# Patient Record
Sex: FEMALE | Race: WHITE | Employment: PART TIME | URBAN - METROPOLITAN AREA
[De-identification: names, ages, dates, MRNs, and addresses within clinical notes are randomized per-mention and may not be internally consistent; named-entity substitution may affect disease eponyms.]

---

## 2019-01-22 PROBLEM — F34.1 DYSTHYMIC DISORDER: Status: ACTIVE | Noted: 2019-01-22

## 2019-06-24 ENCOUNTER — HOSPITAL ENCOUNTER (OUTPATIENT)
Dept: GENERAL RADIOLOGY | Age: 54
Discharge: HOME | End: 2019-06-24
Attending: FAMILY MEDICINE
Payer: COMMERCIAL

## 2019-06-24 DIAGNOSIS — M54.10 RADICULAR LOW BACK PAIN: ICD-10-CM

## 2019-06-24 PROCEDURE — 72100 X-RAY EXAM L-S SPINE 2/3 VWS: CPT

## 2019-06-26 PROBLEM — N95.1 MENOPAUSAL SYNDROME: Chronic | Status: ACTIVE | Noted: 2019-06-26

## 2019-06-26 PROBLEM — E78.5 HYPERLIPIDEMIA ASSOCIATED WITH TYPE 2 DIABETES MELLITUS (HCC): Chronic | Status: ACTIVE | Noted: 2019-06-26

## 2019-06-26 PROBLEM — M47.817 OSTEOARTHRITIS OF LUMBOSACRAL SPINE: Status: ACTIVE | Noted: 2019-06-26

## 2019-06-26 PROBLEM — E11.69 HYPERLIPIDEMIA ASSOCIATED WITH TYPE 2 DIABETES MELLITUS (HCC): Chronic | Status: ACTIVE | Noted: 2019-06-26

## 2019-06-27 PROBLEM — R03.0 ELEVATED BP WITHOUT DIAGNOSIS OF HYPERTENSION: Status: ACTIVE | Noted: 2019-06-27

## 2019-08-12 LAB — HIV AG/AB: NORMAL

## 2019-11-14 ENCOUNTER — HOSPITAL ENCOUNTER (OUTPATIENT)
Dept: MAMMOGRAPHY | Age: 54
Discharge: HOME | End: 2019-11-14
Attending: FAMILY MEDICINE
Payer: COMMERCIAL

## 2019-11-14 DIAGNOSIS — Z12.39 BREAST CANCER SCREENING: ICD-10-CM

## 2019-11-14 PROCEDURE — 77067 SCR MAMMO BI INCL CAD: CPT

## 2022-03-18 PROBLEM — E11.69 HYPERLIPIDEMIA ASSOCIATED WITH TYPE 2 DIABETES MELLITUS (HCC): Status: ACTIVE | Noted: 2019-06-26

## 2022-03-18 PROBLEM — R03.0 ELEVATED BP WITHOUT DIAGNOSIS OF HYPERTENSION: Status: ACTIVE | Noted: 2019-06-27

## 2022-03-18 PROBLEM — E78.5 HYPERLIPIDEMIA ASSOCIATED WITH TYPE 2 DIABETES MELLITUS (HCC): Status: ACTIVE | Noted: 2019-06-26

## 2022-03-18 PROBLEM — M47.817 OSTEOARTHRITIS OF LUMBOSACRAL SPINE: Status: ACTIVE | Noted: 2019-06-26

## 2022-03-19 PROBLEM — F34.1 DYSTHYMIC DISORDER: Status: ACTIVE | Noted: 2019-01-22

## 2022-03-20 PROBLEM — N95.1 MENOPAUSAL SYNDROME: Status: ACTIVE | Noted: 2019-06-26

## 2022-04-29 PROBLEM — E66.3 OVERWEIGHT (BMI 25.0-29.9): Status: ACTIVE | Noted: 2022-04-29

## 2022-04-29 PROBLEM — R07.9 CHEST PAIN: Status: ACTIVE | Noted: 2022-04-29

## 2022-05-06 PROBLEM — I10 HYPERTENSION: Status: ACTIVE | Noted: 2022-05-06

## 2022-05-09 ENCOUNTER — HOSPITAL ENCOUNTER (OUTPATIENT)
Dept: LAB | Age: 57
Discharge: HOME | End: 2022-05-09

## 2022-05-09 DIAGNOSIS — R94.39 ABNORMAL CARDIOVASCULAR STRESS TEST: ICD-10-CM

## 2022-05-09 LAB
ANION GAP SERPL CALC-SCNC: 3 MMOL/L (ref 7–16)
BASOPHILS # BLD: 0.1 K/UL (ref 0–0.2)
BASOPHILS NFR BLD: 1 % (ref 0–2)
BUN SERPL-MCNC: 12 MG/DL (ref 6–23)
CALCIUM SERPL-MCNC: 8.7 MG/DL (ref 8.3–10.4)
CHLORIDE SERPL-SCNC: 105 MMOL/L (ref 98–107)
CO2 SERPL-SCNC: 28 MMOL/L (ref 21–32)
CREAT SERPL-MCNC: 0.7 MG/DL (ref 0.6–1)
DIFFERENTIAL METHOD BLD: NORMAL
EOSINOPHIL # BLD: 0.1 K/UL (ref 0–0.8)
EOSINOPHIL NFR BLD: 2 % (ref 0.5–7.8)
ERYTHROCYTE [DISTWIDTH] IN BLOOD BY AUTOMATED COUNT: 12.3 % (ref 11.9–14.6)
GLUCOSE SERPL-MCNC: 230 MG/DL (ref 65–100)
HCT VFR BLD AUTO: 39 % (ref 35.8–46.3)
HGB BLD-MCNC: 13.3 G/DL (ref 11.7–15.4)
IMM GRANULOCYTES # BLD AUTO: 0 K/UL (ref 0–0.5)
IMM GRANULOCYTES NFR BLD AUTO: 0 % (ref 0–5)
INR PPP: 1.1
LYMPHOCYTES # BLD: 2.4 K/UL (ref 0.5–4.6)
LYMPHOCYTES NFR BLD: 35 % (ref 13–44)
MCH RBC QN AUTO: 28.1 PG (ref 26.1–32.9)
MCHC RBC AUTO-ENTMCNC: 34.1 G/DL (ref 31.4–35)
MCV RBC AUTO: 82.3 FL (ref 79.6–97.8)
MONOCYTES # BLD: 0.5 K/UL (ref 0.1–1.3)
MONOCYTES NFR BLD: 8 % (ref 4–12)
NEUTS SEG # BLD: 3.8 K/UL (ref 1.7–8.2)
NEUTS SEG NFR BLD: 54 % (ref 43–78)
NRBC # BLD: 0 K/UL (ref 0–0.2)
PLATELET # BLD AUTO: 213 K/UL (ref 150–450)
PMV BLD AUTO: 10.5 FL (ref 9.4–12.3)
POTASSIUM SERPL-SCNC: 4.3 MMOL/L (ref 3.5–5.1)
PROTHROMBIN TIME: 14.3 SEC (ref 12.6–14.5)
RBC # BLD AUTO: 4.74 M/UL (ref 4.05–5.2)
SODIUM SERPL-SCNC: 136 MMOL/L (ref 136–145)
WBC # BLD AUTO: 7 K/UL (ref 4.3–11.1)

## 2022-05-09 PROCEDURE — 85610 PROTHROMBIN TIME: CPT

## 2022-05-09 PROCEDURE — 80048 BASIC METABOLIC PNL TOTAL CA: CPT

## 2022-05-09 PROCEDURE — 85025 COMPLETE CBC W/AUTO DIFF WBC: CPT

## 2022-05-09 PROCEDURE — 36415 COLL VENOUS BLD VENIPUNCTURE: CPT

## 2022-05-10 ENCOUNTER — HOSPITAL ENCOUNTER (OUTPATIENT)
Dept: MAMMOGRAPHY | Age: 57
Discharge: HOME | End: 2022-05-10
Attending: INTERNAL MEDICINE

## 2022-05-10 DIAGNOSIS — Z12.31 SCREENING MAMMOGRAM, ENCOUNTER FOR: ICD-10-CM

## 2022-05-10 PROCEDURE — 77067 SCR MAMMO BI INCL CAD: CPT

## 2022-05-11 NOTE — PROGRESS NOTES
Called to pre-assess for Mario Knight , Scheduled LHC. No answer & message left for pt to arrive @ 1200. NPO after MN except for 81mg ASA x4, bp meds.   Instructed to take half dose of insulin tonight and none in AM

## 2022-05-12 ENCOUNTER — HOSPITAL ENCOUNTER (OUTPATIENT)
Age: 57
Setting detail: OUTPATIENT SURGERY
Discharge: HOME | End: 2022-05-12
Attending: INTERNAL MEDICINE | Admitting: INTERNAL MEDICINE

## 2022-05-12 ENCOUNTER — APPOINTMENT (OUTPATIENT)
Dept: NON INVASIVE DIAGNOSTICS | Age: 57
End: 2022-05-12
Attending: INTERNAL MEDICINE

## 2022-05-12 VITALS
OXYGEN SATURATION: 94 % | DIASTOLIC BLOOD PRESSURE: 63 MMHG | HEART RATE: 80 BPM | SYSTOLIC BLOOD PRESSURE: 97 MMHG | TEMPERATURE: 98 F

## 2022-05-12 DIAGNOSIS — R94.39 ABNORMAL CARDIOVASCULAR STRESS TEST: ICD-10-CM

## 2022-05-12 LAB
ATRIAL RATE: 80 BPM
CALCULATED P AXIS, ECG09: 57 DEGREES
CALCULATED R AXIS, ECG10: 18 DEGREES
CALCULATED T AXIS, ECG11: 61 DEGREES
DIAGNOSIS, 93000: NORMAL
ECHO AO ASC DIAM: 2.8 CM
ECHO AO ROOT DIAM: 3.1 CM
ECHO AV AREA PEAK VELOCITY: 2.9 CM2
ECHO AV AREA VTI: 3.4 CM2
ECHO AV MEAN GRADIENT: 2 MMHG
ECHO AV MEAN VELOCITY: 0.6 M/S
ECHO AV PEAK GRADIENT: 3 MMHG
ECHO AV PEAK VELOCITY: 0.9 M/S
ECHO AV VELOCITY RATIO: 0.89
ECHO AV VTI: 16.1 CM
ECHO IVC PROX: 1.7 CM
ECHO LA AREA 2C: 14.2 CM2
ECHO LA AREA 4C: 13.9 CM2
ECHO LA DIAMETER: 3.6 CM
ECHO LA MAJOR AXIS: 4.9 CM
ECHO LA MINOR AXIS: 4.7 CM
ECHO LA TO AORTIC ROOT RATIO: 1.16
ECHO LA VOL BP: 34 ML (ref 22–52)
ECHO LV E' LATERAL VELOCITY: 9 CM/S
ECHO LV E' SEPTAL VELOCITY: 7 CM/S
ECHO LV EDV A2C: 88 ML
ECHO LV EDV A4C: 94 ML
ECHO LV EJECTION FRACTION A2C: 55 %
ECHO LV EJECTION FRACTION A4C: 54 %
ECHO LV EJECTION FRACTION BIPLANE: 55 % (ref 55–100)
ECHO LV ESV A2C: 40 ML
ECHO LV ESV A4C: 43 ML
ECHO LV FRACTIONAL SHORTENING: 26 % (ref 28–44)
ECHO LV INTERNAL DIMENSION DIASTOLIC: 4.3 CM (ref 3.9–5.3)
ECHO LV INTERNAL DIMENSION SYSTOLIC: 3.2 CM
ECHO LV IVSD: 1 CM (ref 0.6–0.9)
ECHO LV MASS 2D: 123.3 G (ref 67–162)
ECHO LV POSTERIOR WALL DIASTOLIC: 0.8 CM (ref 0.6–0.9)
ECHO LV RELATIVE WALL THICKNESS RATIO: 0.37
ECHO LVOT AREA: 3.1 CM2
ECHO LVOT AV VTI INDEX: 1.09
ECHO LVOT DIAM: 2 CM
ECHO LVOT MEAN GRADIENT: 1 MMHG
ECHO LVOT PEAK GRADIENT: 3 MMHG
ECHO LVOT PEAK VELOCITY: 0.8 M/S
ECHO LVOT SV: 55.3 ML
ECHO LVOT VTI: 17.6 CM
ECHO MV A VELOCITY: 0.77 M/S
ECHO MV E DECELERATION TIME (DT): 169 MS
ECHO MV E VELOCITY: 0.84 M/S
ECHO MV E/A RATIO: 1.09
ECHO MV E/E' LATERAL: 9.33
ECHO MV E/E' RATIO (AVERAGED): 10.67
ECHO MV E/E' SEPTAL: 12
ECHO RV BASAL DIMENSION: 3.4 CM
ECHO RV INTERNAL DIMENSION: 3.4 CM
ECHO RV TAPSE: 2 CM (ref 1.7–?)
GLUCOSE BLD STRIP.AUTO-MCNC: 140 MG/DL (ref 65–100)
P-R INTERVAL, ECG05: 144 MS
Q-T INTERVAL, ECG07: 400 MS
QRS DURATION, ECG06: 70 MS
QTC CALCULATION (BEZET), ECG08: 461 MS
SERVICE CMNT-IMP: ABNORMAL
VENTRICULAR RATE, ECG03: 80 BPM

## 2022-05-12 PROCEDURE — 77030040934 HC CATH DIAG DXTERITY MEDT -A: Performed by: INTERNAL MEDICINE

## 2022-05-12 PROCEDURE — 74011000250 HC RX REV CODE- 250: Performed by: INTERNAL MEDICINE

## 2022-05-12 PROCEDURE — 99152 MOD SED SAME PHYS/QHP 5/>YRS: CPT | Performed by: INTERNAL MEDICINE

## 2022-05-12 PROCEDURE — 93458 L HRT ARTERY/VENTRICLE ANGIO: CPT | Performed by: INTERNAL MEDICINE

## 2022-05-12 PROCEDURE — C1887 CATHETER, GUIDING: HCPCS | Performed by: INTERNAL MEDICINE

## 2022-05-12 PROCEDURE — 74011250636 HC RX REV CODE- 250/636: Performed by: INTERNAL MEDICINE

## 2022-05-12 PROCEDURE — 93005 ELECTROCARDIOGRAM TRACING: CPT | Performed by: INTERNAL MEDICINE

## 2022-05-12 PROCEDURE — 82962 GLUCOSE BLOOD TEST: CPT

## 2022-05-12 PROCEDURE — 77030029997 HC DEV COM RDL R BND TELE -B: Performed by: INTERNAL MEDICINE

## 2022-05-12 PROCEDURE — 99245 OFF/OP CONSLTJ NEW/EST HI 55: CPT | Performed by: THORACIC SURGERY (CARDIOTHORACIC VASCULAR SURGERY)

## 2022-05-12 PROCEDURE — 74011000636 HC RX REV CODE- 636: Performed by: INTERNAL MEDICINE

## 2022-05-12 PROCEDURE — C1894 INTRO/SHEATH, NON-LASER: HCPCS | Performed by: INTERNAL MEDICINE

## 2022-05-12 PROCEDURE — 77030016699 HC CATH ANGI DX INFN1 CARD -A: Performed by: INTERNAL MEDICINE

## 2022-05-12 PROCEDURE — C1769 GUIDE WIRE: HCPCS | Performed by: INTERNAL MEDICINE

## 2022-05-12 PROCEDURE — 93306 TTE W/DOPPLER COMPLETE: CPT

## 2022-05-12 RX ORDER — NALOXONE HYDROCHLORIDE 0.4 MG/ML
0.4 INJECTION, SOLUTION INTRAMUSCULAR; INTRAVENOUS; SUBCUTANEOUS AS NEEDED
Status: CANCELLED | OUTPATIENT
Start: 2022-05-12

## 2022-05-12 RX ORDER — SODIUM CHLORIDE 9 MG/ML
75 INJECTION, SOLUTION INTRAVENOUS CONTINUOUS
Status: DISCONTINUED | OUTPATIENT
Start: 2022-05-12 | End: 2022-05-12 | Stop reason: HOSPADM

## 2022-05-12 RX ORDER — MIDAZOLAM HYDROCHLORIDE 1 MG/ML
INJECTION, SOLUTION INTRAMUSCULAR; INTRAVENOUS AS NEEDED
Status: DISCONTINUED | OUTPATIENT
Start: 2022-05-12 | End: 2022-05-12 | Stop reason: HOSPADM

## 2022-05-12 RX ORDER — SODIUM CHLORIDE 9 MG/ML
75 INJECTION, SOLUTION INTRAVENOUS CONTINUOUS
Status: CANCELLED | OUTPATIENT
Start: 2022-05-12

## 2022-05-12 RX ORDER — SODIUM CHLORIDE 0.9 % (FLUSH) 0.9 %
5-40 SYRINGE (ML) INJECTION AS NEEDED
Status: CANCELLED | OUTPATIENT
Start: 2022-05-12

## 2022-05-12 RX ORDER — ACETAMINOPHEN 325 MG/1
650 TABLET ORAL
Status: CANCELLED | OUTPATIENT
Start: 2022-05-12

## 2022-05-12 RX ORDER — GUAIFENESIN 100 MG/5ML
324 LIQUID (ML) ORAL ONCE
Status: DISCONTINUED | OUTPATIENT
Start: 2022-05-12 | End: 2022-05-12 | Stop reason: HOSPADM

## 2022-05-12 RX ORDER — FENTANYL CITRATE 50 UG/ML
INJECTION, SOLUTION INTRAMUSCULAR; INTRAVENOUS AS NEEDED
Status: DISCONTINUED | OUTPATIENT
Start: 2022-05-12 | End: 2022-05-12 | Stop reason: HOSPADM

## 2022-05-12 RX ORDER — HYDROCODONE BITARTRATE AND ACETAMINOPHEN 5; 325 MG/1; MG/1
1 TABLET ORAL
Status: CANCELLED | OUTPATIENT
Start: 2022-05-12

## 2022-05-12 RX ORDER — LIDOCAINE HYDROCHLORIDE 10 MG/ML
INJECTION INFILTRATION; PERINEURAL AS NEEDED
Status: DISCONTINUED | OUTPATIENT
Start: 2022-05-12 | End: 2022-05-12 | Stop reason: HOSPADM

## 2022-05-12 RX ORDER — ONDANSETRON 2 MG/ML
4 INJECTION INTRAMUSCULAR; INTRAVENOUS
Status: CANCELLED | OUTPATIENT
Start: 2022-05-12 | End: 2022-05-13

## 2022-05-12 RX ORDER — HEPARIN SODIUM 200 [USP'U]/100ML
INJECTION, SOLUTION INTRAVENOUS
Status: COMPLETED | OUTPATIENT
Start: 2022-05-12 | End: 2022-05-12

## 2022-05-12 RX ORDER — MORPHINE SULFATE 4 MG/ML
1 INJECTION INTRAVENOUS
Status: CANCELLED | OUTPATIENT
Start: 2022-05-12

## 2022-05-12 RX ORDER — SODIUM CHLORIDE 0.9 % (FLUSH) 0.9 %
5-40 SYRINGE (ML) INJECTION EVERY 8 HOURS
Status: CANCELLED | OUTPATIENT
Start: 2022-05-12

## 2022-05-12 RX ADMIN — SODIUM CHLORIDE 75 ML/HR: 900 INJECTION, SOLUTION INTRAVENOUS at 13:25

## 2022-05-12 NOTE — PROGRESS NOTES
Complete amount of air removed from TR band with no bleeding or swelling noted. TR band removed and gauze dressing applied over site. Reinforced to patient  importance of no pushing, pulling or heavy lifting with affected arm. IV sites removed and pt assisted OOB. Tolerated well. Assisted OOB & ambulated to BR & on unit. Tolerated activity without difficulty. Right wrist site without bleeding or hematoma. Discharge instructions reviewed with patient including post cath care and medications side effects. Discussed thoroughly follow up appts with CVsurgery and cardiologist. Time allowed for questions and answers. Pt discharged via wheelchair with RN.

## 2022-05-12 NOTE — PROGRESS NOTES
Report received from [address] [place] [address]. Procedural findings communicated. Intra procedural  medication administration reviewed. Progression of care discussed.      Patient received into CPRU [place] [facility] post sheath removal.     [facility] with Dr Hortensia Fitzpatrick  No interventions  CV Surgery consult  R Radial  TR band at 12mL  Versed 2mg  Fentanyl 50mcg  Heparin 5000 units    Access site without bleeding or swelling yes    Dressing dry and intact yes    Patient instructed to limit movement to right upper extremity    Routine post procedural vital signs and site assessment initiated yes

## 2022-05-12 NOTE — PROGRESS NOTES
Patient received to [address] [place] [address] room # 16  Ambulatory from [facility]. Patient scheduled for [facility] today with Dr Mary Sheikh. Procedure reviewed & questions answered, voiced good understanding consent obtained & placed on chart. All medications and medical history reviewed. Will prep patient per orders. Patient & family updated on plan of care. The patient has a fraility score of 3-MANAGING WELL, based on ambulation.     324mg of Aspirin taken at 0500

## 2022-05-12 NOTE — PROGRESS NOTES
Nemesio Hobbs. MD Susy García. Adele Michel MD            Consult Note    MINISTRY [facility] Gregory Castellano         5/6/2022 1965    REFERRING PHYSICIAN:  Dr. Jackie Gonzalez:  The patient is a 64 y.o. female who was seen in the office by Dr Fay Barros. She had noted some right sided chest discomfort and JOAQUIN. JOAQUIN was more noticeable with climbing stairs. She underwent a nuclear stress test that showed basal inferolateral, mid inferior, mid inferolateral and apical ischemia. LHC was planned. She underwent cardiac catheterization today that showed severe multivessel disease with an occluded LAD and subtotally occluded LCx. Echocardiogram showed a normal LV EF with no significant valvular disease. Risk factors include DM, HTN, dyslipidemia    ** No history of stroke, TIA, prior cardiac surgery, prior vascular surgery, anesthetic complication, lung disease, DVT or PE, GI bleeding       Past Medical History:   Diagnosis Date    Anxiety 11/28/2012    DM (diabetes mellitus) ([facility] [address] 11/28/2012    Hypertension        History reviewed. No pertinent surgical history.     Family History   Problem Relation Age of Onset    Diabetes Father        Social History     Socioeconomic History    Marital status:      Spouse name: Not on file    Number of children: Not on file    Years of education: Not on file    Highest education level: Not on file   Occupational History    Not on file   Tobacco Use    Smoking status: Never Smoker    Smokeless tobacco: Never Used   Substance and Sexual Activity    Alcohol use: Yes     Comment: occ    Drug use: No    Sexual activity: Not on file   Other Topics Concern    Not on file   Social History Narrative    Not on file     Social Determinants of Health     Financial Resource Strain:     Difficulty of Paying Living Expenses: Not on file   Food Insecurity:     Worried About Running Out of Food in the Last Year: Not on file    [address] in the Last Year: Not on file   Transportation Needs:     Lack of Transportation (Medical): Not on file    Lack of Transportation (Non-Medical): Not on file   Physical Activity:     Days of Exercise per Week: Not on file    Minutes of Exercise per Session: Not on file   Stress:     Feeling of Stress : Not on file   Social Connections:     Frequency of Communication with Friends and Family: Not on file    Frequency of Social Gatherings with Friends and Family: Not on file    Attends Oriental orthodox Services: Not on file    Active Member of [address] [place] [address] [email or web address] or Organizations: Not on file    Attends Club or Organization Meetings: Not on file    Marital Status: Not on file   Intimate Partner Violence:     Fear of Current or Ex-Partner: Not on file    Emotionally Abused: Not on file    Physically Abused: Not on file    Sexually Abused: Not on file   Housing Stability:     Unable to Pay for Housing in the Last Year: Not on file    Number of [place] in the Last Year: Not on file    Unstable Housing in the Last Year: Not on file       No Known Allergies    No current facility-administered medications on file prior to encounter. Current Outpatient Medications on File Prior to Encounter   Medication Sig Dispense Refill    lisinopriL (PRINIVIL, ZESTRIL) 20 mg tablet Take 1 Tablet by mouth daily. 90 Tablet 1    aspirin delayed-release 81 mg tablet Take 1 Tablet by mouth daily. 1 Tablet 0    citalopram (CELEXA) 20 mg tablet Take 1 Tablet by mouth daily for 30 days. 30 Tablet 5    atorvastatin (LIPITOR) 20 mg tablet Take 1 Tablet by mouth daily. 90 Tablet 1    insulin NPH (HumuLIN N NPH Insulin KwikPen) 100 unit/mL (3 mL) inpn 15 units qam, 7 units pm before meals 3 Pen 3    OZEMPIC 0.25 mg or 0.5 mg(2 mg/1.5 mL) sub-q pen 0.5 mg by SubCUTAneous route every seven (7) days.  1 Box 3    Blood-Glucose Meter (ONETOUCH VERIO IQ METER) monitoring kit Use twice daily as directed for management of T2DM uncontrolled (E11.65) 1 Kit 0    glucose blood VI test strips (ONETOUCH VERIO) strip Use twice daily as directed for management of T2DM uncontrolled (E11.65) 200 Strip 3    lancets (ONETOUCH ULTRASOFT LANCETS) misc Use twice daily as directed for management of T2DM uncontrolled (E11.65) 200 Each 3    raloxifene (EVISTA) 60 mg tablet Take 1 Tab by mouth daily. (Patient not taking: Reported on 4/27/2022) 30 Tab 11       REVIEW OF SYSTEMS:  Review of Systems   Constitutional: Negative for chills, fever, malaise/fatigue, weight gain and weight loss. HENT: Negative for ear pain, hearing loss, nosebleeds, sore throat and tinnitus. Eyes: Negative for blurred vision, vision loss in left eye and vision loss in right eye. Cardiovascular: Positive for chest pain and dyspnea on exertion. Negative for leg swelling, near-syncope, orthopnea, palpitations, paroxysmal nocturnal dyspnea and syncope. Respiratory: Negative for cough, hemoptysis, shortness of breath, sputum production and wheezing. Endocrine: Negative for cold intolerance, heat intolerance and polydipsia. Hematologic/Lymphatic: Does not bruise/bleed easily. Skin: Negative for color change and rash. Musculoskeletal: Negative for back pain, joint pain, joint swelling and myalgias. Gastrointestinal: Negative for abdominal pain, constipation, diarrhea, dysphagia, heartburn, hematemesis, melena, nausea and vomiting. Genitourinary: Negative for dysuria, frequency, hematuria and urgency. Neurological: Negative for difficulty with concentration, dizziness, headaches, light-headedness, numbness, paresthesias, seizures, vertigo and weakness. Psychiatric/Behavioral: Negative for altered mental status and depression.        Physical Exam  Vitals:    05/12/22 1544 05/12/22 1559 05/12/22 1614 05/12/22 1629   BP: 120/78 114/68 123/85 126/75   Pulse: 94 84 82 87   Temp:       SpO2: 94% 95% 95% 96%       Physical Exam:  General: Well Developed, Well Nourished, No Acute Distress  HEENT: Normocephalic, pupils equal and round, no scleral icterus  Neck: supple, no JVD  Chest wall: No deformity  Heart: S1S2 with RRR without murmurs or gallops  Lungs: Clear throughout auscultation bilaterally without adventitious sounds  Abd: soft, nontender, nondistended, with good bowel sounds, no pulsatile masses  Ext: warm, no edema, calves supple/nontender, pulses 2+ bilaterally  Skin: warm and dry, no rashes, cyanosis, jaundice, ecchymoses or evidence of skin breakdown  Psychiatric: Normal mood and affect  Neurologic: Alert and oriented X 3, no focal deficit noted    Labs:     Lab Results   Component Value Date/Time    Cholesterol, total 217 (H) 04/27/2022 03:48 PM    HDL Cholesterol 40 04/27/2022 03:48 PM    LDL, calculated 131 (H) 04/27/2022 03:48 PM    LDL, calculated 164 (H) 06/19/2019 11:00 AM    VLDL, calculated 46 (H) 04/27/2022 03:48 PM    VLDL, calculated 34 06/19/2019 11:00 AM    Triglyceride 259 (H) 04/27/2022 03:48 PM       Assessment:     Principal Problem:    Chest pain (4/29/2022)    CAD    DM    Hypertension    Dyslipidemia     Plan:     Yue Kang is to see preoperative teaching film that thoroughly discusses procedure, risks, and possible complications. Risks, benefits, and alternatives were discussed to include, but not limited to:     1. Bleeding  2. Arrhythmia   3. Infection including mediastinitis  4. Myocardial infarction  5. Need for reoperation  6. Renal failure  7. Respiratory failure  8. Stroke  9. Potential death      Cardiac catheterization films, echocardiogram, and labs reviewed. The patient has NYHA Class III symptoms. The mortality risk for CABG surgery is 0.418%. She agrees to proceed with CABG surgery.        Amelia Sorensen MD

## 2022-05-12 NOTE — DISCHARGE INSTRUCTIONS
Cardiac Catheterization/Angiography Discharge Instructions    *Check the puncture site frequently for swelling or bleeding. If you see any bleeding, lie down and apply pressure over the area with a clean town or washcloth. Notify your doctor for any redness, swelling, drainage or oozing from the puncture site. Notify your doctor for any fever or chills. *If the leg or arm with the puncture becomes cold, numb or painful, call Dr Tyler Payne at  [phone]    *Activity should be limited for the next 48 hours. Climb stairs as little as possible and avoid any stooping, bending or strenuous activity for 48 hours. No heavy lifting (anything over 10 pounds) for three days. *Do not drive for 48 hours. *You may resume your usual diet. Drink more fluids than usual.    *Have a responsible person drive you home and stay with you for at least 24 hours after your heart catheterization/angiography. *You may remove the bandage from your Right and Arm in 24 hours. You may shower in 24 hours. No tub baths, hot tubs or swimming for one week. Do not place any lotions, creams, powders, ointments over the puncture site for one week. You may place a clean band-aid over the puncture site each day for 5 days. Change this daily.

## 2022-05-12 NOTE — PROGRESS NOTES
TRANSFER - OUT REPORT:    Verbal report given to Ammy Berg RN(name) on [facility]  being transferred to CPRU(unit) for routine progression of care       Report consisted of patients Situation, Background, Assessment and   Recommendations(SBAR). Information from the following report(s) SBAR was reviewed with the receiving nurse.     [facility] with Dr Christin Schwab  No interventions  CV Surgery consult  R Radial  TR band at 12mL  Versed 2mg  Fentanyl 50mcg  Heparin 5000 units

## 2022-05-12 NOTE — PROGRESS NOTES
Abner Williamson. MD Alanna Agustin. Sascha Miller MD           MANAGEMENT PLAN    Chiara Mackay         5/6/2022 1965    REFERRING PHYSICIAN:  Dr. Katherine Snyder:  The patient is a 64 y.o. female who was seen in the office by Dr Osmel Smiley. She had noted some right sided chest discomfort and JOAQUIN. JOAQUIN was more noticeable with climbing stairs. She underwent a nuclear stress test that showed basal inferolateral, mid inferior, mid inferolateral and apical ischemia. LHC was planned. She underwent cardiac catheterization today that showed severe multivessel disease with an occluded LAD and subtotally occluded LCx. Echocardiogram showed a normal LV EF with no significant valvular disease. Risk factors include DM, HTN, dyslipidemia    ** No history of stroke, TIA, prior cardiac surgery, prior vascular surgery, anesthetic complication, lung disease, DVT or PE, GI bleeding       Past Medical History:   Diagnosis Date    Anxiety 11/28/2012    DM (diabetes mellitus) ([facility] [address] 11/28/2012    Hypertension        History reviewed. No pertinent surgical history.     Family History   Problem Relation Age of Onset    Diabetes Father        Social History     Socioeconomic History    Marital status:      Spouse name: Not on file    Number of children: Not on file    Years of education: Not on file    Highest education level: Not on file   Occupational History    Not on file   Tobacco Use    Smoking status: Never Smoker    Smokeless tobacco: Never Used   Substance and Sexual Activity    Alcohol use: Yes     Comment: occ    Drug use: No    Sexual activity: Not on file   Other Topics Concern    Not on file   Social History Narrative    Not on file     Social Determinants of Health     Financial Resource Strain:     Difficulty of Paying Living Expenses: Not on file   Food Insecurity:     Worried About Running Out of Food in the Last Year: Not on file    [address] in the Last Year: Not on file   Transportation Needs:     Lack of Transportation (Medical): Not on file    Lack of Transportation (Non-Medical): Not on file   Physical Activity:     Days of Exercise per Week: Not on file    Minutes of Exercise per Session: Not on file   Stress:     Feeling of Stress : Not on file   Social Connections:     Frequency of Communication with Friends and Family: Not on file    Frequency of Social Gatherings with Friends and Family: Not on file    Attends [employer] Services: Not on file    Active Member of [address] [place] [address] [employer] or Organizations: Not on file    Attends Club or Organization Meetings: Not on file    Marital Status: Not on file   Intimate Partner Violence:     Fear of Current or Ex-Partner: Not on file    Emotionally Abused: Not on file    Physically Abused: Not on file    Sexually Abused: Not on file   Housing Stability:     Unable to Pay for Housing in the Last Year: Not on file    Number of [place] in the Last Year: Not on file    Unstable Housing in the Last Year: Not on file       No Known Allergies    No current facility-administered medications on file prior to encounter. Current Outpatient Medications on File Prior to Encounter   Medication Sig Dispense Refill    lisinopriL (PRINIVIL, ZESTRIL) 20 mg tablet Take 1 Tablet by mouth daily. 90 Tablet 1    aspirin delayed-release 81 mg tablet Take 1 Tablet by mouth daily. 1 Tablet 0    citalopram (CELEXA) 20 mg tablet Take 1 Tablet by mouth daily for 30 days. 30 Tablet 5    atorvastatin (LIPITOR) 20 mg tablet Take 1 Tablet by mouth daily. 90 Tablet 1    insulin NPH (HumuLIN N NPH Insulin KwikPen) 100 unit/mL (3 mL) inpn 15 units qam, 7 units pm before meals 3 Pen 3    OZEMPIC 0.25 mg or 0.5 mg(2 mg/1.5 mL) sub-q pen 0.5 mg by SubCUTAneous route every seven (7) days.  1 Box 3    Blood-Glucose Meter (ONETOUCH VERIO IQ METER) monitoring kit Use twice daily as directed for management of T2DM uncontrolled (E11.65) 1 Kit 0    glucose blood VI test strips (ONETOUCH VERIO) strip Use twice daily as directed for management of T2DM uncontrolled (E11.65) 200 Strip 3    lancets (ONETOUCH ULTRASOFT LANCETS) misc Use twice daily as directed for management of T2DM uncontrolled (E11.65) 200 Each 3    raloxifene (EVISTA) 60 mg tablet Take 1 Tab by mouth daily. (Patient not taking: Reported on 4/27/2022) 30 Tab 11       REVIEW OF SYSTEMS:  Review of Systems   Constitutional: Negative for chills, fever, malaise/fatigue, weight gain and weight loss. HENT: Negative for ear pain, hearing loss, nosebleeds, sore throat and tinnitus. Eyes: Negative for blurred vision, vision loss in left eye and vision loss in right eye. Cardiovascular: Positive for chest pain and dyspnea on exertion. Negative for leg swelling, near-syncope, orthopnea, palpitations, paroxysmal nocturnal dyspnea and syncope. Respiratory: Negative for cough, hemoptysis, shortness of breath, sputum production and wheezing. Endocrine: Negative for cold intolerance, heat intolerance and polydipsia. Hematologic/Lymphatic: Does not bruise/bleed easily. Skin: Negative for color change and rash. Musculoskeletal: Negative for back pain, joint pain, joint swelling and myalgias. Gastrointestinal: Negative for abdominal pain, constipation, diarrhea, dysphagia, heartburn, hematemesis, melena, nausea and vomiting. Genitourinary: Negative for dysuria, frequency, hematuria and urgency. Neurological: Negative for difficulty with concentration, dizziness, headaches, light-headedness, numbness, paresthesias, seizures, vertigo and weakness. Psychiatric/Behavioral: Negative for altered mental status and depression.        Physical Exam  Vitals:    05/12/22 1322   BP: (!) 144/84   Pulse: 81   Temp: 98 °F (36.7 °C)   SpO2: 98%       Physical Exam:  General: Well Developed, Well Nourished, No Acute Distress  HEENT: Normocephalic, pupils equal and round, no scleral icterus  Neck: supple, no JVD  Chest wall: No deformity  Heart: S1S2 with RRR without murmurs or gallops  Lungs: Clear throughout auscultation bilaterally without adventitious sounds  Abd: soft, nontender, nondistended, with good bowel sounds, no pulsatile masses  Ext: warm, no edema, calves supple/nontender, pulses 2+ bilaterally  Skin: warm and dry, no rashes, cyanosis, jaundice, ecchymoses or evidence of skin breakdown  Psychiatric: Normal mood and affect  Neurologic: Alert and oriented X 3, no focal deficit noted    Labs:     Lab Results   Component Value Date/Time    Cholesterol, total 217 (H) 04/27/2022 03:48 PM    HDL Cholesterol 40 04/27/2022 03:48 PM    LDL, calculated 131 (H) 04/27/2022 03:48 PM    LDL, calculated 164 (H) 06/19/2019 11:00 AM    VLDL, calculated 46 (H) 04/27/2022 03:48 PM    VLDL, calculated 34 06/19/2019 11:00 AM    Triglyceride 259 (H) 04/27/2022 03:48 PM       Assessment:     Principal Problem:    Chest pain (4/29/2022)    CAD    DM    Hypertension    Dyslipidemia     Plan:     Dora Philip is to see preoperative teaching film that thoroughly discusses procedure, risks, and possible complications. Risks, benefits, and alternatives were discussed to include, but not limited to:     1. Bleeding  2. Arrhythmia   3. Infection including mediastinitis  4. Myocardial infarction  5. Need for reoperation  6. Renal failure  7. Respiratory failure  8. Stroke  9. Potential death      Cardiac catheterization films, echocardiogram, and labs reviewed. The patient has NYHA Class III symptoms. The mortality risk for CABG surgery is 0.418%. She agrees to proceed with CABG surgery.        Lilian Walker PA-C

## 2022-05-12 NOTE — Clinical Note
TRANSFER - IN REPORT:     Verbal report received from: cpru RN. Report consisted of patient's Situation, Background, Assessment and   Recommendations(SBAR). Opportunity for questions and clarification was provided. Assessment completed upon patient's arrival to unit and care assumed.

## 2022-05-20 ENCOUNTER — ANESTHESIA EVENT (OUTPATIENT)
Dept: SURGERY | Age: 57
DRG: 236 | End: 2022-05-20

## 2022-05-23 ENCOUNTER — HOSPITAL ENCOUNTER (OUTPATIENT)
Dept: ULTRASOUND IMAGING | Age: 57
Discharge: HOME | End: 2022-05-26

## 2022-05-23 ENCOUNTER — HOSPITAL ENCOUNTER (OUTPATIENT)
Dept: GENERAL RADIOLOGY | Age: 57
Discharge: HOME | End: 2022-05-26

## 2022-05-23 ENCOUNTER — HOSPITAL ENCOUNTER (OUTPATIENT)
Dept: PREADMISSION TESTING | Age: 57
Discharge: HOME | DRG: 236 | End: 2022-05-26

## 2022-05-23 VITALS
DIASTOLIC BLOOD PRESSURE: 86 MMHG | BODY MASS INDEX: 29.2 KG/M2 | RESPIRATION RATE: 16 BRPM | WEIGHT: 164.8 LBS | TEMPERATURE: 97.3 F | SYSTOLIC BLOOD PRESSURE: 150 MMHG | OXYGEN SATURATION: 98 % | HEART RATE: 81 BPM | HEIGHT: 63 IN

## 2022-05-23 DIAGNOSIS — I25.709 CORONARY ARTERY DISEASE INVOLVING CORONARY BYPASS GRAFT OF NATIVE HEART WITH ANGINA PECTORIS (HCC): Primary | ICD-10-CM

## 2022-05-23 DIAGNOSIS — Z01.810 PREOP CARDIOVASCULAR EXAM: ICD-10-CM

## 2022-05-23 LAB
ALBUMIN SERPL-MCNC: 3.4 G/DL (ref 3.5–5)
ALBUMIN/GLOB SERPL: 0.8 {RATIO} (ref 1.2–3.5)
ALP SERPL-CCNC: 139 U/L (ref 50–136)
ALT SERPL-CCNC: 23 U/L (ref 12–65)
ANION GAP SERPL CALC-SCNC: 7 MMOL/L (ref 7–16)
APPEARANCE UR: CLEAR
APTT PPP: 27.3 SEC (ref 24.1–35.1)
AST SERPL-CCNC: 14 U/L (ref 15–37)
BASOPHILS # BLD: 0.1 K/UL (ref 0–0.2)
BASOPHILS NFR BLD: 1 % (ref 0–2)
BILIRUB SERPL-MCNC: 0.3 MG/DL (ref 0.2–1.1)
BILIRUB UR QL: NEGATIVE
BUN SERPL-MCNC: 18 MG/DL (ref 6–23)
CALCIUM SERPL-MCNC: 9.3 MG/DL (ref 8.3–10.4)
CHLORIDE SERPL-SCNC: 102 MMOL/L (ref 98–107)
CO2 SERPL-SCNC: 28 MMOL/L (ref 21–32)
COLOR UR: YELLOW
CREAT SERPL-MCNC: 0.74 MG/DL (ref 0.6–1)
DIFFERENTIAL METHOD BLD: NORMAL
EOSINOPHIL # BLD: 0.1 K/UL (ref 0–0.8)
EOSINOPHIL NFR BLD: 2 % (ref 0.5–7.8)
ERYTHROCYTE [DISTWIDTH] IN BLOOD BY AUTOMATED COUNT: 12.4 % (ref 11.9–14.6)
EST. AVERAGE GLUCOSE BLD GHB EST-MCNC: 283 MG/DL
GLOBULIN SER CALC-MCNC: 4.2 G/DL (ref 2.3–3.5)
GLUCOSE BLD STRIP.AUTO-MCNC: 309 MG/DL (ref 65–100)
GLUCOSE SERPL-MCNC: 297 MG/DL (ref 65–100)
GLUCOSE UR STRIP.AUTO-MCNC: >1000 MG/DL
HBA1C MFR BLD: 11.5 % (ref 4.2–6.3)
HCT VFR BLD AUTO: 40.2 % (ref 35.8–46.3)
HGB BLD-MCNC: 13.7 G/DL (ref 11.7–15.4)
HGB UR QL STRIP: NEGATIVE
HISTORY CHECK: NORMAL
IMM GRANULOCYTES # BLD AUTO: 0 K/UL (ref 0–0.5)
IMM GRANULOCYTES NFR BLD AUTO: 0 % (ref 0–5)
INR PPP: 1
KETONES UR QL STRIP.AUTO: NEGATIVE MG/DL
LEUKOCYTE ESTERASE UR QL STRIP.AUTO: NEGATIVE
LYMPHOCYTES # BLD: 2.7 K/UL (ref 0.5–4.6)
LYMPHOCYTES NFR BLD: 39 % (ref 13–44)
MAGNESIUM SERPL-MCNC: 2 MG/DL (ref 1.8–2.4)
MCH RBC QN AUTO: 28.6 PG (ref 26.1–32.9)
MCHC RBC AUTO-ENTMCNC: 34.1 G/DL (ref 31.4–35)
MCV RBC AUTO: 83.9 FL (ref 79.6–97.8)
MONOCYTES # BLD: 0.5 K/UL (ref 0.1–1.3)
MONOCYTES NFR BLD: 7 % (ref 4–12)
NEUTS SEG # BLD: 3.6 K/UL (ref 1.7–8.2)
NEUTS SEG NFR BLD: 51 % (ref 43–78)
NITRITE UR QL STRIP.AUTO: NEGATIVE
NRBC # BLD: 0 K/UL (ref 0–0.2)
PH UR STRIP: 5.5 [PH] (ref 5–9)
PLATELET # BLD AUTO: 219 K/UL (ref 150–450)
PMV BLD AUTO: 10.8 FL (ref 9.4–12.3)
POTASSIUM SERPL-SCNC: 4.3 MMOL/L (ref 3.5–5.1)
PROT SERPL-MCNC: 7.6 G/DL (ref 6.3–8.2)
PROT UR STRIP-MCNC: NEGATIVE MG/DL
PROTHROMBIN TIME: 13.3 SEC (ref 12.6–14.5)
RBC # BLD AUTO: 4.79 M/UL (ref 4.05–5.2)
SERVICE CMNT-IMP: ABNORMAL
SODIUM SERPL-SCNC: 137 MMOL/L (ref 136–145)
SP GR UR REFRACTOMETRY: 1.03 (ref 1–1.02)
UROBILINOGEN UR QL STRIP.AUTO: 1 EU/DL (ref 0.2–1)
WBC # BLD AUTO: 7.1 K/UL (ref 4.3–11.1)

## 2022-05-23 PROCEDURE — 80053 COMPREHEN METABOLIC PANEL: CPT

## 2022-05-23 PROCEDURE — 86901 BLOOD TYPING SEROLOGIC RH(D): CPT

## 2022-05-23 PROCEDURE — 93880 EXTRACRANIAL BILAT STUDY: CPT

## 2022-05-23 PROCEDURE — 83036 HEMOGLOBIN GLYCOSYLATED A1C: CPT

## 2022-05-23 PROCEDURE — 82962 GLUCOSE BLOOD TEST: CPT

## 2022-05-23 PROCEDURE — 83735 ASSAY OF MAGNESIUM: CPT

## 2022-05-23 PROCEDURE — 87086 URINE CULTURE/COLONY COUNT: CPT

## 2022-05-23 PROCEDURE — 86920 COMPATIBILITY TEST SPIN: CPT

## 2022-05-23 PROCEDURE — 85610 PROTHROMBIN TIME: CPT

## 2022-05-23 PROCEDURE — 85730 THROMBOPLASTIN TIME PARTIAL: CPT

## 2022-05-23 PROCEDURE — 71046 X-RAY EXAM CHEST 2 VIEWS: CPT

## 2022-05-23 PROCEDURE — 85025 COMPLETE CBC W/AUTO DIFF WBC: CPT

## 2022-05-23 PROCEDURE — 81003 URINALYSIS AUTO W/O SCOPE: CPT

## 2022-05-23 RX ORDER — SODIUM CHLORIDE 9 MG/ML
INJECTION, SOLUTION INTRAVENOUS PRN
Status: DISCONTINUED | OUTPATIENT
Start: 2022-05-23 | End: 2022-05-27 | Stop reason: HOSPADM

## 2022-05-23 RX ORDER — ASPIRIN 81 MG/1
81 TABLET ORAL NIGHTLY
COMMUNITY

## 2022-05-23 RX ORDER — ATORVASTATIN CALCIUM 20 MG/1
20 TABLET, FILM COATED ORAL NIGHTLY
Status: ON HOLD | COMMUNITY
End: 2022-06-01 | Stop reason: SDUPTHER

## 2022-05-23 NOTE — PERIOP NOTE
Results for Luis Enrique Acevedo (MRN [id number]) as of 5/23/2022 16:14   Ref.  Range 5/23/2022 09:27   Sodium Latest Ref Range: 136 - 145 mmol/L 137   Potassium Latest Ref Range: 3.5 - 5.1 mmol/L 4.3   Chloride Latest Ref Range: 98 - 107 mmol/L 102   CO2 Latest Ref Range: 21 - 32 mmol/L 28   BUN,BUNPL Latest Ref Range: 6 - 23 MG/DL 18   Creatinine Latest Ref Range: 0.6 - 1.0 MG/DL 0.74   Anion Gap Latest Ref Range: 7 - 16 mmol/L 7   GFR Non- Latest Ref Range: >60 ml/min/1.73m2 >60   GFR African American Latest Ref Range: >60 ml/min/1.73m2 >60   Magnesium Latest Ref Range: 1.8 - 2.4 mg/dL 2.0   GLUCOSE, FASTING,GF Latest Ref Range: 65 - 100 mg/dL 297 (H)   CALCIUM, SERUM, 135492 Latest Ref Range: 8.3 - 10.4 MG/DL 9.3   ALBUMIN/GLOBULIN RATIO Latest Ref Range: 1.2 - 3.5   0.8 (L)   Total Protein Latest Ref Range: 6.3 - 8.2 g/dL 7.6   Albumin Latest Ref Range: 3.5 - 5.0 g/dL 3.4 (L)   Globulin Latest Ref Range: 2.3 - 3.5 g/dL 4.2 (H)   Alk Phosphatase Latest Ref Range: 50 - 136 U/L 139 (H)   ALT Latest Ref Range: 12 - 65 U/L 23   AST Latest Ref Range: 15 - 37 U/L 14 (L)   Bilirubin Latest Ref Range: 0.2 - 1.1 MG/DL 0.3   Hemoglobin A1C Latest Ref Range: 4.20 - 6.30 % 11.5 (H)   eAG (mg/dL) Latest Units: mg/dL 283   WBC Latest Ref Range: 4.3 - 11.1 K/uL 7.1   RBC Latest Ref Range: 4.05 - 5.2 M/uL 4.79   Hemoglobin Quant Latest Ref Range: 11.7 - 15.4 g/dL 13.7   Hematocrit Latest Ref Range: 35.8 - 46.3 % 40.2   MCV Latest Ref Range: 79.6 - 97.8 FL 83.9   MCH Latest Ref Range: 26.1 - 32.9 PG 28.6   MCHC Latest Ref Range: 31.4 - 35.0 g/dL 34.1   MPV Latest Ref Range: 9.4 - 12.3 FL 10.8   RDW Latest Ref Range: 11.9 - 14.6 % 12.4   Platelet Count Latest Ref Range: 150 - 450 K/uL 219   Absolute Mono # Latest Ref Range: 0.1 - 1.3 K/UL 0.5   Eosinophils % Latest Ref Range: 0.5 - 7.8 % 2   Basophils Absolute Latest Ref Range: 0.0 - 0.2 K/UL 0.1   Differential Type Latest Units:   AUTOMATED   Seg Neutrophils Latest Ref Range: 43 - 78 % 51   Segs Absolute Latest Ref Range: 1.7 - 8.2 K/UL 3.6   Lymphocytes Latest Ref Range: 13 - 44 % 39   Absolute Lymph # Latest Ref Range: 0.5 - 4.6 K/UL 2.7   Monocytes Latest Ref Range: 4.0 - 12.0 % 7   Absolute Eos # Latest Ref Range: 0.0 - 0.8 K/UL 0.1   Basophils Latest Ref Range: 0.0 - 2.0 % 1   Immature Granulocytes Latest Ref Range: 0.0 - 5.0 % 0   Nucleated Red Blood Cells Latest Ref Range: 0.0 - 0.2 K/uL 0.00   Prothrombin Time Latest Ref Range: 12.6 - 14.5 sec 13.3   INR Latest Units:   1.0   PTT Latest Ref Range: 24.1 - 35.1 SEC 27.3     Results for Martha Becker (MRN [id number]) as of 5/23/2022 16:14   Ref. Range 5/23/2022 09:28   Color, UA Latest Units:   YELLOW   Glucose, UA Latest Units: mg/dL >1000   Bilirubin, Urine Latest Ref Range: NEG   Negative   Ketones, Urine Latest Ref Range: NEG mg/dL Negative   Specific Gravity, UA Latest Ref Range: 1.001 - 1.023   1.028 (H)   Blood, Urine Latest Ref Range: NEG   Negative   Protein, UA Latest Ref Range: NEG mg/dL Negative   Urobilinogen, Urine Latest Ref Range: 0.2 - 1.0 EU/dL 1.0   Nitrite, Urine Latest Ref Range: NEG   Negative   Leukocyte Esterase, Urine Latest Ref Range: NEG   Negative   Appearance Latest Units:   CLEAR   pH, Urine Latest Ref Range: 5.0 - 9.0   5.5     Carotid US  History: Preop for CABG     Sonographic evaluation of the carotid arteries was performed bilaterally. PSV  and EDV criteria utilized for carotid artery stenosis measurements     Grayscale imaging on the right demonstrates mild plaque disease at the carotid  bulb. The velocities and ratios are unremarkable. The right vertebral artery  demonstrates antegrade flow without evidence of steal.     Grayscale imaging on the left demonstrates mild plaque disease at the carotid  bulb. The velocities and ratios are unremarkable. The left vertebral artery  demonstrates antegrade flow without evidence of steal.     IMPRESSION:  1.  Less than 50% stenosis of the right internal carotid artery. 2. Less than 50% stenosis of the left internal carotid artery. CHEST X-RAY, 2 views.       INDICATION:  Preoperative evaluation for coronary artery bypass surgery.       TECHNIQUE: PA and lateral views.       COMPARISON: None.       FINDINGS:    Lungs: are clear. Costophrenic angles: are sharp. Heart size: is normal.    Pulmonary vasculature: is unremarkable. Aorta: Unremarkable. Included portion of the upper abdomen: is unremarkable.     Bones: No gross bony lesions.       Other: None.

## 2022-05-23 NOTE — PROGRESS NOTES
Patient verified name and . Patient provided medical/health information and PTA medications to the best of their ability. TYPE  CASE: 3  Order for consent found in EHR and matches case posting. Labs per surgeon: Carotid US, CXR, CBC, APTT, T/C, UA, PT/INR, UCX, MAG, A1C, CMP; results pending  Labs per anesthesia protocol: POC Glucose; results 309-Notified Dr. Sina Lanier of BS reading and last A1C 15.4 on 2022, orders given to inform patient that surgery can be cancelled if BS is 300 or > the day of surgery, orders given for POC glucose for the day of surgery, orders given to instruct patient to take insulin as instructed by prescribing physician. Patient given these instructions and provided teaching about eating a healthy diet prior to surgery. Per Dr. Sina Lanier, she did not need to see pt today, anesthesia to assess pt the day of surgery. No other orders received. Dominic Grijalva for Dr. June Aviles also made aware. [id number]    Patient provided with and instructed on educational handouts including Heart Guide to Surgery, blood transfusions, pain management, central line infection prevention, being on a ventilator and hand hygiene for the family and community, and [place] [facility] [address] brochure. Instructed that family must be present in building at all times. Incentive spirometry completed. Patient viewed pre-operative DVD. Instructed on chest-xray procedure and carotid ultrasound. Instructed on type and cross match procedure and not to remove green blood bank bracelet. Instructed on medications- Amiodarone, Lopressor with Rx called into [facility] [address] on [address] in [place], [place]. Spoke with Raúl Fajardo. Surgical skin cleanser provided and instructions given per hospital policy. Original medication prescription bottles NOT visualized during patient appointment. Patient teach back successful and patient demonstrates knowledge of instruction.       How to Use Your Incentive Spirometer       About Your Incentive Spirometer  An incentive spirometer is a device that will expand your lungs by helping you to breathe more deeply and fully. The parts of your incentive spirometer are labeled in Figure 1. Using your incentive spirometer  When you're using your incentive spirometer, make sure to breathe through your mouth. If you breathe through your nose, the incentive spirometer won't work properly. You can hold your nose if you have trouble. DO NOT BLOW INTO THE DEVICE. If you feel dizzy at any time, stop and rest. Try again at a later time.  Sit upright in a chair or in bed. Hold the incentive spirometer at eye level.  Put the mouthpiece in your mouth and close your lips tightly around it. Slowly breathe out (exhale) completely.  Breathe in (inhale) slowly through your mouth as deeply as you can. As you take the breath, you will see the piston rise inside the large column. While the piston rises, the indicator on the right should move upwards. It should stay in between the 2 arrows (see Figure 1).  Try to get the piston as high as you can, while keeping the indicator between the arrows. If the indicator doesn't stay between the arrows, you're breathing either too fast or too slow.  When you get it as high as you can, hold your breath for 10 seconds, or as long as possible. While you're holding your breath, the piston will slowly fall to the base of the spirometer.  Once the piston reaches the bottom of the spirometer, breathe out slowly through your mouth. Rest for a few seconds.  Repeat 10 times. Try to get the piston to the same level with each breath.  After each set of 10 breaths, try to cough as coughing will help loosen or clear any mucus in your lungs.  Put the marker at the level the piston reached on your incentive spirometer. This will be your goal next time. Repeat these steps every hour that you're awake.   Cover the mouthpiece of the incentive spirometer when you aren't using it    PLEASE CONTINUE TAKING ALL PRESCRIPTION MEDICATIONS UP TO THE DAY OF SURGERY UNLESS OTHERWISE DIRECTED BELOW. DISCONTINUE all vitamins and supplements 7 days prior to surgery. DISCONTINUE Non-Steriodal Anti-Inflammatory (NSAIDS) such as Advil and Aleve 5 days prior to surgery. Home Medications to take  the day of surgery      Take Humulin 8 units the day of surgery if glucose is >120, hold if glucose is <120. Home Medications   to Hold           Comments    Hibiclens shower the night before surgery and the morning of surgery. On the day before surgery please take Acetaminophen 1000mg in the morning and then again before bed. You may substitute for Tylenol 650 mg. Metoprolol 12.5 mg and Amiodarone 600 mg will be called into your preferred pharmacy; medications will need to be taken after 4 pm the night before your surgery. Please do not remove blood bank bracelet. Day before surgery (05/24) Take Humulin- 12 units in the morning and 6 units at night     Bring incentive spirometer the day of surgery. Please do not bring home medications with you on the day of surgery unless otherwise directed by your nurse. If you are instructed to bring home medications, please give them to your nurse as they will be administered by the nursing staff. If you have any questions, please call [facility] [phone] or [facility] [phone]. A copy of this note was provided to the patient for reference.

## 2022-05-23 NOTE — PERIOP NOTE
All lab results, CXR , Carotid US reviewed and routed to surgeon's office. Abdirashid Riggs notified of A1C, and POC Glucose. UCX pending, chart flagged for follow up.

## 2022-05-23 NOTE — PROGRESS NOTES
Mupirocin 2% ointment    Prevention of Infection after surgery is a goal of your care at [facility]. Infection prevention is a team effort between you, your doctor, and the staff at [facility]. We can help prevent infection after surgery by good hygiene, hand washing and use of mupirocin ointment. You will need to use Mupirocin ointment in your nose twice daily for five days, please carefully read the instructions below and follow ALL of them exactly. How to use Mupirocin ointment:   This medicine has been approved for use in the nose. You do not need to use it anywhere else.  Do not get any of it in your eyes or on your skin. If it does get on these areas, rinse it off right away.  Before using the medicine, gently blow your nose to clear the nostrils. Apply inside each nostril with a cotton tipped applicator.  This medicine is not for long term use.  Make sure your doctor knows if you are pregnant or breast feeding.  This medicine should not be used in children under 15years old, unless prescribed by your doctor. Mupirocin 2% ointment: Apply to each nostril, twice daily: once in the morning and once in the evening, for five days. If you have any questions, please call the Preassessment Department where you had your appointment. Please call between the hours of 8:30 am and 4:30 pm, Monday through Friday, excluding Holidays. Jeancarlos Woods [phone], [address] [place] [address] [phone]. Please check off on the chart below each and every time that you use the ointment. All doses of the medication need to be to completed the night before your surgery date. Start date: ______05/23_______     Day #     Date Morning dose Afternoon dose    One 05/23     Two 05/24     Three      Four      Five           It is important to bring this completed sheet to the hospital the day of surgery. Give it to the nurse who gets you ready for surgery.    If you forget to bring the sheet, the 10 doses will be started over   DO NOT bring the tube of Mupirocin with you. If you have not completed all 10 doses, it will be   given to you by your nurses in the hospital    Thank you! FREQUENTLY ASKED QUESTIONS:    What is Staph and MRSA? Staphylococcus aureus or Staph germs are very common. About 1 out of every 3 people (about 30%) carries Staph on their skin or in their nose. Methicillin-resistant Staphylococcus aureus or MRSA germs are a type of Staph germ that are very resistant to antibiotics. About 2 out of every 100 people (about 2%) carry MRSA on their skin or in their nose. Does this mean I have a Staph or MRSA infection in my nose? Being colonized or a carrier means you carry the germ but have no active signs or symptoms of infection. You are not sick with a MRSA or Staph infection. How did I get Staph or MRSA? Anyone can get Staph or MRSA on their body by either having contact with an infected wound or sharing personal items that have touched infected skin. People at higher risk for contacting MRSA or Staph are those in close contact with shared equipment or supplies. For example, athletes,  and school students, nursing home residents and those receiving inpatient medical care are at higher risk. Can my family or friends get Staph or MRSA from me? The chance of family or friends getting Staph or MRSA from you is very low. Bathing frequently, frequent hand washing and not sharing personal items like towels or razors will help prevent spreading Staph or MRSA to others. Your family or friends should wash hands with soap and water or use antibacterial gel before and after visiting with you. As long as family and friends, including children, are healthyit is okay to visit with your loved ones. Will this cancel my surgery? No, being colonized with Staph or MRSA will not cancel your surgery.  However, an infection can start if Staph or MRSA germs enter a break in the skin such as a surgical site. By using the Mupirocin ointment if your swab is positive, frequent hand washing and practicing good hygiene like bathing before your surgery, we can work together to help prevent infection after your surgery.

## 2022-05-24 LAB
BACTERIA SPEC CULT: NORMAL
BACTERIA SPEC CULT: NORMAL
SERVICE CMNT-IMP: NORMAL

## 2022-05-24 NOTE — PERIOP NOTE
Culture, Urine  Order: [id number]   Status: Final result     Visible to patient: Yes (not seen)     Next appt: 06/01/2022 at 08:00 AM in Cardiology Katherin Hicks MD)    Specimen Information: Urine         0 Result Notes     Ref Range & Units 5/23/22 0928 Resulting Agency   Special Requests   NO SPECIAL REQUESTS  [facility]   Culture   >100,000 [address] [place] [address]   Culture   THREE OR MORE TYPES OF ORGANISMS ARE PRESENT.  THIS IS INDICATIVE OF CONTAMINATION DUE TO IMPROPER COLLECTION TECHNIQUE.  PLEASE REPEAT COLLECTION UNLESS PATIENT HAS STARTED ANTIBIOTIC TREATMENT.  [address] [facility]              Specimen Collected: 05/23/22 09:28 Last Resulted: 05/24/22 09:42

## 2022-05-25 ENCOUNTER — ANESTHESIA (OUTPATIENT)
Dept: SURGERY | Age: 57
DRG: 236 | End: 2022-05-25

## 2022-05-25 ENCOUNTER — APPOINTMENT (OUTPATIENT)
Dept: GENERAL RADIOLOGY | Age: 57
DRG: 236 | End: 2022-05-25
Attending: THORACIC SURGERY (CARDIOTHORACIC VASCULAR SURGERY)

## 2022-05-25 ENCOUNTER — HOSPITAL ENCOUNTER (INPATIENT)
Age: 57
LOS: 7 days | Discharge: HOME | DRG: 236 | End: 2022-06-01
Attending: THORACIC SURGERY (CARDIOTHORACIC VASCULAR SURGERY) | Admitting: THORACIC SURGERY (CARDIOTHORACIC VASCULAR SURGERY)
Payer: COMMERCIAL

## 2022-05-25 DIAGNOSIS — I25.118 CORONARY ARTERY DISEASE OF NATIVE ARTERY OF NATIVE HEART WITH STABLE ANGINA PECTORIS (HCC): ICD-10-CM

## 2022-05-25 DIAGNOSIS — Z01.818 PRE-OP TESTING: Primary | ICD-10-CM

## 2022-05-25 DIAGNOSIS — Z95.1 S/P CABG X 2: ICD-10-CM

## 2022-05-25 PROBLEM — R07.9 CHEST PAIN: Status: ACTIVE | Noted: 2022-04-29

## 2022-05-25 PROBLEM — Z99.11 ENCOUNTER FOR WEANING FROM VENTILATOR (HCC): Status: ACTIVE | Noted: 2022-05-25

## 2022-05-25 PROBLEM — R09.02 HYPOXIA: Status: ACTIVE | Noted: 2022-05-25

## 2022-05-25 LAB
ACT AVERAGE - AAVG: 131 SEC
ACT AVERAGE - AAVG: 533 SEC
ACT AVERAGE - AAVG: 538 SEC
ACT AVERAGE - AAVG: 558 SEC
ANION GAP SERPL CALC-SCNC: 5 MMOL/L (ref 7–16)
APTT PPP: 32.5 SEC (ref 24.1–35.1)
ARTERIAL PATENCY WRIST A: ABNORMAL
ARTERIAL PATENCY WRIST A: ABNORMAL
BASE DEFICIT BLD-SCNC: 0.1 MMOL/L
BASE DEFICIT BLD-SCNC: 0.5 MMOL/L
BASE DEFICIT BLD-SCNC: 0.5 MMOL/L
BASE DEFICIT BLD-SCNC: 1.4 MMOL/L
BASE DEFICIT BLD-SCNC: 2.3 MMOL/L
BASE DEFICIT BLD-SCNC: 2.4 MMOL/L
BASE DEFICIT BLD-SCNC: 2.9 MMOL/L
BASELINE ACT: 135 SEC
BDY SITE: ABNORMAL
BDY SITE: ABNORMAL
BUN SERPL-MCNC: 15 MG/DL (ref 6–23)
CA-I BLD-MCNC: 1.08 MMOL/L (ref 1.12–1.32)
CA-I BLD-MCNC: 1.12 MMOL/L (ref 1.12–1.32)
CA-I BLD-MCNC: 1.13 MMOL/L (ref 1.12–1.32)
CA-I BLD-MCNC: 1.14 MMOL/L (ref 1.12–1.32)
CA-I BLD-MCNC: 1.4 MMOL/L (ref 1.12–1.32)
CALCIUM SERPL-MCNC: 8.6 MG/DL (ref 8.3–10.4)
CHLORIDE SERPL-SCNC: 114 MMOL/L (ref 98–107)
CO2 BLD-SCNC: 23 MMOL/L (ref 13–23)
CO2 BLD-SCNC: 23 MMOL/L (ref 13–23)
CO2 BLD-SCNC: 24 MMOL/L (ref 13–23)
CO2 BLD-SCNC: 25 MMOL/L (ref 13–23)
CO2 BLD-SCNC: 25 MMOL/L (ref 13–23)
CO2 SERPL-SCNC: 24 MMOL/L (ref 21–32)
CREAT SERPL-MCNC: 0.7 MG/DL (ref 0.6–1)
EKG ATRIAL RATE: 80 BPM
EKG DIAGNOSIS: NORMAL
EKG P AXIS: 61 DEGREES
EKG P-R INTERVAL: 172 MS
EKG Q-T INTERVAL: 428 MS
EKG QRS DURATION: 70 MS
EKG QTC CALCULATION (BAZETT): 493 MS
EKG R AXIS: 60 DEGREES
EKG T AXIS: 50 DEGREES
EKG VENTRICULAR RATE: 80 BPM
ERYTHROCYTE [DISTWIDTH] IN BLOOD BY AUTOMATED COUNT: 12.3 % (ref 11.9–14.6)
FIBRINOGEN PPP-MCNC: 207 MG/DL (ref 190–501)
GAS FLOW.O2 O2 DELIVERY SYS: ABNORMAL L/MIN
GAS FLOW.O2 O2 DELIVERY SYS: ABNORMAL L/MIN
GLUCOSE BLD STRIP.AUTO-MCNC: 115 MG/DL (ref 65–100)
GLUCOSE BLD STRIP.AUTO-MCNC: 119 MG/DL (ref 65–100)
GLUCOSE BLD STRIP.AUTO-MCNC: 122 MG/DL (ref 65–100)
GLUCOSE BLD STRIP.AUTO-MCNC: 122 MG/DL (ref 65–100)
GLUCOSE BLD STRIP.AUTO-MCNC: 124 MG/DL (ref 65–100)
GLUCOSE BLD STRIP.AUTO-MCNC: 126 MG/DL (ref 65–100)
GLUCOSE BLD STRIP.AUTO-MCNC: 131 MG/DL (ref 65–100)
GLUCOSE BLD STRIP.AUTO-MCNC: 138 MG/DL (ref 65–100)
GLUCOSE BLD STRIP.AUTO-MCNC: 139 MG/DL (ref 65–100)
GLUCOSE BLD STRIP.AUTO-MCNC: 142 MG/DL (ref 65–100)
GLUCOSE BLD STRIP.AUTO-MCNC: 177 MG/DL (ref 65–100)
GLUCOSE BLD STRIP.AUTO-MCNC: 183 MG/DL (ref 65–100)
GLUCOSE BLD STRIP.AUTO-MCNC: 199 MG/DL (ref 65–100)
GLUCOSE BLD STRIP.AUTO-MCNC: 202 MG/DL (ref 65–100)
GLUCOSE BLD STRIP.AUTO-MCNC: 241 MG/DL (ref 65–100)
GLUCOSE SERPL-MCNC: 122 MG/DL (ref 65–100)
HCO3 BLD-SCNC: 23 MMOL/L (ref 22–26)
HCO3 BLD-SCNC: 23.1 MMOL/L (ref 22–26)
HCO3 BLD-SCNC: 23.3 MMOL/L (ref 22–26)
HCO3 BLD-SCNC: 23.6 MMOL/L (ref 22–26)
HCO3 BLD-SCNC: 23.8 MMOL/L (ref 22–26)
HCO3 BLD-SCNC: 24.9 MMOL/L (ref 22–26)
HCO3 BLD-SCNC: 24.9 MMOL/L (ref 22–26)
HCT VFR BLD AUTO: 30.8 % (ref 35.8–46.3)
HCT VFR BLD AUTO: 34 % (ref 35.8–46.3)
HEPARIN BOLUS-PATIENT: NORMAL UNITS
HEPARIN BOLUS-PUMP: 0 UNITS
HEPARIN BOLUS-TOTAL: NORMAL UNITS
HEPARIN REQUIRED-PATIENT: 0
HEPARIN REQUIRED-PATIENT: 0
HEPARIN REQUIRED-PATIENT: 978
HEPARIN REQUIRED-TOTAL: 0 UNITS
HEPARIN REQUIRED-TOTAL: 0 UNITS
HEPARIN REQUIRED-TOTAL: 1210 UNITS
HEPARIN TEST CONCENTRATE: 2.5 MG/KG
HEPARIN TEST CONCENTRATE: 3 MG/KG
HEPARIN TEST CONCENTRATE: 3 MG/KG
HGB BLD-MCNC: 10.7 G/DL (ref 11.7–15.4)
HGB BLD-MCNC: 11.7 G/DL (ref 11.7–15.4)
INR PPP: 1.4
MAGNESIUM SERPL-MCNC: 2.4 MG/DL (ref 1.8–2.4)
MAGNESIUM SERPL-MCNC: 2.9 MG/DL (ref 1.8–2.4)
MCH RBC QN AUTO: 28.6 PG (ref 26.1–32.9)
MCHC RBC AUTO-ENTMCNC: 34.7 G/DL (ref 31.4–35)
MCV RBC AUTO: 82.4 FL (ref 79.6–97.8)
NRBC # BLD: 0 K/UL (ref 0–0.2)
O2/TOTAL GAS SETTING VFR VENT: 30 %
O2/TOTAL GAS SETTING VFR VENT: 60 %
PCO2 BLD: 36.8 MMHG (ref 35–45)
PCO2 BLD: 37.2 MMHG (ref 35–45)
PCO2 BLD: 40.9 MMHG (ref 35–45)
PCO2 BLD: 41.4 MMHG (ref 35–45)
PCO2 BLD: 42.4 MMHG (ref 35–45)
PCO2 BLD: 43.4 MMHG (ref 35–45)
PCO2 BLD: 47.6 MMHG (ref 35–45)
PEEP RESPIRATORY: 8 CMH2O
PEEP RESPIRATORY: 8 CMH2O
PH BLD: 7.3 [PH] (ref 7.35–7.45)
PH BLD: 7.35 [PH] (ref 7.35–7.45)
PH BLD: 7.35 [PH] (ref 7.35–7.45)
PH BLD: 7.37 [PH] (ref 7.35–7.45)
PH BLD: 7.39 [PH] (ref 7.35–7.45)
PH BLD: 7.4 [PH] (ref 7.35–7.45)
PH BLD: 7.42 [PH] (ref 7.35–7.45)
PLATELET # BLD AUTO: 143 K/UL (ref 150–450)
PMV BLD AUTO: 10.2 FL (ref 9.4–12.3)
PO2 BLD: 104 MMHG (ref 75–100)
PO2 BLD: 108 MMHG (ref 75–100)
PO2 BLD: 274 MMHG (ref 75–100)
PO2 BLD: 319 MMHG (ref 75–100)
PO2 BLD: 321 MMHG (ref 75–100)
PO2 BLD: 334 MMHG (ref 75–100)
PO2 BLD: 446 MMHG (ref 75–100)
POTASSIUM BLD-SCNC: 3.5 MMOL/L (ref 3.5–5.1)
POTASSIUM BLD-SCNC: 4.1 MMOL/L (ref 3.5–5.1)
POTASSIUM BLD-SCNC: 4.8 MMOL/L (ref 3.5–5.1)
POTASSIUM SERPL-SCNC: 4 MMOL/L (ref 3.5–5.1)
POTASSIUM SERPL-SCNC: 4.3 MMOL/L (ref 3.5–5.1)
PRESSURE SUPPORT SETTING VENT: 5 CMH2O
PROJECTED HEPARIN CONCENTATION: 2.7 MG/KG
PROTAMINE DOSE-PUMP: 34 MG
PROTAMINE DOSE-PUMP: 41 MG
PROTAMINE DOSE-PUMP: 41 MG
PROTAMINE DOSE-TOTAL: 178 MG
PROTAMINE DOSE-TOTAL: 213 MG
PROTAMINE DOSE-TOTAL: 213 MG
PROTHROMBIN TIME: 17.3 SEC (ref 12.6–14.5)
RBC # BLD AUTO: 3.74 M/UL (ref 4.05–5.2)
RESPIRATORY RATE, POC: 18 (ref 5–40)
SAO2 % BLD: 100 %
SAO2 % BLD: 97.3 % (ref 95–98)
SAO2 % BLD: 97.9 % (ref 95–98)
SERVICE CMNT-IMP: ABNORMAL
SLOPE: 95
SODIUM BLD-SCNC: 139 MMOL/L (ref 136–145)
SODIUM BLD-SCNC: 140 MMOL/L (ref 136–145)
SODIUM BLD-SCNC: 141 MMOL/L (ref 136–145)
SODIUM SERPL-SCNC: 143 MMOL/L (ref 136–145)
SPECIMEN SITE: ABNORMAL
SPECIMEN TYPE: ABNORMAL
SPECIMEN TYPE: ABNORMAL
VENTILATION MODE VENT: ABNORMAL
VENTILATION MODE VENT: ABNORMAL
VT SETTING VENT: 400 ML
WBC # BLD AUTO: 15.1 K/UL (ref 4.3–11.1)

## 2022-05-25 PROCEDURE — 2720000010 HC SURG SUPPLY STERILE: Performed by: THORACIC SURGERY (CARDIOTHORACIC VASCULAR SURGERY)

## 2022-05-25 PROCEDURE — 85018 HEMOGLOBIN: CPT

## 2022-05-25 PROCEDURE — 93005 ELECTROCARDIOGRAM TRACING: CPT | Performed by: THORACIC SURGERY (CARDIOTHORACIC VASCULAR SURGERY)

## 2022-05-25 PROCEDURE — 6370000000 HC RX 637 (ALT 250 FOR IP): Performed by: PHYSICIAN ASSISTANT

## 2022-05-25 PROCEDURE — 06BQ4ZZ EXCISION OF LEFT SAPHENOUS VEIN, PERCUTANEOUS ENDOSCOPIC APPROACH: ICD-10-PCS | Performed by: THORACIC SURGERY (CARDIOTHORACIC VASCULAR SURGERY)

## 2022-05-25 PROCEDURE — 2500000003 HC RX 250 WO HCPCS

## 2022-05-25 PROCEDURE — 80048 BASIC METABOLIC PNL TOTAL CA: CPT

## 2022-05-25 PROCEDURE — 71045 X-RAY EXAM CHEST 1 VIEW: CPT

## 2022-05-25 PROCEDURE — 3600000018 HC SURGERY OHS ADDTL 15MIN: Performed by: THORACIC SURGERY (CARDIOTHORACIC VASCULAR SURGERY)

## 2022-05-25 PROCEDURE — 99223 1ST HOSP IP/OBS HIGH 75: CPT | Performed by: INTERNAL MEDICINE

## 2022-05-25 PROCEDURE — 2580000003 HC RX 258: Performed by: THORACIC SURGERY (CARDIOTHORACIC VASCULAR SURGERY)

## 2022-05-25 PROCEDURE — 85530 HEPARIN-PROTAMINE TOLERANCE: CPT

## 2022-05-25 PROCEDURE — 85520 HEPARIN ASSAY: CPT

## 2022-05-25 PROCEDURE — 03HY32Z INSERTION OF MONITORING DEVICE INTO UPPER ARTERY, PERCUTANEOUS APPROACH: ICD-10-PCS | Performed by: ANESTHESIOLOGY

## 2022-05-25 PROCEDURE — 5A1221Z PERFORMANCE OF CARDIAC OUTPUT, CONTINUOUS: ICD-10-PCS | Performed by: THORACIC SURGERY (CARDIOTHORACIC VASCULAR SURGERY)

## 2022-05-25 PROCEDURE — C1729 CATH, DRAINAGE: HCPCS | Performed by: THORACIC SURGERY (CARDIOTHORACIC VASCULAR SURGERY)

## 2022-05-25 PROCEDURE — 84132 ASSAY OF SERUM POTASSIUM: CPT

## 2022-05-25 PROCEDURE — 2500000003 HC RX 250 WO HCPCS: Performed by: NURSE ANESTHETIST, CERTIFIED REGISTERED

## 2022-05-25 PROCEDURE — P9047 ALBUMIN (HUMAN), 25%, 50ML: HCPCS

## 2022-05-25 PROCEDURE — 2580000003 HC RX 258: Performed by: NURSE ANESTHETIST, CERTIFIED REGISTERED

## 2022-05-25 PROCEDURE — 82803 BLOOD GASES ANY COMBINATION: CPT

## 2022-05-25 PROCEDURE — 02100Z9 BYPASS CORONARY ARTERY, ONE ARTERY FROM LEFT INTERNAL MAMMARY, OPEN APPROACH: ICD-10-PCS | Performed by: THORACIC SURGERY (CARDIOTHORACIC VASCULAR SURGERY)

## 2022-05-25 PROCEDURE — 2709999900 HC NON-CHARGEABLE SUPPLY: Performed by: THORACIC SURGERY (CARDIOTHORACIC VASCULAR SURGERY)

## 2022-05-25 PROCEDURE — 3700000001 HC ADD 15 MINUTES (ANESTHESIA): Performed by: THORACIC SURGERY (CARDIOTHORACIC VASCULAR SURGERY)

## 2022-05-25 PROCEDURE — 83735 ASSAY OF MAGNESIUM: CPT

## 2022-05-25 PROCEDURE — 3600000008 HC SURGERY OHS BASE: Performed by: THORACIC SURGERY (CARDIOTHORACIC VASCULAR SURGERY)

## 2022-05-25 PROCEDURE — 2100000000 HC CCU R&B

## 2022-05-25 PROCEDURE — 6370000000 HC RX 637 (ALT 250 FOR IP): Performed by: ANESTHESIOLOGY

## 2022-05-25 PROCEDURE — 6360000002 HC RX W HCPCS: Performed by: THORACIC SURGERY (CARDIOTHORACIC VASCULAR SURGERY)

## 2022-05-25 PROCEDURE — 82330 ASSAY OF CALCIUM: CPT

## 2022-05-25 PROCEDURE — 2580000003 HC RX 258

## 2022-05-25 PROCEDURE — 021009W BYPASS CORONARY ARTERY, ONE ARTERY FROM AORTA WITH AUTOLOGOUS VENOUS TISSUE, OPEN APPROACH: ICD-10-PCS | Performed by: THORACIC SURGERY (CARDIOTHORACIC VASCULAR SURGERY)

## 2022-05-25 PROCEDURE — 94002 VENT MGMT INPAT INIT DAY: CPT

## 2022-05-25 PROCEDURE — 6360000002 HC RX W HCPCS: Performed by: NURSE ANESTHETIST, CERTIFIED REGISTERED

## 2022-05-25 PROCEDURE — 85384 FIBRINOGEN ACTIVITY: CPT

## 2022-05-25 PROCEDURE — 85347 COAGULATION TIME ACTIVATED: CPT

## 2022-05-25 PROCEDURE — 6360000002 HC RX W HCPCS

## 2022-05-25 PROCEDURE — 85027 COMPLETE CBC AUTOMATED: CPT

## 2022-05-25 PROCEDURE — 02HV33Z INSERTION OF INFUSION DEVICE INTO SUPERIOR VENA CAVA, PERCUTANEOUS APPROACH: ICD-10-PCS | Performed by: ANESTHESIOLOGY

## 2022-05-25 PROCEDURE — 85730 THROMBOPLASTIN TIME PARTIAL: CPT

## 2022-05-25 PROCEDURE — 3700000000 HC ANESTHESIA ATTENDED CARE: Performed by: THORACIC SURGERY (CARDIOTHORACIC VASCULAR SURGERY)

## 2022-05-25 PROCEDURE — 6370000000 HC RX 637 (ALT 250 FOR IP): Performed by: THORACIC SURGERY (CARDIOTHORACIC VASCULAR SURGERY)

## 2022-05-25 PROCEDURE — B246ZZ4 ULTRASONOGRAPHY OF RIGHT AND LEFT HEART, TRANSESOPHAGEAL: ICD-10-PCS | Performed by: ANESTHESIOLOGY

## 2022-05-25 PROCEDURE — 2580000003 HC RX 258: Performed by: ANESTHESIOLOGY

## 2022-05-25 PROCEDURE — A4217 STERILE WATER/SALINE, 500 ML: HCPCS | Performed by: THORACIC SURGERY (CARDIOTHORACIC VASCULAR SURGERY)

## 2022-05-25 PROCEDURE — 82947 ASSAY GLUCOSE BLOOD QUANT: CPT

## 2022-05-25 PROCEDURE — 2500000003 HC RX 250 WO HCPCS: Performed by: THORACIC SURGERY (CARDIOTHORACIC VASCULAR SURGERY)

## 2022-05-25 PROCEDURE — 85610 PROTHROMBIN TIME: CPT

## 2022-05-25 RX ORDER — SODIUM CHLORIDE 0.9 % (FLUSH) 0.9 %
5-40 SYRINGE (ML) INJECTION PRN
Status: DISCONTINUED | OUTPATIENT
Start: 2022-05-25 | End: 2022-06-01 | Stop reason: HOSPADM

## 2022-05-25 RX ORDER — ROCURONIUM BROMIDE 10 MG/ML
INJECTION, SOLUTION INTRAVENOUS PRN
Status: DISCONTINUED | OUTPATIENT
Start: 2022-05-25 | End: 2022-05-25 | Stop reason: SDUPTHER

## 2022-05-25 RX ORDER — MIDAZOLAM HYDROCHLORIDE 2 MG/2ML
2 INJECTION, SOLUTION INTRAMUSCULAR; INTRAVENOUS
Status: DISCONTINUED | OUTPATIENT
Start: 2022-05-25 | End: 2022-05-25 | Stop reason: HOSPADM

## 2022-05-25 RX ORDER — VECURONIUM BROMIDE 1 MG/ML
INJECTION, POWDER, LYOPHILIZED, FOR SOLUTION INTRAVENOUS PRN
Status: DISCONTINUED | OUTPATIENT
Start: 2022-05-25 | End: 2022-05-25 | Stop reason: SDUPTHER

## 2022-05-25 RX ORDER — MAGNESIUM SULFATE IN WATER 40 MG/ML
2000 INJECTION, SOLUTION INTRAVENOUS PRN
Status: DISCONTINUED | OUTPATIENT
Start: 2022-05-25 | End: 2022-05-27

## 2022-05-25 RX ORDER — PROTAMINE SULFATE 10 MG/ML
INJECTION, SOLUTION INTRAVENOUS PRN
Status: DISCONTINUED | OUTPATIENT
Start: 2022-05-25 | End: 2022-05-25 | Stop reason: SDUPTHER

## 2022-05-25 RX ORDER — MIDAZOLAM HYDROCHLORIDE 1 MG/ML
INJECTION INTRAMUSCULAR; INTRAVENOUS PRN
Status: DISCONTINUED | OUTPATIENT
Start: 2022-05-25 | End: 2022-05-25 | Stop reason: SDUPTHER

## 2022-05-25 RX ORDER — PHENYLEPHRINE HCL IN 0.9% NACL 50MG/250ML
PLASTIC BAG, INJECTION (ML) INTRAVENOUS CONTINUOUS PRN
Status: DISCONTINUED | OUTPATIENT
Start: 2022-05-25 | End: 2022-05-25 | Stop reason: SDUPTHER

## 2022-05-25 RX ORDER — AMIODARONE HYDROCHLORIDE 200 MG/1
600 TABLET ORAL ONCE
Status: COMPLETED | OUTPATIENT
Start: 2022-05-25 | End: 2022-05-25

## 2022-05-25 RX ORDER — DEXTROSE, SODIUM CHLORIDE, AND POTASSIUM CHLORIDE 5; .45; .15 G/100ML; G/100ML; G/100ML
INJECTION INTRAVENOUS CONTINUOUS
Status: DISCONTINUED | OUTPATIENT
Start: 2022-05-25 | End: 2022-05-27

## 2022-05-25 RX ORDER — SODIUM CHLORIDE, SODIUM LACTATE, POTASSIUM CHLORIDE, CALCIUM CHLORIDE 600; 310; 30; 20 MG/100ML; MG/100ML; MG/100ML; MG/100ML
INJECTION, SOLUTION INTRAVENOUS CONTINUOUS
Status: DISCONTINUED | OUTPATIENT
Start: 2022-05-25 | End: 2022-05-25 | Stop reason: HOSPADM

## 2022-05-25 RX ORDER — EPHEDRINE SULFATE/0.9% NACL/PF 50 MG/5 ML
SYRINGE (ML) INTRAVENOUS PRN
Status: DISCONTINUED | OUTPATIENT
Start: 2022-05-25 | End: 2022-05-25 | Stop reason: SDUPTHER

## 2022-05-25 RX ORDER — POTASSIUM CHLORIDE 29.8 MG/ML
20 INJECTION INTRAVENOUS PRN
Status: DISCONTINUED | OUTPATIENT
Start: 2022-05-25 | End: 2022-05-27

## 2022-05-25 RX ORDER — NOREPINEPHRINE BIT/0.9 % NACL 16MG/250ML
.01-.1 INFUSION BOTTLE (ML) INTRAVENOUS CONTINUOUS PRN
Status: DISCONTINUED | OUTPATIENT
Start: 2022-05-25 | End: 2022-05-27

## 2022-05-25 RX ORDER — PROPOFOL 10 MG/ML
INJECTION, EMULSION INTRAVENOUS PRN
Status: DISCONTINUED | OUTPATIENT
Start: 2022-05-25 | End: 2022-05-25 | Stop reason: SDUPTHER

## 2022-05-25 RX ORDER — SODIUM CHLORIDE 9 MG/ML
INJECTION, SOLUTION INTRAVENOUS PRN
Status: DISCONTINUED | OUTPATIENT
Start: 2022-05-25 | End: 2022-05-27

## 2022-05-25 RX ORDER — DEXMEDETOMIDINE HYDROCHLORIDE 4 UG/ML
INJECTION, SOLUTION INTRAVENOUS CONTINUOUS PRN
Status: DISCONTINUED | OUTPATIENT
Start: 2022-05-25 | End: 2022-05-25 | Stop reason: SDUPTHER

## 2022-05-25 RX ORDER — NITROGLYCERIN 20 MG/100ML
INJECTION INTRAVENOUS CONTINUOUS PRN
Status: DISCONTINUED | OUTPATIENT
Start: 2022-05-25 | End: 2022-05-25 | Stop reason: SDUPTHER

## 2022-05-25 RX ORDER — SODIUM CHLORIDE 0.9 % (FLUSH) 0.9 %
5-40 SYRINGE (ML) INJECTION EVERY 12 HOURS SCHEDULED
Status: DISCONTINUED | OUTPATIENT
Start: 2022-05-25 | End: 2022-06-01 | Stop reason: HOSPADM

## 2022-05-25 RX ORDER — LIDOCAINE HYDROCHLORIDE 20 MG/ML
INJECTION, SOLUTION INTRAVENOUS PRN
Status: DISCONTINUED | OUTPATIENT
Start: 2022-05-25 | End: 2022-05-25 | Stop reason: SDUPTHER

## 2022-05-25 RX ORDER — ATORVASTATIN CALCIUM 80 MG/1
80 TABLET, FILM COATED ORAL NIGHTLY
Status: DISCONTINUED | OUTPATIENT
Start: 2022-05-25 | End: 2022-06-01 | Stop reason: HOSPADM

## 2022-05-25 RX ORDER — HEPARIN SODIUM 1000 [USP'U]/ML
INJECTION, SOLUTION INTRAVENOUS; SUBCUTANEOUS PRN
Status: DISCONTINUED | OUTPATIENT
Start: 2022-05-25 | End: 2022-05-25 | Stop reason: SDUPTHER

## 2022-05-25 RX ORDER — SODIUM CHLORIDE 9 MG/ML
INJECTION, SOLUTION INTRAVENOUS CONTINUOUS PRN
Status: DISCONTINUED | OUTPATIENT
Start: 2022-05-25 | End: 2022-05-25 | Stop reason: SDUPTHER

## 2022-05-25 RX ORDER — SODIUM CHLORIDE 0.9 % (FLUSH) 0.9 %
5-40 SYRINGE (ML) INJECTION PRN
Status: DISCONTINUED | OUTPATIENT
Start: 2022-05-25 | End: 2022-05-25 | Stop reason: HOSPADM

## 2022-05-25 RX ORDER — ONDANSETRON 2 MG/ML
4 INJECTION INTRAMUSCULAR; INTRAVENOUS EVERY 4 HOURS PRN
Status: DISCONTINUED | OUTPATIENT
Start: 2022-05-25 | End: 2022-06-01 | Stop reason: HOSPADM

## 2022-05-25 RX ORDER — FAMOTIDINE 20 MG/1
20 TABLET, FILM COATED ORAL 2 TIMES DAILY
Status: DISCONTINUED | OUTPATIENT
Start: 2022-05-26 | End: 2022-06-01 | Stop reason: HOSPADM

## 2022-05-25 RX ORDER — SODIUM CHLORIDE, SODIUM LACTATE, POTASSIUM CHLORIDE, CALCIUM CHLORIDE 600; 310; 30; 20 MG/100ML; MG/100ML; MG/100ML; MG/100ML
INJECTION, SOLUTION INTRAVENOUS CONTINUOUS PRN
Status: DISCONTINUED | OUTPATIENT
Start: 2022-05-25 | End: 2022-05-25 | Stop reason: SDUPTHER

## 2022-05-25 RX ORDER — OXYCODONE HYDROCHLORIDE AND ACETAMINOPHEN 5; 325 MG/1; MG/1
1 TABLET ORAL EVERY 4 HOURS PRN
Status: DISCONTINUED | OUTPATIENT
Start: 2022-05-25 | End: 2022-05-27

## 2022-05-25 RX ORDER — DEXMEDETOMIDINE HYDROCHLORIDE 4 UG/ML
.1-1.5 INJECTION, SOLUTION INTRAVENOUS CONTINUOUS
Status: DISCONTINUED | OUTPATIENT
Start: 2022-05-25 | End: 2022-05-26

## 2022-05-25 RX ORDER — ASPIRIN 81 MG/1
81 TABLET ORAL DAILY
Status: DISCONTINUED | OUTPATIENT
Start: 2022-05-26 | End: 2022-06-01 | Stop reason: HOSPADM

## 2022-05-25 RX ORDER — DEXTROSE MONOHYDRATE 50 MG/ML
100 INJECTION, SOLUTION INTRAVENOUS PRN
Status: DISCONTINUED | OUTPATIENT
Start: 2022-05-25 | End: 2022-05-27

## 2022-05-25 RX ORDER — PAPAVERINE HYDROCHLORIDE 30 MG/ML
INJECTION INTRAMUSCULAR; INTRAVENOUS PRN
Status: DISCONTINUED | OUTPATIENT
Start: 2022-05-25 | End: 2022-05-25 | Stop reason: ALTCHOICE

## 2022-05-25 RX ORDER — DOBUTAMINE HYDROCHLORIDE 200 MG/100ML
2-10 INJECTION INTRAVENOUS CONTINUOUS PRN
Status: DISCONTINUED | OUTPATIENT
Start: 2022-05-25 | End: 2022-05-27

## 2022-05-25 RX ORDER — PHENYLEPHRINE HCL IN 0.9% NACL 50MG/250ML
10-300 PLASTIC BAG, INJECTION (ML) INTRAVENOUS CONTINUOUS
Status: DISCONTINUED | OUTPATIENT
Start: 2022-05-25 | End: 2022-05-26

## 2022-05-25 RX ORDER — SUFENTANIL CITRATE 50 UG/ML
INJECTION EPIDURAL; INTRAVENOUS PRN
Status: DISCONTINUED | OUTPATIENT
Start: 2022-05-25 | End: 2022-05-25 | Stop reason: SDUPTHER

## 2022-05-25 RX ORDER — NITROGLYCERIN 20 MG/100ML
0-100 INJECTION INTRAVENOUS CONTINUOUS PRN
Status: DISCONTINUED | OUTPATIENT
Start: 2022-05-25 | End: 2022-05-27

## 2022-05-25 RX ORDER — AMIODARONE HYDROCHLORIDE 200 MG/1
200 TABLET ORAL 2 TIMES DAILY
Status: DISCONTINUED | OUTPATIENT
Start: 2022-05-25 | End: 2022-06-01 | Stop reason: HOSPADM

## 2022-05-25 RX ORDER — SODIUM CHLORIDE 0.9 % (FLUSH) 0.9 %
5-40 SYRINGE (ML) INJECTION EVERY 12 HOURS SCHEDULED
Status: DISCONTINUED | OUTPATIENT
Start: 2022-05-25 | End: 2022-05-25 | Stop reason: HOSPADM

## 2022-05-25 RX ORDER — GLUCAGON 1 MG/ML
1 KIT INJECTION PRN
Status: DISCONTINUED | OUTPATIENT
Start: 2022-05-25 | End: 2022-06-01 | Stop reason: HOSPADM

## 2022-05-25 RX ORDER — ACETAMINOPHEN 500 MG
1000 TABLET ORAL ONCE
Status: COMPLETED | OUTPATIENT
Start: 2022-05-25 | End: 2022-05-25

## 2022-05-25 RX ORDER — SODIUM CHLORIDE 9 MG/ML
INJECTION, SOLUTION INTRAVENOUS PRN
Status: DISCONTINUED | OUTPATIENT
Start: 2022-05-25 | End: 2022-05-25 | Stop reason: HOSPADM

## 2022-05-25 RX ORDER — ACETAMINOPHEN 325 MG/1
650 TABLET ORAL EVERY 4 HOURS PRN
Status: DISCONTINUED | OUTPATIENT
Start: 2022-05-25 | End: 2022-05-27

## 2022-05-25 RX ORDER — CEFAZOLIN SODIUM 1 G/3ML
INJECTION, POWDER, FOR SOLUTION INTRAMUSCULAR; INTRAVENOUS PRN
Status: DISCONTINUED | OUTPATIENT
Start: 2022-05-25 | End: 2022-05-25 | Stop reason: SDUPTHER

## 2022-05-25 RX ORDER — MORPHINE SULFATE 4 MG/ML
3 INJECTION INTRAVENOUS
Status: DISCONTINUED | OUTPATIENT
Start: 2022-05-25 | End: 2022-05-27

## 2022-05-25 RX ORDER — MIDAZOLAM HYDROCHLORIDE 1 MG/ML
1 INJECTION, SOLUTION INTRAMUSCULAR; INTRAVENOUS
Status: DISCONTINUED | OUTPATIENT
Start: 2022-05-25 | End: 2022-05-27

## 2022-05-25 RX ORDER — CHLORHEXIDINE GLUCONATE 0.12 MG/ML
10 RINSE ORAL 2 TIMES DAILY
Status: DISCONTINUED | OUTPATIENT
Start: 2022-05-25 | End: 2022-05-27

## 2022-05-25 RX ORDER — MORPHINE SULFATE 4 MG/ML
4 INJECTION INTRAVENOUS
Status: DISCONTINUED | OUTPATIENT
Start: 2022-05-25 | End: 2022-05-27

## 2022-05-25 RX ADMIN — MIDAZOLAM 2 MG: 1 INJECTION INTRAMUSCULAR; INTRAVENOUS at 10:40

## 2022-05-25 RX ADMIN — MIDAZOLAM 2 MG: 1 INJECTION INTRAMUSCULAR; INTRAVENOUS at 12:39

## 2022-05-25 RX ADMIN — CEFAZOLIN SODIUM 2000 MG: 1 INJECTION, POWDER, FOR SOLUTION INTRAMUSCULAR; INTRAVENOUS at 14:29

## 2022-05-25 RX ADMIN — SUFENTANIL CITRATE 10 MCG: 50 INJECTION EPIDURAL; INTRAVENOUS at 11:34

## 2022-05-25 RX ADMIN — Medication 3 AMPULE: at 09:38

## 2022-05-25 RX ADMIN — Medication 2000 MG: at 21:58

## 2022-05-25 RX ADMIN — NITROGLYCERIN 10 MCG/MIN: 20 INJECTION INTRAVENOUS at 11:19

## 2022-05-25 RX ADMIN — SUFENTANIL CITRATE 50 MCG: 50 INJECTION EPIDURAL; INTRAVENOUS at 12:39

## 2022-05-25 RX ADMIN — SUFENTANIL CITRATE 50 MCG: 50 INJECTION EPIDURAL; INTRAVENOUS at 12:34

## 2022-05-25 RX ADMIN — ONDANSETRON 4 MG: 2 INJECTION INTRAMUSCULAR; INTRAVENOUS at 21:58

## 2022-05-25 RX ADMIN — POTASSIUM CHLORIDE, DEXTROSE MONOHYDRATE AND SODIUM CHLORIDE: 150; 5; 450 INJECTION, SOLUTION INTRAVENOUS at 16:05

## 2022-05-25 RX ADMIN — SODIUM CHLORIDE, PRESERVATIVE FREE 10 ML: 5 INJECTION INTRAVENOUS at 21:58

## 2022-05-25 RX ADMIN — AMIODARONE HYDROCHLORIDE 600 MG: 200 TABLET ORAL at 09:38

## 2022-05-25 RX ADMIN — PHENYLEPHRINE HYDROCHLORIDE 10 MCG/MIN: 10 INJECTION INTRAVENOUS at 11:19

## 2022-05-25 RX ADMIN — SUFENTANIL CITRATE 20 MCG: 50 INJECTION EPIDURAL; INTRAVENOUS at 10:49

## 2022-05-25 RX ADMIN — VECURONIUM BROMIDE 3 MG: 1 INJECTION, POWDER, LYOPHILIZED, FOR SOLUTION INTRAVENOUS at 12:56

## 2022-05-25 RX ADMIN — MIDAZOLAM 1 MG: 1 INJECTION INTRAMUSCULAR; INTRAVENOUS at 10:49

## 2022-05-25 RX ADMIN — SUFENTANIL CITRATE 20 MCG: 50 INJECTION EPIDURAL; INTRAVENOUS at 11:27

## 2022-05-25 RX ADMIN — PROPOFOL 100 MG: 10 INJECTION, EMULSION INTRAVENOUS at 10:49

## 2022-05-25 RX ADMIN — ATORVASTATIN CALCIUM 80 MG: 80 TABLET, FILM COATED ORAL at 21:58

## 2022-05-25 RX ADMIN — PHENYLEPHRINE HYDROCHLORIDE 10 MCG/MIN: 10 INJECTION INTRAVENOUS at 15:35

## 2022-05-25 RX ADMIN — Medication 0.01 MCG/KG/MIN: at 14:10

## 2022-05-25 RX ADMIN — PROTAMINE SULFATE 200 MG: 10 INJECTION, SOLUTION INTRAVENOUS at 14:20

## 2022-05-25 RX ADMIN — AMIODARONE HYDROCHLORIDE 200 MG: 200 TABLET ORAL at 21:58

## 2022-05-25 RX ADMIN — SODIUM CHLORIDE, SODIUM LACTATE, POTASSIUM CHLORIDE, CALCIUM CHLORIDE: 600; 310; 30; 20 INJECTION, SOLUTION INTRAVENOUS at 11:16

## 2022-05-25 RX ADMIN — DEXMEDETOMIDINE HYDROCHLORIDE 0.5 MCG/KG/HR: 100 INJECTION, SOLUTION INTRAVENOUS at 14:29

## 2022-05-25 RX ADMIN — VECURONIUM BROMIDE 4 MG: 1 INJECTION, POWDER, LYOPHILIZED, FOR SOLUTION INTRAVENOUS at 13:47

## 2022-05-25 RX ADMIN — SODIUM CHLORIDE: 900 INJECTION, SOLUTION INTRAVENOUS at 15:35

## 2022-05-25 RX ADMIN — SUFENTANIL CITRATE 50 MCG: 50 INJECTION EPIDURAL; INTRAVENOUS at 12:41

## 2022-05-25 RX ADMIN — ROCURONIUM BROMIDE 50 MG: 50 INJECTION, SOLUTION INTRAVENOUS at 10:49

## 2022-05-25 RX ADMIN — ACETAMINOPHEN 1000 MG: 500 TABLET ORAL at 09:38

## 2022-05-25 RX ADMIN — SUFENTANIL CITRATE 50 MCG: 50 INJECTION EPIDURAL; INTRAVENOUS at 11:39

## 2022-05-25 RX ADMIN — MIDAZOLAM 3 MG: 1 INJECTION INTRAMUSCULAR; INTRAVENOUS at 13:47

## 2022-05-25 RX ADMIN — Medication 2 UNITS/HR: at 12:00

## 2022-05-25 RX ADMIN — LIDOCAINE HYDROCHLORIDE 100 MG: 20 INJECTION, SOLUTION INTRAVENOUS at 10:49

## 2022-05-25 RX ADMIN — HEPARIN SODIUM 24000 UNITS: 1000 INJECTION INTRAVENOUS; SUBCUTANEOUS at 12:24

## 2022-05-25 RX ADMIN — AMINOCAPROIC ACID 1 G/HR: 250 INJECTION, SOLUTION INTRAVENOUS at 11:50

## 2022-05-25 RX ADMIN — SODIUM CHLORIDE, SODIUM LACTATE, POTASSIUM CHLORIDE, AND CALCIUM CHLORIDE: 600; 310; 30; 20 INJECTION, SOLUTION INTRAVENOUS at 10:34

## 2022-05-25 RX ADMIN — Medication 5 MG: at 11:24

## 2022-05-25 RX ADMIN — CEFAZOLIN SODIUM 2000 MG: 1 INJECTION, POWDER, FOR SOLUTION INTRAMUSCULAR; INTRAVENOUS at 11:22

## 2022-05-25 RX ADMIN — SODIUM CHLORIDE: 900 INJECTION INTRAVENOUS at 11:16

## 2022-05-25 RX ADMIN — SODIUM CHLORIDE, POTASSIUM CHLORIDE, SODIUM LACTATE AND CALCIUM CHLORIDE: 600; 310; 30; 20 INJECTION, SOLUTION INTRAVENOUS at 09:39

## 2022-05-25 RX ADMIN — CHLORHEXIDINE GLUCONATE 10 ML: 1.2 RINSE ORAL at 22:19

## 2022-05-25 RX ADMIN — PHENYLEPHRINE HYDROCHLORIDE 100 MCG: 10 INJECTION INTRAVENOUS at 11:24

## 2022-05-25 RX ADMIN — VECURONIUM BROMIDE 3 MG: 1 INJECTION, POWDER, LYOPHILIZED, FOR SOLUTION INTRAVENOUS at 12:03

## 2022-05-25 ASSESSMENT — PULMONARY FUNCTION TESTS
PIF_VALUE: 13.1
PIF_VALUE: 20
PIF_VALUE: 13.4
PIF_VALUE: 19.1
PIF_VALUE: 19
PIF_VALUE: 20.6
PIF_VALUE: 15.8
PIF_VALUE: 21.1
PIF_VALUE: 13.4
PIF_VALUE: 18.7
PIF_VALUE: 21.9
PIF_VALUE: 20.5
PIF_VALUE: 20.4
PIF_VALUE: 16
PIF_VALUE: 20.9
PIF_VALUE: 21.5
PIF_VALUE: 20.4
PIF_VALUE: 20.6
PIF_VALUE: 20
PIF_VALUE: 13.3
PIF_VALUE: 20.5
PIF_VALUE: 20.6
PIF_VALUE: 19
PIF_VALUE: 13.6
PIF_VALUE: 19.9
PIF_VALUE: 19.5
PIF_VALUE: 16.6

## 2022-05-25 ASSESSMENT — PAIN - FUNCTIONAL ASSESSMENT: PAIN_FUNCTIONAL_ASSESSMENT: NONE - DENIES PAIN

## 2022-05-25 ASSESSMENT — PAIN SCALES - GENERAL
PAINLEVEL_OUTOF10: 0
PAINLEVEL_OUTOF10: 0

## 2022-05-25 NOTE — CONSENT
Informed Consent for Blood Component Transfusion Note    I have discussed with the patient the rationale for blood component transfusion; its benefits in treating or preventing fatigue, organ damage, or death; and its risk which includes mild transfusion reactions, rare risk of blood borne infection, or more serious but rare reactions. I have discussed the alternatives to transfusion, including the risk and consequences of not receiving transfusion. The patient had an opportunity to ask questions and had agreed to proceed with transfusion of blood components.     Electronically signed by Yenifer Villatoro MD on 5/25/22 at 9:06 AM EDT

## 2022-05-25 NOTE — PERIOP NOTE
Pt placed on 3 L O2 per NC per protocol. Allevyn dsg placed on chart per protocol. Warming blanket applied. Hair clipped chin to toe per protocol. Wiped with CHG wipes. #18g IV placed in R hand. Christi,  johanne AM [id number] . Pt watched pre-op teaching video. All necessary studies in pt's chart, reviewed. On heart monitor. Call light given. Family at bedside.

## 2022-05-25 NOTE — PERIOP NOTE
Family updated on case progress after verification of security code at 11:36 by [facility], RN.

## 2022-05-25 NOTE — PROGRESS NOTES
TRANSFER - IN REPORT:    Verbal report received from Lauryn [address] on [facility] being received from OR for routine progression of patient care      Report consisted of patients Situation, Background, Assessment and Recommendations(SBAR). Information from the following report(s) SBAR, Kardex, OR Summary, Procedure Summary, Intake/Output, Recent Results and Cardiac Rhythm NSR was reviewed with the receiving nurse. Opportunity for questions and clarification was provided. Assessment completed upon patients arrival to unit and care assumed.

## 2022-05-25 NOTE — CONSULTS
Cardiovascular ICU Consult Note: 5/25/2022  Tash Gordon                                                     Admission Date: 5/25/2022     The patient's chart is reviewed and the patient is discussed with the staff. Subjective:     Patient is seen at the request of Dr. Burnadette Severs for respiratory management status post cardiac surgery. Patient had CABG X 2.  Currently is sedated in CV-ICU and orally intubated receiving  mechanical ventilation. He received This SmartLink must be used in the context of an imaging procedure during surgery. We have been asked to see in the CV-ICU for mechanical ventilation management and weaning. Ms. Rosario Greenfield is a 64year old female, PMH DM, HTN, and HLD here s/p CABG X 2. The patient had previously been seen by [facility] for CASTRO and right sided chest discomfort. Left cardiac catheterization was done on 5/6/2022 and revealed severe multivessel disease. The patient is a never smoker and has not history of any lung disease.     Current Outpatient Medications   Medication Instructions    AMIODARONE HCL  mg, Oral, ONCE, To be taken after 4 pm the day before surgery    aspirin 81 mg, Oral, Nightly    atorvastatin (LIPITOR) 20 mg, Oral, Nightly    citalopram (CELEXA) 20 mg, Oral, Nightly    insulin NPH (HUMULIN N;NOVOLIN N) 100 UNIT/ML injection vial SubCUTAneous, 2 TIMES DAILY BEFORE MEALS, 15 units in the morning and 7 units at night     lisinopril (PRINIVIL;ZESTRIL) 20 mg, Oral, Nightly, TAKE 1 TABLET BY MOUTH EVERY DAY    METOPROLOL TARTRATE PO 12.5 mg, Oral, ONCE, To be taken after 4 pm the day before surgery      Review of Systems: unable to determine due to intubated status  Past Medical History:   Diagnosis Date    Anxiety 11/28/2012    CAD (coronary artery disease)     No MI; no stents    Depression     managed with med    DM (diabetes mellitus) ([facility] [address] 11/28/2012    insulin reliant; AVG ; pt denies s.s. of hypoglycemia; last A1C    HLD (hyperlipidemia)     managed with med    Hypertension     managed with med    Ulcerative esophagitis      Past Surgical History:   Procedure Laterality Date    CARDIAC CATHETERIZATION Left     UPPER GASTROINTESTINAL ENDOSCOPY      ulcerative espophagitis     Social History     Socioeconomic History    Marital status:      Spouse name: Not on file    Number of children: Not on file    Years of education: Not on file    Highest education level: Not on file   Occupational History    Not on file   Tobacco Use    Smoking status: Never Smoker    Smokeless tobacco: Never Used   Vaping Use    Vaping Use: Never used   Substance and Sexual Activity    Alcohol use: Yes     Comment: rarely    Drug use: No    Sexual activity: Not on file   Other Topics Concern    Not on file   Social History Narrative    Not on file     Social Determinants of Health     Financial Resource Strain:     Difficulty of Paying Living Expenses: Not on file   Food Insecurity:     Worried About [address] in the Last Year: Not on file    [address] [employer] in the Last Year: Not on file   Transportation Needs:     Lack of Transportation (Medical): Not on file    Lack of Transportation (Non-Medical):  Not on file   Physical Activity:     Days of Exercise per Week: Not on file    Minutes of Exercise per Session: Not on file   Stress:     Feeling of Stress : Not on file   Social Connections:     Frequency of Communication with Friends and Family: Not on file    Frequency of Social Gatherings with Friends and Family: Not on file    Attends Jainism Services: Not on file    Active Member of Clubs or Organizations: Not on file    Attends Club or Organization Meetings: Not on file    Marital Status: Not on file   Intimate Partner Violence:     Fear of Current or Ex-Partner: Not on file    Emotionally Abused: Not on file    Physically Abused: Not on file    Sexually Abused: Not on file Housing Stability:     Unable to Pay for Housing in the Last Year: Not on file    Number of Places Lived in the Last Year: Not on file    Unstable Housing in the Last Year: Not on file     Family History   Problem Relation Age of Onset    Diabetes Father      No Known Allergies  Objective:   Blood pressure 119/62, pulse 78, temperature 97.5 °F (36.4 °C), temperature source Bladder, resp. rate 18, height 5' 4\" (1.626 m), weight 165 lb (74.8 kg), SpO2 99 %. Intake/Output Summary (Last 24 hours) at 5/25/2022 1610  Last data filed at 5/25/2022 1525  Gross per 24 hour   Intake 1200 ml   Output 1385 ml   Net -185 ml     Physical Exam:          Constitutional:  Sedated, orally intubated and mechanically ventilated. EENMT:  Sclera clear, pupils equal, oral mucosa moist and orally intubated  Respiratory: Clear bilaterally, PRVC 60%  Cardiovascular:  RRR with no M,G,R;  Gastrointestinal:  soft; + bowel sounds present  Musculoskeletal:  warm with no cyanosis, no lower extremity edema. Morrow site left leg with ace wrap. Sedated with no movements. SKIN:  no jaundice or ecchymosis   Neurologic:  sedated but no gross neuro deficits  Psychiatric:  sedated and unable to assess at this time    CXR:  Pending      LINES:  ETT, suarez, swan farhat, arterial line, chest tubes times 2 in epigastric area without air leak. DRIPS:   Dose (mcg/kg/min) Propofol : *100 mg  Dose (mcg/kg/hr) Dexmedetomidine: 0.5 mcg/kg/hr  Dose (mcg/min) Epinephrine: 0 mcg/min  Dose (mcg/kg/min) Phenylephrine : *10 mcg/min  Dose (mcg/kg/min) Dobutamine: 0 mcg/kg/min  Dose (units/hr) Insulin : 2 Units/hr     CI:   2.9    Ventilator Settings:  Ideal body weight: 54.7 kg (120 lb 9.5 oz)  Adjusted ideal body weight: 62.8 kg (138 lb 5.7 oz)  Mode FIO2 Rate Tidal Volume Pressure   PRVC  PRVC  60 %            8     Peak airway pressure:     Minute ventilation:    No results for input(s): PH, PCO2, PO2, HCO3 in the last 72 hours.    Recent Labs 05/23/22  0927 05/25/22  1539   WBC 7.1 15.1*   HGB 13.7 10.7*   HCT 40.2 30.8*    143*   INR 1.0  --      Recent Labs     05/23/22  0927      K 4.3      CO2 28   BUN 18   CREATININE 0.74   MG 2.0     No results for input(s): PROCAL, LACTATE in the last 72 hours. No results found for this or any previous visit. Assessment and Plan :  (Medical Decision Making)   Impression: 64 y.o. y/o female s/p CV surgery for S/P CABG x 2    Encounter for weaning from Ventilator:  Post op ABG and CXR review. Continue with weaning per protocol. Hypoxemia:   Once weaned from ventilator will work on IS, mobility and weaning O2. Bronchodilators per protocol. Atelectasis:   IS, mobility after extubated. S/P Cardiovascular surgery:  Monitor chest tube output, removal per surgery. More than 50% of the time documented was spent in face-to-face contact with the patient and in the care of the patient on the floor/unit where the patient is located. Thank you for this referral.  We appreciate the opportunity to participate in this patient's care. Will follow along with you. Marianela Lawler MD     I have spoken with and examined the patient. I agree with the above assessment and plan as documented. I contributed more than 50% of the clinical time to this encounter today. Gen: sedated  Lungs:  Clear, Vent 60%, 99% sat  Heart:  RRR with no Murmur/Rubs/Gallops  Abd: soft  Ext: no edema    Appropriate appearance post op. Continue with routine vent weaning. Monitor chest tube output. Will follow.     Marianela Lawler MD

## 2022-05-25 NOTE — ANESTHESIA PROCEDURE NOTES
Airway  Date/Time: 5/25/2022 10:49 AM  Urgency: elective    Airway not difficult    General Information and Staff    Patient location during procedure: OR    Indications and Patient Condition  Indications for airway management: anesthesia  Spontaneous ventilation: present  Sedation level: deep  Preoxygenated: yes  Patient position: sniffing  MILS not maintained throughout  Mask difficulty assessment: vent by bag mask    Final Airway Details  Final airway type: endotracheal airway      Successful airway: ETT  Cuffed: yes   Successful intubation technique: direct laryngoscopy  Facilitating devices/methods: intubating stylet  Endotracheal tube insertion site: oral  Blade: Jose  Blade size: #3  ETT size (mm): 7.5  Cormack-Lehane Classification: grade I - full view of glottis  Placement verified by: chest auscultation and capnometry   Inital cuff pressure (cm H2O): 21  Measured from: lips  Number of attempts at approach: 1  Ventilation between attempts: bag mask  Number of other approaches attempted: 0
Arterial Line:    An arterial line was placed using surface landmarks, in the OR for the following indication(s): Siddharth Alcantara A 20 gauge (size), (length), Arrow (type) catheter was placed, into the left radial artery, secured by Tegaderm.   Anesthesia type: Local    Events:  patient tolerated procedure well with no complications and EBL 1AY.1/26/5121 10:46 AM5/25/2022 10:49 AM  Resident/CRNA: Getachew Bai, APRN - CRNA  Preanesthetic Checklist  Completed: patient identified, IV checked, site marked, risks and benefits discussed, surgical/procedural consents, equipment checked, pre-op evaluation, timeout performed, anesthesia consent given, oxygen available, monitors applied/VS acknowledged, fire risk safety assessment completed and verbalized and blood product R/B/A discussed and consented
Central Venous Line:    A central venous line was placed using ultrasound guidance, in the OR for the following indication(s): central venous access, CVP monitoring and vasoactive infusions. 5/25/2022 11:01 AM5/25/2022 11:11 AM    Sterility preparation included the following: hand hygiene performed prior to procedure, maximum sterile barriers used and sterile technique used to drape from head to toe. The patient was placed in Trendelenburg position. The right    The site was prepped with Chloraprep. A 8.5 Fr (size), 12 (length), introducer SLIC was placed. During the procedure, the following specific steps were taken: target vein identified, needle advanced into vein and blood aspirated and guidewire advanced into vein. Intravenous verification was obtained by ultrasound, venous blood return and manometry. Post insertion care included: all ports aspirated, all ports flushed easily, guidewire removed intact, line sutured in place and dressing applied. During the procedure the patient experienced: patient tolerated procedure well with no complications and EBL < 5mL. Outcomes: uncomplicated and patient tolerated procedure wellpermanent images obtained  Anesthesia type: general.. No    Additional notes:  Potential vascular access sites examined with ultrasound and an acceptable, patent site selected as noted above. Needle path and vascular access visualized in real time using ultrasound. As noted above, a confirmatory image is permanently stored in the chart. Easy catheter insertion. Chest x-ray to be performed in ICU. Seven step protocol followed.      Staffing  Performed: Anesthesiologist   Anesthesiologist: Anatoly Macias MD  Preanesthetic Checklist  Completed: patient identified, IV checked, risks and benefits discussed, equipment checked, pre-op evaluation, timeout performed, anesthesia consent given, oxygen available and monitors applied/VS acknowledged
Anesthesia Information  Performed Personally  Anesthesiologist:  Isiah Henderson MD    Post Intervention Follow-up Study  2.1None

## 2022-05-25 NOTE — BRIEF OP NOTE
Brief Postoperative Note      Patient: Warren Coulter  YOB: 1965  MRN: [id number]    Date of Procedure: 5/25/2022    Pre-Op Diagnosis: Atherosclerosis of native coronary artery of native heart with unstable angina pectoris (HCC) [I25.110]    Post-Op Diagnosis: Same       Procedure(s):  CORONARY ARTERY BYPASS GRAFT (CABG X 2), LIMA; ENDOSCOPIC VEIN HARVEST LEFT GREATER SAPHENOUS  TRANSESOPHAGEAL ECHOCARDIOGRAM    Surgeon(s):  Zoila Crook MD    Assistant:  Surgical Assistant: Elizabeth Horner V    Anesthesia: General    Estimated Blood Loss (mL): Minimal    Complications: None    Specimens:   * No specimens in log *    Implants:  * No implants in log *      Drains:   Chest Tube Left Pleural 1 (Active)       Chest Tube Anterior Mediastinal;Other (Comment) 2 (Active)       Urinary Catheter 2 Way; Haji-Temperature (Active)       Findings:      Electronically signed by Kia Reyes MD on 5/25/2022 at 3:15 PM

## 2022-05-25 NOTE — ANESTHESIA PRE PROCEDURE
Department of Anesthesiology  Preprocedure Note       Name:  Pasty Goldberg   Age:  64 y.o.  :  1965                                          MRN:  [id number]         Date:  2022      Surgeon: Christine Calzada):  Jose Luis Isbell MD    Procedure: Procedure(s):  CABG CORONARY ARTERY BYPASS    Medications prior to admission:   Prior to Admission medications    Medication Sig Start Date End Date Taking?  Authorizing Provider   atorvastatin (LIPITOR) 20 MG tablet Take 20 mg by mouth at bedtime    Historical Provider, MD   insulin NPH (HUMULIN N;NOVOLIN N) 100 UNIT/ML injection vial Inject into the skin 2 times daily (before meals) 15 units in the morning and 7 units at night    Historical Provider, MD   aspirin 81 MG EC tablet Take 81 mg by mouth at bedtime    Historical Provider, MD   METOPROLOL TARTRATE PO Take 12.5 mg by mouth once To be taken after 4 pm the day before surgery 22  Historical Provider, MD   AMIODARONE HCL PO Take 600 mg by mouth once To be taken after 4 pm the day before surgery 22  Historical Provider, MD   citalopram (CELEXA) 20 MG tablet Take 20 mg by mouth at bedtime  19   Ar Automatic Reconciliation   lisinopril (PRINIVIL;ZESTRIL) 20 MG tablet Take 20 mg by mouth at bedtime TAKE 1 TABLET BY MOUTH EVERY DAY 7/3/19   Ar Automatic Reconciliation       Current medications:    Current Facility-Administered Medications   Medication Dose Route Frequency Provider Last Rate Last Admin    ceFAZolin (ANCEF) 2000 mg in sterile water 20 mL IV syringe  2,000 mg IntraVENous On Call to [address] [place] [address]        amiodarone (CORDARONE) tablet 600 mg  600 mg Oral Once [employer], [place]        alcohol 62% (NOZIN) nasal  3 ampule  3 ampule Topical Once [employer], [place]        lidocaine 1 % injection 1 mL  1 mL IntraDERmal Once PRN Laura Figueroa MD        acetaminophen (TYLENOL) tablet 1,000 mg  1,000 mg Oral Once Laura Figueroa MD       Patricia See lactated ringers infusion   IntraVENous Continuous Yaz Mendiola MD        sodium chloride flush 0.9 % injection 5-40 mL  5-40 mL IntraVENous 2 times per day Yaz Mendiola MD        sodium chloride flush 0.9 % injection 5-40 mL  5-40 mL IntraVENous PRN Yaz Mendiola MD        0.9 % sodium chloride infusion   IntraVENous PRN Yaz Mendiola MD        midazolam PF (VERSED) injection 2 mg  2 mg IntraVENous Once PRN Yaz Mendiola MD         Facility-Administered Medications Ordered in Other Encounters   Medication Dose Route Frequency Provider Last Rate Last Admin    0.9 % sodium chloride infusion   IntraVENous PRN CLAUDIA Cueva           Allergies:  No Known Allergies    Problem List:    Patient Active Problem List   Diagnosis Code    Osteoarthritis of lumbosacral spine M47.817    Elevated BP without diagnosis of hypertension R03.0    DM (diabetes mellitus) ([facility] [facility] E11.9    Hyperlipidemia associated with type 2 diabetes mellitus ([facility] [facility] E11.69, E78.5    Anxiety F41.9    Dysthymic disorder F34.1    Class 1 obesity with serious comorbidity and body mass index (BMI) of 30.0 to 30.9 in adult E66.9, Z68.30    Uncontrolled type 2 diabetes mellitus with hyperglycemia (HCC) E11.65    Plantar wart of right foot B07.0    Menopausal syndrome N95.1    Overweight (BMI 25.0-29. 9) E66.3    Chest pain R07.9    Hypertension I10       Past Medical History:        Diagnosis Date    Anxiety 11/28/2012    CAD (coronary artery disease)     No MI; no stents    Depression     managed with med    DM (diabetes mellitus) ([facility] [address] 11/28/2012    insulin reliant; AVG ; pt denies s.s. of hypoglycemia; last A1C    HLD (hyperlipidemia)     managed with med    Hypertension     managed with med    Ulcerative esophagitis        Past Surgical History:        Procedure Laterality Date    CARDIAC CATHETERIZATION Left     UPPER GASTROINTESTINAL ENDOSCOPY      ulcerative espophagitis       Social History:    Social History Tobacco Use    Smoking status: Never Smoker    Smokeless tobacco: Never Used   Substance Use Topics    Alcohol use: Yes     Comment: rarely                                Counseling given: Not Answered      Vital Signs (Current):   Vitals:    05/25/22 0858   BP: 115/71   Pulse: 86   Resp: 16   Temp: 97.9 °F (36.6 °C)   TempSrc: Oral   SpO2: 97%   Weight: 165 lb (74.8 kg)   Height: 5' 4\" (1.626 m)                                              BP Readings from Last 3 Encounters:   05/25/22 115/71   05/23/22 (!) 150/86   05/06/22 138/80       NPO Status: Time of last liquid consumption: 2300                        Time of last solid consumption: 2200                        Date of last liquid consumption: 05/24/22                        Date of last solid food consumption: 05/24/22    BMI:   Wt Readings from Last 3 Encounters:   05/25/22 165 lb (74.8 kg)   05/23/22 164 lb 12.8 oz (74.8 kg)   05/06/22 167 lb (75.8 kg)     Body mass index is 28.32 kg/m².     CBC:   Lab Results   Component Value Date    WBC 7.1 05/23/2022    RBC 4.79 05/23/2022    HGB 13.7 05/23/2022    HCT 40.2 05/23/2022    MCV 83.9 05/23/2022    RDW 12.4 05/23/2022     05/23/2022       CMP:   Lab Results   Component Value Date     05/23/2022    K 4.3 05/23/2022     05/23/2022    CO2 28 05/23/2022    BUN 18 05/23/2022    CREATININE 0.74 05/23/2022    GFRAA >60 05/23/2022    AGRATIO 1.3 04/27/2022    LABGLOM >60 05/23/2022    GLUCOSE 297 05/23/2022    PROT 7.6 05/23/2022    CALCIUM 9.3 05/23/2022    BILITOT 0.3 05/23/2022    ALKPHOS 139 05/23/2022    ALKPHOS 176 04/27/2022    AST 14 05/23/2022    ALT 23 05/23/2022       POC Tests:   Recent Labs     05/23/22  0940   POCGLU 309*       Coags:   Lab Results   Component Value Date    PROTIME 13.3 05/23/2022    INR 1.0 05/23/2022    APTT 27.3 05/23/2022       HCG (If Applicable): No results found for: PREGTESTUR, PREGSERUM, HCG, HCGQUANT     ABGs: No results found for: PHART, PO2ART, MPH6ZVQ, JJI9NUR, BEART, J3FOEFCN     Type & Screen (If Applicable):  No results found for: LABABO, LABRH    Drug/Infectious Status (If Applicable):  No results found for: HIV, HEPCAB    COVID-19 Screening (If Applicable): No results found for: COVID19        Anesthesia Evaluation  Patient summary reviewed  Airway: Mallampati: II  TM distance: >3 FB   Neck ROM: full  Mouth opening: > = 3 FB   Dental: normal exam         Pulmonary:Negative Pulmonary ROS and normal exam  breath sounds clear to auscultation                             Cardiovascular:Negative CV ROS  Exercise tolerance: good (>4 METS),   (+) hypertension:, CAD: obstructive, CASTRO:,     (-) murmur      Rhythm: regular  Rate: normal                 ROS comment: Pt denies recent CP, SOB or changes in functional status    Echo 5/12/22: Normal Valves and LVEF 55-60%    Cath 5/12/22: Severe multivessel CAD with occluded LAD and subtotal occluded LCx     Neuro/Psych:   Negative Neuro/Psych ROS  (+) depression/anxiety             GI/Hepatic/Renal: Neg GI/Hepatic/Renal ROS            Endo/Other: Negative Endo/Other ROS   (+) DiabetesType II DM, poorly controlled, , : arthritis (OA):., .                 Abdominal:              PE comment: Deferred   Vascular: negative vascular ROS. Other Findings:           Anesthesia Plan      general     ASA 4       Induction: intravenous. arterial line, central line, CVP and MICHELLE    Anesthetic plan and risks discussed with patient. Use of blood products discussed with whom consented to blood products.                      Claire Carpenter MD   5/25/2022

## 2022-05-25 NOTE — PERIOP NOTE
TRANSFER - OUT REPORT:    Verbal report given to Tato Leonard RN on [employer]  being transferred to CVICU for routine progression of patient care       Report consisted of patient's Situation, Background, Assessment and   Recommendations(SBAR). Information from the following report(s) Nurse Handoff Report and Surgery Report was reviewed with the receiving nurse. Lines:   CVC Double Lumen 05/25/22 Right (Active)       Peripheral IV 05/25/22 Right Hand (Active)   Site Assessment Clean, dry & intact 05/25/22 0959   Line Status Blood return noted; Infusing 05/25/22 0959   Phlebitis Assessment No symptoms 05/25/22 0959   Infiltration Assessment 0 05/25/22 0959   Alcohol Cap Used No 05/25/22 0959   Dressing Status Clean, dry & intact 05/25/22 0959   Dressing Type Transparent 05/25/22 0959   Dressing Intervention New 05/25/22 0959       Arterial Line 05/25/22 Left Radial (Active)       Introducer 05/25/22 (Active)        Opportunity for questions and clarification was provided.       Patient transported with:  Monitor, O2 @ 15lpm and Registered Nurse, CRNA, JACOBO

## 2022-05-25 NOTE — PROGRESS NOTES
Patient was intubated with a number 7.5 ET Tube. Tube placement verified by auscultation, by CXR and ETCO2 monitor. ET Tube is secured at the 21 cm ector at the lip and on the right side. Patient was intubated by OR on the UNKNOWN attempt. Breath sounds are diminished. Patient is NEG for subcutaneous air and chest excursion is symmetric. Trachea is midline. Patient is also NEG for cyanosis and is NEG for pitting edema. Patient placed on ventilator on documented settings. All alarms are set and audible. Resuscitation bag  at the head of the bed. Ventilator Settings  Mode FIO2 Rate Tidal Volume Pressure PEEP I:E Ratio     PRVC 60 18 400      8        Peak airway pressure:     Minute ventilation:       ABG: No results for input(s): PH, PCO2, PO2, HCO3 in the last 72 hours.

## 2022-05-26 ENCOUNTER — APPOINTMENT (OUTPATIENT)
Dept: GENERAL RADIOLOGY | Age: 57
DRG: 236 | End: 2022-05-26
Attending: THORACIC SURGERY (CARDIOTHORACIC VASCULAR SURGERY)

## 2022-05-26 LAB
ANION GAP SERPL CALC-SCNC: 4 MMOL/L (ref 7–16)
BUN SERPL-MCNC: 12 MG/DL (ref 6–23)
CALCIUM SERPL-MCNC: 8.5 MG/DL (ref 8.3–10.4)
CHLORIDE SERPL-SCNC: 115 MMOL/L (ref 98–107)
CO2 SERPL-SCNC: 25 MMOL/L (ref 21–32)
CREAT SERPL-MCNC: 0.5 MG/DL (ref 0.6–1)
EKG ATRIAL RATE: 87 BPM
EKG DIAGNOSIS: NORMAL
EKG P AXIS: -19 DEGREES
EKG P-R INTERVAL: 156 MS
EKG Q-T INTERVAL: 390 MS
EKG QRS DURATION: 58 MS
EKG QTC CALCULATION (BAZETT): 469 MS
EKG R AXIS: -32 DEGREES
EKG T AXIS: 6 DEGREES
EKG VENTRICULAR RATE: 87 BPM
ERYTHROCYTE [DISTWIDTH] IN BLOOD BY AUTOMATED COUNT: 12.5 % (ref 11.9–14.6)
ERYTHROCYTE [DISTWIDTH] IN BLOOD BY AUTOMATED COUNT: 12.7 % (ref 11.9–14.6)
GLUCOSE BLD STRIP.AUTO-MCNC: 100 MG/DL (ref 65–100)
GLUCOSE BLD STRIP.AUTO-MCNC: 101 MG/DL (ref 65–100)
GLUCOSE BLD STRIP.AUTO-MCNC: 106 MG/DL (ref 65–100)
GLUCOSE BLD STRIP.AUTO-MCNC: 107 MG/DL (ref 65–100)
GLUCOSE BLD STRIP.AUTO-MCNC: 113 MG/DL (ref 65–100)
GLUCOSE BLD STRIP.AUTO-MCNC: 127 MG/DL (ref 65–100)
GLUCOSE BLD STRIP.AUTO-MCNC: 185 MG/DL (ref 65–100)
GLUCOSE BLD STRIP.AUTO-MCNC: 90 MG/DL (ref 65–100)
GLUCOSE BLD STRIP.AUTO-MCNC: 92 MG/DL (ref 65–100)
GLUCOSE BLD STRIP.AUTO-MCNC: 97 MG/DL (ref 65–100)
GLUCOSE SERPL-MCNC: 94 MG/DL (ref 65–100)
HCT VFR BLD AUTO: 28.6 % (ref 35.8–46.3)
HCT VFR BLD AUTO: 28.7 % (ref 35.8–46.3)
HCT VFR BLD AUTO: 33.2 % (ref 35.8–46.3)
HGB BLD-MCNC: 11.3 G/DL (ref 11.7–15.4)
HGB BLD-MCNC: 9.3 G/DL (ref 11.7–15.4)
HGB BLD-MCNC: 9.7 G/DL (ref 11.7–15.4)
MAGNESIUM SERPL-MCNC: 2.3 MG/DL (ref 1.8–2.4)
MAGNESIUM SERPL-MCNC: 2.3 MG/DL (ref 1.8–2.4)
MCH RBC QN AUTO: 28.1 PG (ref 26.1–32.9)
MCH RBC QN AUTO: 28.4 PG (ref 26.1–32.9)
MCHC RBC AUTO-ENTMCNC: 32.4 G/DL (ref 31.4–35)
MCHC RBC AUTO-ENTMCNC: 33.9 G/DL (ref 31.4–35)
MCV RBC AUTO: 83.9 FL (ref 79.6–97.8)
MCV RBC AUTO: 86.7 FL (ref 79.6–97.8)
NRBC # BLD: 0 K/UL (ref 0–0.2)
NRBC # BLD: 0 K/UL (ref 0–0.2)
PLATELET # BLD AUTO: 119 K/UL (ref 150–450)
PLATELET # BLD AUTO: 147 K/UL (ref 150–450)
PMV BLD AUTO: 10.4 FL (ref 9.4–12.3)
PMV BLD AUTO: 10.5 FL (ref 9.4–12.3)
POTASSIUM SERPL-SCNC: 4.3 MMOL/L (ref 3.5–5.1)
POTASSIUM SERPL-SCNC: 4.6 MMOL/L (ref 3.5–5.1)
RBC # BLD AUTO: 3.31 M/UL (ref 4.05–5.2)
RBC # BLD AUTO: 3.41 M/UL (ref 4.05–5.2)
SERVICE CMNT-IMP: ABNORMAL
SERVICE CMNT-IMP: NORMAL
SODIUM SERPL-SCNC: 144 MMOL/L (ref 136–145)
WBC # BLD AUTO: 8.4 K/UL (ref 4.3–11.1)
WBC # BLD AUTO: 9.7 K/UL (ref 4.3–11.1)

## 2022-05-26 PROCEDURE — 83735 ASSAY OF MAGNESIUM: CPT

## 2022-05-26 PROCEDURE — 6370000000 HC RX 637 (ALT 250 FOR IP): Performed by: PHYSICIAN ASSISTANT

## 2022-05-26 PROCEDURE — A4216 STERILE WATER/SALINE, 10 ML: HCPCS | Performed by: THORACIC SURGERY (CARDIOTHORACIC VASCULAR SURGERY)

## 2022-05-26 PROCEDURE — 84132 ASSAY OF SERUM POTASSIUM: CPT

## 2022-05-26 PROCEDURE — 85027 COMPLETE CBC AUTOMATED: CPT

## 2022-05-26 PROCEDURE — 6360000002 HC RX W HCPCS: Performed by: THORACIC SURGERY (CARDIOTHORACIC VASCULAR SURGERY)

## 2022-05-26 PROCEDURE — 80048 BASIC METABOLIC PNL TOTAL CA: CPT

## 2022-05-26 PROCEDURE — 36592 COLLECT BLOOD FROM PICC: CPT

## 2022-05-26 PROCEDURE — 93005 ELECTROCARDIOGRAM TRACING: CPT | Performed by: THORACIC SURGERY (CARDIOTHORACIC VASCULAR SURGERY)

## 2022-05-26 PROCEDURE — 2100000000 HC CCU R&B

## 2022-05-26 PROCEDURE — 2700000000 HC OXYGEN THERAPY PER DAY

## 2022-05-26 PROCEDURE — P9041 ALBUMIN (HUMAN),5%, 50ML: HCPCS | Performed by: THORACIC SURGERY (CARDIOTHORACIC VASCULAR SURGERY)

## 2022-05-26 PROCEDURE — 85018 HEMOGLOBIN: CPT

## 2022-05-26 PROCEDURE — 37799 UNLISTED PX VASCULAR SURGERY: CPT

## 2022-05-26 PROCEDURE — 6370000000 HC RX 637 (ALT 250 FOR IP): Performed by: THORACIC SURGERY (CARDIOTHORACIC VASCULAR SURGERY)

## 2022-05-26 PROCEDURE — 82962 GLUCOSE BLOOD TEST: CPT

## 2022-05-26 PROCEDURE — 71045 X-RAY EXAM CHEST 1 VIEW: CPT

## 2022-05-26 PROCEDURE — 2500000003 HC RX 250 WO HCPCS: Performed by: THORACIC SURGERY (CARDIOTHORACIC VASCULAR SURGERY)

## 2022-05-26 PROCEDURE — 99222 1ST HOSP IP/OBS MODERATE 55: CPT | Performed by: INTERNAL MEDICINE

## 2022-05-26 PROCEDURE — 2580000003 HC RX 258: Performed by: THORACIC SURGERY (CARDIOTHORACIC VASCULAR SURGERY)

## 2022-05-26 RX ORDER — DIMETHICONE, CAMPHOR (SYNTHETIC), MENTHOL, AND PHENOL 1.1; .5; .625; .5 G/100G; G/100G; G/100G; G/100G
OINTMENT TOPICAL PRN
Status: DISCONTINUED | OUTPATIENT
Start: 2022-05-26 | End: 2022-05-26

## 2022-05-26 RX ORDER — INSULIN LISPRO 100 [IU]/ML
0-18 INJECTION, SOLUTION INTRAVENOUS; SUBCUTANEOUS
Status: DISCONTINUED | OUTPATIENT
Start: 2022-05-26 | End: 2022-06-01 | Stop reason: HOSPADM

## 2022-05-26 RX ORDER — KETOROLAC TROMETHAMINE 30 MG/ML
30 INJECTION, SOLUTION INTRAMUSCULAR; INTRAVENOUS ONCE
Status: COMPLETED | OUTPATIENT
Start: 2022-05-26 | End: 2022-05-26

## 2022-05-26 RX ORDER — PHENYLEPHRINE HCL IN 0.9% NACL 50MG/250ML
10-300 PLASTIC BAG, INJECTION (ML) INTRAVENOUS CONTINUOUS
Status: DISCONTINUED | OUTPATIENT
Start: 2022-05-26 | End: 2022-05-27

## 2022-05-26 RX ORDER — KETOROLAC TROMETHAMINE 15 MG/ML
15 INJECTION, SOLUTION INTRAMUSCULAR; INTRAVENOUS EVERY 6 HOURS PRN
Status: DISPENSED | OUTPATIENT
Start: 2022-05-26 | End: 2022-05-28

## 2022-05-26 RX ORDER — ALBUMIN, HUMAN INJ 5% 5 %
25 SOLUTION INTRAVENOUS ONCE
Status: COMPLETED | OUTPATIENT
Start: 2022-05-26 | End: 2022-05-26

## 2022-05-26 RX ADMIN — OXYCODONE AND ACETAMINOPHEN 1 TABLET: 5; 325 TABLET ORAL at 00:13

## 2022-05-26 RX ADMIN — FAMOTIDINE 20 MG: 10 INJECTION, SOLUTION INTRAVENOUS at 17:41

## 2022-05-26 RX ADMIN — OXYCODONE AND ACETAMINOPHEN 1 TABLET: 5; 325 TABLET ORAL at 21:52

## 2022-05-26 RX ADMIN — ASPIRIN 81 MG: 81 TABLET ORAL at 08:19

## 2022-05-26 RX ADMIN — ONDANSETRON 4 MG: 2 INJECTION INTRAMUSCULAR; INTRAVENOUS at 21:51

## 2022-05-26 RX ADMIN — ATORVASTATIN CALCIUM 80 MG: 80 TABLET, FILM COATED ORAL at 21:52

## 2022-05-26 RX ADMIN — KETOROLAC TROMETHAMINE 30 MG: 30 INJECTION, SOLUTION INTRAMUSCULAR at 10:43

## 2022-05-26 RX ADMIN — INSULIN LISPRO 3 UNITS: 100 INJECTION, SOLUTION INTRAVENOUS; SUBCUTANEOUS at 21:49

## 2022-05-26 RX ADMIN — OXYCODONE AND ACETAMINOPHEN 1 TABLET: 5; 325 TABLET ORAL at 04:31

## 2022-05-26 RX ADMIN — ALBUMIN (HUMAN) 25 G: 12.5 INJECTION, SOLUTION INTRAVENOUS at 00:40

## 2022-05-26 RX ADMIN — SODIUM CHLORIDE, PRESERVATIVE FREE 5 ML: 5 INJECTION INTRAVENOUS at 22:28

## 2022-05-26 RX ADMIN — ONDANSETRON 4 MG: 2 INJECTION INTRAMUSCULAR; INTRAVENOUS at 08:25

## 2022-05-26 RX ADMIN — SODIUM CHLORIDE, PRESERVATIVE FREE 10 ML: 5 INJECTION INTRAVENOUS at 09:09

## 2022-05-26 RX ADMIN — AMIODARONE HYDROCHLORIDE 200 MG: 200 TABLET ORAL at 08:19

## 2022-05-26 RX ADMIN — ACETAMINOPHEN 650 MG: 325 TABLET ORAL at 07:28

## 2022-05-26 RX ADMIN — KETOROLAC TROMETHAMINE 15 MG: 15 INJECTION, SOLUTION INTRAMUSCULAR; INTRAVENOUS at 17:40

## 2022-05-26 RX ADMIN — AMIODARONE HYDROCHLORIDE 200 MG: 200 TABLET ORAL at 22:28

## 2022-05-26 RX ADMIN — Medication 2000 MG: at 05:21

## 2022-05-26 RX ADMIN — OXYCODONE AND ACETAMINOPHEN 1 TABLET: 5; 325 TABLET ORAL at 09:14

## 2022-05-26 RX ADMIN — ONDANSETRON 4 MG: 2 INJECTION INTRAMUSCULAR; INTRAVENOUS at 14:21

## 2022-05-26 RX ADMIN — ONDANSETRON 4 MG: 2 INJECTION INTRAMUSCULAR; INTRAVENOUS at 17:11

## 2022-05-26 ASSESSMENT — PAIN DESCRIPTION - ORIENTATION
ORIENTATION: MID
ORIENTATION: MID
ORIENTATION: MID;ANTERIOR
ORIENTATION: MID
ORIENTATION: MID
ORIENTATION: MID;ANTERIOR
ORIENTATION: MID
ORIENTATION: ANTERIOR;MID

## 2022-05-26 ASSESSMENT — PAIN SCALES - GENERAL
PAINLEVEL_OUTOF10: 3
PAINLEVEL_OUTOF10: 3
PAINLEVEL_OUTOF10: 2
PAINLEVEL_OUTOF10: 4
PAINLEVEL_OUTOF10: 3
PAINLEVEL_OUTOF10: 5
PAINLEVEL_OUTOF10: 6
PAINLEVEL_OUTOF10: 5
PAINLEVEL_OUTOF10: 2
PAINLEVEL_OUTOF10: 4
PAINLEVEL_OUTOF10: 3
PAINLEVEL_OUTOF10: 6
PAINLEVEL_OUTOF10: 6
PAINLEVEL_OUTOF10: 3

## 2022-05-26 ASSESSMENT — PAIN DESCRIPTION - ONSET
ONSET: ON-GOING

## 2022-05-26 ASSESSMENT — PAIN - FUNCTIONAL ASSESSMENT
PAIN_FUNCTIONAL_ASSESSMENT: ACTIVITIES ARE NOT PREVENTED

## 2022-05-26 ASSESSMENT — PAIN DESCRIPTION - PAIN TYPE
TYPE: SURGICAL PAIN
TYPE: ACUTE PAIN
TYPE: SURGICAL PAIN
TYPE: SURGICAL PAIN

## 2022-05-26 ASSESSMENT — PAIN DESCRIPTION - DESCRIPTORS
DESCRIPTORS: ACHING

## 2022-05-26 ASSESSMENT — PAIN DESCRIPTION - LOCATION
LOCATION: BACK
LOCATION: CHEST;INCISION
LOCATION: CHEST;BACK;SHOULDER
LOCATION: CHEST;INCISION
LOCATION: CHEST
LOCATION: CHEST
LOCATION: CHEST;INCISION
LOCATION: CHEST;STERNUM

## 2022-05-26 ASSESSMENT — PAIN DESCRIPTION - FREQUENCY
FREQUENCY: INTERMITTENT

## 2022-05-26 NOTE — PROGRESS NOTES
TIMEOUT performed prior to extubation on Annmarie Null with RT, primary RN, and charge RN present on 5/25/2022 at 2155 PM.      Per anesthesia team on admission, patient's intubation was no acute fracture or dislocation     ABG results as follows:    Results for Akira Bryson (MRN [id number]) as of 5/26/2022 01:49   Ref. Range 5/25/2022 21:30   BASE DEFICIT (POC) Latest Units: mmol/L 2.9   pCO2, Arterial, POC Latest Ref Range: 35 - 45 MMHG 47.6 (H)   Performed by: Unknown Dian   pH, Arterial, POC Latest Ref Range: 7.35 - 7.45   7.30 (L)   pO2, Arterial, POC Latest Ref Range: 75 - 100 MMHG 104 (H)   POC PEEP Latest Units: cmH2O 8   RESPIRATORY COMMENT: Unknown VE5   SO2c, Arterial, POC Latest Ref Range: 95 - 98 % 97.3   Specimen type: Latest Units:   ARTERIAL     The patient is hemodynamically stable and can demonstrate the following on a consistent basis:  follow commands , elevate head off the pillow, nods appropriately to questions.       Dr. Laura Mixon notified of weaning parameters and order to extubate obtained.      Patient extubated by Katlin Goss RT to airvo at 40L 74% without complication.

## 2022-05-26 NOTE — PROGRESS NOTES
Arterial line pulled without complication. Manual pressure applied for 5 minutes, pressure dressing of gauze and tape applied. Pt tolerated well.

## 2022-05-26 NOTE — PROGRESS NOTES
Comprehensive Nutrition Assessment    Type and Reason for Visit: Initial,Positive Nutrition Screen  Malnutrition Screening Tool: Malnutrition Screen  Have you recently lost weight without trying?: Unsure of amount of weight loss (2 points)  Have you been eating poorly because of a decreased appetite?: No (0 points)  Malnutrition Screening Tool Score: 2    Nutrition Recommendations/Plan:   Meals and Snacks:  Diet: Continue current order  Nutrition Supplement Therapy:   Medical food supplement therapy:  Initiate Glucerna Shake three times per day (this provides 220 kcal and 10 grams protein per bottle)--vanilla     Malnutrition Assessment:  Malnutrition Status: No malnutrition    Nutrition Assessment:  Nutrition History:      Pt states typically eating 2 meals a day without unintentional wt loss PTA. Nutrition Background:   Pt with PMH notable for OA, DM II, HLD, and HTN. Presents for CABG x2 (5/25). Nutrition Interval:  Pt seen sitting up in bed. She states having some jello, but vomited soon after. She denies having any nausea at time of assessment and not feeling nauseous prior to po intake. Discussed addition of Glucerna with meals while appetite improves. Encouraged pt to request antiemetic if she feels nauseous prior to meals. Nutrition Related Findings:   No antony muscle or subcutaneous fat wasting observed      Current Nutrition Therapies:  No diet orders on file    Current Intake:   Average Meal Intake: 1-25% Average Supplements Intake: None Ordered      Anthropometric Measures:  Height: 5' 4\" (162.6 cm)  Current Body Wt: 173 lb 11.6 oz (78.8 kg) (5/26), Weight source: Bed Scale  BMI: 29.8  Admission Body Weight: 165 lb (74.8 kg) (5/25; standing scale)  Ideal Body Weight (Kg) (Calculated): 55 kg (120 lbs), 144.8 %  Usual Body Wt: 167 lb (75.8 kg) (4/27; MD encounter), Percent weight change: 4       Edema: No data recorded  BMI Category Overweight (BMI 25.0-29. 9)  Estimated Daily Nutrient Needs:  Energy (kcal/day): 7349-7881 (Kcal/kg (20-25) Weight used:   Admission (75kg)  Protein (g/day): 60-75 (0.8-1gm/kg) Weight Used: (Admission (75kg))    Fluid (ml/day):   (1 ml/kcal)    Nutrition Diagnosis:   · Inadequate oral intake related to  (vomiting) as evidenced by intake 0-25% (pt states barrier to intake)    Nutrition Interventions:   Food and/or Nutrient Delivery: Continue Current Diet,Start Oral Nutrition Supplement     Coordination of Nutrition Care: Continue to monitor while inpatient       Goals: Active Goal: Meet at least 75% of estimated needs,by next RD assessment       Nutrition Monitoring and Evaluation:      Food/Nutrient Intake Outcomes: Food and Nutrient Intake,Supplement Intake  Physical Signs/Symptoms Outcomes: Biochemical Data,GI Status,Meal Time Behavior,Weight    Discharge Planning:     Too soon to determine    Jan Avitia MS, RDN, LD 5/26/2022 at 11:53 AM  Contact: [phone]

## 2022-05-26 NOTE — ANESTHESIA POSTPROCEDURE EVALUATION
Department of Anesthesiology  Postprocedure Note    Patient: Warren Coulter  MRN: [id number]  [place]: 1965  Date of evaluation: 5/25/2022  Time:  10:03 PM     Procedure Summary     Date: 05/25/22 Room / Location: [facility] MAIN OR 04 CARDIAC / [facility] MAIN OR    Anesthesia Start: 1034 Anesthesia Stop: 1539    Procedures:       CORONARY ARTERY BYPASS GRAFT (CABG X 2), LIMA; ENDOSCOPIC VEIN HARVEST LEFT GREATER SAPHENOUS (N/A Chest)      TRANSESOPHAGEAL ECHOCARDIOGRAM (N/A Esophagus) Diagnosis:       Atherosclerosis of native coronary artery of native heart with unstable angina pectoris (HCC)      Abnormal stress test      (Atherosclerosis of native coronary artery of native heart with unstable angina pectoris ([facility] [address] [I25.110])    Providers: Zoila Crook MD Responsible Provider: Dio Sanchez MD    Anesthesia Type: general ASA Status: 4          Anesthesia Type: No value filed. Jhonatan Phase I: Jhonatan Score: 10    Jhonatan Phase II:      Last vitals: Reviewed and per EMR flowsheets.        Anesthesia Post Evaluation    Patient location during evaluation: ICU  Patient participation: complete - patient cannot participate  Level of consciousness: sedated and ventilated  Airway patency: patent  Nausea & Vomiting: no nausea and no vomiting  Complications: no  Cardiovascular status: hemodynamically stable  Respiratory status: ventilator  Hydration status: euvolemic  Comments: /62   Pulse 74   Temp 97.6 °F (36.4 °C) (Bladder)   Resp 12   Ht 5' 4\" (1.626 m)   Wt 165 lb (74.8 kg)   SpO2 100%   BMI 28.32 kg/m²

## 2022-05-26 NOTE — PROGRESS NOTES
Chart screened by  for discharge planning. No needs identified at this time. Please consult  if any new issues arise.        05/26/22 3159   Condition of Participation: Discharge Planning   The Plan for Transition of Care is related to the following treatment goals: TBD pending clinical  progress

## 2022-05-26 NOTE — PROGRESS NOTES
Respiratory Mechanics completed and are as follows:    RR  14    RSBI 31  NIF -20    RASS 0     Patient extubated to Airvo with 40L flow and 40% FiO2. Patient is able to communicate and is negative for stridor. Breath sounds are clear and diminished. No complications with extubation.      celena baumann, RCP

## 2022-05-26 NOTE — PROGRESS NOTES
[facility] Critical Care Note[de-identified] 5/26/2022  Tash Gordon  Admission Date: 5/25/2022     Length of Stay: 1 days    Background: 65 y/o female had CABG X 2.            Ms. Armand Krause is a 64year old female, PMH DM, HTN, and HLD here s/p CABG X 2. The patient had previously been seen by [facility] for CASTRO and right sided chest discomfort. Left cardiac catheterization was done on 5/6/2022 and revealed severe multivessel disease. The patient is a never smoker and has not history of any lung disease    Notable PMH:  has a past medical history of Anxiety, CAD (coronary artery disease), Depression, DM (diabetes mellitus) ([address] HLD (hyperlipidemia), Hypertension, and Ulcerative esophagitis. 24 Hour events: The pt has tolerated extubation yesterday pm, still on some wilmer, currently on nc    ROS: unable to obtain/negative except as listed elsewhere. Lines: (insertion date)       Peripheral IV 05/25/22 Right Hand (Active)     Chest Tube Left Pleural 1 (Active)       Chest Tube Anterior Mediastinal;Other (Comment) 2 (Active)       Urinary Catheter 2 Way; Haji-Temperature (Active)      Arterial Line 05/25/22 Left Radial (Active)     Introducer 05/25/22 (Active)       Arterial Line 05/25/22 Left Radial (Active)       CVC Double Lumen 05/25/22 Right (Active)        Drips: current dose (range)  Dose (mcg/kg/min) Propofol : *100 mg  Dose (mcg/kg/hr) Dexmedetomidine: 0 mcg/kg/hr  Dose (mcg/min) Epinephrine: 0 mcg/min  Dose (mcg/min) Phenylephrine : 0 mcg/min  Dose (mcg/kg/min) Dobutamine: 0 mcg/kg/min  Dose (units/hr) Insulin : 1.92 Units/hr (BG 90)     Pertinent Exam:         Blood pressure (!) 103/57, pulse 84, temperature 98.8 °F (37.1 °C), temperature source Bladder, resp. rate 11, height 5' 4\" (1.626 m), weight 173 lb 11.6 oz (78.8 kg), SpO2 100 %.      Intake/Output Summary (Last 24 hours) at 5/26/2022 0906  Last data filed at 5/26/2022 0701  Gross per 24 hour   Intake 1432.42 ml Output 2965 ml   Net -1532.58 ml     Constitutional:  nad  EENMT:  Sclera clear, pupils equal, oral mucosa moist  Respiratory:  clear  Cardiovascular:  RRR  Gastrointestinal:  soft with no tenderness; positive bowel sounds present  Musculoskeletal:  warm with no cyanosis, no lower extremity edema  Skin:  no jaundice or ecchymosis  Neurologic:alert  Psychiatric: calm    CXR:       Recent Labs     05/23/22 0927 05/23/22 0927 05/25/22 1539 05/25/22 1539 05/25/22 2016 05/26/22  0006 05/26/22  0314   WBC 7.1  --  15.1*  --   --   --  9.7   HGB 13.7   < > 10.7*   < > 11.7 11.3* 9.7*   HCT 40.2   < > 30.8*   < > 34.0* 33.2* 28.6*     --  143*  --   --   --  147*   INR 1.0  --  1.4  --   --   --   --     < > = values in this interval not displayed. Recent Labs     05/23/22 0927 05/23/22 0927 05/25/22 1539 05/25/22 1539 05/25/22 2016 05/26/22  0006 05/26/22  0314     --  143  --   --   --  144   K 4.3   < > 4.0   < > 4.3 4.6 4.3     --  114*  --   --   --  115*   CO2 28  --  24  --   --   --  25   BUN 18  --  15  --   --   --  12   CREATININE 0.74  --  0.70  --   --   --  0.50*   MG 2.0   < > 2.9*   < > 2.4 2.3 2.3   BILITOT 0.3  --   --   --   --   --   --    AST 14*  --   --   --   --   --   --    ALT 23  --   --   --   --   --   --    ALKPHOS 139*  --   --   --   --   --   --     < > = values in this interval not displayed. No results for input(s): TROPHS, NTPROBNP, CRP, ESR in the last 72 hours. Recent Labs     05/23/22 0927 05/25/22 1539 05/26/22  0314   GLUCOSE 297* 122* 94      ECHO: No results found for this or any previous visit. Microbiology:   Recent Labs     05/23/22  0928   CULTURE >100,000 COLONIES/mL MIXED SKIN SUYAPA ISOLATED  THREE OR MORE TYPES OF ORGANISMS ARE PRESENT. THIS IS INDICATIVE OF CONTAMINATION DUE TO IMPROPER COLLECTION TECHNIQUE. PLEASE REPEAT COLLECTION UNLESS PATIENT HAS STARTED ANTIBIOTIC TREATMENT.      Ventilator Settings Ideal body weight: 54.7 kg (120 lb 9.5 oz)  Adjusted ideal body weight: 64.3 kg (141 lb 13.5 oz)  Mode FIO2 Rate Tidal Volume Pressure   PS/CPAP    40 %  12 bmp     400 mL   8     Peak airway pressure:     Minute ventilation: Minute Ventilation (l/min): 6 l/min  ABG:No results for input(s): PH, PCO2, PO2, HCO3 in the last 72 hours. Assessment and Plan:  (Medical Decision Making)     Impression: 64 y.o. y/o female s/p CV surgery for S/P CABG x 2     Encounter for weaning from Ventilator: Tolerating extubation yesterday     Hypoxemia:   On nc     Atelectasis:   IS, mobility after extubated.     S/P Cardiovascular surgery:  Monitor chest tube output, removal per surgery.   Still on wilmer-wean as tolerated  Full Code        Franklyn Mcfarland MD

## 2022-05-26 NOTE — PROGRESS NOTES
Patient intubated with 7.5 ET Tube. ET Tube is secured at the 21 cm ector at the lip and on the right side. . Breath sounds are clear and diminished. Patient is negative for subcutaneous air and chest excursion is symmetric. Trachea is midline. Patient is also negative for cyanosis and is negative for pitting edema. Patient on documented settings. All alarms are set and audible.  Resuscitation bag  at the head of the bed.       Ventilator Settings  Mode FIO2 Rate Tidal Volume Pressure PEEP I:E Ratio    SIMV PRVC 30 12 400 10cm H20     8         Peak airway pressure:  16   Minute ventilation:   5.4                     Heidy Mancuso RCP

## 2022-05-26 NOTE — PROGRESS NOTES
CV Progress Note    Admit Date: 5/25/2022    POD 1    Subjective:     Patient present conditions: Awake, Alert and Cooperative. Review of Systems   b0  Cardiac: Vital signs stable. Lines in  Respiratory: Chest x-ray clear. Minimal chest tube drainage. extubated  Neuro: Moves all four extremities. Incision: Dry. GI: Taking liquids. Objective:     Vitals:  Blood pressure (!) 103/57, pulse 84, temperature 98.8 °F (37.1 °C), temperature source Bladder, resp. rate 11, height 5' 4\" (1.626 m), weight 173 lb 11.6 oz (78.8 kg), SpO2 100 %. I/O:  05/26 0701 - 05/26 1900  In: -   Out: 100 [Urine:60]  05/24 1901 - 05/26 0700  In: 1432.4 [I.V.:1082.4]  Out: 9119 [Urine:2690]    Heart: No Murmur. Lung: Working with IS. Neuro: Cooperative. Incisions: Dry. arECG/Telemetry: Unchanged from Pre-Op    Labs:  Recent Results (from the past 12 hour(s))   POCT Glucose    Collection Time: 05/25/22  8:13 PM   Result Value Ref Range    POC Glucose 131 (H) 65 - 100 mg/dL    Performed by:  Hutson (Hoeung)    Hemoglobin and Hematocrit    Collection Time: 05/25/22  8:16 PM   Result Value Ref Range    Hemoglobin 11.7 11.7 - 15.4 g/dL    Hematocrit 34.0 (L) 35.8 - 46.3 %   Potassium    Collection Time: 05/25/22  8:16 PM   Result Value Ref Range    Potassium 4.3 3.5 - 5.1 mmol/L   Magnesium    Collection Time: 05/25/22  8:16 PM   Result Value Ref Range    Magnesium 2.4 1.8 - 2.4 mg/dL   POC Blood, Cord, Arterial    Collection Time: 05/25/22  9:30 PM   Result Value Ref Range    DEVICE ADULT VENT      FIO2 30 %    pH, Arterial, POC 7.30 (L) 7.35 - 7.45      pCO2, Arterial, POC 47.6 (H) 35 - 45 MMHG    pO2, Arterial,  (H) 75 - 100 MMHG    HCO3, Mixed 23.6 22 - 26 MMOL/L    SO2c, Arterial, POC 97.3 95 - 98 %    BASE DEFICIT (POC) 2.9 mmol/L    Mode Pressure Support      POC PEEP 8 cmH2O    POC Pressure Support 5 cmH2O    POC Kendell's Test NOT APPLICABLE      Site DRAWN FROM ARTERIAL LINE      Specimen type: ARTERIAL Performed by: Dian     RESPIRATORY COMMENT: [id number]    POCT Glucose    Collection Time: 05/25/22  9:43 PM   Result Value Ref Range    POC Glucose 119 (H) 65 - 100 mg/dL    Performed by: Yossi    POCT Glucose    Collection Time: 05/25/22 10:25 PM   Result Value Ref Range    POC Glucose 126 (H) 65 - 100 mg/dL    Performed by: Don (Hoeung)RNMallicjose    POCT Glucose    Collection Time: 05/25/22 11:06 PM   Result Value Ref Range    POC Glucose 122 (H) 65 - 100 mg/dL    Performed by: Hutson (Hoeung)    Potassium    Collection Time: 05/26/22 12:06 AM   Result Value Ref Range    Potassium 4.6 3.5 - 5.1 mmol/L   Magnesium    Collection Time: 05/26/22 12:06 AM   Result Value Ref Range    Magnesium 2.3 1.8 - 2.4 mg/dL   Hemoglobin and Hematocrit    Collection Time: 05/26/22 12:06 AM   Result Value Ref Range    Hemoglobin 11.3 (L) 11.7 - 15.4 g/dL    Hematocrit 33.2 (L) 35.8 - 46.3 %   POCT Glucose    Collection Time: 05/26/22 12:09 AM   Result Value Ref Range    POC Glucose 113 (H) 65 - 100 mg/dL    Performed by: Sandra)BILLMallicjose    POCT Glucose    Collection Time: 05/26/22  1:14 AM   Result Value Ref Range    POC Glucose 107 (H) 65 - 100 mg/dL    Performed by: Bar (Hoeung)Mallica    POCT Glucose    Collection Time: 05/26/22  2:06 AM   Result Value Ref Range    POC Glucose 106 (H) 65 - 100 mg/dL    Performed by: Bar (Hoeung)Mallica    POCT Glucose    Collection Time: 05/26/22  3:04 AM   Result Value Ref Range    POC Glucose 97 65 - 100 mg/dL    Performed by:  Bar (Hoeung)Mallicjose    Magnesium    Collection Time: 05/26/22  3:14 AM   Result Value Ref Range    Magnesium 2.3 1.8 - 2.4 mg/dL   Basic Metabolic Panel    Collection Time: 05/26/22  3:14 AM   Result Value Ref Range    Sodium 144 136 - 145 mmol/L    Potassium 4.3 3.5 - 5.1 mmol/L    Chloride 115 (H) 98 - 107 mmol/L    CO2 25 21 - 32 mmol/L    Anion Gap 4 (L) 7 - 16 mmol/L    Glucose 94 65 - 100 mg/dL    BUN 12 6 - 23 MG/DL CREATININE 0.50 (L) 0.6 - 1.0 MG/DL    GFR African American >60 >60 ml/min/1.73m2    GFR Non- >60 >60 ml/min/1.73m2    Calcium 8.5 8.3 - 10.4 MG/DL   CBC    Collection Time: 05/26/22  3:14 AM   Result Value Ref Range    WBC 9.7 4.3 - 11.1 K/uL    RBC 3.41 (L) 4.05 - 5.2 M/uL    Hemoglobin 9.7 (L) 11.7 - 15.4 g/dL    Hematocrit 28.6 (L) 35.8 - 46.3 %    MCV 83.9 79.6 - 97.8 FL    MCH 28.4 26.1 - 32.9 PG    MCHC 33.9 31.4 - 35.0 g/dL    RDW 12.7 11.9 - 14.6 %    Platelets 163 (L) 476 - 450 K/uL    MPV 10.4 9.4 - 12.3 FL    nRBC 0.00 0.0 - 0.2 K/uL   POCT Glucose    Collection Time: 05/26/22  4:05 AM   Result Value Ref Range    POC Glucose 101 (H) 65 - 100 mg/dL    Performed by: Bar (Hoeung)Mallicojse    POCT Glucose    Collection Time: 05/26/22  5:07 AM   Result Value Ref Range    POC Glucose 100 65 - 100 mg/dL    Performed by: Bar (Hoeung)Mallicjose    POCT Glucose    Collection Time: 05/26/22  6:21 AM   Result Value Ref Range    POC Glucose 92 65 - 100 mg/dL    Performed by: Bar (Hoeung)Mallicjose    EKG 12 lead    Collection Time: 05/26/22  7:29 AM   Result Value Ref Range    Ventricular Rate 87 BPM    Atrial Rate 87 BPM    P-R Interval 156 ms    QRS Duration 58 ms    Q-T Interval 390 ms    QTc Calculation (Bazett) 469 ms    P Axis -19 degrees    R Axis -32 degrees    T Axis 6 degrees    Diagnosis Normal sinus rhythm with sinus arrhythmia    POCT Glucose    Collection Time: 05/26/22  7:35 AM   Result Value Ref Range    POC Glucose 90 65 - 100 mg/dL    Performed by: Jaclyn        Assessment:     Stable. Plan transfer today      Plan/Recommendations/Medical Decision Making:     Continue present treatment    Discontinue: Chest tubes today. See orders    Trammell Lynette.  Dre Gaines MD

## 2022-05-26 NOTE — PROGRESS NOTES
completed initial visit with patient. Patient expressed calm affect and stated that she was glad to have her procedure behind her.  provided pastoral presence, prayer and empathetic listening.   Signed by  Jluis Swanson M.Div.

## 2022-05-27 ENCOUNTER — APPOINTMENT (OUTPATIENT)
Dept: GENERAL RADIOLOGY | Age: 57
DRG: 236 | End: 2022-05-27
Attending: THORACIC SURGERY (CARDIOTHORACIC VASCULAR SURGERY)

## 2022-05-27 LAB
ABO + RH BLD: NORMAL
ANION GAP SERPL CALC-SCNC: 4 MMOL/L (ref 7–16)
BLD PROD TYP BPU: NORMAL
BLD PROD TYP BPU: NORMAL
BLOOD BANK DISPENSE STATUS: NORMAL
BLOOD BANK DISPENSE STATUS: NORMAL
BLOOD GROUP ANTIBODIES SERPL: NORMAL
BPU ID: NORMAL
BPU ID: NORMAL
BUN SERPL-MCNC: 21 MG/DL (ref 6–23)
CALCIUM SERPL-MCNC: 8.7 MG/DL (ref 8.3–10.4)
CHLORIDE SERPL-SCNC: 111 MMOL/L (ref 98–107)
CO2 SERPL-SCNC: 25 MMOL/L (ref 21–32)
CREAT SERPL-MCNC: 0.8 MG/DL (ref 0.6–1)
CROSSMATCH RESULT: NORMAL
CROSSMATCH RESULT: NORMAL
ERYTHROCYTE [DISTWIDTH] IN BLOOD BY AUTOMATED COUNT: 12.3 % (ref 11.9–14.6)
GLUCOSE BLD STRIP.AUTO-MCNC: 219 MG/DL (ref 65–100)
GLUCOSE BLD STRIP.AUTO-MCNC: 229 MG/DL (ref 65–100)
GLUCOSE BLD STRIP.AUTO-MCNC: 235 MG/DL (ref 65–100)
GLUCOSE BLD STRIP.AUTO-MCNC: 294 MG/DL (ref 65–100)
GLUCOSE SERPL-MCNC: 179 MG/DL (ref 65–100)
HCT VFR BLD AUTO: 27.1 % (ref 35.8–46.3)
HGB BLD-MCNC: 8.8 G/DL (ref 11.7–15.4)
MAGNESIUM SERPL-MCNC: 2.1 MG/DL (ref 1.8–2.4)
MCH RBC QN AUTO: 27.8 PG (ref 26.1–32.9)
MCHC RBC AUTO-ENTMCNC: 32.5 G/DL (ref 31.4–35)
MCV RBC AUTO: 85.8 FL (ref 79.6–97.8)
NRBC # BLD: 0 K/UL (ref 0–0.2)
PLATELET # BLD AUTO: 110 K/UL (ref 150–450)
PMV BLD AUTO: 10.6 FL (ref 9.4–12.3)
POTASSIUM SERPL-SCNC: 4.5 MMOL/L (ref 3.5–5.1)
RBC # BLD AUTO: 3.16 M/UL (ref 4.05–5.2)
SERVICE CMNT-IMP: ABNORMAL
SODIUM SERPL-SCNC: 140 MMOL/L (ref 136–145)
SPECIMEN EXP DATE BLD: NORMAL
UNIT DIVISION: 0
UNIT DIVISION: 0
WBC # BLD AUTO: 8.1 K/UL (ref 4.3–11.1)

## 2022-05-27 PROCEDURE — 71045 X-RAY EXAM CHEST 1 VIEW: CPT

## 2022-05-27 PROCEDURE — 83735 ASSAY OF MAGNESIUM: CPT

## 2022-05-27 PROCEDURE — 80048 BASIC METABOLIC PNL TOTAL CA: CPT

## 2022-05-27 PROCEDURE — 6370000000 HC RX 637 (ALT 250 FOR IP): Performed by: PHYSICIAN ASSISTANT

## 2022-05-27 PROCEDURE — 2140000001 HC CVICU INTERMEDIATE R&B

## 2022-05-27 PROCEDURE — 82962 GLUCOSE BLOOD TEST: CPT

## 2022-05-27 PROCEDURE — 2580000003 HC RX 258: Performed by: THORACIC SURGERY (CARDIOTHORACIC VASCULAR SURGERY)

## 2022-05-27 PROCEDURE — 6370000000 HC RX 637 (ALT 250 FOR IP): Performed by: THORACIC SURGERY (CARDIOTHORACIC VASCULAR SURGERY)

## 2022-05-27 PROCEDURE — 6360000002 HC RX W HCPCS: Performed by: THORACIC SURGERY (CARDIOTHORACIC VASCULAR SURGERY)

## 2022-05-27 PROCEDURE — 99232 SBSQ HOSP IP/OBS MODERATE 35: CPT | Performed by: INTERNAL MEDICINE

## 2022-05-27 PROCEDURE — 85027 COMPLETE CBC AUTOMATED: CPT

## 2022-05-27 RX ORDER — SENNA AND DOCUSATE SODIUM 50; 8.6 MG/1; MG/1
1 TABLET, FILM COATED ORAL 2 TIMES DAILY
Status: DISCONTINUED | OUTPATIENT
Start: 2022-05-27 | End: 2022-06-01 | Stop reason: HOSPADM

## 2022-05-27 RX ORDER — POTASSIUM CHLORIDE 20 MEQ/1
20 TABLET, EXTENDED RELEASE ORAL 2 TIMES DAILY PRN
Status: DISCONTINUED | OUTPATIENT
Start: 2022-05-27 | End: 2022-06-01 | Stop reason: HOSPADM

## 2022-05-27 RX ORDER — POTASSIUM CHLORIDE 750 MG/1
10 TABLET, EXTENDED RELEASE ORAL DAILY
Status: DISPENSED | OUTPATIENT
Start: 2022-05-27 | End: 2022-05-30

## 2022-05-27 RX ORDER — INSULIN LISPRO 100 [IU]/ML
0-9 INJECTION, SOLUTION INTRAVENOUS; SUBCUTANEOUS NIGHTLY
Status: DISCONTINUED | OUTPATIENT
Start: 2022-05-27 | End: 2022-06-01 | Stop reason: HOSPADM

## 2022-05-27 RX ORDER — POTASSIUM CHLORIDE 20 MEQ/1
40 TABLET, EXTENDED RELEASE ORAL 2 TIMES DAILY PRN
Status: DISCONTINUED | OUTPATIENT
Start: 2022-05-27 | End: 2022-06-01 | Stop reason: HOSPADM

## 2022-05-27 RX ORDER — OXYCODONE HYDROCHLORIDE AND ACETAMINOPHEN 5; 325 MG/1; MG/1
1 TABLET ORAL EVERY 6 HOURS PRN
Status: DISCONTINUED | OUTPATIENT
Start: 2022-05-27 | End: 2022-06-01 | Stop reason: HOSPADM

## 2022-05-27 RX ORDER — POLYETHYLENE GLYCOL 3350 17 G/17G
17 POWDER, FOR SOLUTION ORAL DAILY PRN
Status: DISCONTINUED | OUTPATIENT
Start: 2022-05-27 | End: 2022-06-01 | Stop reason: HOSPADM

## 2022-05-27 RX ORDER — TRAMADOL HYDROCHLORIDE 50 MG/1
50 TABLET ORAL EVERY 6 HOURS PRN
Status: DISCONTINUED | OUTPATIENT
Start: 2022-05-27 | End: 2022-06-01 | Stop reason: HOSPADM

## 2022-05-27 RX ORDER — ACETAMINOPHEN 325 MG/1
650 TABLET ORAL EVERY 4 HOURS PRN
Status: DISCONTINUED | OUTPATIENT
Start: 2022-05-27 | End: 2022-06-01 | Stop reason: HOSPADM

## 2022-05-27 RX ORDER — FUROSEMIDE 40 MG/1
40 TABLET ORAL DAILY
Status: DISPENSED | OUTPATIENT
Start: 2022-05-27 | End: 2022-05-30

## 2022-05-27 RX ORDER — LANOLIN ALCOHOL/MO/W.PET/CERES
400 CREAM (GRAM) TOPICAL 3 TIMES DAILY PRN
Status: DISCONTINUED | OUTPATIENT
Start: 2022-05-27 | End: 2022-06-01 | Stop reason: HOSPADM

## 2022-05-27 RX ORDER — LANOLIN ALCOHOL/MO/W.PET/CERES
400 CREAM (GRAM) TOPICAL 4 TIMES DAILY PRN
Status: DISCONTINUED | OUTPATIENT
Start: 2022-05-27 | End: 2022-06-01 | Stop reason: HOSPADM

## 2022-05-27 RX ADMIN — AMIODARONE HYDROCHLORIDE 200 MG: 200 TABLET ORAL at 21:34

## 2022-05-27 RX ADMIN — SODIUM CHLORIDE, PRESERVATIVE FREE 10 ML: 5 INJECTION INTRAVENOUS at 08:47

## 2022-05-27 RX ADMIN — METOPROLOL TARTRATE 25 MG: 25 TABLET, FILM COATED ORAL at 21:34

## 2022-05-27 RX ADMIN — AMIODARONE HYDROCHLORIDE 200 MG: 200 TABLET ORAL at 08:44

## 2022-05-27 RX ADMIN — KETOROLAC TROMETHAMINE 15 MG: 15 INJECTION, SOLUTION INTRAMUSCULAR; INTRAVENOUS at 18:18

## 2022-05-27 RX ADMIN — Medication 1 AMPULE: at 21:33

## 2022-05-27 RX ADMIN — METOPROLOL TARTRATE 25 MG: 25 TABLET, FILM COATED ORAL at 08:45

## 2022-05-27 RX ADMIN — INSULIN LISPRO 9 UNITS: 100 INJECTION, SOLUTION INTRAVENOUS; SUBCUTANEOUS at 16:35

## 2022-05-27 RX ADMIN — INSULIN LISPRO 3 UNITS: 100 INJECTION, SOLUTION INTRAVENOUS; SUBCUTANEOUS at 21:34

## 2022-05-27 RX ADMIN — KETOROLAC TROMETHAMINE 15 MG: 15 INJECTION, SOLUTION INTRAMUSCULAR; INTRAVENOUS at 12:52

## 2022-05-27 RX ADMIN — INSULIN LISPRO 6 UNITS: 100 INJECTION, SOLUTION INTRAVENOUS; SUBCUTANEOUS at 08:52

## 2022-05-27 RX ADMIN — ASPIRIN 81 MG: 81 TABLET ORAL at 08:44

## 2022-05-27 RX ADMIN — FAMOTIDINE 20 MG: 20 TABLET, FILM COATED ORAL at 08:44

## 2022-05-27 RX ADMIN — SENNOSIDES AND DOCUSATE SODIUM 1 TABLET: 8.6; 5 TABLET ORAL at 21:34

## 2022-05-27 RX ADMIN — INSULIN LISPRO 6 UNITS: 100 INJECTION, SOLUTION INTRAVENOUS; SUBCUTANEOUS at 12:43

## 2022-05-27 RX ADMIN — ONDANSETRON 4 MG: 2 INJECTION INTRAMUSCULAR; INTRAVENOUS at 04:43

## 2022-05-27 RX ADMIN — ATORVASTATIN CALCIUM 80 MG: 80 TABLET, FILM COATED ORAL at 21:34

## 2022-05-27 RX ADMIN — FAMOTIDINE 20 MG: 20 TABLET, FILM COATED ORAL at 21:34

## 2022-05-27 RX ADMIN — KETOROLAC TROMETHAMINE 15 MG: 15 INJECTION, SOLUTION INTRAMUSCULAR; INTRAVENOUS at 04:40

## 2022-05-27 RX ADMIN — SODIUM CHLORIDE, PRESERVATIVE FREE 10 ML: 5 INJECTION INTRAVENOUS at 21:41

## 2022-05-27 ASSESSMENT — PAIN DESCRIPTION - DESCRIPTORS
DESCRIPTORS: ACHING;SORE
DESCRIPTORS: ACHING;SORE
DESCRIPTORS: ACHING
DESCRIPTORS: ACHING
DESCRIPTORS: ACHING;SORE

## 2022-05-27 ASSESSMENT — PAIN SCALES - GENERAL
PAINLEVEL_OUTOF10: 3
PAINLEVEL_OUTOF10: 0
PAINLEVEL_OUTOF10: 3
PAINLEVEL_OUTOF10: 1
PAINLEVEL_OUTOF10: 3
PAINLEVEL_OUTOF10: 1
PAINLEVEL_OUTOF10: 0
PAINLEVEL_OUTOF10: 5
PAINLEVEL_OUTOF10: 3
PAINLEVEL_OUTOF10: 5
PAINLEVEL_OUTOF10: 5

## 2022-05-27 ASSESSMENT — PAIN DESCRIPTION - ONSET
ONSET: ON-GOING

## 2022-05-27 ASSESSMENT — PAIN - FUNCTIONAL ASSESSMENT
PAIN_FUNCTIONAL_ASSESSMENT: ACTIVITIES ARE NOT PREVENTED

## 2022-05-27 ASSESSMENT — PAIN DESCRIPTION - ORIENTATION
ORIENTATION: ANTERIOR;MID
ORIENTATION: MID
ORIENTATION: ANTERIOR;MID
ORIENTATION: MID
ORIENTATION: ANTERIOR;MID
ORIENTATION: ANTERIOR;MID
ORIENTATION: MID
ORIENTATION: ANTERIOR;MID

## 2022-05-27 ASSESSMENT — PAIN DESCRIPTION - PAIN TYPE
TYPE: SURGICAL PAIN
TYPE: SURGICAL PAIN
TYPE: ACUTE PAIN;SURGICAL PAIN
TYPE: SURGICAL PAIN

## 2022-05-27 ASSESSMENT — PAIN DESCRIPTION - LOCATION
LOCATION: CHEST;INCISION
LOCATION: CHEST;INCISION;STERNUM
LOCATION: CHEST;INCISION
LOCATION: CHEST;INCISION
LOCATION: CHEST;STERNUM
LOCATION: CHEST;INCISION

## 2022-05-27 ASSESSMENT — PAIN DESCRIPTION - FREQUENCY
FREQUENCY: INTERMITTENT

## 2022-05-27 NOTE — PROGRESS NOTES
TRANSFER - IN REPORT:    Verbal report received from [facility] on [employer]  being received from CVICU for routine progression of patient care      Report consisted of patient's Situation, Background, Assessment and Recommendations (SBAR). Information from the following report(s) Nurse Handoff Report, MAR, Recent Results and Cardiac Rhythm NSR was reviewed with the receiving nurse. Opportunity for questions and clarification was provided. Assessment completed upon patient's arrival to unit and care assumed. Dual skin assessment completed with [facility]. Nicolle Lutz would vac on surgical chest wound c/d/i and LLE incisions open to air and c/d/i. No skin breakdown noted on sacrum.

## 2022-05-27 NOTE — PLAN OF CARE
Problem: Chronic Conditions and Co-morbidities  Goal: Patient's chronic conditions and co-morbidity symptoms are monitored and maintained or improved  Outcome: Progressing  Flowsheets (Taken 5/26/2022 2000)  Care Plan - Patient's Chronic Conditions and Co-Morbidity Symptoms are Monitored and Maintained or Improved:   Monitor and assess patient's chronic conditions and comorbid symptoms for stability, deterioration, or improvement   Collaborate with multidisciplinary team to address chronic and comorbid conditions and prevent exacerbation or deterioration   Update acute care plan with appropriate goals if chronic or comorbid symptoms are exacerbated and prevent overall improvement and discharge     Problem: Discharge Planning  Goal: Discharge to home or other facility with appropriate resources  Outcome: Progressing  Flowsheets (Taken 5/26/2022 2000)  Discharge to home or other facility with appropriate resources:   Identify barriers to discharge with patient and caregiver   Arrange for needed discharge resources and transportation as appropriate   Identify discharge learning needs (meds, wound care, etc)   Arrange for interpreters to assist at discharge as needed     Problem: Pain  Goal: Verbalizes/displays adequate comfort level or baseline comfort level  Outcome: Progressing  Flowsheets  Taken 5/26/2022 2000 by Marisol Swanson RN  Verbalizes/displays adequate comfort level or baseline comfort level:   Encourage patient to monitor pain and request assistance   Assess pain using appropriate pain scale   Administer analgesics based on type and severity of pain and evaluate response   Implement non-pharmacological measures as appropriate and evaluate response   Consider cultural and social influences on pain and pain management  Taken 5/26/2022 1000 by Topher Vargas RN  Verbalizes/displays adequate comfort level or baseline comfort level: Encourage patient to monitor pain and request assistance     Problem: Skin/Tissue Integrity  Goal: Absence of new skin breakdown  Description: 1. Monitor for areas of redness and/or skin breakdown  2. Assess vascular access sites hourly  3. Every 4-6 hours minimum:  Change oxygen saturation probe site  4. Every 4-6 hours:  If on nasal continuous positive airway pressure, respiratory therapy assess nares and determine need for appliance change or resting period.   Outcome: Progressing     Problem: Safety - Adult  Goal: Free from fall injury  Outcome: Progressing     Problem: ABCDS Injury Assessment  Goal: Absence of physical injury  Outcome: Progressing

## 2022-05-27 NOTE — PROGRESS NOTES
A follow up visit was made to the patient. Emotional support, spiritual presence and   prayer were provided for the patient and her family.       Davina George, [address] [facility], [facility]

## 2022-05-27 NOTE — PROGRESS NOTES
[facility] Critical Care Note[de-identified] 5/27/2022  Tash Gordon  Admission Date: 5/25/2022     Length of Stay: 2 days    Background: 65 y/o female had CABG X 2.            Ms. Jarrod Sumner is a 64year old female, PMH DM, HTN, and HLD here s/p CABG X 2. The patient had previously been seen by [facility] for CASTRO and right sided chest discomfort. Left cardiac catheterization was done on 5/6/2022 and revealed severe multivessel disease. The patient is a never smoker and has not history of any lung disease    Notable PMH:  has a past medical history of Anxiety, CAD (coronary artery disease), Depression, DM (diabetes mellitus) ([address] HLD (hyperlipidemia), Hypertension, and Ulcerative esophagitis. 24 Hour events: The pt is currently on nc 1 LPM with sat 99%    ROS: unable to obtain/negative except as listed elsewhere. Lines: (insertion date)       Peripheral IV 05/25/22 Right Hand (Active)     Chest Tube Left Pleural 1 (Active)       Chest Tube Anterior Mediastinal;Other (Comment) 2 (Active)       Urinary Catheter 2 Way; Haji-Temperature (Active)      Arterial Line 05/25/22 Left Radial (Active)     Introducer 05/25/22 (Active)       Arterial Line 05/25/22 Left Radial (Active)       CVC Double Lumen 05/25/22 Right (Active)        Drips: current dose (range)  Dose (mcg/kg/min) Propofol : *100 mg  Dose (mcg/kg/hr) Dexmedetomidine: 0 mcg/kg/hr  Dose (mcg/min) Epinephrine: 0 mcg/min  Dose (mcg/min) Phenylephrine : 0 mcg/min  Dose (mcg/kg/min) Dobutamine: 0 mcg/kg/min  Dose (units/hr) Insulin : 0 Units/hr     Pertinent Exam:         Blood pressure 119/60, pulse 95, temperature 98.5 °F (36.9 °C), temperature source Oral, resp. rate 16, height 5' 4\" (1.626 m), weight 164 lb 7.4 oz (74.6 kg), SpO2 97 %.      Intake/Output Summary (Last 24 hours) at 5/27/2022 0936  Last data filed at 5/27/2022 0859  Gross per 24 hour   Intake 1937.72 ml   Output 705 ml   Net 1232.72 ml       EENMT:  Sclera clear, pupils equal, oral mucosa moist  Respiratory:  clear  Cardiovascular:  RRR  Gastrointestinal:  soft with no tenderness; positive bowel sounds present  Musculoskeletal:  warm with no cyanosis, no lower extremity edema  Skin:  no jaundice or ecchymosis  Neurologic:alert  Psychiatric: calm    CXR:     5/27       1. Persistent bibasilar atelectasis.       2. Stable left chest tube. Negative for pneumothorax           Recent Labs     05/25/22  1539 05/25/22 2016 05/26/22 0314 05/26/22 2119 05/27/22  0428   WBC 15.1*  --  9.7 8.4 8.1   HGB 10.7*   < > 9.7* 9.3* 8.8*   HCT 30.8*   < > 28.6* 28.7* 27.1*   *  --  147* 119* 110*   INR 1.4  --   --   --   --     < > = values in this interval not displayed. Recent Labs     05/25/22 1539 05/25/22 2016 05/26/22  0006 05/26/22 0314 05/27/22  0428     --   --  144 140   K 4.0   < > 4.6 4.3 4.5   *  --   --  115* 111*   CO2 24  --   --  25 25   BUN 15  --   --  12 21   CREATININE 0.70  --   --  0.50* 0.80   MG 2.9*   < > 2.3 2.3 2.1    < > = values in this interval not displayed. No results for input(s): TROPHS, NTPROBNP, CRP, ESR in the last 72 hours. Recent Labs     05/25/22 1539 05/26/22 0314 05/27/22  0428   GLUCOSE 122* 94 179*      ECHO: No results found for this or any previous visit. Microbiology:   No results for input(s): CULTURE in the last 72 hours. Invalid input(s): STAIN  ABG:No results for input(s): PH, PCO2, PO2, HCO3 in the last 72 hours. Assessment and Plan:  (Medical Decision Making)     Impression: 64 y.o. y/o female s/p CV surgery for S/P CABG x 2      Hypoxemia:   On nc     Atelectasis:   IS, mobility after extubated.     S/P Cardiovascular surgery:  Monitor chest tube output, removal per surgery.   Can go to telemetry     Full Code        Mo Dallas MD

## 2022-05-27 NOTE — PROGRESS NOTES
TRANSFER - OUT REPORT:    Verbal report given to Miguelito Gallegos RN on [employer] being transferred to [facility] for routine progression of patient care       Report consisted of patients Situation, Background, Assessment and Recommendations (SBAR). Information from the following report(s) SBAR, Procedure Summary, MAR, Recent Results and Cardiac Rhythm NSR was reviewed with the receiving nurse. Opportunity for questions and clarification was provided.

## 2022-05-27 NOTE — PLAN OF CARE
Problem: Chronic Conditions and Co-morbidities  Goal: Patient's chronic conditions and co-morbidity symptoms are monitored and maintained or improved  5/27/2022 1536 by Grupo Mejía RN  Outcome: Progressing  5/27/2022 1158 by Mirta Mitchell RN  Outcome: Progressing     Problem: Discharge Planning  Goal: Discharge to home or other facility with appropriate resources  5/27/2022 1536 by Grupo Mejía RN  Outcome: Progressing  5/27/2022 1158 by Mirta Mitchell RN  Outcome: Progressing     Problem: Pain  Goal: Verbalizes/displays adequate comfort level or baseline comfort level  5/27/2022 1536 by Grupo Mejía RN  Outcome: Progressing  5/27/2022 1158 by Mirta Mitchell RN  Outcome: Progressing  Flowsheets  Taken 5/27/2022 0430 by Elisa Mahoney RN  Verbalizes/displays adequate comfort level or baseline comfort level:   Encourage patient to monitor pain and request assistance   Assess pain using appropriate pain scale   Administer analgesics based on type and severity of pain and evaluate response   Implement non-pharmacological measures as appropriate and evaluate response   Consider cultural and social influences on pain and pain management  Taken 5/27/2022 0000 by Elisa Mahoney RN  Verbalizes/displays adequate comfort level or baseline comfort level:   Encourage patient to monitor pain and request assistance   Assess pain using appropriate pain scale   Administer analgesics based on type and severity of pain and evaluate response   Implement non-pharmacological measures as appropriate and evaluate response   Consider cultural and social influences on pain and pain management     Problem: Skin/Tissue Integrity  Goal: Absence of new skin breakdown  Description: 1. Monitor for areas of redness and/or skin breakdown  2. Assess vascular access sites hourly  3. Every 4-6 hours minimum:  Change oxygen saturation probe site  4.   Every 4-6 hours:  If on nasal continuous positive airway pressure, respiratory therapy assess nares and determine need for appliance change or resting period.   5/27/2022 1536 by Curry Solomon RN  Outcome: Progressing  5/27/2022 1158 by Dahiana Walter RN  Outcome: Progressing     Problem: Safety - Adult  Goal: Free from fall injury  5/27/2022 1536 by Curry Solomon RN  Outcome: Progressing  5/27/2022 1158 by Dahiana Walter RN  Outcome: Progressing     Problem: ABCDS Injury Assessment  Goal: Absence of physical injury  5/27/2022 1536 by Curry Solomon RN  Outcome: Progressing  5/27/2022 1158 by Dahiana Walter RN  Outcome: Progressing

## 2022-05-27 NOTE — PROGRESS NOTES
This CM met with pt this day to complete assessment. She verified her address, emergency contact, and PCP. She confirms she does not currently have insurance. She reports she pays OOP for her medications. She has no home DME. She lives at home with her mother with 4 steps to enter. Prior to admission she is independent with her ADLs including bathing, dressing, cooking, and driving. We discussed discharge planning this day. Pt plans on returning home with mother when medically stable for discharge. We discussed the role and recommendation of home health at discharge - pt is agreeable to referral.  Reviewed that due to pt being self-pay the referral would be made to [facility] - she is agreeable to referral being made. Referral made this day. No additional CM needs at this time. 05/26/22 9939   Service Assessment   Patient Orientation Alert and Oriented   Cognition Alert   History Provided By Patient   Primary Caregiver Family   PCP Verified by CM Yes   Prior Functional Level Independent in ADLs/IADLs   Can patient return to prior living arrangement Yes   Ability to make needs known: Good   Family able to assist with home care needs: Yes   Social/Functional History   Lives With Parent   Type of [facility] Help From Family   ADL Assistance Independent   Discharge Planning   Type of Residence House   Living Arrangements Family Members   Type of [facility]. PT;Nursing Services   Patient expects to be discharged to: . [address] Discharge   Mode of Transport at Discharge Other (see comment)  (family)   Confirm Follow Up Transport Family   Condition of Participation: Discharge Planning   The Plan for Transition of Care is related to the following treatment goals: home with home health   The Patient and/or Patient Representative was provided with a Choice of Provider? Patient   The Patient and/Or Patient Representative agree with the Discharge Plan?  Yes   Freedom of Choice list was provided with basic dialogue that supports the patient's individualized plan of care/goals, treatment preferences, and shares the quality data associated with the providers?   Yes

## 2022-05-27 NOTE — PROGRESS NOTES
D10year-old woman is postop day #2 from a two-vessel coronary artery bypass grafting. Patient is awake and alert    Objective blood pressure is 116/62 patient is in normal sinus rhythm 90 bpm  Minimal chest tube drainage but there is a small airleak  Vital signs are stable  Abdomen soft nontender  Incisions dry and intact Geena in place chest incision    Assessment: Overall stable postop day #2 from coronary artery bypass grafting small air leak but without evidence of pneumothorax on the chest x-ray    Plan: Transfer to floor today we will leave chest tubes in since there is a small airleak.

## 2022-05-27 NOTE — PLAN OF CARE
Problem: Chronic Conditions and Co-morbidities  Goal: Patient's chronic conditions and co-morbidity symptoms are monitored and maintained or improved  5/27/2022 1158 by Emily Joshua RN  Outcome: Progressing  5/26/2022 2344 by Juan Zheng RN  Outcome: Progressing  Flowsheets (Taken 5/26/2022 2000)  Care Plan - Patient's Chronic Conditions and Co-Morbidity Symptoms are Monitored and Maintained or Improved:   Monitor and assess patient's chronic conditions and comorbid symptoms for stability, deterioration, or improvement   Collaborate with multidisciplinary team to address chronic and comorbid conditions and prevent exacerbation or deterioration   Update acute care plan with appropriate goals if chronic or comorbid symptoms are exacerbated and prevent overall improvement and discharge     Problem: Discharge Planning  Goal: Discharge to home or other facility with appropriate resources  5/27/2022 1158 by Emily Joshua RN  Outcome: Progressing  5/26/2022 2344 by Juan Zheng RN  Outcome: Progressing  Flowsheets (Taken 5/26/2022 2000)  Discharge to home or other facility with appropriate resources:   Identify barriers to discharge with patient and caregiver   Arrange for needed discharge resources and transportation as appropriate   Identify discharge learning needs (meds, wound care, etc)   Arrange for interpreters to assist at discharge as needed     Problem: Pain  Goal: Verbalizes/displays adequate comfort level or baseline comfort level  5/27/2022 1158 by Emily Joshua RN  Outcome: Progressing  Flowsheets  Taken 5/27/2022 0430 by Juan Zheng RN  Verbalizes/displays adequate comfort level or baseline comfort level:   Encourage patient to monitor pain and request assistance   Assess pain using appropriate pain scale   Administer analgesics based on type and severity of pain and evaluate response   Implement non-pharmacological measures as appropriate and evaluate response   Consider cultural and social influences on pain and pain management  Taken 5/27/2022 0000 by Juan Zheng RN  Verbalizes/displays adequate comfort level or baseline comfort level:   Encourage patient to monitor pain and request assistance   Assess pain using appropriate pain scale   Administer analgesics based on type and severity of pain and evaluate response   Implement non-pharmacological measures as appropriate and evaluate response   Consider cultural and social influences on pain and pain management  5/26/2022 2344 by Juan Zheng RN  Outcome: Progressing  Flowsheets  Taken 5/26/2022 2000 by Juna Zheng RN  Verbalizes/displays adequate comfort level or baseline comfort level:   Encourage patient to monitor pain and request assistance   Assess pain using appropriate pain scale   Administer analgesics based on type and severity of pain and evaluate response   Implement non-pharmacological measures as appropriate and evaluate response   Consider cultural and social influences on pain and pain management  Taken 5/26/2022 1000 by Snehal Castro RN  Verbalizes/displays adequate comfort level or baseline comfort level: Encourage patient to monitor pain and request assistance     Problem: Skin/Tissue Integrity  Goal: Absence of new skin breakdown  Description: 1. Monitor for areas of redness and/or skin breakdown  2. Assess vascular access sites hourly  3. Every 4-6 hours minimum:  Change oxygen saturation probe site  4. Every 4-6 hours:  If on nasal continuous positive airway pressure, respiratory therapy assess nares and determine need for appliance change or resting period.   5/27/2022 1158 by Emily Joshua RN  Outcome: Progressing  5/26/2022 2344 by Juan Zheng RN  Outcome: Progressing     Problem: Safety - Adult  Goal: Free from fall injury  5/27/2022 1158 by Emily Joshua RN  Outcome: Progressing  5/26/2022 2344 by Juan Zheng RN  Outcome: Progressing     Problem: ABCDS Injury Assessment  Goal: Absence of physical injury  5/27/2022 1158 by Faustino Rdz RN  Outcome: Progressing  5/26/2022 2344 by Aurelio Walls RN  Outcome: Progressing

## 2022-05-28 ENCOUNTER — HOME HEALTH ADMISSION (OUTPATIENT)
Dept: HOME HEALTH SERVICES | Facility: HOME HEALTH | Age: 57
End: 2022-05-28
Payer: COMMERCIAL

## 2022-05-28 ENCOUNTER — APPOINTMENT (OUTPATIENT)
Dept: GENERAL RADIOLOGY | Age: 57
DRG: 236 | End: 2022-05-28
Attending: THORACIC SURGERY (CARDIOTHORACIC VASCULAR SURGERY)

## 2022-05-28 LAB
ANION GAP SERPL CALC-SCNC: 5 MMOL/L (ref 7–16)
BUN SERPL-MCNC: 26 MG/DL (ref 6–23)
CALCIUM SERPL-MCNC: 8.8 MG/DL (ref 8.3–10.4)
CHLORIDE SERPL-SCNC: 107 MMOL/L (ref 98–107)
CO2 SERPL-SCNC: 25 MMOL/L (ref 21–32)
CREAT SERPL-MCNC: 0.8 MG/DL (ref 0.6–1)
ERYTHROCYTE [DISTWIDTH] IN BLOOD BY AUTOMATED COUNT: 12.1 % (ref 11.9–14.6)
GLUCOSE BLD STRIP.AUTO-MCNC: 206 MG/DL (ref 65–100)
GLUCOSE BLD STRIP.AUTO-MCNC: 211 MG/DL (ref 65–100)
GLUCOSE BLD STRIP.AUTO-MCNC: 244 MG/DL (ref 65–100)
GLUCOSE BLD STRIP.AUTO-MCNC: 246 MG/DL (ref 65–100)
GLUCOSE SERPL-MCNC: 236 MG/DL (ref 65–100)
HCT VFR BLD AUTO: 26.7 % (ref 35.8–46.3)
HGB BLD-MCNC: 9.1 G/DL (ref 11.7–15.4)
MAGNESIUM SERPL-MCNC: 2.1 MG/DL (ref 1.8–2.4)
MCH RBC QN AUTO: 28.6 PG (ref 26.1–32.9)
MCHC RBC AUTO-ENTMCNC: 34.1 G/DL (ref 31.4–35)
MCV RBC AUTO: 84 FL (ref 79.6–97.8)
NRBC # BLD: 0 K/UL (ref 0–0.2)
PLATELET # BLD AUTO: 140 K/UL (ref 150–450)
PMV BLD AUTO: 10.6 FL (ref 9.4–12.3)
POTASSIUM SERPL-SCNC: 4.4 MMOL/L (ref 3.5–5.1)
RBC # BLD AUTO: 3.18 M/UL (ref 4.05–5.2)
SERVICE CMNT-IMP: ABNORMAL
SODIUM SERPL-SCNC: 137 MMOL/L (ref 136–145)
WBC # BLD AUTO: 7.8 K/UL (ref 4.3–11.1)

## 2022-05-28 PROCEDURE — 93005 ELECTROCARDIOGRAM TRACING: CPT | Performed by: THORACIC SURGERY (CARDIOTHORACIC VASCULAR SURGERY)

## 2022-05-28 PROCEDURE — 99232 SBSQ HOSP IP/OBS MODERATE 35: CPT | Performed by: INTERNAL MEDICINE

## 2022-05-28 PROCEDURE — 6370000000 HC RX 637 (ALT 250 FOR IP): Performed by: NURSE PRACTITIONER

## 2022-05-28 PROCEDURE — 71045 X-RAY EXAM CHEST 1 VIEW: CPT

## 2022-05-28 PROCEDURE — 83735 ASSAY OF MAGNESIUM: CPT

## 2022-05-28 PROCEDURE — APPSS15 APP SPLIT SHARED TIME 0-15 MINUTES: Performed by: NURSE PRACTITIONER

## 2022-05-28 PROCEDURE — 82962 GLUCOSE BLOOD TEST: CPT

## 2022-05-28 PROCEDURE — 85027 COMPLETE CBC AUTOMATED: CPT

## 2022-05-28 PROCEDURE — 97162 PT EVAL MOD COMPLEX 30 MIN: CPT

## 2022-05-28 PROCEDURE — 80048 BASIC METABOLIC PNL TOTAL CA: CPT

## 2022-05-28 PROCEDURE — 6370000000 HC RX 637 (ALT 250 FOR IP): Performed by: PHYSICIAN ASSISTANT

## 2022-05-28 PROCEDURE — 2140000001 HC CVICU INTERMEDIATE R&B

## 2022-05-28 PROCEDURE — 2580000003 HC RX 258: Performed by: THORACIC SURGERY (CARDIOTHORACIC VASCULAR SURGERY)

## 2022-05-28 PROCEDURE — 97530 THERAPEUTIC ACTIVITIES: CPT

## 2022-05-28 PROCEDURE — 36415 COLL VENOUS BLD VENIPUNCTURE: CPT

## 2022-05-28 PROCEDURE — 6360000002 HC RX W HCPCS: Performed by: THORACIC SURGERY (CARDIOTHORACIC VASCULAR SURGERY)

## 2022-05-28 PROCEDURE — 6370000000 HC RX 637 (ALT 250 FOR IP): Performed by: THORACIC SURGERY (CARDIOTHORACIC VASCULAR SURGERY)

## 2022-05-28 RX ORDER — CITALOPRAM 20 MG/1
20 TABLET ORAL NIGHTLY
Status: DISCONTINUED | OUTPATIENT
Start: 2022-05-28 | End: 2022-06-01 | Stop reason: HOSPADM

## 2022-05-28 RX ADMIN — KETOROLAC TROMETHAMINE 15 MG: 15 INJECTION, SOLUTION INTRAMUSCULAR; INTRAVENOUS at 00:48

## 2022-05-28 RX ADMIN — INSULIN LISPRO 6 UNITS: 100 INJECTION, SOLUTION INTRAVENOUS; SUBCUTANEOUS at 12:21

## 2022-05-28 RX ADMIN — ONDANSETRON 4 MG: 2 INJECTION INTRAMUSCULAR; INTRAVENOUS at 19:43

## 2022-05-28 RX ADMIN — POTASSIUM CHLORIDE 10 MEQ: 10 TABLET, EXTENDED RELEASE ORAL at 08:04

## 2022-05-28 RX ADMIN — METOPROLOL TARTRATE 25 MG: 25 TABLET, FILM COATED ORAL at 08:05

## 2022-05-28 RX ADMIN — FAMOTIDINE 20 MG: 20 TABLET, FILM COATED ORAL at 08:04

## 2022-05-28 RX ADMIN — AMIODARONE HYDROCHLORIDE 200 MG: 200 TABLET ORAL at 21:27

## 2022-05-28 RX ADMIN — INSULIN LISPRO 6 UNITS: 100 INJECTION, SOLUTION INTRAVENOUS; SUBCUTANEOUS at 08:05

## 2022-05-28 RX ADMIN — FUROSEMIDE 40 MG: 40 TABLET ORAL at 08:04

## 2022-05-28 RX ADMIN — SODIUM CHLORIDE, PRESERVATIVE FREE 10 ML: 5 INJECTION INTRAVENOUS at 08:05

## 2022-05-28 RX ADMIN — INSULIN LISPRO 6 UNITS: 100 INJECTION, SOLUTION INTRAVENOUS; SUBCUTANEOUS at 17:25

## 2022-05-28 RX ADMIN — ONDANSETRON 4 MG: 2 INJECTION INTRAMUSCULAR; INTRAVENOUS at 13:25

## 2022-05-28 RX ADMIN — ONDANSETRON 4 MG: 2 INJECTION INTRAMUSCULAR; INTRAVENOUS at 03:51

## 2022-05-28 RX ADMIN — METOPROLOL TARTRATE 25 MG: 25 TABLET, FILM COATED ORAL at 21:28

## 2022-05-28 RX ADMIN — Medication 1 AMPULE: at 21:27

## 2022-05-28 RX ADMIN — KETOROLAC TROMETHAMINE 15 MG: 15 INJECTION, SOLUTION INTRAMUSCULAR; INTRAVENOUS at 11:35

## 2022-05-28 RX ADMIN — SENNOSIDES AND DOCUSATE SODIUM 1 TABLET: 8.6; 5 TABLET ORAL at 08:04

## 2022-05-28 RX ADMIN — SENNOSIDES AND DOCUSATE SODIUM 1 TABLET: 8.6; 5 TABLET ORAL at 21:28

## 2022-05-28 RX ADMIN — INSULIN LISPRO 3 UNITS: 100 INJECTION, SOLUTION INTRAVENOUS; SUBCUTANEOUS at 21:32

## 2022-05-28 RX ADMIN — ONDANSETRON 4 MG: 2 INJECTION INTRAMUSCULAR; INTRAVENOUS at 08:15

## 2022-05-28 RX ADMIN — ATORVASTATIN CALCIUM 80 MG: 80 TABLET, FILM COATED ORAL at 21:27

## 2022-05-28 RX ADMIN — CITALOPRAM HYDROBROMIDE 20 MG: 20 TABLET ORAL at 21:27

## 2022-05-28 RX ADMIN — AMIODARONE HYDROCHLORIDE 200 MG: 200 TABLET ORAL at 08:04

## 2022-05-28 RX ADMIN — Medication 1 AMPULE: at 08:05

## 2022-05-28 RX ADMIN — FAMOTIDINE 20 MG: 20 TABLET, FILM COATED ORAL at 21:28

## 2022-05-28 RX ADMIN — ASPIRIN 81 MG: 81 TABLET ORAL at 08:04

## 2022-05-28 RX ADMIN — TRAMADOL HYDROCHLORIDE 50 MG: 50 TABLET, COATED ORAL at 19:43

## 2022-05-28 RX ADMIN — SODIUM CHLORIDE, PRESERVATIVE FREE 10 ML: 5 INJECTION INTRAVENOUS at 21:29

## 2022-05-28 ASSESSMENT — PAIN DESCRIPTION - ORIENTATION
ORIENTATION: ANTERIOR;MID
ORIENTATION: MID
ORIENTATION: ANTERIOR;MID

## 2022-05-28 ASSESSMENT — PAIN DESCRIPTION - DESCRIPTORS
DESCRIPTORS: ACHING;SORE

## 2022-05-28 ASSESSMENT — PAIN SCALES - GENERAL
PAINLEVEL_OUTOF10: 3
PAINLEVEL_OUTOF10: 2
PAINLEVEL_OUTOF10: 1
PAINLEVEL_OUTOF10: 6
PAINLEVEL_OUTOF10: 3
PAINLEVEL_OUTOF10: 5
PAINLEVEL_OUTOF10: 4
PAINLEVEL_OUTOF10: 5

## 2022-05-28 ASSESSMENT — PAIN - FUNCTIONAL ASSESSMENT
PAIN_FUNCTIONAL_ASSESSMENT: ACTIVITIES ARE NOT PREVENTED

## 2022-05-28 ASSESSMENT — PAIN DESCRIPTION - PAIN TYPE
TYPE: SURGICAL PAIN

## 2022-05-28 ASSESSMENT — PAIN DESCRIPTION - ONSET
ONSET: ON-GOING

## 2022-05-28 ASSESSMENT — PAIN SCALES - WONG BAKER: WONGBAKER_NUMERICALRESPONSE: 2

## 2022-05-28 ASSESSMENT — PAIN DESCRIPTION - LOCATION
LOCATION: CHEST;INCISION
LOCATION: INCISION
LOCATION: INCISION
LOCATION: CHEST;INCISION

## 2022-05-28 ASSESSMENT — PAIN DESCRIPTION - FREQUENCY
FREQUENCY: INTERMITTENT

## 2022-05-28 NOTE — PLAN OF CARE
Problem: Chronic Conditions and Co-morbidities  Goal: Patient's chronic conditions and co-morbidity symptoms are monitored and maintained or improved  5/28/2022 0132 by Virginia Eisenberg RN  Outcome: Progressing  5/27/2022 1536 by Maria Antonia Johnson RN  Outcome: Progressing  5/27/2022 1158 by Glen Orozco RN  Outcome: Progressing     Problem: Discharge Planning  Goal: Discharge to home or other facility with appropriate resources  5/27/2022 1536 by Maria Antonia Johnson RN  Outcome: Progressing  5/27/2022 1158 by Glen Orozco RN  Outcome: Progressing     Problem: Pain  Goal: Verbalizes/displays adequate comfort level or baseline comfort level  5/28/2022 0132 by Virginia Eisenberg RN  Outcome: Progressing  5/27/2022 1536 by Maria Antonia Johnson RN  Outcome: Progressing  5/27/2022 1158 by Glen Orozco RN  Outcome: Progressing  Flowsheets  Taken 5/27/2022 0430 by Stefan Duong RN  Verbalizes/displays adequate comfort level or baseline comfort level:   Encourage patient to monitor pain and request assistance   Assess pain using appropriate pain scale   Administer analgesics based on type and severity of pain and evaluate response   Implement non-pharmacological measures as appropriate and evaluate response   Consider cultural and social influences on pain and pain management  Taken 5/27/2022 0000 by Stefan Duong RN  Verbalizes/displays adequate comfort level or baseline comfort level:   Encourage patient to monitor pain and request assistance   Assess pain using appropriate pain scale   Administer analgesics based on type and severity of pain and evaluate response   Implement non-pharmacological measures as appropriate and evaluate response   Consider cultural and social influences on pain and pain management     Problem: Skin/Tissue Integrity  Goal: Absence of new skin breakdown  Description: 1. Monitor for areas of redness and/or skin breakdown  2. Assess vascular access sites hourly  3.   Every 4-6 hours minimum:  Change oxygen saturation probe site  4. Every 4-6 hours:  If on nasal continuous positive airway pressure, respiratory therapy assess nares and determine need for appliance change or resting period.   5/28/2022 0132 by Costa Lazo RN  Outcome: Progressing  5/27/2022 1536 by Damion Cazares RN  Outcome: Progressing  5/27/2022 1158 by Dahiana Corey RN  Outcome: Progressing     Problem: Safety - Adult  Goal: Free from fall injury  5/28/2022 0132 by Costa Lazo RN  Outcome: Progressing  5/27/2022 1536 by Damion Cazares RN  Outcome: Progressing  5/27/2022 1158 by Dahiana Corey RN  Outcome: Progressing     Problem: ABCDS Injury Assessment  Goal: Absence of physical injury  5/28/2022 0132 by Costa Lazo RN  Outcome: Progressing  5/27/2022 1536 by Damion Cazares RN  Outcome: Progressing  5/27/2022 1158 by Dahiana Corey RN  Outcome: Progressing

## 2022-05-28 NOTE — PROGRESS NOTES
Tash Gordon  Admission Date: 5/25/2022         Daily Progress Note: 5/28/2022    The patient's chart is reviewed and the patient is discussed with the staff. Background: 64year old female, PMH DM, HTN, and HLD here s/p CABG X 2.  The patient had previously been seen by [facility] for CASTRO and right sided chest discomfort.  Left cardiac catheterization was done on 5/6/2022 and revealed severe multivessel disease.  The patient is a never smoker and has not history of any lung disease. Subjective:   Now on RA sitting up in recliner. CT pulled previously. Eating lunch.      Current Facility-Administered Medications   Medication Dose Route Frequency    acetaminophen (TYLENOL) tablet 650 mg  650 mg Oral Q4H PRN    oxyCODONE-acetaminophen (PERCOCET) 5-325 MG per tablet 1 tablet  1 tablet Oral Q6H PRN    furosemide (LASIX) tablet 40 mg  40 mg Oral Daily    potassium chloride (KLOR-CON M) extended release tablet 10 mEq  10 mEq Oral Daily    sennosides-docusate sodium (SENOKOT-S) 8.6-50 MG tablet 1 tablet  1 tablet Oral BID    polyethylene glycol (GLYCOLAX) packet 17 g  17 g Oral Daily PRN    magnesium hydroxide (MILK OF MAGNESIA) 400 MG/5ML suspension 30 mL  30 mL Oral Daily PRN    glucose chewable tablet 16 g  4 tablet Oral PRN    dextrose bolus 10% 125 mL  125 mL IntraVENous PRN    Or    dextrose bolus 10% 250 mL  250 mL IntraVENous PRN    insulin lispro (HUMALOG) injection vial 0-9 Units  0-9 Units SubCUTAneous Nightly    traMADol (ULTRAM) tablet 50 mg  50 mg Oral Q6H PRN    magnesium oxide (MAG-OX) tablet 400 mg  400 mg Oral TID PRN    magnesium oxide (MAG-OX) tablet 400 mg  400 mg Oral 4x Daily PRN    potassium chloride (KLOR-CON M) extended release tablet 20 mEq  20 mEq Oral BID PRN    potassium chloride (KLOR-CON M) extended release tablet 40 mEq  40 mEq Oral BID PRN    alcohol 62% (NOZIN) nasal  1 ampule  1 ampule Topical Q12H    insulin lispro (HUMALOG) injection vial 0-18 Units  0-18 Units SubCUTAneous TID WC    ketorolac (TORADOL) injection 15 mg  15 mg IntraVENous Q6H PRN    glucagon injection 1 mg  1 mg IntraMUSCular PRN    sodium chloride flush 0.9 % injection 5-40 mL  5-40 mL IntraVENous 2 times per day    sodium chloride flush 0.9 % injection 5-40 mL  5-40 mL IntraVENous PRN    ondansetron (ZOFRAN) injection 4 mg  4 mg IntraVENous Q4H PRN    aspirin EC tablet 81 mg  81 mg Oral Daily    amiodarone (CORDARONE) tablet 200 mg  200 mg Oral BID    metoprolol tartrate (LOPRESSOR) tablet 25 mg  25 mg Oral BID    atorvastatin (LIPITOR) tablet 80 mg  80 mg Oral Nightly    famotidine (PEPCID) tablet 20 mg  20 mg Oral BID    Or    famotidine (PEPCID) 20 mg in sodium chloride (PF) 10 mL injection  20 mg IntraVENous BID     Review of Systems  Constitutional: negative for fever, chills, sweats  Cardiovascular: negative for chest pain, palpitations, syncope, edema  Gastrointestinal:  negative for dysphagia, reflux, vomiting, diarrhea, abdominal pain, or melena  Neurologic:  negative for focal weakness, numbness, headache  Objective:     Vitals:    05/28/22 0340 05/28/22 0645 05/28/22 0704 05/28/22 1134   BP: 110/61  128/69 127/64   Pulse: 84  89 84   Resp: 18  20 20   Temp: 98.5 °F (36.9 °C)  98.3 °F (36.8 °C) 98.4 °F (36.9 °C)   TempSrc: Oral  Oral Oral   SpO2: 91%  93% 92%   Weight:  173 lb 6.4 oz (78.7 kg)     Height:           Physical Exam:   Constitution:  the patient is well developed and in no acute distress  HEENT:  Sclera clear, pupils equal, oral mucosa moist  Respiratory: clear, faint crackles in LLL posteriorly only, on RA   Cardiovascular:  RRR without M,G,R  Gastrointestinal: soft and non-tender; with positive bowel sounds. Musculoskeletal: warm without cyanosis. There is trace  lower extremity edema.   Skin:  no jaundice or rashes, surgical wounds   Neurologic: no gross neuro deficits     Psychiatric:  alert and oriented x 3    CXR: 5/28/2022 5/27/2022      LAB:  Recent Labs     05/25/22  1539 05/25/22 2016 05/26/22  2119 05/27/22  0428 05/28/22  0603   WBC 15.1*   < > 8.4 8.1 7.8   HGB 10.7*   < > 9.3* 8.8* 9.1*   HCT 30.8*   < > 28.7* 27.1* 26.7*   *   < > 119* 110* 140*   INR 1.4  --   --   --   --     < > = values in this interval not displayed. Recent Labs     05/26/22  0314 05/27/22 0428 05/28/22  0603    140 137   K 4.3 4.5 4.4   * 111* 107   CO2 25 25 25   BUN 12 21 26*   MG 2.3 2.1 2.1     No results for input(s): TROPHS, BNPNT, CRP, ESR in the last 72 hours. Invalid input(s): LAC  No results for input(s): PH, PCO2, PO2, HCO3 in the last 72 hours. Invalid input(s): PHI, PCO2I, PO2I, HCO3I  No results for input(s): SDES in the last 72 hours. Invalid input(s): CULT  Assessment and Plan:  (Medical Decision Making)     Hospital Problems           Last Modified POA    * (Principal) S/P CABG x 2 5/25/2022 Yes    Encounter for weaning from ventilator ([address] 5/25/2022 Yes    Hypoxia 5/25/2022 Yes    Chest pain 5/25/2022 Yes      S/P CABG x 2 (principal)  --CT removed per surgery. Tolerating  And progressing well  --cont mobility and IS    Encounter for weaning from ventilator [facility])    Plan: resolved    Hypoxia:   --resolved, now on RA  --cont IS, mobility    Chest pain:   --s/o CABG    Restart home celexa as previously dosed. More than 50% of the time documented was spent in face-to-face contact with the patient and in the care of the patient on the floor/unit where the patient is located. Belinda Appiah, APRN - CNP\  I have spoken with and examined the patient. I agree with the above assessment and plan as documented. I contributed more than 50% of the clinical time to this encounter today.     Gen: alert  Lungs:  clear  Heart:  RRR with no Murmur/Rubs/Gallops  Abd:soft  Ext: no edema    Continue is, mobility    Alexandre Huggins MD

## 2022-05-28 NOTE — PROGRESS NOTES
Post chest tube d/c, pt with no SQ air and good aeration throughout. Pt weepy and allowed to voice that she's having a down day. Reassured, educated. Pt v/u.

## 2022-05-28 NOTE — PROGRESS NOTES
Progress Note    2022 9:09 AM          POD#   this is a 80-year-old woman postop day #3 from a two-vessel coronary artery bypass grafting    Subjective:  Ms. Sabino Chan   Well patient is doing well although somewhat teary today history of depression in the past  OBJECTIVE:    PULSE OXIMETRY RANGE: SpO2  Av.6 %  Min: 91 %  Max: 93 %  SUPPLEMENTAL O2: O2 Flow Rate (L/min): 1 L/min (attempting to wean)   Vital Signs: /69   Pulse 89   Temp 98.3 °F (36.8 °C) (Oral)   Resp 20   Ht 5' 4\" (1.626 m)   Wt 173 lb 6.4 oz (78.7 kg)   SpO2 93%   BMI 29.76 kg/m²    Temperature Range:   Temp: 98.3 °F (36.8 °C)   Temp  Av.2 °F (36.8 °C)  Min: 97.1 °F (36.2 °C)  Max: 98.8 °F (37.1 °C)      Exam:   General appearance:   Neck:   Lungs: Lungs are clear to auscultation  Sternum: Prevena dressing is intact  Heart: Dynamically stable  Abdomen: Soft and nontender  Extremities:   Leg Wounds:   Tubes were left in because of a small airleak no obvious air leak today               Rhythm: Patient is in normal sinus rhythm    Labs:   ABG:  No results found for: PHART, PO2ART, LCD1YOW, U9PSJCYD, PGM0IRT, THGBART, BQL5ZBD, BEART  CBC: Recent Labs     22  2119 22  0428 22  0603   WBC 8.4 8.1 7.8   HGB 9.3* 8.8* 9.1*   HCT 28.7* 27.1* 26.7*   MCV 86.7 85.8 84.0   * 110* 140*     BMP:   Recent Labs     22  0314 22  0428 22  0603    140 137   K 4.3 4.5 4.4   * 111* 107   CO2 25 25 25   BUN 12 21 26*   CREATININE 0.50* 0.80 0.80     PT/INR:   Recent Labs     22  1539   PROTIME 17.3*   INR 1.4     APTT:   Recent Labs     22  1539   APTT 32.5       Chest X-Ray: Chest x-ray yesterday showed some improvement for left-sided atelectasis    CT:  I/O last 3 completed shifts: In: 0244 [P.O.:1075; I.V.:30]  Out: 985 [Urine:775; Chest Tube:210]      Air Leak:   I/O last 3 completed shifts: In: 9493 [P.O.:1075;  I.V.:30]  Out: 985 [Urine:775; Chest Tube:210]    Scheduled Meds:    furosemide  40 mg Oral Daily    potassium chloride  10 mEq Oral Daily    sennosides-docusate sodium  1 tablet Oral BID    insulin lispro  0-9 Units SubCUTAneous Nightly    alcohol 62%  1 ampule Topical Q12H    insulin lispro  0-18 Units SubCUTAneous TID WC    sodium chloride flush  5-40 mL IntraVENous 2 times per day    aspirin  81 mg Oral Daily    amiodarone  200 mg Oral BID    metoprolol tartrate  25 mg Oral BID    atorvastatin  80 mg Oral Nightly    famotidine  20 mg Oral BID    Or    famotidine (PEPCID) injection  20 mg IntraVENous BID     . Elevated blood sugars 202 46 and elevated HbA1c  Continuous Infusions: 0      Assessment  1. Postop coronary artery bypass grafting  2. Elevated blood sugars  3. Ongoing depression  4. Patient Active Problem List   Diagnosis    Osteoarthritis of lumbosacral spine    Elevated BP without diagnosis of hypertension    DM [facility]    [facility]    Anxiety    Dysthymic disorder    Class 1 obesity with serious comorbidity and body mass index (BMI) of 30.0 to 30.9 in adult    Uncontrolled type 2 diabetes mellitus with hyperglycemia (HCC)    Plantar wart of right foot    Menopausal syndrome    Overweight (BMI 25.0-29. 9)    Chest pain    Hypertension    S/P CABG x 2    En[facility]    Hypoxia       Plan   1. Monitor blood sugar with sliding scale, dietitian dietary device  2. Depression meds.   3.  4.          MD Keisha Sanchez MD

## 2022-05-28 NOTE — PROGRESS NOTES
PHYSICAL THERAPY Daily Note and PM  (Link to Caseload Tracking: PT Visit Days : 1  Acknowledge Orders  Time In/Out  PT Charge Capture  Rehab Caseload Tracker    Maryelizabeth Schirmer is a 64 y.o. female   PRIMARY DIAGNOSIS: S/P CABG x 2  Atherosclerosis of native coronary artery of native heart with unstable angina pectoris (HCC) [I25.110]  Abnormal stress test [R94.39]  Chest pain [R07.9]  Procedure(s) (LRB):  CORONARY ARTERY BYPASS GRAFT (CABG X 2), LIMA; ENDOSCOPIC VEIN HARVEST LEFT GREATER SAPHENOUS (N/A)  TRANSESOPHAGEAL ECHOCARDIOGRAM (N/A)  3 Days Post-Op  Reason for Referral: Generalized Muscle Weakness (M62.81)  Difficulty in walking, Not elsewhere classified (R26.2)  Inpatient: Payor: /     ASSESSMENT:     REHAB RECOMMENDATIONS:   Recommendation to date pending progress:  [facility] [place] [address] Therapy    Equipment:     None     ASSESSMENT:  Ms. Ash Bello presents with decreased mobility and decreased gait. At baseline she works 2 jobs. Lives alone but will be going to her mothers house at discharge. She is tearful today. Overwhelmed by all that has happened. From a mobility standpoint she is doing well. On RA. CT will come out today. Mobility cg with rolling walker 150 ft. Expect she will do well. She is functioning below baseline and is therefore appropriate for skilled PT to maximize her rehab potential.  Expect home to [facility] with hhpt. PM-In better spirits this afternoon. Moving well now with no walker. Slow but steady. Increased to 200 ft. She will do well. Performed therex in chair. Progressing towards goals.      [facility] [address] [id number] AM-PAC 6 Clicks Basic Mobility Inpatient Short Form  AM-PAC Mobility Inpatient   How much difficulty turning over in bed?: A Lot  How much difficulty sitting down on / standing up from a chair with arms?: A Little  How much difficulty moving from lying on back to sitting on side of bed?: A Lot  How much help from another person moving to and from a bed to a chair?: A Little  How much help from another person needed to walk in hospital room?: A Little  How much help from another person for climbing 3-5 steps with a railing?: A Little  AM-PAC Inpatient Mobility Raw Score : 16  AM-PAC Inpatient T-Scale Score : 40.78  Mobility Inpatient CMS 0-100% Score: 54.16  Mobility Inpatient CMS G-Code Modifier : CK    SUBJECTIVE:   Ms. Sudarshan Saldana states, \"Its all just hitting me about what has happened\" \"Im better this afternoon\"    Social/Functional Lives With: Parent  Type of Home: House  Receives Help From: Family  ADL Assistance: Independent    OBJECTIVE:     PAIN: Rachel Aland / O2: Taya Gola / Darin Manger / Leobardo Remedies:   Pre Treatment: 2         Post Treatment: 2 Vitals        Oxygen      None    RESTRICTIONS/PRECAUTIONS:                    GROSS EVALUATION: Intact Impaired (Comments):   AROM [x]     PROM [x]    Strength [x]     Balance [x]     Posture [] N/A   Sensation [x]     Coordination []      Tone []     Edema [x]    Activity Tolerance []      []      COGNITION/  PERCEPTION: Intact Impaired (Comments):   Orientation [x]     Vision [x]     Hearing [x]     Cognition  [x]       MOBILITY: I Mod I S SBA CGA Min Mod Max Total  NT x2 Comments:   Bed Mobility    Rolling [] [] [] [] [] [] [] [] [] [x] []    Supine to Sit [] [] [] [] [] [] [] [] [] [x] []    Scooting [] [] [] [] [] [] [] [] [] [x] []    Sit to Supine [] [] [] [] [] [] [] [] [] [x] []    Transfers    Sit to Stand [] [] [] [] [x] [] [] [] [] [] []    Bed to Chair [] [] [] [] [] [] [] [] [] [x] []    Stand to Sit [] [] [] [] [x] [] [] [] [] [] []    I=Independent, Mod I=Modified Independent, S=Supervision, SBA=Standby Assistance, CGA=Contact Guard Assistance,   Min=Minimal Assistance, Mod=Moderate Assistance, Max=Maximal Assistance, Total=Total Assistance, NT=Not Tested    GAIT: I Mod I S SBA CGA Min Mod Max Total  NT x2 Comments:   Level of Assistance [] [] [] [] [x] [] [] [] [] [] []    Distance 200 feet    DME None    Gait Quality Decreased annie , Decreased step clearance and Decreased step length    Weightbearing Status      Stairs      I=Independent, Mod I=Modified Independent, S=Supervision, SBA=Standby Assistance, CGA=Contact Guard Assistance,   Min=Minimal Assistance, Mod=Moderate Assistance, Max=Maximal Assistance, Total=Total Assistance, NT=Not Tested    PLAN:   ACUTE PHYSICAL THERAPY GOALS:   (Developed with and agreed upon by patient and/or caregiver.)  1. Ms. Sueanne Goodpasture will perform supine to sit and sit to supine with bed flat and no rail independently in 7 days. 2.  Ms. Sueanne Goodpasture will perform sit to stand and bed to chair independently in 7 days. 3.  Ms. Sueanne Goodpasture will perform gait >1000 ft independently in 7 days. 4.  Ms. Sueanne Goodpasture will go up and down 4 steps with rail independently in 7 days. FREQUENCY AND DURATION: BID for duration of hospital stay or until stated goals are met, whichever comes first.    THERAPY PROGNOSIS: Excellent    PROBLEM LIST:   (Skilled intervention is medically necessary to address:)  Decreased Activity Tolerance  Decreased AROM/PROM  Decreased Gait Ability  Decreased Strength  Decreased Transfer Abilities INTERVENTIONS PLANNED:   (Benefits and precautions of physical therapy have been discussed with the patient.)  Therapeutic Activity  Therapeutic Exercise/HEP  Neuromuscular Re-education  Gait Training  Education       TREATMENT:   EVALUATION: MODERATE COMPLEXITY: (Untimed Charge)    TREATMENT:   Therapeutic Activity (25 Minutes): Therapeutic activity included Transfer Training, Ambulation on level ground, Sitting balance  and Standing balance to improve functional Mobility.     TREATMENT GRID:     Date:  5/28 Date:   Date:     ACTIVITY/EXERCISE AM PM AM PM AM PM   AP  10       LAQ  10       Marching  10       Glut sets  10                                  B = bilateral; AA = active assistive; A = active; P = passive    AFTER TREATMENT PRECAUTIONS: Call light within reach, Chair, Needs within reach and RN notified    INTERDISCIPLINARY COLLABORATION:  MD/ PA/ NP , RN/ PCT and PT/ PTA    EDUCATION: Education Given To: Patient  Education Provided: Role of Therapy  Education Method: Verbal  Barriers to Learning: None  Education Outcome: Verbalized understanding    TIME IN/OUT:  Time In: 1345  Time Out: [address]  Minutes: [address], [place]

## 2022-05-29 ENCOUNTER — APPOINTMENT (OUTPATIENT)
Dept: GENERAL RADIOLOGY | Age: 57
DRG: 236 | End: 2022-05-29
Attending: THORACIC SURGERY (CARDIOTHORACIC VASCULAR SURGERY)

## 2022-05-29 PROBLEM — E11.69 HYPERLIPIDEMIA ASSOCIATED WITH TYPE 2 DIABETES MELLITUS (HCC): Status: ACTIVE | Noted: 2019-06-26

## 2022-05-29 PROBLEM — I10 HYPERTENSION: Status: ACTIVE | Noted: 2022-05-06

## 2022-05-29 PROBLEM — E78.5 HYPERLIPIDEMIA ASSOCIATED WITH TYPE 2 DIABETES MELLITUS (HCC): Status: ACTIVE | Noted: 2019-06-26

## 2022-05-29 LAB
GLUCOSE BLD STRIP.AUTO-MCNC: 173 MG/DL (ref 65–100)
GLUCOSE BLD STRIP.AUTO-MCNC: 201 MG/DL (ref 65–100)
GLUCOSE BLD STRIP.AUTO-MCNC: 205 MG/DL (ref 65–100)
GLUCOSE BLD STRIP.AUTO-MCNC: 210 MG/DL (ref 65–100)
MAGNESIUM SERPL-MCNC: 1.8 MG/DL (ref 1.8–2.4)
POTASSIUM SERPL-SCNC: 4 MMOL/L (ref 3.5–5.1)
SERVICE CMNT-IMP: ABNORMAL

## 2022-05-29 PROCEDURE — 84132 ASSAY OF SERUM POTASSIUM: CPT

## 2022-05-29 PROCEDURE — 6370000000 HC RX 637 (ALT 250 FOR IP): Performed by: THORACIC SURGERY (CARDIOTHORACIC VASCULAR SURGERY)

## 2022-05-29 PROCEDURE — 92953 TEMPORARY EXTERNAL PACING: CPT

## 2022-05-29 PROCEDURE — 82962 GLUCOSE BLOOD TEST: CPT

## 2022-05-29 PROCEDURE — 6360000002 HC RX W HCPCS: Performed by: THORACIC SURGERY (CARDIOTHORACIC VASCULAR SURGERY)

## 2022-05-29 PROCEDURE — 6370000000 HC RX 637 (ALT 250 FOR IP): Performed by: INTERNAL MEDICINE

## 2022-05-29 PROCEDURE — 6370000000 HC RX 637 (ALT 250 FOR IP): Performed by: NURSE PRACTITIONER

## 2022-05-29 PROCEDURE — 36415 COLL VENOUS BLD VENIPUNCTURE: CPT

## 2022-05-29 PROCEDURE — APPSS15 APP SPLIT SHARED TIME 0-15 MINUTES: Performed by: NURSE PRACTITIONER

## 2022-05-29 PROCEDURE — 6370000000 HC RX 637 (ALT 250 FOR IP): Performed by: PHYSICIAN ASSISTANT

## 2022-05-29 PROCEDURE — 99231 SBSQ HOSP IP/OBS SF/LOW 25: CPT | Performed by: INTERNAL MEDICINE

## 2022-05-29 PROCEDURE — 2140000001 HC CVICU INTERMEDIATE R&B

## 2022-05-29 PROCEDURE — 2580000003 HC RX 258: Performed by: THORACIC SURGERY (CARDIOTHORACIC VASCULAR SURGERY)

## 2022-05-29 PROCEDURE — 71045 X-RAY EXAM CHEST 1 VIEW: CPT

## 2022-05-29 PROCEDURE — 97530 THERAPEUTIC ACTIVITIES: CPT

## 2022-05-29 PROCEDURE — 83735 ASSAY OF MAGNESIUM: CPT

## 2022-05-29 PROCEDURE — 93005 ELECTROCARDIOGRAM TRACING: CPT | Performed by: THORACIC SURGERY (CARDIOTHORACIC VASCULAR SURGERY)

## 2022-05-29 RX ORDER — INSULIN GLARGINE 100 [IU]/ML
10 INJECTION, SOLUTION SUBCUTANEOUS DAILY
Status: DISCONTINUED | OUTPATIENT
Start: 2022-05-29 | End: 2022-05-30

## 2022-05-29 RX ADMIN — SODIUM CHLORIDE, PRESERVATIVE FREE 10 ML: 5 INJECTION INTRAVENOUS at 21:01

## 2022-05-29 RX ADMIN — TRAMADOL HYDROCHLORIDE 50 MG: 50 TABLET, COATED ORAL at 09:42

## 2022-05-29 RX ADMIN — TRAMADOL HYDROCHLORIDE 50 MG: 50 TABLET, COATED ORAL at 03:45

## 2022-05-29 RX ADMIN — INSULIN LISPRO 3 UNITS: 100 INJECTION, SOLUTION INTRAVENOUS; SUBCUTANEOUS at 12:07

## 2022-05-29 RX ADMIN — TRAMADOL HYDROCHLORIDE 50 MG: 50 TABLET, COATED ORAL at 17:08

## 2022-05-29 RX ADMIN — INSULIN GLARGINE 10 UNITS: 100 INJECTION, SOLUTION SUBCUTANEOUS at 12:07

## 2022-05-29 RX ADMIN — POTASSIUM CHLORIDE 10 MEQ: 10 TABLET, EXTENDED RELEASE ORAL at 09:38

## 2022-05-29 RX ADMIN — FAMOTIDINE 20 MG: 20 TABLET, FILM COATED ORAL at 20:59

## 2022-05-29 RX ADMIN — FAMOTIDINE 20 MG: 20 TABLET, FILM COATED ORAL at 09:38

## 2022-05-29 RX ADMIN — AMIODARONE HYDROCHLORIDE 200 MG: 200 TABLET ORAL at 09:38

## 2022-05-29 RX ADMIN — ONDANSETRON 4 MG: 2 INJECTION INTRAMUSCULAR; INTRAVENOUS at 07:31

## 2022-05-29 RX ADMIN — INSULIN LISPRO 6 UNITS: 100 INJECTION, SOLUTION INTRAVENOUS; SUBCUTANEOUS at 07:39

## 2022-05-29 RX ADMIN — INSULIN LISPRO 3 UNITS: 100 INJECTION, SOLUTION INTRAVENOUS; SUBCUTANEOUS at 21:16

## 2022-05-29 RX ADMIN — METOPROLOL TARTRATE 25 MG: 25 TABLET, FILM COATED ORAL at 20:59

## 2022-05-29 RX ADMIN — ATORVASTATIN CALCIUM 80 MG: 80 TABLET, FILM COATED ORAL at 20:59

## 2022-05-29 RX ADMIN — FUROSEMIDE 40 MG: 40 TABLET ORAL at 09:38

## 2022-05-29 RX ADMIN — Medication 1 AMPULE: at 21:01

## 2022-05-29 RX ADMIN — ONDANSETRON 4 MG: 2 INJECTION INTRAMUSCULAR; INTRAVENOUS at 21:02

## 2022-05-29 RX ADMIN — Medication 1 AMPULE: at 09:38

## 2022-05-29 RX ADMIN — METOPROLOL TARTRATE 25 MG: 25 TABLET, FILM COATED ORAL at 09:38

## 2022-05-29 RX ADMIN — SODIUM CHLORIDE, PRESERVATIVE FREE 10 ML: 5 INJECTION INTRAVENOUS at 08:24

## 2022-05-29 RX ADMIN — ASPIRIN 81 MG: 81 TABLET ORAL at 09:38

## 2022-05-29 RX ADMIN — ONDANSETRON 4 MG: 2 INJECTION INTRAMUSCULAR; INTRAVENOUS at 03:45

## 2022-05-29 RX ADMIN — AMIODARONE HYDROCHLORIDE 200 MG: 200 TABLET ORAL at 21:00

## 2022-05-29 RX ADMIN — CITALOPRAM HYDROBROMIDE 20 MG: 20 TABLET ORAL at 21:00

## 2022-05-29 RX ADMIN — ONDANSETRON 4 MG: 2 INJECTION INTRAMUSCULAR; INTRAVENOUS at 17:08

## 2022-05-29 RX ADMIN — INSULIN LISPRO 6 UNITS: 100 INJECTION, SOLUTION INTRAVENOUS; SUBCUTANEOUS at 17:02

## 2022-05-29 ASSESSMENT — PAIN DESCRIPTION - ORIENTATION
ORIENTATION: ANTERIOR;MID
ORIENTATION: MID
ORIENTATION: ANTERIOR;MID

## 2022-05-29 ASSESSMENT — PAIN DESCRIPTION - PAIN TYPE
TYPE: SURGICAL PAIN

## 2022-05-29 ASSESSMENT — PAIN DESCRIPTION - LOCATION
LOCATION: CHEST;INCISION

## 2022-05-29 ASSESSMENT — PAIN DESCRIPTION - DESCRIPTORS
DESCRIPTORS: ACHING
DESCRIPTORS: ACHING;SORE
DESCRIPTORS: ACHING
DESCRIPTORS: ACHING;SORE

## 2022-05-29 ASSESSMENT — PAIN DESCRIPTION - FREQUENCY
FREQUENCY: INTERMITTENT

## 2022-05-29 ASSESSMENT — PAIN DESCRIPTION - ONSET
ONSET: ON-GOING

## 2022-05-29 ASSESSMENT — PAIN SCALES - GENERAL
PAINLEVEL_OUTOF10: 4
PAINLEVEL_OUTOF10: 2
PAINLEVEL_OUTOF10: 2
PAINLEVEL_OUTOF10: 5
PAINLEVEL_OUTOF10: 1
PAINLEVEL_OUTOF10: 1
PAINLEVEL_OUTOF10: 5

## 2022-05-29 ASSESSMENT — PAIN - FUNCTIONAL ASSESSMENT
PAIN_FUNCTIONAL_ASSESSMENT: ACTIVITIES ARE NOT PREVENTED

## 2022-05-29 NOTE — PLAN OF CARE
Problem: Chronic Conditions and Co-morbidities  Goal: Patient's chronic conditions and co-morbidity symptoms are monitored and maintained or improved  Outcome: Progressing  Care Plan - Patient's Chronic Conditions and Co-Morbidity Symptoms are Monitored and Maintained or Improved: Monitor and assess patient's chronic conditions and comorbid symptoms for stability, deterioration, or improvement     Problem: Discharge Planning  Goal: Discharge to home or other facility with appropriate resources  Outcome: Progressing  Discharge to home or other facility with appropriate resources: Identify barriers to discharge with patient and caregiver     Problem: Pain  Goal: Verbalizes/displays adequate comfort level or baseline comfort level  Outcome: Progressing  Verbalizes/displays adequate comfort level or baseline comfort level:   Encourage patient to monitor pain and request assistance   Assess pain using appropriate pain scale   Administer analgesics based on type and severity of pain and evaluate response   Implement non-pharmacological measures as appropriate and evaluate response     Problem: Skin/Tissue Integrity  Goal: Absence of new skin breakdown  Description: 1. Monitor for areas of redness and/or skin breakdown  2. Assess vascular access sites hourly  3. Every 4-6 hours minimum:  Change oxygen saturation probe site  4. Every 4-6 hours:  If on nasal continuous positive airway pressure, respiratory therapy assess nares and determine need for appliance change or resting period.   Outcome: Progressing     Problem: Safety - Adult  Goal: Free from fall injury  Free From Fall Injury: Instruct family/caregiver on patient safety     Problem: ABCDS Injury Assessment  Goal: Absence of physical injury  Outcome: Progressing  Absence of Physical Injury: Implement safety measures based on patient assessment

## 2022-05-29 NOTE — CONSULTS
[facility] Hospitalist Consult   Admit Date:  2022  8:41 AM   Name:  Drake Field   Age:  64 y.o. Sex:  female  :  1965   MRN:  [id number]   Room:      Presenting Complaint: S/P CABG surgery and DM management     Reason(s) for Admission: Atherosclerosis of native coronary artery of native heart with unstable angina pectoris (HCC) [I25.110]  Abnormal stress test [R94.39]  Chest pain [R07.9]     Hospitalists consulted by Farida Hernandez MD for: DM management     History of Presenting Illness:   Drake Field is a 64 y.o. female with history of   CAD s/p CABG surgery   DM type 2  BMI of 29  Hypertension   Hyperlipidemia     who was admitted for CABG surgery. Hospitalist service is consulted to help manage diabetes for the patient. Review of Systems:  10 systems reviewed and negative except as noted in HPI. Assessment & Plan:     Principal Problem:    S/P CABG x 2  Post-op care and physical therapy as per primary team (cardiovascular surgery service)   Symptomatic treatments   Pain control. Active Problems:    Encounter for weaning from ventilator [facility])  Patient is off from ventilator       Hypoxia  Resolved      DM (diabetes mellitus) ([facility] [address]  Blood sugar is in low 200s ranges. On Humalog sliding scale. Will give Lantus 10 units sc daily for better blood sugar control. Hyperlipidemia associated with type 2 diabetes mellitus ([facility] [address]  Patient is on Atorvastatin 80 mg po nightly. Chest pain  From CAD and S/P CABG as above. Post-op care and physical therapy. Hypertension  Patient is on Metoprolol 25 mg po bid   BP is 113-161/59-81 mmHg  Monitor   Pulse rate 78-98/min. I have discussed the plan of care with patient. Discharge Planning:    As per primary team.     Diet:  ADULT DIET; Regular; 3 carb choices (45 gm/meal);  Low Fat/Low Chol/High Fiber/2 gm Na  ADULT ORAL NUTRITION SUPPLEMENT; Breakfast, Lunch, Dinner; Diabetic Oral Supplement  DVT PPx: as per primary team   Code status: Full code     Principal Problem:    S/P CABG x 2  Active Problems:    Encounter for weaning from ventilator ([facility] [address]    Hypoxia    DM (diabetes mellitus) ([facility] [address]    Hyperlipidemia associated with type 2 diabetes mellitus ([facility] [address]    Chest pain    Hypertension  Resolved Problems:    * No resolved hospital problems. *      Past History:  Past Medical History:   Diagnosis Date    Anxiety 11/28/2012    CAD (coronary artery disease)     No MI; no stents    Depression     managed with med    DM (diabetes mellitus) ([facility] [address] 11/28/2012    insulin reliant; AVG ; pt denies s.s. of hypoglycemia; last A1C    HLD (hyperlipidemia)     managed with med    Hypertension     managed with med    Ulcerative esophagitis       Past Surgical History:   Procedure Laterality Date    CARDIAC CATHETERIZATION Left     CORONARY ARTERY BYPASS GRAFT N/A 5/25/2022    CORONARY ARTERY BYPASS GRAFT (CABG X 2), LIM[place]; ENDOSCOPIC VEIN HARVEST LEFT GREATER SAPHENOUS performed by Renetta Brand MD at [facility] MAIN OR    TRANSESOPHAGEAL ECHOCARDIOGRAM N/A 5/25/2022    TRANSESOPHAGEAL ECHOCARDIOGRAM performed by Renetta Brand MD at [facility] MAIN OR    UPPER GASTROINTESTINAL ENDOSCOPY      ulcerative espophagitis      No Known Allergies   Social History     Tobacco Use    Smoking status: Never Smoker    Smokeless tobacco: Never Used   Substance Use Topics    Alcohol use: Yes     Comment: rarely      Family History   Problem Relation Age of Onset    Diabetes Father       Family history reviewed and negative except as noted above. There is no immunization history on file for this patient.   Current Facility-Administered Medications   Medication Dose Route Frequency    citalopram (CELEXA) tablet 20 mg  20 mg Oral Nightly    acetaminophen (TYLENOL) tablet 650 mg  650 mg Oral Q4H PRN    oxyCODONE-acetaminophen (PERCOCET) 5-325 MG per tablet 1 tablet  1 tablet Oral Q6H PRN    furosemide (LASIX) tablet 40 mg  40 mg Oral Daily    potassium chloride (KLOR-CON M) extended release tablet 10 mEq  10 mEq Oral Daily    sennosides-docusate sodium (SENOKOT-S) 8.6-50 MG tablet 1 tablet  1 tablet Oral BID    polyethylene glycol (GLYCOLAX) packet 17 g  17 g Oral Daily PRN    magnesium hydroxide (MILK OF MAGNESIA) 400 MG/5ML suspension 30 mL  30 mL Oral Daily PRN    glucose chewable tablet 16 g  4 tablet Oral PRN    dextrose bolus 10% 125 mL  125 mL IntraVENous PRN    Or    dextrose bolus 10% 250 mL  250 mL IntraVENous PRN    insulin lispro (HUMALOG) injection vial 0-9 Units  0-9 Units SubCUTAneous Nightly    traMADol (ULTRAM) tablet 50 mg  50 mg Oral Q6H PRN    magnesium oxide (MAG-OX) tablet 400 mg  400 mg Oral TID PRN    magnesium oxide (MAG-OX) tablet 400 mg  400 mg Oral 4x Daily PRN    potassium chloride (KLOR-CON M) extended release tablet 20 mEq  20 mEq Oral BID PRN    potassium chloride (KLOR-CON M) extended release tablet 40 mEq  40 mEq Oral BID PRN    alcohol 62% (NOZIN) nasal  1 ampule  1 ampule Topical Q12H    insulin lispro (HUMALOG) injection vial 0-18 Units  0-18 Units SubCUTAneous TID WC    glucagon injection 1 mg  1 mg IntraMUSCular PRN    sodium chloride flush 0.9 % injection 5-40 mL  5-40 mL IntraVENous 2 times per day    sodium chloride flush 0.9 % injection 5-40 mL  5-40 mL IntraVENous PRN    ondansetron (ZOFRAN) injection 4 mg  4 mg IntraVENous Q4H PRN    aspirin EC tablet 81 mg  81 mg Oral Daily    amiodarone (CORDARONE) tablet 200 mg  200 mg Oral BID    metoprolol tartrate (LOPRESSOR) tablet 25 mg  25 mg Oral BID    atorvastatin (LIPITOR) tablet 80 mg  80 mg Oral Nightly    famotidine (PEPCID) tablet 20 mg  20 mg Oral BID    Or    famotidine (PEPCID) 20 mg in sodium chloride (PF) 10 mL injection  20 mg IntraVENous BID       Objective:     Patient Vitals for the past 24 hrs:   Temp Pulse Resp BP SpO2   05/29/22 0745 97.9 °F (36.6 °C) 80 18 122/70 --   05/29/22 0330 97.6 °F (36.4 °C) 81 20 (!) 113/59 95 %   05/28/22 2324 98 °F (36.7 °C) 78 18 118/67 96 %   05/28/22 1930 97.9 °F (36.6 °C) 98 18 (!) 161/81 98 %   05/28/22 1539 98.9 °F (37.2 °C) 83 20 119/62 95 %   05/28/22 1134 98.4 °F (36.9 °C) 84 20 127/64 92 %       Oxygen Therapy  SpO2: 95 %  Pulse Oximeter Device Mode: Continuous  Pulse Oximeter Device Location: Left  O2 Device: None (Room air)  Skin Assessment: Clean, dry, & intact  Skin Protection for O2 Device: N/A  Orientation: Middle  FiO2 : 28 %  O2 Flow Rate (L/min): 1 L/min (attempting to wean)    Estimated body mass index is 29.52 kg/m² as calculated from the following:    Height as of this encounter: 5' 4\" (1.626 m). Weight as of this encounter: 172 lb (78 kg). Intake/Output Summary (Last 24 hours) at 5/29/2022 1121  Last data filed at 5/29/2022 0745  Gross per 24 hour   Intake 420 ml   Output 1240 ml   Net -820 ml         Physical Exam:    Blood pressure 122/70, pulse 80, temperature 97.9 °F (36.6 °C), temperature source Oral, resp. rate 18, height 5' 4\" (1.626 m), weight 172 lb (78 kg), SpO2 95 %. General:    Well nourished. No overt distress. Patient is sitting up in her chair eating breakfast. Afebrile. Head:  Normocephalic, atraumatic, mildly pale. No icterus   Eyes:  Sclerae appear normal.  Pupils equally round. ENT:  Nares appear normal, no drainage. Moist oral mucosa  Neck:  No restricted ROM. Trachea midline   CV:   RRR. No m/r/g. No jugular venous distension. Lungs:   CTAB. No wheezing, rhonchi, or rales. Respirations even, unlabored  Abdomen: Bowel sounds present. Soft, nontender, nondistended. Extremities: No cyanosis or clubbing. No edema  Skin:     No rashes and normal coloration. Warm and dry. Neuro:  CN II-XII grossly intact. Sensation intact. A&Ox3  Psych:  Normal mood and affect.       I have reviewed ordered lab tests and independently visualized imaging below:    Recent Labs:  Recent Results (from the past 48 hour(s))   POCT Glucose    Collection Time: 05/27/22  3:32 PM   Result Value Ref Range    POC Glucose 294 (H) 65 - 100 mg/dL    Performed by: Julio    POCT Glucose    Collection Time: 05/27/22  8:28 PM   Result Value Ref Range    POC Glucose 229 (H) 65 - 100 mg/dL    Performed by: Tiffany Monahan    Basic Metabolic Panel    Collection Time: 05/28/22  6:03 AM   Result Value Ref Range    Sodium 137 136 - 145 mmol/L    Potassium 4.4 3.5 - 5.1 mmol/L    Chloride 107 98 - 107 mmol/L    CO2 25 21 - 32 mmol/L    Anion Gap 5 (L) 7 - 16 mmol/L    Glucose 236 (H) 65 - 100 mg/dL    BUN 26 (H) 6 - 23 MG/DL    CREATININE 0.80 0.6 - 1.0 MG/DL    GFR African American >60 >60 ml/min/1.73m2    GFR Non- >60 >60 ml/min/1.73m2    Calcium 8.8 8.3 - 10.4 MG/DL   CBC    Collection Time: 05/28/22  6:03 AM   Result Value Ref Range    WBC 7.8 4.3 - 11.1 K/uL    RBC 3.18 (L) 4.05 - 5.2 M/uL    Hemoglobin 9.1 (L) 11.7 - 15.4 g/dL    Hematocrit 26.7 (L) 35.8 - 46.3 %    MCV 84.0 79.6 - 97.8 FL    MCH 28.6 26.1 - 32.9 PG    MCHC 34.1 31.4 - 35.0 g/dL    RDW 12.1 11.9 - 14.6 %    Platelets 604 (L) 280 - 450 K/uL    MPV 10.6 9.4 - 12.3 FL    nRBC 0.00 0.0 - 0.2 K/uL   Magnesium    Collection Time: 05/28/22  6:03 AM   Result Value Ref Range    Magnesium 2.1 1.8 - 2.4 mg/dL   POCT Glucose    Collection Time: 05/28/22  6:16 AM   Result Value Ref Range    POC Glucose 246 (H) 65 - 100 mg/dL    Performed by: Hailey    EKG 12 lead    Collection Time: 05/28/22  9:44 AM   Result Value Ref Range    Ventricular Rate 80 BPM    Atrial Rate 80 BPM    P-R Interval 160 ms    QRS Duration 64 ms    Q-T Interval 390 ms    QTc Calculation (Bazett) 449 ms    P Axis 49 degrees    R Axis 14 degrees    T Axis 39 degrees    Diagnosis Normal sinus rhythm    POCT Glucose    Collection Time: 05/28/22 11:38 AM   Result Value Ref Range    POC Glucose 244 (H) 65 - 100 mg/dL    Performed by:  Julio    POCT Glucose Collection Time: 05/28/22  3:44 PM   Result Value Ref Range    POC Glucose 211 (H) 65 - 100 mg/dL    Performed by: Julio    POCT Glucose    Collection Time: 05/28/22  9:32 PM   Result Value Ref Range    POC Glucose 206 (H) 65 - 100 mg/dL    Performed by: [facility]    POCT Glucose    Collection Time: 05/29/22  6:32 AM   Result Value Ref Range    POC Glucose 201 (H) 65 - 100 mg/dL    Performed by: [facility]    Magnesium    Collection Time: 05/29/22  6:49 AM   Result Value Ref Range    Magnesium 1.8 1.8 - 2.4 mg/dL   Potassium    Collection Time: 05/29/22  6:49 AM   Result Value Ref Range    Potassium 4.0 3.5 - 5.1 mmol/L   EKG 12 lead    Collection Time: 05/29/22  9:53 AM   Result Value Ref Range    Ventricular Rate 80 BPM    Atrial Rate 80 BPM    P-R Interval 146 ms    QRS Duration 68 ms    Q-T Interval 410 ms    QTc Calculation (Bazett) 472 ms    P Axis 54 degrees    R Axis 29 degrees    T Axis 62 degrees    Diagnosis Normal sinus rhythm        Other Studies:  XR CHEST PORTABLE    Result Date: 5/29/2022  EXAMINATION: One view chest HISTORY: Post op open heart surgery TECHNIQUE: Frontal chest. COMPARISON: 5/28/2022 FINDINGS:  Chest tubes and mediastinal drain have been removed. Persistent bibasilar lung infiltrates. There is no significant pneumothorax or pleural effusion. The heart is unchanged. Stable sternotomy wires. No other significant interval changes. 1. Findings as described above. XR CHEST PORTABLE    Result Date: 5/28/2022  EXAMINATION: One view chest HISTORY: Post op open heart surgery TECHNIQUE: Frontal chest. COMPARISON: 5/27/2022 FINDINGS:  The right CVC has been removed. Otherwise stable support devices. Persistent mild bibasilar lung infiltrates or atelectasis. There is no significant pneumothorax or pleural effusion. The heart is unchanged. No other significant interval changes. 1. Findings as described above.          Signed:  Larry Valencia MD    Part of this note may have been written by using a voice dictation software. The note has been proof read but may still contain some grammatical/other typographical errors.

## 2022-05-29 NOTE — PROGRESS NOTES
PHYSICAL THERAPY Daily Note and AM  (Link to Caseload Tracking: PT Visit Days : 2  Time In/Out PT Charge Capture  Rehab Caseload Tracker  Orders      Brando Escobedo is a 64 y.o. female   PRIMARY DIAGNOSIS: S/P CABG x 2  Atherosclerosis of native coronary artery of native heart with unstable angina pectoris (HCC) [I25.110]  Abnormal stress test [R94.39]  Chest pain [R07.9]  Procedure(s) (LRB):  CORONARY ARTERY BYPASS GRAFT (CABG X 2), LIMA; ENDOSCOPIC VEIN HARVEST LEFT GREATER SAPHENOUS (N/A)  TRANSESOPHAGEAL ECHOCARDIOGRAM (N/A)  4 Days Post-Op  Inpatient: Payor: /     ASSESSMENT:     REHAB RECOMMENDATIONS:   Recommendation to date pending progress:  [address] [place] [address]    Equipment:     None     ASSESSMENT:  Ms. Marilee Pan is in recliner and still feeling nauseated. She stood and walked around the unit 2x without AD and SBA. She went up and over the stair unit 3x without issue. No real SOB or fatigue mostly nausea holding her back. PM:  Patient feeling better, was able to eat a little. She increased her gait distance as well as went over the stair unit 2x.  slight trunk sway but no LOB while walking without AD.   Below goals met     SUBJECTIVE:   Ms. Marilee Pan states, \"I ate a little\"     Social/Functional Lives With: Parent  Type of Home: House  Receives Help From: Family  ADL Assistance: Independent  OBJECTIVE:     PAIN: Inga Vik / O2: PRECAUTION / Mitzi Houser / Jignesh Louis:   Pre Treatment:          Post Treatment: 0 Vitals        Oxygen    Wound Vac    RESTRICTIONS/PRECAUTIONS:        MOBILITY: I Mod I S SBA CGA Min Mod Max Total  NT x2 Comments:   Bed Mobility    Rolling [] [] [] [] [] [] [] [] [] [] []    Supine to Sit [] [] [] [] [] [] [] [] [] [] []    Scooting [] [] [] [] [] [] [] [] [] [] []    Sit to Supine [] [] [] [] [] [] [] [] [] [] []    Transfers    Sit to Stand [] [x] [] [] [] [] [] [] [] [] []    Bed to Chair [] [] [] [] [] [] [] [] [] [] []    Stand to Sit [] [x] [] [] [] [] [] [] [] [] []    I=Independent, Mod I=Modified Independent, S=Supervision, SBA=Standby Assistance, CGA=Contact Guard Assistance,   Min=Minimal Assistance, Mod=Moderate Assistance, Max=Maximal Assistance, Total=Total Assistance, NT=Not Tested    BALANCE: Good Fair+ Fair Fair- Poor NT Comments   Sitting Static [x] [] [] [] [] []    Sitting Dynamic [x] [] [] [] [] []              Standing Static [] [x] [] [] [] []    Standing Dynamic [] [x] [] [] [] []      GAIT: I Mod I S SBA CGA Min Mod Max Total  NT x2 Comments:   Level of Assistance [] [] [] [x] [] [] [] [] [] [] []    Distance 400 feet    DME None    Gait Quality slow    Weightbearing Status      Stairs Stairs/Curb  Stairs?: Yes  Stairs  # Steps : 3 (3x)  Rails: Bilateral    I=Independent, Mod I=Modified Independent, S=Supervision, SBA=Standby Assistance, CGA=Contact Guard Assistance,   Min=Minimal Assistance, Mod=Moderate Assistance, Max=Maximal Assistance, Total=Total Assistance, NT=Not Tested    PLAN:   ACUTE PHYSICAL THERAPY GOALS:   (Developed with and agreed upon by patient and/or caregiver.)    1. Ms. Carolyn Jennings will perform supine to sit and sit to supine with bed flat and no rail independently in 7 days. 2.  Ms. Carolyn Jennings will perform sit to stand and bed to chair independently in 7 days. GOAL MET 5/29/2022    3. Ms. Carolyn Jennings will perform gait >1000 ft independently in 7 days. 4.  Ms. Carolyn Jennings will go up and down 4 steps with rail independently in 7 days. GOAL MET 5/29/2022    FREQUENCY AND DURATION: BID for duration of hospital stay or until stated goals are met, whichever comes first.    TREATMENT:   TREATMENT:   Therapeutic Activity (15 Minutes): Therapeutic activity included Transfer Training, Ambulation on level ground and Stair Training to improve functional Activity tolerance, Mobility and Strength.     TREATMENT GRID:  N/A    AFTER TREATMENT PRECAUTIONS: Call light within reach, Chair, Needs within reach and RN notified    INTERDISCIPLINARY COLLABORATION:  RN/ PCT and PT/ PTA    EDUCATION:      TIME IN/OUT:  Time In: 1335  Time Out: 1345  Minutes: 10    Kim Ambrosio PTA

## 2022-05-29 NOTE — PROGRESS NOTES
PHYSICAL THERAPY Daily Note and AM  (Link to Caseload Tracking: PT Visit Days : 2  Time In/Out PT Charge Capture  Rehab Caseload Tracker  Orders      Lucita Spencer is a 64 y.o. female   PRIMARY DIAGNOSIS: S/P CABG x 2  Atherosclerosis of native coronary artery of native heart with unstable angina pectoris (HCC) [I25.110]  Abnormal stress test [R94.39]  Chest pain [R07.9]  Procedure(s) (LRB):  CORONARY ARTERY BYPASS GRAFT (CABG X 2), LIMA; ENDOSCOPIC VEIN HARVEST LEFT GREATER SAPHENOUS (N/A)  TRANSESOPHAGEAL ECHOCARDIOGRAM (N/A)  4 Days Post-Op  Inpatient: Payor: /     ASSESSMENT:     REHAB RECOMMENDATIONS:   Recommendation to date pending progress:  [address] [place] [address]    Equipment:     None     ASSESSMENT:  Ms. Kendal Jacobs is in recliner and still feeling nauseated. She stood and walked around the unit 2x without AD and SBA. She went up and over the stair unit 3x without issue. No real SOB or fatigue mostly nausea holding her back.      SUBJECTIVE:   Ms. Kendal Jacobs states, \"I wish I could eat\"     Social/Functional Lives With: Parent  Type of Home: House  Receives Help From: Family  ADL Assistance: Independent  OBJECTIVE:     PAIN: Amber Fitch / O2: PRECAUTION / Elenore Raspberry / Alfrieda Ponds:   Pre Treatment:          Post Treatment: 0 Vitals        Oxygen    Wound Vac    RESTRICTIONS/PRECAUTIONS:        MOBILITY: I Mod I S SBA CGA Min Mod Max Total  NT x2 Comments:   Bed Mobility    Rolling [] [] [] [] [] [] [] [] [] [] []    Supine to Sit [] [] [] [] [] [] [] [] [] [] []    Scooting [] [] [] [] [] [] [] [] [] [] []    Sit to Supine [] [] [] [] [] [] [] [] [] [] []    Transfers    Sit to Stand [] [x] [] [] [] [] [] [] [] [] []    Bed to Chair [] [] [] [] [] [] [] [] [] [] []    Stand to Sit [] [x] [] [] [] [] [] [] [] [] []    I=Independent, Mod I=Modified Independent, S=Supervision, SBA=Standby Assistance, CGA=Contact Guard Assistance,   Min=Minimal Assistance, Mod=Moderate Assistance, Max=Maximal Assistance, Total=Total Assistance, NT=Not Tested    BALANCE: Good Fair+ Fair Fair- Poor NT Comments   Sitting Static [x] [] [] [] [] []    Sitting Dynamic [x] [] [] [] [] []              Standing Static [] [x] [] [] [] []    Standing Dynamic [] [x] [] [] [] []      GAIT: I Mod I S SBA CGA Min Mod Max Total  NT x2 Comments:   Level of Assistance [] [] [] [x] [] [] [] [] [] [] []    Distance 300 feet    DME None    Gait Quality slow    Weightbearing Status      Stairs Stairs/Curb  Stairs?: Yes  Stairs  # Steps : 3 (3x)  Rails: Bilateral    I=Independent, Mod I=Modified Independent, S=Supervision, SBA=Standby Assistance, CGA=Contact Guard Assistance,   Min=Minimal Assistance, Mod=Moderate Assistance, Max=Maximal Assistance, Total=Total Assistance, NT=Not Tested    PLAN:   ACUTE PHYSICAL THERAPY GOALS:   (Developed with and agreed upon by patient and/or caregiver.)    1. Ms. Kendal Jacobs will perform supine to sit and sit to supine with bed flat and no rail independently in 7 days. 2.  Ms. Kendal Jacobs will perform sit to stand and bed to chair independently in 7 days. 3.  Ms. Kendal Jacobs will perform gait >1000 ft independently in 7 days. 4.  Ms. Kendal Jacobs will go up and down 4 steps with rail independently in 7 days. FREQUENCY AND DURATION: BID for duration of hospital stay or until stated goals are met, whichever comes first.    TREATMENT:   TREATMENT:   Therapeutic Activity (15 Minutes): Therapeutic activity included Transfer Training, Ambulation on level ground and Stair Training to improve functional Activity tolerance, Mobility and Strength.     TREATMENT GRID:  N/A    AFTER TREATMENT PRECAUTIONS: Call light within reach, Chair, Needs within reach and RN notified    INTERDISCIPLINARY COLLABORATION:  RN/ PCT and PT/ PTA    EDUCATION:      TIME IN/OUT:  Time In: 0815  Time Out: 0830  Minutes: Merle Vazquez PTA

## 2022-05-29 NOTE — PROGRESS NOTES
Tash Gordon  Admission Date: 5/25/2022         Daily Progress Note: 5/29/2022    The patient's chart is reviewed and the patient is discussed with the staff. Background: 64year old female, PMH DM, HTN, and HLD here s/p CABG X 2.  The patient had previously been seen by [facility] for CASTRO and right sided chest discomfort.  Left cardiac catheterization was done on 5/6/2022 and revealed severe multivessel disease.  The patient is a never smoker and has not history of any lung disease. Subjective:   Remains on RA sitting up in recliner. Just finished working with PT.   Slightly nauseated this AM.     Current Facility-Administered Medications   Medication Dose Route Frequency    citalopram (CELEXA) tablet 20 mg  20 mg Oral Nightly    acetaminophen (TYLENOL) tablet 650 mg  650 mg Oral Q4H PRN    oxyCODONE-acetaminophen (PERCOCET) 5-325 MG per tablet 1 tablet  1 tablet Oral Q6H PRN    furosemide (LASIX) tablet 40 mg  40 mg Oral Daily    potassium chloride (KLOR-CON M) extended release tablet 10 mEq  10 mEq Oral Daily    sennosides-docusate sodium (SENOKOT-S) 8.6-50 MG tablet 1 tablet  1 tablet Oral BID    polyethylene glycol (GLYCOLAX) packet 17 g  17 g Oral Daily PRN    magnesium hydroxide (MILK OF MAGNESIA) 400 MG/5ML suspension 30 mL  30 mL Oral Daily PRN    glucose chewable tablet 16 g  4 tablet Oral PRN    dextrose bolus 10% 125 mL  125 mL IntraVENous PRN    Or    dextrose bolus 10% 250 mL  250 mL IntraVENous PRN    insulin lispro (HUMALOG) injection vial 0-9 Units  0-9 Units SubCUTAneous Nightly    traMADol (ULTRAM) tablet 50 mg  50 mg Oral Q6H PRN    magnesium oxide (MAG-OX) tablet 400 mg  400 mg Oral TID PRN    magnesium oxide (MAG-OX) tablet 400 mg  400 mg Oral 4x Daily PRN    potassium chloride (KLOR-CON M) extended release tablet 20 mEq  20 mEq Oral BID PRN    potassium chloride (KLOR-CON M) extended release tablet 40 mEq  40 mEq Oral BID PRN    alcohol 62% (NOZIN) nasal  1 ampule  1 ampule Topical Q12H    insulin lispro (HUMALOG) injection vial 0-18 Units  0-18 Units SubCUTAneous TID WC    glucagon injection 1 mg  1 mg IntraMUSCular PRN    sodium chloride flush 0.9 % injection 5-40 mL  5-40 mL IntraVENous 2 times per day    sodium chloride flush 0.9 % injection 5-40 mL  5-40 mL IntraVENous PRN    ondansetron (ZOFRAN) injection 4 mg  4 mg IntraVENous Q4H PRN    aspirin EC tablet 81 mg  81 mg Oral Daily    amiodarone (CORDARONE) tablet 200 mg  200 mg Oral BID    metoprolol tartrate (LOPRESSOR) tablet 25 mg  25 mg Oral BID    atorvastatin (LIPITOR) tablet 80 mg  80 mg Oral Nightly    famotidine (PEPCID) tablet 20 mg  20 mg Oral BID    Or    famotidine (PEPCID) 20 mg in sodium chloride (PF) 10 mL injection  20 mg IntraVENous BID     Review of Systems  Constitutional: negative for fever, chills, sweats  Cardiovascular: negative for chest pain, palpitations, syncope, edema  Gastrointestinal:  negative for dysphagia, reflux, vomiting, diarrhea, abdominal pain, or melena +nausea   Neurologic:  negative for focal weakness, numbness, headache  Objective:     Vitals:    05/28/22 2324 05/29/22 0330 05/29/22 0630 05/29/22 0745   BP: 118/67 (!) 113/59  122/70   Pulse: 78 81  80   Resp: 18 20  18   Temp: 98 °F (36.7 °C) 97.6 °F (36.4 °C)  97.9 °F (36.6 °C)   TempSrc: Temporal Temporal  Oral   SpO2: 96% 95%     Weight:   172 lb (78 kg)    Height:           Physical Exam:   Constitution:  the patient is well developed and in no acute distress  HEENT:  Sclera clear, pupils equal, oral mucosa moist  Respiratory:clear throughout, on RA   Cardiovascular:  RRR without M,G,R  Gastrointestinal: soft and non-tender; with positive bowel sounds. Musculoskeletal: warm without cyanosis. There is trace  lower extremity edema.   Skin:  no jaundice or rashes, surgical wounds   Neurologic: no gross neuro deficits     Psychiatric:  alert and oriented x 3    CXR: 5/29/2022 5/28/2022 5/27/2022      LAB:  Recent Labs     05/26/22  2119 05/27/22  0428 05/28/22  0603   WBC 8.4 8.1 7.8   HGB 9.3* 8.8* 9.1*   HCT 28.7* 27.1* 26.7*   * 110* 140*     Recent Labs     05/27/22  0428 05/28/22  0603 05/29/22  0649    137  --    K 4.5 4.4 4.0   * 107  --    CO2 25 25  --    BUN 21 26*  --    MG 2.1 2.1 1.8     No results for input(s): TROPHS, BNPNT, CRP, ESR in the last 72 hours. Invalid input(s): LAC  No results for input(s): PH, PCO2, PO2, HCO3 in the last 72 hours. Invalid input(s): PHI, PCO2I, PO2I, HCO3I  No results for input(s): SDES in the last 72 hours. Invalid input(s): CULT  Assessment and Plan:  (Medical Decision Making)     Hospital Problems           Last Modified POA    * (Principal) S/P CABG x 2 5/25/2022 Yes    Encounter for weaning from ventilator ([address] 5/25/2022 Yes    Hypoxia 5/25/2022 Yes    Chest pain 5/25/2022 Yes      S/P CABG x 2 (principal)  --progressing well  --orders per surgery  --cont mobility and IS    Hypoxia:   --resolved, now on RA  --cont IS, mobility    Chest pain:   --s/o CABG    Nothing further to add from pulmonary standpoint. Will sign off. Please call if needed. More than 50% of the time documented was spent in face-to-face contact with the patient and in the care of the patient on the floor/unit where the patient is located. Linden Patton, APRN - CNP\  I have spoken with and examined the patient. I agree with the above assessment and plan as documented. I contributed more than 50% of the clinical time to this encounter today.     Gen:  alert  Lungs:  clear  Heart:  RRR with no Murmur/Rubs/Gallops  Abd:soft  Ext: no edema    Now off O2, doing well from pulmonary standpoint- will sign off    Fei Sam MD

## 2022-05-29 NOTE — PROGRESS NOTES
Progress Note    2022 7:59 AM          POD#   patient is postop day #4 from a two-vessel coronary artery bypass grafting. This is a 59-year-old woman with longstanding history of diabetes elevated HbA1c of 11 on admission. Postoperatively has been doing reasonably well somewhat teary yesterday but better today some underlying depression. Subjective:  Ms. Adriana Laura   Patient seems to be in a better mood today less teary and more positive. OBJECTIVE:    PULSE OXIMETRY RANGE: SpO2  Av.2 %  Min: 92 %  Max: 98 %  SUPPLEMENTAL O2: O2 Flow Rate (L/min): 1 L/min (attempting to wean)   Vital Signs: /70   Pulse 80   Temp 97.9 °F (36.6 °C) (Oral)   Resp 18   Ht 5' 4\" (1.626 m)   Wt 172 lb (78 kg)   SpO2 95%   BMI 29.52 kg/m²    Temperature Range:   Temp: 97.9 °F (36.6 °C)   Temp  Av.1 °F (36.7 °C)  Min: 97.6 °F (36.4 °C)  Max: 98.9 °F (37.2 °C)      Exam:   General appearance: Sitting in a chair without complaints  Neck:   Lungs: Some fine crepitus in the right base  Sternum: Incision is dry and intact  Heart: Patient's blood pressure is 118/60 normal sinus rhythm 86  Abdomen: Soft and nontender  Extremities:   Leg Wounds:                  Rhythm: Normal sinus rhythm    Labs:   ABG:  No results found for: PHART, PO2ART, TMX6PWO, Y4BGVUEJ, JAD3HQN, THGBART, VYN4HLO, BEART  CBC: Recent Labs     228 22  0603   WBC 8.4 8.1 7.8   HGB 9.3* 8.8* 9.1*   HCT 28.7* 27.1* 26.7*   MCV 86.7 85.8 84.0   * 110* 140*     BMP:   Recent Labs     22  0603 22  0649    137  --    K 4.5 4.4 4.0   * 107  --    CO2 25 25  --    BUN 21 26*  --    CREATININE 0.80 0.80  --      PT/INR: No results for input(s): PROTIME, INR in the last 72 hours. APTT: No results for input(s): APTT in the last 72 hours.     Chest X-Ray: Chest x-ray yesterday showed some atelectasis in the bases but appeared to be slightly improved    CT:  I/O last 3 completed shifts: In: 300 [P.O.:300]  Out: 7845 [Urine:2200; Chest Tube:140]      Air Leak:   I/O last 3 completed shifts: In: 300 [P.O.:300]  Out: 1364 [Urine:2200; Chest Tube:140]    Scheduled Meds:    citalopram  20 mg Oral Nightly    furosemide  40 mg Oral Daily    potassium chloride  10 mEq Oral Daily    sennosides-docusate sodium  1 tablet Oral BID    insulin lispro  0-9 Units SubCUTAneous Nightly    alcohol 62%  1 ampule Topical Q12H    insulin lispro  0-18 Units SubCUTAneous TID WC    sodium chloride flush  5-40 mL IntraVENous 2 times per day    aspirin  81 mg Oral Daily    amiodarone  200 mg Oral BID    metoprolol tartrate  25 mg Oral BID    atorvastatin  80 mg Oral Nightly    famotidine  20 mg Oral BID    Or    famotidine (PEPCID) injection  20 mg IntraVENous BID       Continuous Infusions:       Assessment  1. Status post coronary artery bypass grafting postoperative day #4  2. Underlying diabetes will require ongoing control post discharge given elevated HbA1c  3. Some crepitations in the right base we will try ambulation incentive spirometry may require additional diuretic. 4.      Patient Active Problem List   Diagnosis    Osteoarthritis of lumbosacral spine    Elevated BP without diagnosis of hypertension    DM [facility]di[facility] mellitus) [facility] [address]    Hyperlipidemia associated with type 2 diabetes mellitus ([address]    Anxiety    Dysthymic disorder    Class 1 obesity with serious comorbidity and body mass index (BMI) of 30.0 to 30.9 in adult    Uncontrolled type 2 diabetes mellitus with hyperglycemia (HCC)    Plantar wart of right foot    Menopausal syndrome    Overweight (BMI 25.0-29. 9)    Chest pain    Hypertension    S/P CABG x 2    Encounter for weaning from ventilator ([facility] [address]    Hypoxia       Plan   1. Continue diabetic control suggest dietitian nutritional support prior to discharge. 2.  Increase ambulation incentive spirometry  3.   Depression continue home meds and supportive care.   MD Mukesh Pierce MD

## 2022-05-30 ENCOUNTER — APPOINTMENT (OUTPATIENT)
Dept: GENERAL RADIOLOGY | Age: 57
DRG: 236 | End: 2022-05-30
Attending: THORACIC SURGERY (CARDIOTHORACIC VASCULAR SURGERY)

## 2022-05-30 LAB
ANION GAP SERPL CALC-SCNC: 5 MMOL/L (ref 7–16)
BASOPHILS # BLD: 0.1 K/UL (ref 0–0.2)
BASOPHILS NFR BLD: 1 % (ref 0–2)
BUN SERPL-MCNC: 13 MG/DL (ref 6–23)
CALCIUM SERPL-MCNC: 8.8 MG/DL (ref 8.3–10.4)
CHLORIDE SERPL-SCNC: 100 MMOL/L (ref 98–107)
CO2 SERPL-SCNC: 30 MMOL/L (ref 21–32)
CREAT SERPL-MCNC: 0.7 MG/DL (ref 0.6–1)
DIFFERENTIAL METHOD BLD: ABNORMAL
EOSINOPHIL # BLD: 0.4 K/UL (ref 0–0.8)
EOSINOPHIL NFR BLD: 5 % (ref 0.5–7.8)
ERYTHROCYTE [DISTWIDTH] IN BLOOD BY AUTOMATED COUNT: 12 % (ref 11.9–14.6)
GLUCOSE BLD STRIP.AUTO-MCNC: 195 MG/DL (ref 65–100)
GLUCOSE BLD STRIP.AUTO-MCNC: 202 MG/DL (ref 65–100)
GLUCOSE BLD STRIP.AUTO-MCNC: 244 MG/DL (ref 65–100)
GLUCOSE BLD STRIP.AUTO-MCNC: 266 MG/DL (ref 65–100)
GLUCOSE SERPL-MCNC: 181 MG/DL (ref 65–100)
HCT VFR BLD AUTO: 30.6 % (ref 35.8–46.3)
HGB BLD-MCNC: 10.5 G/DL (ref 11.7–15.4)
IMM GRANULOCYTES # BLD AUTO: 0 K/UL (ref 0–0.5)
IMM GRANULOCYTES NFR BLD AUTO: 1 % (ref 0–5)
LYMPHOCYTES # BLD: 2.5 K/UL (ref 0.5–4.6)
LYMPHOCYTES NFR BLD: 34 % (ref 13–44)
MAGNESIUM SERPL-MCNC: 1.7 MG/DL (ref 1.8–2.4)
MCH RBC QN AUTO: 28.3 PG (ref 26.1–32.9)
MCHC RBC AUTO-ENTMCNC: 34.3 G/DL (ref 31.4–35)
MCV RBC AUTO: 82.5 FL (ref 79.6–97.8)
MONOCYTES # BLD: 0.7 K/UL (ref 0.1–1.3)
MONOCYTES NFR BLD: 10 % (ref 4–12)
NEUTS SEG # BLD: 3.6 K/UL (ref 1.7–8.2)
NEUTS SEG NFR BLD: 49 % (ref 43–78)
NRBC # BLD: 0 K/UL (ref 0–0.2)
PLATELET # BLD AUTO: 235 K/UL (ref 150–450)
PMV BLD AUTO: 9.9 FL (ref 9.4–12.3)
POTASSIUM SERPL-SCNC: 3.9 MMOL/L (ref 3.5–5.1)
POTASSIUM SERPL-SCNC: 4 MMOL/L (ref 3.5–5.1)
RBC # BLD AUTO: 3.71 M/UL (ref 4.05–5.2)
SERVICE CMNT-IMP: ABNORMAL
SODIUM SERPL-SCNC: 135 MMOL/L (ref 136–145)
WBC # BLD AUTO: 7.2 K/UL (ref 4.3–11.1)

## 2022-05-30 PROCEDURE — 99232 SBSQ HOSP IP/OBS MODERATE 35: CPT | Performed by: INTERNAL MEDICINE

## 2022-05-30 PROCEDURE — 97530 THERAPEUTIC ACTIVITIES: CPT

## 2022-05-30 PROCEDURE — 80048 BASIC METABOLIC PNL TOTAL CA: CPT

## 2022-05-30 PROCEDURE — 6370000000 HC RX 637 (ALT 250 FOR IP): Performed by: NURSE PRACTITIONER

## 2022-05-30 PROCEDURE — 6370000000 HC RX 637 (ALT 250 FOR IP): Performed by: THORACIC SURGERY (CARDIOTHORACIC VASCULAR SURGERY)

## 2022-05-30 PROCEDURE — 2580000003 HC RX 258: Performed by: THORACIC SURGERY (CARDIOTHORACIC VASCULAR SURGERY)

## 2022-05-30 PROCEDURE — 36415 COLL VENOUS BLD VENIPUNCTURE: CPT

## 2022-05-30 PROCEDURE — 84132 ASSAY OF SERUM POTASSIUM: CPT

## 2022-05-30 PROCEDURE — 6360000002 HC RX W HCPCS: Performed by: THORACIC SURGERY (CARDIOTHORACIC VASCULAR SURGERY)

## 2022-05-30 PROCEDURE — 6370000000 HC RX 637 (ALT 250 FOR IP): Performed by: PHYSICIAN ASSISTANT

## 2022-05-30 PROCEDURE — 82962 GLUCOSE BLOOD TEST: CPT

## 2022-05-30 PROCEDURE — 2140000001 HC CVICU INTERMEDIATE R&B

## 2022-05-30 PROCEDURE — 6370000000 HC RX 637 (ALT 250 FOR IP): Performed by: INTERNAL MEDICINE

## 2022-05-30 PROCEDURE — 83735 ASSAY OF MAGNESIUM: CPT

## 2022-05-30 PROCEDURE — 97110 THERAPEUTIC EXERCISES: CPT

## 2022-05-30 PROCEDURE — 85025 COMPLETE CBC W/AUTO DIFF WBC: CPT

## 2022-05-30 PROCEDURE — 71045 X-RAY EXAM CHEST 1 VIEW: CPT

## 2022-05-30 RX ORDER — INSULIN GLARGINE 100 [IU]/ML
14 INJECTION, SOLUTION SUBCUTANEOUS DAILY
Status: DISCONTINUED | OUTPATIENT
Start: 2022-05-31 | End: 2022-06-01 | Stop reason: HOSPADM

## 2022-05-30 RX ADMIN — CITALOPRAM HYDROBROMIDE 20 MG: 20 TABLET ORAL at 20:55

## 2022-05-30 RX ADMIN — FAMOTIDINE 20 MG: 20 TABLET, FILM COATED ORAL at 20:55

## 2022-05-30 RX ADMIN — SENNOSIDES AND DOCUSATE SODIUM 1 TABLET: 8.6; 5 TABLET ORAL at 10:04

## 2022-05-30 RX ADMIN — INSULIN GLARGINE 10 UNITS: 100 INJECTION, SOLUTION SUBCUTANEOUS at 10:04

## 2022-05-30 RX ADMIN — FAMOTIDINE 20 MG: 20 TABLET, FILM COATED ORAL at 08:56

## 2022-05-30 RX ADMIN — Medication 1 AMPULE: at 08:56

## 2022-05-30 RX ADMIN — Medication 1 AMPULE: at 20:55

## 2022-05-30 RX ADMIN — METOPROLOL TARTRATE 25 MG: 25 TABLET, FILM COATED ORAL at 08:56

## 2022-05-30 RX ADMIN — SODIUM CHLORIDE, PRESERVATIVE FREE 10 ML: 5 INJECTION INTRAVENOUS at 20:52

## 2022-05-30 RX ADMIN — POTASSIUM CHLORIDE 20 MEQ: 1500 TABLET, EXTENDED RELEASE ORAL at 16:37

## 2022-05-30 RX ADMIN — SODIUM CHLORIDE, PRESERVATIVE FREE 10 ML: 5 INJECTION INTRAVENOUS at 08:57

## 2022-05-30 RX ADMIN — INSULIN LISPRO 6 UNITS: 100 INJECTION, SOLUTION INTRAVENOUS; SUBCUTANEOUS at 07:59

## 2022-05-30 RX ADMIN — SENNOSIDES AND DOCUSATE SODIUM 1 TABLET: 8.6; 5 TABLET ORAL at 20:56

## 2022-05-30 RX ADMIN — TRAMADOL HYDROCHLORIDE 50 MG: 50 TABLET, COATED ORAL at 17:21

## 2022-05-30 RX ADMIN — INSULIN LISPRO 9 UNITS: 100 INJECTION, SOLUTION INTRAVENOUS; SUBCUTANEOUS at 11:42

## 2022-05-30 RX ADMIN — ATORVASTATIN CALCIUM 80 MG: 80 TABLET, FILM COATED ORAL at 20:55

## 2022-05-30 RX ADMIN — ONDANSETRON 4 MG: 2 INJECTION INTRAMUSCULAR; INTRAVENOUS at 16:37

## 2022-05-30 RX ADMIN — ONDANSETRON 4 MG: 2 INJECTION INTRAMUSCULAR; INTRAVENOUS at 08:56

## 2022-05-30 RX ADMIN — INSULIN LISPRO 3 UNITS: 100 INJECTION, SOLUTION INTRAVENOUS; SUBCUTANEOUS at 16:40

## 2022-05-30 RX ADMIN — INSULIN LISPRO 3 UNITS: 100 INJECTION, SOLUTION INTRAVENOUS; SUBCUTANEOUS at 21:01

## 2022-05-30 RX ADMIN — METOPROLOL TARTRATE 25 MG: 25 TABLET, FILM COATED ORAL at 20:55

## 2022-05-30 RX ADMIN — ASPIRIN 81 MG: 81 TABLET ORAL at 08:56

## 2022-05-30 RX ADMIN — AMIODARONE HYDROCHLORIDE 200 MG: 200 TABLET ORAL at 08:56

## 2022-05-30 RX ADMIN — AMIODARONE HYDROCHLORIDE 200 MG: 200 TABLET ORAL at 20:56

## 2022-05-30 ASSESSMENT — PAIN DESCRIPTION - DESCRIPTORS: DESCRIPTORS: ACHING

## 2022-05-30 ASSESSMENT — PAIN SCALES - GENERAL
PAINLEVEL_OUTOF10: 2
PAINLEVEL_OUTOF10: 0
PAINLEVEL_OUTOF10: 0
PAINLEVEL_OUTOF10: 5

## 2022-05-30 ASSESSMENT — PAIN DESCRIPTION - PAIN TYPE: TYPE: SURGICAL PAIN

## 2022-05-30 ASSESSMENT — PAIN - FUNCTIONAL ASSESSMENT: PAIN_FUNCTIONAL_ASSESSMENT: ACTIVITIES ARE NOT PREVENTED

## 2022-05-30 ASSESSMENT — PAIN DESCRIPTION - ORIENTATION: ORIENTATION: MID;ANTERIOR

## 2022-05-30 ASSESSMENT — PAIN DESCRIPTION - FREQUENCY: FREQUENCY: INTERMITTENT

## 2022-05-30 ASSESSMENT — PAIN DESCRIPTION - LOCATION: LOCATION: CHEST;INCISION

## 2022-05-30 ASSESSMENT — PAIN DESCRIPTION - ONSET: ONSET: ON-GOING

## 2022-05-30 NOTE — PROGRESS NOTES
PHYSICAL THERAPY Daily Note and AM  (Link to Caseload Tracking: PT Visit Days : 2  Time In/Out PT Charge Capture  Rehab Caseload Tracker  Orders      iVvien Pan is a 64 y.o. female   PRIMARY DIAGNOSIS: S/P CABG x 2  Atherosclerosis of native coronary artery of native heart with unstable angina pectoris (HCC) [I25.110]  Abnormal stress test [R94.39]  Chest pain [R07.9]  Procedure(s) (LRB):  CORONARY ARTERY BYPASS GRAFT (CABG X 2), LIMA; ENDOSCOPIC VEIN HARVEST LEFT GREATER SAPHENOUS (N/A)  TRANSESOPHAGEAL ECHOCARDIOGRAM (N/A)  5 Days Post-Op  Inpatient: Payor: /     ASSESSMENT:     REHAB RECOMMENDATIONS:   Recommendation to date pending progress:  [address] [place] [address]    Equipment:     None     ASSESSMENT:  Ms. Lakisha Pantoja is in recliner and agreeable to therapy. Daughter st bedside. Sit to stand with stand by assist.  Gait training with slight hand held assist as the patient has a just a little episode of loss of balance. Patient is returned to the recliner and after a rest break performed therapeutic exercises. Good session. Making progress towards goals. Patient is a ron to work with. Continue PT efforts.   Home with HHPT     SUBJECTIVE:   Ms. Lakisha Pantoja states, \"Good morning\"     Social/Functional Lives With: Parent  Type of Home: House  Receives Help From: Family  ADL Assistance: Independent  OBJECTIVE:     PAIN: [place] Andrew / O2: PRECAUTION / Gregory Figueroa / Karmen Oas:   Pre Treatment:  0/10         Post Treatment: 0/10 Vitals        Oxygen    Wound Vac    RESTRICTIONS/PRECAUTIONS:        MOBILITY: I Mod I S SBA CGA Min Mod Max Total  NT x2 Comments:   Bed Mobility    Rolling [] [] [] [] [] [] [] [] [] [x] []    Supine to Sit [] [] [] [] [] [] [] [] [] [x] []    Scooting [] [] [] [] [] [] [] [] [] [x] []    Sit to Supine [] [] [] [] [] [] [] [] [] [x] []    Transfers    Sit to Stand [] [x] [] [] [] [] [] [] [] [] []    Bed to Chair [] [] [] [] [] [] [] [] [] [x] []    Stand to Sit [] [x] [] [] [] [] [] [] [] [] []    I=Independent, Mod I=Modified Independent, S=Supervision, SBA=Standby Assistance, CGA=Contact Guard Assistance,   Min=Minimal Assistance, Mod=Moderate Assistance, Max=Maximal Assistance, Total=Total Assistance, NT=Not Tested    BALANCE: Good Fair+ Fair Fair- Poor NT Comments   Sitting Static [x] [] [] [] [] []    Sitting Dynamic [x] [] [] [] [] []              Standing Static [] [x] [] [] [] []    Standing Dynamic [] [x] [] [] [] []      GAIT: I Mod I S SBA CGA Min Mod Max Total  NT x2 Comments:   Level of Assistance [] [] [] [] [x] [] [] [] [] [] []    Distance 300  feet    DME None    Gait Quality slow    Weightbearing Status      Stairs      I=Independent, Mod I=Modified Independent, S=Supervision, SBA=Standby Assistance, CGA=Contact Guard Assistance,   Min=Minimal Assistance, Mod=Moderate Assistance, Max=Maximal Assistance, Total=Total Assistance, NT=Not Tested    PLAN:   ACUTE PHYSICAL THERAPY GOALS:   (Developed with and agreed upon by patient and/or caregiver.)    1. Ms. Javier Arroyo will perform supine to sit and sit to supine with bed flat and no rail independently in 7 days. 2.  Ms. Javier Arroyo will perform sit to stand and bed to chair independently in 7 days. GOAL MET 5/29/2022    3. Ms. Javier Arroyo will perform gait >1000 ft independently in 7 days. 4.  Ms. Javire Arroyo will go up and down 4 steps with rail independently in 7 days. GOAL MET 5/29/2022    FREQUENCY AND DURATION: BID for duration of hospital stay or until stated goals are met, whichever comes first.    TREATMENT:   TREATMENT:   Therapeutic Activity (13 Minutes): Therapeutic activity included Transfer Training, Ambulation on level ground, Stair Training, Sitting balance  and Standing balance to improve functional Activity tolerance, Balance, Coordination, Mobility and Strength. Therapeutic Exercise (10 Minutes): Therapeutic exercises noted below to improve functional activity tolerance, AROM, strength and mobility.      TREATMENT GRID:     Date:  05/30/22 Date:   Date:     ACTIVITY/EXERCISE AM PM AM PM AM PM   LAQ 15        Shoulder shrugs 15        Gluteal sets 15        marching 15        Ankle pumps 15        abduction 15                 B = bilateral; AA = active assistive; A = active; P = passive    AFTER TREATMENT PRECAUTIONS: Call light within reach, Chair, Needs within reach, RN notified and Visitors at bedside    INTERDISCIPLINARY COLLABORATION:  RN/ PCT and PT/ PTA    EDUCATION:      TIME IN/OUT:  Time In: 1005  Time Out: [address] [address] [facility]  Minutes: 23    Shamika Edwards-Melly, PTA

## 2022-05-30 NOTE — PROGRESS NOTES
PHYSICAL THERAPY Daily Note and AM  (Link to Caseload Tracking: PT Visit Days : 2  Time In/Out PT Charge Capture  Rehab Caseload Tracker  Orders      Emily Ruth is a 64 y.o. female   PRIMARY DIAGNOSIS: S/P CABG x 2  Atherosclerosis of native coronary artery of native heart with unstable angina pectoris (HCC) [I25.110]  Abnormal stress test [R94.39]  Chest pain [R07.9]  Procedure(s) (LRB):  CORONARY ARTERY BYPASS GRAFT (CABG X 2), LIMA; ENDOSCOPIC VEIN HARVEST LEFT GREATER SAPHENOUS (N/A)  TRANSESOPHAGEAL ECHOCARDIOGRAM (N/A)  5 Days Post-Op  Inpatient: Payor: /     ASSESSMENT:     REHAB RECOMMENDATIONS:   Recommendation to date pending progress:  [address] [place] [address]    Equipment:     None     ASSESSMENT:  Ms. Sueanne Goodpasture is in recliner and agreeable to therapy. Daughter st bedside. Sit to stand with stand by assist.  Gait training with slight hand held assist as the patient has a just a little episode of loss of balance. Patient is returned to the recliner and after a rest break performed therapeutic exercises. Good session. Making progress towards goals. Patient is a ron to work with. Continue PT efforts. Home with HHPT. PM note:  Patient is agreeable to therapy. Increased gait distance this pm, exercises continued, stairs. Good session. Making progress.      SUBJECTIVE:   Ms. Sueanne Goodpasture states, \"Hello\"     Social/Functional Lives With: Parent  Type of Home: House  Receives Help From: Family  ADL Assistance: Independent  OBJECTIVE:     PAIN: [place] No / O2: PRECAUTION / Nael Ahr / Corby Slot:   Pre Treatment:  0/10         Post Treatment: 0/10 Vitals        Oxygen    Wound Vac    RESTRICTIONS/PRECAUTIONS:        MOBILITY: I Mod I S SBA CGA Min Mod Max Total  NT x2 Comments:   Bed Mobility    Rolling [] [] [] [] [] [] [] [] [] [x] []    Supine to Sit [] [] [] [] [] [] [] [] [] [x] []    Scooting [] [] [] [] [] [] [] [] [] [x] []    Sit to Supine [] [] [] [] [] [] [] [] [] [x] []    Transfers Sit to Stand [] [x] [] [] [] [] [] [] [] [] []    Bed to Chair [] [] [] [] [] [] [] [] [] [x] []    Stand to Sit [] [x] [] [] [] [] [] [] [] [] []    I=Independent, Mod I=Modified Independent, S=Supervision, SBA=Standby Assistance, CGA=Contact Guard Assistance,   Min=Minimal Assistance, Mod=Moderate Assistance, Max=Maximal Assistance, Total=Total Assistance, NT=Not Tested    BALANCE: Good Fair+ Fair Fair- Poor NT Comments   Sitting Static [x] [] [] [] [] []    Sitting Dynamic [x] [] [] [] [] []              Standing Static [] [x] [] [] [] []    Standing Dynamic [] [x] [] [] [] []      GAIT: I Mod I S SBA CGA Min Mod Max Total  NT x2 Comments:   Level of Assistance [] [] [] [] [x] [] [] [] [] [] []    Distance 325  feet    DME None    Gait Quality slow    Weightbearing Status      Stairs      I=Independent, Mod I=Modified Independent, S=Supervision, SBA=Standby Assistance, CGA=Contact Guard Assistance,   Min=Minimal Assistance, Mod=Moderate Assistance, Max=Maximal Assistance, Total=Total Assistance, NT=Not Tested    PLAN:   ACUTE PHYSICAL THERAPY GOALS:   (Developed with and agreed upon by patient and/or caregiver.)    1. Ms. Kev Robles will perform supine to sit and sit to supine with bed flat and no rail independently in 7 days. 2.  Ms. Kev Robles will perform sit to stand and bed to chair independently in 7 days. GOAL MET 5/29/2022    3. Ms. Kev Robles will perform gait >1000 ft independently in 7 days. 4.  Ms. Kev Robles will go up and down 4 steps with rail independently in 7 days. GOAL MET 5/29/2022    FREQUENCY AND DURATION: BID for duration of hospital stay or until stated goals are met, whichever comes first.    TREATMENT:   TREATMENT:   Therapeutic Activity (14 Minutes): Therapeutic activity included Transfer Training, Ambulation on level ground, Stair Training, Sitting balance  and Standing balance to improve functional Activity tolerance, Balance, Coordination, Mobility and Strength.   Therapeutic Exercise (10 Minutes): Therapeutic exercises noted below to improve functional activity tolerance, AROM, strength and mobility.      TREATMENT GRID:     Date:  05/30/22 Date:   Date:     ACTIVITY/EXERCISE AM PM AM PM AM PM   LAQ 15 15       Shoulder shrugs 15 15       Gluteal sets 15 15       marching 15 15       Ankle pumps 15 15       abduction 15 15                B = bilateral; AA = active assistive; A = active; P = passive    AFTER TREATMENT PRECAUTIONS: Call light within reach, Chair, Needs within reach and RN notified    INTERDISCIPLINARY COLLABORATION:  RN/ PCT and PT/ PTA    EDUCATION:      TIME IN/OUT:  Time In: 1318  Time Out: [address]  Minutes: 24    Shamika Edwards-Melly, PTA

## 2022-05-30 NOTE — PROGRESS NOTES
[facility] Hospitalist Consult   Admit Date:  2022  8:41 AM   Name:  Kenna Forde   Age:  64 y.o. Sex:  female  :  1965   MRN:  [id number]   Room:      Presenting Complaint: S/P CABG surgery and DM management     Reason(s) for Admission: Atherosclerosis of native coronary artery of native heart with unstable angina pectoris (HCC) [I25.110]  Abnormal stress test [R94.39]  Chest pain [R07.9]     Hospitalists consulted by Essence Fatima MD for: DM management     History of Presenting Illness:   Kenna Forde is a 64 y.o. female with history of   CAD s/p CABG surgery   DM type 2  BMI of 29  Hypertension   Hyperlipidemia     who was admitted for CABG surgery. Hospitalist service is consulted to help manage diabetes for the patient. Subjective:  : Patient is seen at the bedside. Eating breakfast.  Reports she is feeling better. No active complaint. Blood glucose in the low 200s. We will adjust insulin      Review of Systems:  10 systems reviewed and negative except as noted in HPI. Assessment & Plan:     S/P CABG x 2  Post-op care and physical therapy as per primary team (cardiovascular surgery service)   Symptomatic treatments   Pain control. Off ventilator    Hypoxia  Resolved    DM  Blood sugar is in low 200s ranges. On Humalog sliding scale. Increase Lantus to 14 units units sc daily for better blood sugar control. Hyperlipidemia associated with type 2 diabetes mellitus   Patient is on Atorvastatin 80 mg po nightly. Chest pain  From CAD and S/P CABG as above. Post-op care and physical therapy. Hypertension  Patient is on Metoprolol 25 mg po bid       I have discussed the plan of care with patient. Discharge Planning:    As per primary team.     Diet:  ADULT DIET; Regular; 3 carb choices (45 gm/meal);  Low Fat/Low Chol/High Fiber/2 gm Na  ADULT ORAL NUTRITION SUPPLEMENT; Breakfast, Lunch, Dinner; Diabetic Oral Supplement  DVT PPx: as per primary team   Code status: Full code     Principal Problem:    S/P CABG x 2  Active Problems:    Encounter for weaning from ventilator ([facility]    Hypoxia    DM (diabetes mellitus) ([facility]    Hyperlipidemia associated with type 2 diabetes mellitus ([facility]    Chest pain    Hypertension  Resolved Problems:    * No resolved hospital problems. *      Past History:  Past Medical History:   Diagnosis Date    Anxiety 11/28/2012    CAD (coronary artery disease)     No MI; no stents    Depression     managed with med    DM (diabetes mellitus) ([facility] [facility] 11/28/2012    insulin reliant; AVG ; pt denies s.s. of hypoglycemia; last A1C    HLD (hyperlipidemia)     managed with med    Hypertension     managed with med    Ulcerative esophagitis       Past Surgical History:   Procedure Laterality Date    CARDIAC CATHETERIZATION Left     CORONARY ARTERY BYPASS GRAFT N/A 5/25/2022    CORONARY ARTERY BYPASS GRAFT (CABG X 2), [place]; ENDOSCOPIC VEIN HARVEST LEFT GREATER SAPHENOUS performed by Jose Luis Isbell MD at [facility] MAIN OR    TRANSESOPHAGEAL ECHOCARDIOGRAM N/A 5/25/2022    TRANSESOPHAGEAL ECHOCARDIOGRAM performed by Jose Luis Isbell MD at [facility] MAIN OR    UPPER GASTROINTESTINAL ENDOSCOPY      ulcerative espophagitis      No Known Allergies   Social History     Tobacco Use    Smoking status: Never Smoker    Smokeless tobacco: Never Used   Substance Use Topics    Alcohol use: Yes     Comment: rarely      Family History   Problem Relation Age of Onset    Diabetes Father       Family history reviewed and negative except as noted above. There is no immunization history on file for this patient.   Current Facility-Administered Medications   Medication Dose Route Frequency    insulin glargine (LANTUS) injection vial 10 Units  10 Units SubCUTAneous Daily    citalopram (CELEXA) tablet 20 mg  20 mg Oral Nightly    acetaminophen (TYLENOL) tablet 650 mg  650 mg Oral Q4H PRN    oxyCODONE-acetaminophen (PERCOCET) 5-325 MG per tablet 1 tablet  1 tablet Oral Q6H PRN    sennosides-docusate sodium (SENOKOT-S) 8.6-50 MG tablet 1 tablet  1 tablet Oral BID    polyethylene glycol (GLYCOLAX) packet 17 g  17 g Oral Daily PRN    magnesium hydroxide (MILK OF MAGNESIA) 400 MG/5ML suspension 30 mL  30 mL Oral Daily PRN    glucose chewable tablet 16 g  4 tablet Oral PRN    dextrose bolus 10% 125 mL  125 mL IntraVENous PRN    Or    dextrose bolus 10% 250 mL  250 mL IntraVENous PRN    insulin lispro (HUMALOG) injection vial 0-9 Units  0-9 Units SubCUTAneous Nightly    traMADol (ULTRAM) tablet 50 mg  50 mg Oral Q6H PRN    magnesium oxide (MAG-OX) tablet 400 mg  400 mg Oral TID PRN    magnesium oxide (MAG-OX) tablet 400 mg  400 mg Oral 4x Daily PRN    potassium chloride (KLOR-CON M) extended release tablet 20 mEq  20 mEq Oral BID PRN    potassium chloride (KLOR-CON M) extended release tablet 40 mEq  40 mEq Oral BID PRN    alcohol 62% (NOZIN) nasal  1 ampule  1 ampule Topical Q12H    insulin lispro (HUMALOG) injection vial 0-18 Units  0-18 Units SubCUTAneous TID WC    glucagon injection 1 mg  1 mg IntraMUSCular PRN    sodium chloride flush 0.9 % injection 5-40 mL  5-40 mL IntraVENous 2 times per day    sodium chloride flush 0.9 % injection 5-40 mL  5-40 mL IntraVENous PRN    ondansetron (ZOFRAN) injection 4 mg  4 mg IntraVENous Q4H PRN    aspirin EC tablet 81 mg  81 mg Oral Daily    amiodarone (CORDARONE) tablet 200 mg  200 mg Oral BID    metoprolol tartrate (LOPRESSOR) tablet 25 mg  25 mg Oral BID    atorvastatin (LIPITOR) tablet 80 mg  80 mg Oral Nightly    famotidine (PEPCID) tablet 20 mg  20 mg Oral BID    Or    famotidine (PEPCID) 20 mg in sodium chloride (PF) 10 mL injection  20 mg IntraVENous BID       Objective:     Patient Vitals for the past 24 hrs:   Temp Pulse Resp BP SpO2   05/30/22 1121 98.2 °F (36.8 °C) 75 16 119/65 98 %   05/30/22 0945 -- 83 -- (!) 151/88 --   05/30/22 0930 -- 85 -- 130/67 --   05/30/22 0915 -- 83 -- (!) 145/79 --   05/30/22 0900 -- 82 -- 129/75 --   05/30/22 0845 -- 84 -- (!) 141/72 --   05/30/22 0704 97.4 °F (36.3 °C) 75 14 119/75 93 %   05/30/22 0315 98 °F (36.7 °C) 73 16 128/72 92 %   05/29/22 2238 98 °F (36.7 °C) 75 18 122/88 95 %   05/29/22 1915 97.6 °F (36.4 °C) 80 18 130/76 96 %   05/29/22 1530 97.4 °F (36.3 °C) 76 18 120/62 96 %       Oxygen Therapy  SpO2: 98 %  Pulse Oximeter Device Mode: Continuous  Pulse Oximeter Device Location: Left  O2 Device: None (Room air)  Skin Assessment: Clean, dry, & intact  Skin Protection for O2 Device: N/A  Orientation: Middle  FiO2 : 28 %  O2 Flow Rate (L/min): 1 L/min (attempting to wean)    Estimated body mass index is 28.84 kg/m² as calculated from the following:    Height as of this encounter: 5' 4\" (1.626 m). Weight as of this encounter: 168 lb (76.2 kg). Intake/Output Summary (Last 24 hours) at 5/30/2022 1303  Last data filed at 5/30/2022 0933  Gross per 24 hour   Intake 360 ml   Output 900 ml   Net -540 ml         Physical Exam:    Blood pressure 119/65, pulse 75, temperature 98.2 °F (36.8 °C), temperature source Temporal, resp. rate 16, height 5' 4\" (1.626 m), weight 168 lb (76.2 kg), SpO2 98 %. General:    Well nourished. No overt distress. Patient is sitting up in her chair eating breakfast. Afebrile. Head:  Normocephalic, atraumatic, mildly pale. No icterus   Eyes:  Sclerae appear normal.  Pupils equally round. ENT:  Nares appear normal, no drainage. Moist oral mucosa  Neck:  No restricted ROM. Trachea midline   CV:   RRR. No m/r/g. No jugular venous distension. Lungs:   CTAB. No wheezing, rhonchi, or rales. Respirations even, unlabored  Abdomen: Bowel sounds present. Soft, nontender, nondistended. Extremities: No cyanosis or clubbing. No edema  Skin:     No rashes and normal coloration. Warm and dry. Neuro:  CN II-XII grossly intact. Sensation intact. A&Ox3  Psych:  Normal mood and affect.       I have reviewed ordered lab tests and independently visualized imaging below:    Recent Labs:  Recent Results (from the past 48 hour(s))   POCT Glucose    Collection Time: 05/28/22  3:44 PM   Result Value Ref Range    POC Glucose 211 (H) 65 - 100 mg/dL    Performed by: Julio    POCT Glucose    Collection Time: 05/28/22  9:32 PM   Result Value Ref Range    POC Glucose 206 (H) 65 - 100 mg/dL    Performed by: Shikha    POCT Glucose    Collection Time: 05/29/22  6:32 AM   Result Value Ref Range    POC Glucose 201 (H) 65 - 100 mg/dL    Performed by: Heath Russo    Magnesium    Collection Time: 05/29/22  6:49 AM   Result Value Ref Range    Magnesium 1.8 1.8 - 2.4 mg/dL   Potassium    Collection Time: 05/29/22  6:49 AM   Result Value Ref Range    Potassium 4.0 3.5 - 5.1 mmol/L   EKG 12 lead    Collection Time: 05/29/22  9:53 AM   Result Value Ref Range    Ventricular Rate 80 BPM    Atrial Rate 80 BPM    P-R Interval 146 ms    QRS Duration 68 ms    Q-T Interval 410 ms    QTc Calculation (Bazett) 472 ms    P Axis 54 degrees    R Axis 29 degrees    T Axis 62 degrees    Diagnosis Normal sinus rhythm    POCT Glucose    Collection Time: 05/29/22 11:55 AM   Result Value Ref Range    POC Glucose 173 (H) 65 - 100 mg/dL    Performed by: Ariel    POCT Glucose    Collection Time: 05/29/22  4:41 PM   Result Value Ref Range    POC Glucose 205 (H) 65 - 100 mg/dL    Performed by:  Ariel    POCT Glucose    Collection Time: 05/29/22  9:09 PM   Result Value Ref Range    POC Glucose 210 (H) 65 - 100 mg/dL    Performed by: Heath Russo    Magnesium    Collection Time: 05/30/22  6:10 AM   Result Value Ref Range    Magnesium 1.7 (L) 1.8 - 2.4 mg/dL   Potassium    Collection Time: 05/30/22  6:10 AM   Result Value Ref Range    Potassium 3.9 3.5 - 5.1 mmol/L   POCT Glucose    Collection Time: 05/30/22  6:24 AM   Result Value Ref Range    POC Glucose 202 (H) 65 - 100 mg/dL    Performed by: Shikha    CBC with Auto Differential    Collection Time: 05/30/22  7:35 AM   Result Value Ref Range    WBC 7.2 4.3 - 11.1 K/uL    RBC 3.71 (L) 4.05 - 5.2 M/uL    Hemoglobin 10.5 (L) 11.7 - 15.4 g/dL    Hematocrit 30.6 (L) 35.8 - 46.3 %    MCV 82.5 79.6 - 97.8 FL    MCH 28.3 26.1 - 32.9 PG    MCHC 34.3 31.4 - 35.0 g/dL    RDW 12.0 11.9 - 14.6 %    Platelets 131 365 - 603 K/uL    MPV 9.9 9.4 - 12.3 FL    nRBC 0.00 0.0 - 0.2 K/uL    Differential Type AUTOMATED      Seg Neutrophils 49 43 - 78 %    Lymphocytes 34 13 - 44 %    Monocytes 10 4.0 - 12.0 %    Eosinophils % 5 0.5 - 7.8 %    Basophils 1 0.0 - 2.0 %    Immature Granulocytes 1 0.0 - 5.0 %    Segs Absolute 3.6 1.7 - 8.2 K/UL    Absolute Lymph # 2.5 0.5 - 4.6 K/UL    Absolute Mono # 0.7 0.1 - 1.3 K/UL    Absolute Eos # 0.4 0.0 - 0.8 K/UL    Basophils Absolute 0.1 0.0 - 0.2 K/UL    Absolute Immature Granulocyte 0.0 0.0 - 0.5 K/UL   Basic Metabolic Panel    Collection Time: 05/30/22  7:35 AM   Result Value Ref Range    Sodium 135 (L) 136 - 145 mmol/L    Potassium 4.0 3.5 - 5.1 mmol/L    Chloride 100 98 - 107 mmol/L    CO2 30 21 - 32 mmol/L    Anion Gap 5 (L) 7 - 16 mmol/L    Glucose 181 (H) 65 - 100 mg/dL    BUN 13 6 - 23 MG/DL    CREATININE 0.70 0.6 - 1.0 MG/DL    GFR African American >60 >60 ml/min/1.73m2    GFR Non- >60 >60 ml/min/1.73m2    Calcium 8.8 8.3 - 10.4 MG/DL   POCT Glucose    Collection Time: 05/30/22 11:26 AM   Result Value Ref Range    POC Glucose 266 (H) 65 - 100 mg/dL    Performed by: Duane        Other Studies:  XR CHEST PORTABLE    Result Date: 5/29/2022  EXAMINATION: One view chest HISTORY: Post op open heart surgery TECHNIQUE: Frontal chest. COMPARISON: 5/28/2022 FINDINGS:  Chest tubes and mediastinal drain have been removed. Persistent bibasilar lung infiltrates. There is no significant pneumothorax or pleural effusion. The heart is unchanged. Stable sternotomy wires. No other significant interval changes. 1. Findings as described above. XR CHEST PORTABLE    Result Date: 5/28/2022  EXAMINATION: One view chest HISTORY: Post op open heart surgery TECHNIQUE: Frontal chest. COMPARISON: 5/27/2022 FINDINGS:  The right CVC has been removed. Otherwise stable support devices. Persistent mild bibasilar lung infiltrates or atelectasis. There is no significant pneumothorax or pleural effusion. The heart is unchanged. No other significant interval changes. 1. Findings as described above. Signed:  Monik Mejía MD    Part of this note may have been written by using a voice dictation software. The note has been proof read but may still contain some grammatical/other typographical errors.

## 2022-05-30 NOTE — PROGRESS NOTES
Tash Gordon  Admission Date: 5/25/2022         Daily Progress Note: 5/30/2022    The patient's chart is reviewed and the patient is discussed with the staff. Background: 64year old female, PMH DM, HTN, and HLD here s/p CABG X 2.  The patient had previously been seen by [facility] for CASTRO and right sided chest discomfort.  Left cardiac catheterization was done on 5/6/2022 and revealed severe multivessel disease.  The patient is a never smoker and has not history of any lung disease. Subjective:     Remains on RA sitting up in recliner. Using IS up to 500 only.  Less nausea    Current Facility-Administered Medications   Medication Dose Route Frequency    insulin glargine (LANTUS) injection vial 10 Units  10 Units SubCUTAneous Daily    citalopram (CELEXA) tablet 20 mg  20 mg Oral Nightly    acetaminophen (TYLENOL) tablet 650 mg  650 mg Oral Q4H PRN    oxyCODONE-acetaminophen (PERCOCET) 5-325 MG per tablet 1 tablet  1 tablet Oral Q6H PRN    sennosides-docusate sodium (SENOKOT-S) 8.6-50 MG tablet 1 tablet  1 tablet Oral BID    polyethylene glycol (GLYCOLAX) packet 17 g  17 g Oral Daily PRN    magnesium hydroxide (MILK OF MAGNESIA) 400 MG/5ML suspension 30 mL  30 mL Oral Daily PRN    glucose chewable tablet 16 g  4 tablet Oral PRN    dextrose bolus 10% 125 mL  125 mL IntraVENous PRN    Or    dextrose bolus 10% 250 mL  250 mL IntraVENous PRN    insulin lispro (HUMALOG) injection vial 0-9 Units  0-9 Units SubCUTAneous Nightly    traMADol (ULTRAM) tablet 50 mg  50 mg Oral Q6H PRN    magnesium oxide (MAG-OX) tablet 400 mg  400 mg Oral TID PRN    magnesium oxide (MAG-OX) tablet 400 mg  400 mg Oral 4x Daily PRN    potassium chloride (KLOR-CON M) extended release tablet 20 mEq  20 mEq Oral BID PRN    potassium chloride (KLOR-CON M) extended release tablet 40 mEq  40 mEq Oral BID PRN    alcohol 62% (NOZIN) nasal  1 ampule  1 ampule Topical Q12H    insulin lispro (HUMALOG) injection vial 0-18 Units  0-18 Units SubCUTAneous TID WC    glucagon injection 1 mg  1 mg IntraMUSCular PRN    sodium chloride flush 0.9 % injection 5-40 mL  5-40 mL IntraVENous 2 times per day    sodium chloride flush 0.9 % injection 5-40 mL  5-40 mL IntraVENous PRN    ondansetron (ZOFRAN) injection 4 mg  4 mg IntraVENous Q4H PRN    aspirin EC tablet 81 mg  81 mg Oral Daily    amiodarone (CORDARONE) tablet 200 mg  200 mg Oral BID    metoprolol tartrate (LOPRESSOR) tablet 25 mg  25 mg Oral BID    atorvastatin (LIPITOR) tablet 80 mg  80 mg Oral Nightly    famotidine (PEPCID) tablet 20 mg  20 mg Oral BID    Or    famotidine (PEPCID) 20 mg in sodium chloride (PF) 10 mL injection  20 mg IntraVENous BID     Review of Systems  Constitutional: negative for fever, chills, sweats  Cardiovascular: negative for chest pain, palpitations, syncope, edema  Gastrointestinal:  negative for dysphagia, reflux, vomiting, diarrhea, abdominal pain, or melena or nausea   Neurologic:  negative for focal weakness, numbness, headache  Objective:     Vitals:    05/30/22 0900 05/30/22 0915 05/30/22 0930 05/30/22 0945   BP: 129/75 (!) 145/79 130/67 (!) 151/88   Pulse: 82 83 85 83   Resp:       Temp:       TempSrc:       SpO2:       Weight:       Height:           Physical Exam:   Constitution:  the patient is well developed and in no acute distress  HEENT:  Sclera clear, pupils equal, oral mucosa moist  Respiratory:clear throughout,    Cardiovascular:  RRR without M,G,R  Gastrointestinal: soft and non-tender; with positive bowel sounds. Musculoskeletal: warm without cyanosis. There is trace  lower extremity edema.   Skin:  no jaundice or rashes, surgical wounds   Neurologic: no gross neuro deficits     Psychiatric:  alert and oriented x 3    CXR:     5/30/22    EXAMINATION: One view chest       HISTORY: Post op open heart surgery       TECHNIQUE: Frontal chest.       COMPARISON: 5/29/2022       FINDINGS:   Persistent mild bibasilar lung infiltrates or atelectasis.       The patient is lordotic and rotated to the left. Allowing for this, there may be   a tiny left apical pneumothorax versus external artifact.       There is no pleural effusion.        The heart is unchanged.       No other significant interval changes.                 5/29/2022 5/28/2022 5/27/2022      LAB:  Recent Labs     05/28/22  0603 05/30/22  0735   WBC 7.8 7.2   HGB 9.1* 10.5*   HCT 26.7* 30.6*   * 235     Recent Labs     05/28/22  0603 05/28/22  0603 05/29/22  0649 05/30/22  0610 05/30/22  0735     --   --   --  135*   K 4.4   < > 4.0 3.9 4.0     --   --   --  100   CO2 25  --   --   --  30   BUN 26*  --   --   --  13   MG 2.1  --  1.8 1.7*  --     < > = values in this interval not displayed. No results for input(s): TROPHS, BNPNT, CRP, ESR in the last 72 hours. Invalid input(s): LAC  No results for input(s): PH, PCO2, PO2, HCO3 in the last 72 hours. Invalid input(s): PHI, PCO2I, PO2I, HCO3I  No results for input(s): SDES in the last 72 hours. Invalid input(s): CULT  Assessment and Plan:  (Medical Decision Making)     Hospital Problems           Last Modified POA    * (Principal) S/P CABG x 2 5/25/2022 Yes    Encounter for weaning from ventilator ([address] 5/25/2022 Yes    Hypoxia 5/25/2022 Yes    DM (diabetes mellitus) ([address] 5/29/2022 Yes    Hyperlipidemia associated with type 2 diabetes mellitus ([address] 5/29/2022 Yes    Chest pain 5/25/2022 Yes    Hypertension 5/29/2022 Yes      S/P CABG x 2 (principal)  --progressing well    --cont mobility and IS    Hypoxia:   --resolved, now on RA  --cont IS, mobility    Chest pain:   --s/o CABG    Nothing further to add from pulmonary standpoint. Will sign off. Please call if needed. More than 50% of the time documented was spent in face-to-face contact with the patient and in the care of the patient on the floor/unit where the patient is located.     Milagro Ward Clair Simon MD

## 2022-05-30 NOTE — PROGRESS NOTES
CM continues to follow for discharge planning and/or CM needs. Plan remains that pt will return to her mothers home when medically stable. Home health arranged with [facility]. No additional CM needs at this time. Will continue to monitor and update as needed.

## 2022-05-30 NOTE — PROGRESS NOTES
Progress Note    2022 9:14 AM          POD#5-day #5 from two-vessel coronary bypass grafting. Patient has been doing well overnight had some early reactive depression seems better today. Underlying issue with her diabetic control longstanding. Subjective:  Ms. Nguyen Manner   Specific complaints today slight nausea  OBJECTIVE:    PULSE OXIMETRY RANGE: SpO2  Av.5 %  Min: 92 %  Max: 96 %  SUPPLEMENTAL O2: O2 Flow Rate (L/min): 1 L/min (attempting to wean)   Vital Signs: /75   Pulse 82   Temp 97.4 °F (36.3 °C) (Temporal)   Resp 14   Ht 5' 4\" (1.626 m)   Wt 168 lb (76.2 kg)   SpO2 93%   BMI 28.84 kg/m²    Temperature Range:   Temp: 97.4 °F (36.3 °C)   Temp  Av.8 °F (36.6 °C)  Min: 97.4 °F (36.3 °C)  Max: 98.5 °F (36.9 °C)      Exam:   General appearance: Patient awake and alert  Neck:   Lungs: Lungs clear to auscultation  Sternum: Incision dry and intact  Heart: Pressures 119/75 normal sinus rhythm 82  Abdomen: Jessica soft nontender  Extremities:   Leg Wounds:     Pacing wires removed without difficulty               Rhythm: Sinus rhythm    Labs:   ABG:  No results found for: PHART, PO2ART, DOE6MQH, D7YPLRWQ, RDK1YOY, THGBART, CCS2ACD, BEART  CBC: Recent Labs     22  0603 22  0735   WBC 7.8 7.2   HGB 9.1* 10.5*   HCT 26.7* 30.6*   MCV 84.0 82.5   * 235     BMP:   Recent Labs     22  0603 22  0603 22  0649 22  0610 22  0735     --   --   --  135*   K 4.4   < > 4.0 3.9 4.0     --   --   --  100   CO2 25  --   --   --  30   BUN 26*  --   --   --  13   CREATININE 0.80  --   --   --  0.70    < > = values in this interval not displayed. PT/INR: No results for input(s): PROTIME, INR in the last 72 hours. APTT: No results for input(s): APTT in the last 72 hours. Chest X-Ray:      CT:  I/O last 3 completed shifts: In: 630 [P.O.:630]  Out: 1400 [Urine:1400]      Air Leak:   I/O last 3 completed shifts:   In: 630 [P.O.:630]  Out: 1400 [GVGCO:7276]    Scheduled Meds:    insulin glargine  10 Units SubCUTAneous Daily    citalopram  20 mg Oral Nightly    sennosides-docusate sodium  1 tablet Oral BID    insulin lispro  0-9 Units SubCUTAneous Nightly    alcohol 62%  1 ampule Topical Q12H    insulin lispro  0-18 Units SubCUTAneous TID WC    sodium chloride flush  5-40 mL IntraVENous 2 times per day    aspirin  81 mg Oral Daily    amiodarone  200 mg Oral BID    metoprolol tartrate  25 mg Oral BID    atorvastatin  80 mg Oral Nightly    famotidine  20 mg Oral BID    Or    famotidine (PEPCID) injection  20 mg IntraVENous BID       Continuous Infusions:       Assessment  1. Day #5 from two-vessel coronary bypass grafting patient hemodynamically stable some nausea lorena in normal sinus rhythm. 2.  Ending diabetes  3.  4.      Patient Active Problem List   Diagnosis    Osteoarthritis of lumbosacral spine    Elevated BP without diagnosis of hypertension    [facility]    Hyperlipidemia associated with type 2 diabetes mellitus ([facility]    Anxiety    Dysthymic disorder    Class 1 obesity with serious comorbidity and body mass index (BMI) of 30.0 to 30.9 in adult    Uncontrolled type 2 diabetes mellitus with hyperglycemia (HCC)    Plantar wart of right foot    Menopausal syndrome    Overweight (BMI 25.0-29. 9)    Chest pain    Hypertension    S/P CABG x 2    Encounter for weaning from ventilator ([facility]    Hypoxia       Plan   1. Continue care path tamponade precautions for 1 hour given removal of pacing wires  2. Monitor blood sugars  3. This ambulation  4.           MD Jim Cox MD

## 2022-05-30 NOTE — PLAN OF CARE
Problem: Chronic Conditions and Co-morbidities  Goal: Patient's chronic conditions and co-morbidity symptoms are monitored and maintained or improved  Outcome: Progressing  Care Plan - Patient's Chronic Conditions and Co-Morbidity Symptoms are Monitored and Maintained or Improved: Monitor and assess patient's chronic conditions and comorbid symptoms for stability, deterioration, or improvement     Problem: Discharge Planning  Goal: Discharge to home or other facility with appropriate resources  Outcome: Progressing  Discharge to home or other facility with appropriate resources: Identify barriers to discharge with patient and caregiver     Problem: Pain  Goal: Verbalizes/displays adequate comfort level or baseline comfort level  Outcome: Progressing     Problem: Skin/Tissue Integrity  Goal: Absence of new skin breakdown  Description: 1. Monitor for areas of redness and/or skin breakdown  2. Assess vascular access sites hourly  3. Every 4-6 hours minimum:  Change oxygen saturation probe site  4. Every 4-6 hours:  If on nasal continuous positive airway pressure, respiratory therapy assess nares and determine need for appliance change or resting period.   Outcome: Progressing    Problem: Safety - Adult  Goal: Free from fall injury  Outcome: Progressing     Problem: ABCDS Injury Assessment  Goal: Absence of physical injury  Outcome: Progressing

## 2022-05-31 ENCOUNTER — APPOINTMENT (OUTPATIENT)
Dept: GENERAL RADIOLOGY | Age: 57
DRG: 236 | End: 2022-05-31
Attending: THORACIC SURGERY (CARDIOTHORACIC VASCULAR SURGERY)

## 2022-05-31 LAB
GLUCOSE BLD STRIP.AUTO-MCNC: 141 MG/DL (ref 65–100)
GLUCOSE BLD STRIP.AUTO-MCNC: 187 MG/DL (ref 65–100)
GLUCOSE BLD STRIP.AUTO-MCNC: 203 MG/DL (ref 65–100)
GLUCOSE BLD STRIP.AUTO-MCNC: 261 MG/DL (ref 65–100)
SERVICE CMNT-IMP: ABNORMAL

## 2022-05-31 PROCEDURE — 33533 CABG ARTERIAL SINGLE: CPT | Performed by: THORACIC SURGERY (CARDIOTHORACIC VASCULAR SURGERY)

## 2022-05-31 PROCEDURE — 97530 THERAPEUTIC ACTIVITIES: CPT

## 2022-05-31 PROCEDURE — 2580000003 HC RX 258: Performed by: THORACIC SURGERY (CARDIOTHORACIC VASCULAR SURGERY)

## 2022-05-31 PROCEDURE — 6370000000 HC RX 637 (ALT 250 FOR IP): Performed by: THORACIC SURGERY (CARDIOTHORACIC VASCULAR SURGERY)

## 2022-05-31 PROCEDURE — 2140000001 HC CVICU INTERMEDIATE R&B

## 2022-05-31 PROCEDURE — 71045 X-RAY EXAM CHEST 1 VIEW: CPT

## 2022-05-31 PROCEDURE — 6370000000 HC RX 637 (ALT 250 FOR IP): Performed by: FAMILY MEDICINE

## 2022-05-31 PROCEDURE — 6360000002 HC RX W HCPCS: Performed by: THORACIC SURGERY (CARDIOTHORACIC VASCULAR SURGERY)

## 2022-05-31 PROCEDURE — 97110 THERAPEUTIC EXERCISES: CPT

## 2022-05-31 PROCEDURE — 33517 CABG ARTERY-VEIN SINGLE: CPT | Performed by: THORACIC SURGERY (CARDIOTHORACIC VASCULAR SURGERY)

## 2022-05-31 PROCEDURE — 33508 ENDOSCOPIC VEIN HARVEST: CPT | Performed by: THORACIC SURGERY (CARDIOTHORACIC VASCULAR SURGERY)

## 2022-05-31 PROCEDURE — 6370000000 HC RX 637 (ALT 250 FOR IP): Performed by: PHYSICIAN ASSISTANT

## 2022-05-31 PROCEDURE — 82962 GLUCOSE BLOOD TEST: CPT

## 2022-05-31 PROCEDURE — 6370000000 HC RX 637 (ALT 250 FOR IP): Performed by: NURSE PRACTITIONER

## 2022-05-31 RX ADMIN — SENNOSIDES AND DOCUSATE SODIUM 1 TABLET: 8.6; 5 TABLET ORAL at 09:16

## 2022-05-31 RX ADMIN — SENNOSIDES AND DOCUSATE SODIUM 1 TABLET: 8.6; 5 TABLET ORAL at 20:59

## 2022-05-31 RX ADMIN — Medication 1 AMPULE: at 20:58

## 2022-05-31 RX ADMIN — INSULIN LISPRO 6 UNITS: 100 INJECTION, SOLUTION INTRAVENOUS; SUBCUTANEOUS at 17:11

## 2022-05-31 RX ADMIN — AMIODARONE HYDROCHLORIDE 200 MG: 200 TABLET ORAL at 20:59

## 2022-05-31 RX ADMIN — METOPROLOL TARTRATE 25 MG: 25 TABLET, FILM COATED ORAL at 20:59

## 2022-05-31 RX ADMIN — SODIUM CHLORIDE, PRESERVATIVE FREE 10 ML: 5 INJECTION INTRAVENOUS at 20:59

## 2022-05-31 RX ADMIN — INSULIN LISPRO 3 UNITS: 100 INJECTION, SOLUTION INTRAVENOUS; SUBCUTANEOUS at 09:21

## 2022-05-31 RX ADMIN — AMIODARONE HYDROCHLORIDE 200 MG: 200 TABLET ORAL at 09:16

## 2022-05-31 RX ADMIN — TRAMADOL HYDROCHLORIDE 50 MG: 50 TABLET, COATED ORAL at 03:05

## 2022-05-31 RX ADMIN — ASPIRIN 81 MG: 81 TABLET ORAL at 09:16

## 2022-05-31 RX ADMIN — INSULIN LISPRO 9 UNITS: 100 INJECTION, SOLUTION INTRAVENOUS; SUBCUTANEOUS at 12:06

## 2022-05-31 RX ADMIN — METOPROLOL TARTRATE 25 MG: 25 TABLET, FILM COATED ORAL at 09:16

## 2022-05-31 RX ADMIN — INSULIN LISPRO 2 UNITS: 100 INJECTION, SOLUTION INTRAVENOUS; SUBCUTANEOUS at 21:05

## 2022-05-31 RX ADMIN — INSULIN GLARGINE 14 UNITS: 100 INJECTION, SOLUTION SUBCUTANEOUS at 09:20

## 2022-05-31 RX ADMIN — CITALOPRAM HYDROBROMIDE 20 MG: 20 TABLET ORAL at 20:58

## 2022-05-31 RX ADMIN — TRAMADOL HYDROCHLORIDE 50 MG: 50 TABLET, COATED ORAL at 21:05

## 2022-05-31 RX ADMIN — FAMOTIDINE 20 MG: 20 TABLET, FILM COATED ORAL at 20:58

## 2022-05-31 RX ADMIN — ONDANSETRON 4 MG: 2 INJECTION INTRAMUSCULAR; INTRAVENOUS at 06:48

## 2022-05-31 RX ADMIN — Medication 1 AMPULE: at 09:16

## 2022-05-31 RX ADMIN — TRAMADOL HYDROCHLORIDE 50 MG: 50 TABLET, COATED ORAL at 09:16

## 2022-05-31 RX ADMIN — SODIUM CHLORIDE, PRESERVATIVE FREE 10 ML: 5 INJECTION INTRAVENOUS at 09:26

## 2022-05-31 RX ADMIN — FAMOTIDINE 20 MG: 20 TABLET, FILM COATED ORAL at 09:16

## 2022-05-31 RX ADMIN — ATORVASTATIN CALCIUM 80 MG: 80 TABLET, FILM COATED ORAL at 20:58

## 2022-05-31 ASSESSMENT — PAIN - FUNCTIONAL ASSESSMENT
PAIN_FUNCTIONAL_ASSESSMENT: ACTIVITIES ARE NOT PREVENTED

## 2022-05-31 ASSESSMENT — PAIN DESCRIPTION - LOCATION
LOCATION: CHEST;INCISION
LOCATION: INCISION;CHEST
LOCATION: CHEST
LOCATION: CHEST;INCISION
LOCATION: CHEST;INCISION

## 2022-05-31 ASSESSMENT — PAIN DESCRIPTION - ORIENTATION
ORIENTATION: MID
ORIENTATION: MID;ANTERIOR

## 2022-05-31 ASSESSMENT — PAIN SCALES - GENERAL
PAINLEVEL_OUTOF10: 5
PAINLEVEL_OUTOF10: 5
PAINLEVEL_OUTOF10: 0
PAINLEVEL_OUTOF10: 3
PAINLEVEL_OUTOF10: 0
PAINLEVEL_OUTOF10: 5
PAINLEVEL_OUTOF10: 6
PAINLEVEL_OUTOF10: 0
PAINLEVEL_OUTOF10: 5

## 2022-05-31 ASSESSMENT — PAIN DESCRIPTION - FREQUENCY
FREQUENCY: INTERMITTENT

## 2022-05-31 ASSESSMENT — PAIN DESCRIPTION - DESCRIPTORS
DESCRIPTORS: ACHING;SORE
DESCRIPTORS: ACHING
DESCRIPTORS: ACHING;SORE
DESCRIPTORS: ACHING;SORE
DESCRIPTORS: ACHING

## 2022-05-31 ASSESSMENT — PAIN DESCRIPTION - PAIN TYPE
TYPE: SURGICAL PAIN

## 2022-05-31 ASSESSMENT — PAIN SCALES - WONG BAKER
WONGBAKER_NUMERICALRESPONSE: 2
WONGBAKER_NUMERICALRESPONSE: 2

## 2022-05-31 ASSESSMENT — PAIN DESCRIPTION - ONSET
ONSET: ON-GOING

## 2022-05-31 NOTE — PROGRESS NOTES
Spiritual Care Visit, initial visit. Visited with patient at bedside. Prayed for patient's healing and health. Visit by Carney Apley Barnie Madison, Staff .  Vashti, Kamryn.B., B.A.

## 2022-05-31 NOTE — PROGRESS NOTES
PHYSICAL THERAPY Daily Note and AM  (Link to Caseload Tracking: PT Visit Days : 2  Time In/Out PT Charge Capture  Rehab Caseload Tracker  Orders      Elizabeth Rader is a 64 y.o. female   PRIMARY DIAGNOSIS: S/P CABG x 2  Atherosclerosis of native coronary artery of native heart with unstable angina pectoris (HCC) [I25.110]  Abnormal stress test [R94.39]  Chest pain [R07.9]  Procedure(s) (LRB):  CORONARY ARTERY BYPASS GRAFT (CABG X 2), LIMA; ENDOSCOPIC VEIN HARVEST LEFT GREATER SAPHENOUS (N/A)  TRANSESOPHAGEAL ECHOCARDIOGRAM (N/A)  6 Days Post-Op  Inpatient: Payor: /     ASSESSMENT:     REHAB RECOMMENDATIONS:   Recommendation to date pending progress:  [address] [place] [address]    Equipment:     None     ASSESSMENT:  Ms. Suleman Johnson is in recliner and agreeable to therapy. Sit to stand with supervision. Gait training with slight hand held assist.   Patient is returned to the recliner and after a rest break performed therapeutic exercises. Good session. Making progress towards goals. Patient is a ron to work with. Continue PT efforts. Home with HHPT    PM note:  Patient was able to maintain her am gait distance, stairs and exercises. Doing very well and making progress. Continue PT efforts.   Left in the recliner with needs within reach     SUBJECTIVE:   Ms. Suleman Johnson states, \"I'm ready\"     Social/Functional Lives With: Parent  Type of Home: House  Receives Help From: Family  ADL Assistance: Independent  OBJECTIVE:     PAIN: [place] [employer] / O2: PRECAUTION / Rena Adams / Karlie Batch:   Pre Treatment:  010         Post Treatment: 010 Vitals        Oxygen    Wound Vac    RESTRICTIONS/PRECAUTIONS:        MOBILITY: I Mod I S SBA CGA Min Mod Max Total  NT x2 Comments:   Bed Mobility    Rolling [] [] [] [] [] [] [] [] [] [x] []    Supine to Sit [] [] [] [] [] [] [] [] [] [x] []    Scooting [] [] [] [] [] [] [] [] [] [x] []    Sit to Supine [] [] [] [] [] [] [] [] [] [x] []    Transfers    Sit to Stand [] [x] [] [] [] [] [] [] [] [] []    Bed to Chair [] [] [] [] [] [] [] [] [] [x] []    Stand to Sit [] [x] [] [] [] [] [] [] [] [] []    I=Independent, Mod I=Modified Independent, S=Supervision, SBA=Standby Assistance, CGA=Contact Guard Assistance,   Min=Minimal Assistance, Mod=Moderate Assistance, Max=Maximal Assistance, Total=Total Assistance, NT=Not Tested    BALANCE: Good Fair+ Fair Fair- Poor NT Comments   Sitting Static [x] [] [] [] [] []    Sitting Dynamic [x] [] [] [] [] []              Standing Static [x] [] [] [] [] []    Standing Dynamic [x] [] [] [] [] []      GAIT: I Mod I S SBA CGA Min Mod Max Total  NT x2 Comments:   Level of Assistance [] [] [] [] [x] [] [] [] [] [] []    Distance 400  feet    DME None    Gait Quality slow    Weightbearing Status      Stairs      I=Independent, Mod I=Modified Independent, S=Supervision, SBA=Standby Assistance, CGA=Contact Guard Assistance,   Min=Minimal Assistance, Mod=Moderate Assistance, Max=Maximal Assistance, Total=Total Assistance, NT=Not Tested    PLAN:   ACUTE PHYSICAL THERAPY GOALS:   (Developed with and agreed upon by patient and/or caregiver.)    1. Ms. Michelle Shankar will perform supine to sit and sit to supine with bed flat and no rail independently in 7 days. 2.  Ms. Michelle Shankar will perform sit to stand and bed to chair independently in 7 days. GOAL MET 5/29/2022    3. Ms. Michelle Shankar will perform gait >1000 ft independently in 7 days. 4.  Ms. Michelle Shankar will go up and down 4 steps with rail independently in 7 days. GOAL MET 5/29/2022    FREQUENCY AND DURATION: BID for duration of hospital stay or until stated goals are met, whichever comes first.    TREATMENT:   TREATMENT:   Therapeutic Activity (13 Minutes): Therapeutic activity included Transfer Training, Ambulation on level ground, Stair Training, Sitting balance  and Standing balance to improve functional Activity tolerance, Balance, Coordination, Mobility and Strength. Therapeutic Exercise (11 Minutes):  Therapeutic exercises noted below to improve functional activity tolerance, AROM, strength and mobility.      TREATMENT GRID:     Date:  05/30/22 Date:  05/31/22 Date:     ACTIVITY/EXERCISE AM PM AM PM AM PM   LAQ 15  15 15     Shoulder shrugs 15  15 15     Gluteal sets 15  15 15     marching 15  15 15     Ankle pumps 15  15 15     abduction 15  15 15              B = bilateral; AA = active assistive; A = active; P = passive    AFTER TREATMENT PRECAUTIONS: Call light within reach, Chair, Needs within reach and RN notified    INTERDISCIPLINARY COLLABORATION:  RN/ PCT and PT/ PTA    EDUCATION:      TIME IN/OUT:  Time In: 1322  Time Out: [facility]  Minutes: 24    Shamika Edwards-Melly, PTA

## 2022-05-31 NOTE — PROGRESS NOTES
[facility] Hospitalist Consult   Admit Date:  2022  8:41 AM   Name:  Drake iFeld   Age:  64 y.o. Sex:  female  :  1965   MRN:  [id number]   Room:      Presenting Complaint: S/P CABG surgery and DM management     Reason(s) for Admission: Atherosclerosis of native coronary artery of native heart with unstable angina pectoris (HCC) [I25.110]  Abnormal stress test [R94.39]  Chest pain [R07.9]     Hospitalists consulted by Farida Hernandez MD for: DM management     History of Presenting Illness:   Drake Field is a 64 y.o. female with history of CAD s/p CABG surgery, DM type 2, Hypertension and Hyperlipidemia who was admitted for CABG surgery. Hospitalist service is consulted to help manage diabetes for the patient. Subjective:  : Patient is seen at the bedside. Sitting comfortable in the chair. Reports feeling okay. No active complaints. BG ranges in 200s-260s. Review of Systems:  10 systems reviewed and negative except as noted in HPI. Assessment & Plan:     S/P CABG x 2  Off ventilator  Post-op care and physical therapy as per primary team (cardiovascular surgery service)   Symptomatic treatments   Pain control. Hypoxia  Resolved    DM  At home on humalin x BID  Increase Lantus to 14 units daily for better blood sugar control  Continue humalog sliding scale, will adjust accordingly    Hyperlipidemia associated with type 2 diabetes mellitus   Patient is on Atorvastatin 80 mg po nightly. Chest pain  From CAD and S/P CABG as above. Post-op care and physical therapy. Hypertension  Patient is on Metoprolol 25 mg po bid     I have discussed the plan of care with patient. Discharge Planning:    As per primary team.     Diet:  ADULT DIET; Regular; 3 carb choices (45 gm/meal);  Low Fat/Low Chol/High Fiber/2 gm Na  ADULT ORAL NUTRITION SUPPLEMENT; Breakfast, Lunch, Dinner; Diabetic Oral Supplement  DVT PPx: as per primary team   Code status: Full code     Principal Problem:    S/P CABG x 2  Active Problems:    Encounter for weaning from ventilator ([facility]    Hypoxia    DM (diabetes mellitus) ([facility]    Hyperlipidemia associated with type 2 diabetes mellitus ([facility]    Chest pain    Hypertension  Resolved Problems:    * No resolved hospital problems. *      Past History:  Past Medical History:   Diagnosis Date    Anxiety 11/28/2012    CAD (coronary artery disease)     No MI; no stents    Depression     managed with med    DM (diabetes mellitus) ([facility] 11/28/2012    insulin reliant; AVG ; pt denies s.s. of hypoglycemia; last A1C    HLD (hyperlipidemia)     managed with med    Hypertension     managed with med    Ulcerative esophagitis       Past Surgical History:   Procedure Laterality Date    CARDIAC CATHETERIZATION Left     CORONARY ARTERY BYPASS GRAFT N/A 5/25/2022    CORONARY ARTERY BYPASS GRAFT (CABG X 2), LIMA; ENDOSCOPIC VEIN HARVEST LEFT GREATER SAPHENOUS performed by Jerri Miller MD at [facility] MAIN OR    TRANSESOPHAGEAL ECHOCARDIOGRAM N/A 5/25/2022    TRANSESOPHAGEAL ECHOCARDIOGRAM performed by Jerri Miller MD at [facility] MAIN OR    UPPER GASTROINTESTINAL ENDOSCOPY      ulcerative espophagitis      No Known Allergies   Social History     Tobacco Use    Smoking status: Never Smoker    Smokeless tobacco: Never Used   Substance Use Topics    Alcohol use: Yes     Comment: rarely      Family History   Problem Relation Age of Onset    Diabetes Father       Family history reviewed and negative except as noted above. There is no immunization history on file for this patient.   Current Facility-Administered Medications   Medication Dose Route Frequency    insulin glargine (LANTUS) injection vial 14 Units  14 Units SubCUTAneous Daily    citalopram (CELEXA) tablet 20 mg  20 mg Oral Nightly    acetaminophen (TYLENOL) tablet 650 mg  650 mg Oral Q4H PRN    oxyCODONE-acetaminophen (PERCOCET) 5-325 MG per tablet 1 tablet  1 tablet Oral Q6H PRN    sennosides-docusate sodium (SENOKOT-S) 8.6-50 MG tablet 1 tablet  1 tablet Oral BID    polyethylene glycol (GLYCOLAX) packet 17 g  17 g Oral Daily PRN    magnesium hydroxide (MILK OF MAGNESIA) 400 MG/5ML suspension 30 mL  30 mL Oral Daily PRN    glucose chewable tablet 16 g  4 tablet Oral PRN    dextrose bolus 10% 125 mL  125 mL IntraVENous PRN    Or    dextrose bolus 10% 250 mL  250 mL IntraVENous PRN    insulin lispro (HUMALOG) injection vial 0-9 Units  0-9 Units SubCUTAneous Nightly    traMADol (ULTRAM) tablet 50 mg  50 mg Oral Q6H PRN    magnesium oxide (MAG-OX) tablet 400 mg  400 mg Oral TID PRN    magnesium oxide (MAG-OX) tablet 400 mg  400 mg Oral 4x Daily PRN    potassium chloride (KLOR-CON M) extended release tablet 20 mEq  20 mEq Oral BID PRN    potassium chloride (KLOR-CON M) extended release tablet 40 mEq  40 mEq Oral BID PRN    alcohol 62% (NOZIN) nasal  1 ampule  1 ampule Topical Q12H    insulin lispro (HUMALOG) injection vial 0-18 Units  0-18 Units SubCUTAneous TID WC    glucagon injection 1 mg  1 mg IntraMUSCular PRN    sodium chloride flush 0.9 % injection 5-40 mL  5-40 mL IntraVENous 2 times per day    sodium chloride flush 0.9 % injection 5-40 mL  5-40 mL IntraVENous PRN    ondansetron (ZOFRAN) injection 4 mg  4 mg IntraVENous Q4H PRN    aspirin EC tablet 81 mg  81 mg Oral Daily    amiodarone (CORDARONE) tablet 200 mg  200 mg Oral BID    metoprolol tartrate (LOPRESSOR) tablet 25 mg  25 mg Oral BID    atorvastatin (LIPITOR) tablet 80 mg  80 mg Oral Nightly    famotidine (PEPCID) tablet 20 mg  20 mg Oral BID    Or    famotidine (PEPCID) 20 mg in sodium chloride (PF) 10 mL injection  20 mg IntraVENous BID       Objective:     Patient Vitals for the past 24 hrs:   Temp Pulse Resp BP SpO2   05/31/22 0656 97.4 °F (36.3 °C) 80 18 121/73 92 %   05/31/22 0300 98.5 °F (36.9 °C) 74 20 (!) 143/85 93 %   05/31/22 0000 98.5 °F (36.9 °C) 75 18 113/67 93 %   05/30/22 1900 97.4 °F (36.3 °C) 76 20 121/71 --   05/30/22 1751 -- -- 20 -- --   05/30/22 1545 98.2 °F (36.8 °C) 77 20 129/88 95 %   05/30/22 1121 98.2 °F (36.8 °C) 75 16 119/65 98 %       Oxygen Therapy  SpO2: 92 %  Pulse Oximeter Device Mode: Continuous  Pulse Oximeter Device Location: Left  O2 Device: None (Room air)  Skin Assessment: Clean, dry, & intact  Skin Protection for O2 Device: N/A  Orientation: Middle  FiO2 : 28 %  O2 Flow Rate (L/min): 1 L/min (attempting to wean)    Estimated body mass index is 28.67 kg/m² as calculated from the following:    Height as of this encounter: 5' 4\" (1.626 m). Weight as of this encounter: 167 lb (75.8 kg). Intake/Output Summary (Last 24 hours) at 5/31/2022 0957  Last data filed at 5/31/2022 0847  Gross per 24 hour   Intake 1400 ml   Output 1800 ml   Net -400 ml         Physical Exam:    Blood pressure 121/73, pulse 80, temperature 97.4 °F (36.3 °C), temperature source Temporal, resp. rate 18, height 5' 4\" (1.626 m), weight 167 lb (75.8 kg), SpO2 92 %. General:    Well nourished. No overt distress. Patient is sitting up in her chair eating breakfast. Afebrile. Head:  Normocephalic, atraumatic, mildly pale. No icterus   Eyes:  Sclerae appear normal.  Pupils equally round. ENT:  Nares appear normal, no drainage. Moist oral mucosa  Neck:  No restricted ROM. Trachea midline   CV:   RRR. No m/r/g. No jugular venous distension. Lungs:   CTAB. No wheezing, rhonchi, or rales. Respirations even, unlabored  Abdomen: Bowel sounds present. Soft, nontender, nondistended. Extremities: No cyanosis or clubbing. No edema  Skin:     No rashes and normal coloration. Warm and dry. Neuro:  CN II-XII grossly intact. Sensation intact. A&Ox3  Psych:  Normal mood and affect.       I have reviewed ordered lab tests and independently visualized imaging below:    Recent Labs:  Recent Results (from the past 48 hour(s))   POCT Glucose    Collection Time: 05/29/22 11:55 AM   Result Value Ref Range POC Glucose 173 (H) 65 - 100 mg/dL    Performed by: Ariel    POCT Glucose    Collection Time: 05/29/22  4:41 PM   Result Value Ref Range    POC Glucose 205 (H) 65 - 100 mg/dL    Performed by:  Ariel    POCT Glucose    Collection Time: 05/29/22  9:09 PM   Result Value Ref Range    POC Glucose 210 (H) 65 - 100 mg/dL    Performed by: Shawn Gamma    Magnesium    Collection Time: 05/30/22  6:10 AM   Result Value Ref Range    Magnesium 1.7 (L) 1.8 - 2.4 mg/dL   Potassium    Collection Time: 05/30/22  6:10 AM   Result Value Ref Range    Potassium 3.9 3.5 - 5.1 mmol/L   POCT Glucose    Collection Time: 05/30/22  6:24 AM   Result Value Ref Range    POC Glucose 202 (H) 65 - 100 mg/dL    Performed by: Shikha    CBC with Auto Differential    Collection Time: 05/30/22  7:35 AM   Result Value Ref Range    WBC 7.2 4.3 - 11.1 K/uL    RBC 3.71 (L) 4.05 - 5.2 M/uL    Hemoglobin 10.5 (L) 11.7 - 15.4 g/dL    Hematocrit 30.6 (L) 35.8 - 46.3 %    MCV 82.5 79.6 - 97.8 FL    MCH 28.3 26.1 - 32.9 PG    MCHC 34.3 31.4 - 35.0 g/dL    RDW 12.0 11.9 - 14.6 %    Platelets 232 479 - 974 K/uL    MPV 9.9 9.4 - 12.3 FL    nRBC 0.00 0.0 - 0.2 K/uL    Differential Type AUTOMATED      Seg Neutrophils 49 43 - 78 %    Lymphocytes 34 13 - 44 %    Monocytes 10 4.0 - 12.0 %    Eosinophils % 5 0.5 - 7.8 %    Basophils 1 0.0 - 2.0 %    Immature Granulocytes 1 0.0 - 5.0 %    Segs Absolute 3.6 1.7 - 8.2 K/UL    Absolute Lymph # 2.5 0.5 - 4.6 K/UL    Absolute Mono # 0.7 0.1 - 1.3 K/UL    Absolute Eos # 0.4 0.0 - 0.8 K/UL    Basophils Absolute 0.1 0.0 - 0.2 K/UL    Absolute Immature Granulocyte 0.0 0.0 - 0.5 K/UL   Basic Metabolic Panel    Collection Time: 05/30/22  7:35 AM   Result Value Ref Range    Sodium 135 (L) 136 - 145 mmol/L    Potassium 4.0 3.5 - 5.1 mmol/L    Chloride 100 98 - 107 mmol/L    CO2 30 21 - 32 mmol/L    Anion Gap 5 (L) 7 - 16 mmol/L    Glucose 181 (H) 65 - 100 mg/dL    BUN 13 6 - 23 MG/DL    CREATININE 0.70

## 2022-05-31 NOTE — PLAN OF CARE
Problem: Chronic Conditions and Co-morbidities  Goal: Patient's chronic conditions and co-morbidity symptoms are monitored and maintained or improved  Outcome: Progressing  Flowsheets (Taken 5/30/2022 2010)  Care Plan - Patient's Chronic Conditions and Co-Morbidity Symptoms are Monitored and Maintained or Improved: Monitor and assess patient's chronic conditions and comorbid symptoms for stability, deterioration, or improvement     Problem: Pain  Goal: Verbalizes/displays adequate comfort level or baseline comfort level  Outcome: Progressing  Flowsheets (Taken 5/30/2022 2010)  Verbalizes/displays adequate comfort level or baseline comfort level: Encourage patient to monitor pain and request assistance     Problem: Skin/Tissue Integrity  Goal: Absence of new skin breakdown  Description: 1. Monitor for areas of redness and/or skin breakdown  2. Assess vascular access sites hourly  3. Every 4-6 hours minimum:  Change oxygen saturation probe site  4. Every 4-6 hours:  If on nasal continuous positive airway pressure, respiratory therapy assess nares and determine need for appliance change or resting period.   Outcome: Progressing     Problem: Safety - Adult  Goal: Free from fall injury  Outcome: Progressing  Flowsheets (Taken 5/30/2022 2010)  Free From Fall Injury: Instruct family/caregiver on patient safety     Problem: ABCDS Injury Assessment  Goal: Absence of physical injury  Outcome: Progressing     Problem: Respiratory - Adult  Goal: Achieves optimal ventilation and oxygenation  Outcome: Progressing  Flowsheets (Taken 5/30/2022 2010)  Achieves optimal ventilation and oxygenation: Assess for changes in respiratory status     Problem: Skin/Tissue Integrity - Adult  Goal: Incisions, wounds, or drain sites healing without S/S of infection  Outcome: Progressing  Flowsheets (Taken 5/30/2022 2010)  Incisions, Wounds, or Drain Sites Healing Without Sign and Symptoms of Infection: Implement wound care per orders Problem: Musculoskeletal - Adult  Goal: Return ADL status to a safe level of function  Outcome: Progressing  Flowsheets (Taken 5/30/2022 2010)  Return ADL Status to a Safe Level of Function: Assist and instruct patient to increase activity and self care as tolerated     Problem: Metabolic/Fluid and Electrolytes - Adult  Goal: Glucose maintained within prescribed range  Outcome: Progressing  Flowsheets (Taken 5/30/2022 2010)  Glucose maintained within prescribed range: Monitor blood glucose as ordered     Problem: Discharge Planning  Goal: Discharge to home or other facility with appropriate resources  Recent Flowsheet Documentation  Taken 5/30/2022 2010 by Beatriz Martinez RN  Discharge to home or other facility with appropriate resources: Identify discharge learning needs (meds, wound care, etc)

## 2022-05-31 NOTE — PROGRESS NOTES
CV Progress Note    Subjective: Incisional pain:  moderate  Appetite:  good   Activity: Ambulating well      Objective:     Vitals:  Blood pressure 123/72, pulse 72, temperature 98 °F (36.7 °C), temperature source Temporal, resp. rate 17, height 5' 4\" (1.626 m), weight 167 lb (75.8 kg), SpO2 94 %. Temp (24hrs), Av °F (36.7 °C), Min:97.4 °F (36.3 °C), Max:98.5 °F (36.9 °C)        Plan/Recommendations/Medical Decision Making:     Principal Problem:    S/P CABG x 2  Active Problems:    Encounter for weaning from ventilator ([address]    Hypoxia    DM (diabetes mellitus) ([address]    Hyperlipidemia associated with type 2 diabetes mellitus ([address]    Chest pain    Hypertension  Resolved Problems:    * No resolved hospital problems. *      Continue present treatment    See orders for details. Sha Dupont.  Emeli Brown MD

## 2022-05-31 NOTE — PROGRESS NOTES
PHYSICAL THERAPY Daily Note and AM  (Link to Caseload Tracking: PT Visit Days : 2  Time In/Out PT Charge Capture  Rehab Caseload Tracker  Orders      Drake Field is a 64 y.o. female   PRIMARY DIAGNOSIS: S/P CABG x 2  Atherosclerosis of native coronary artery of native heart with unstable angina pectoris (HCC) [I25.110]  Abnormal stress test [R94.39]  Chest pain [R07.9]  Procedure(s) (LRB):  CORONARY ARTERY BYPASS GRAFT (CABG X 2), LIMA; ENDOSCOPIC VEIN HARVEST LEFT GREATER SAPHENOUS (N/A)  TRANSESOPHAGEAL ECHOCARDIOGRAM (N/A)  6 Days Post-Op  Inpatient: Payor: /     ASSESSMENT:     REHAB RECOMMENDATIONS:   Recommendation to date pending progress:  [address] [place] [address]    Equipment:     None     ASSESSMENT:  Ms. Wendy Ordoñez is in recliner and agreeable to therapy. Sit to stand with supervision. Gait training with slight hand held assist.   Patient is returned to the recliner and after a rest break performed therapeutic exercises. Good session. Making progress towards goals. Patient is a ron to work with. Continue PT efforts.   Home with HHPT     SUBJECTIVE:   Ms. Wendy Ordoñez states, \"Good morning\"     Social/Functional Lives With: Parent  Type of Home: House  Receives Help From: Family  ADL Assistance: Independent  OBJECTIVE:     PAIN: [employer] / O2: PRECAUTION / Parminder Vines / Claudia Elizondo:   Pre Treatment:  0/10         Post Treatment: 0/10 Vitals        Oxygen    Wound Vac    RESTRICTIONS/PRECAUTIONS:        MOBILITY: I Mod I S SBA CGA Min Mod Max Total  NT x2 Comments:   Bed Mobility    Rolling [] [] [] [] [] [] [] [] [] [x] []    Supine to Sit [] [] [] [] [] [] [] [] [] [x] []    Scooting [] [] [] [] [] [] [] [] [] [x] []    Sit to Supine [] [] [] [] [] [] [] [] [] [x] []    Transfers    Sit to Stand [] [x] [] [] [] [] [] [] [] [] []    Bed to Chair [] [] [] [] [] [] [] [] [] [x] []    Stand to Sit [] [x] [] [] [] [] [] [] [] [] []    I=Independent, Mod I=Modified Independent, S=Supervision, SBA=Standby Assistance, CGA=Contact Guard Assistance,   Min=Minimal Assistance, Mod=Moderate Assistance, Max=Maximal Assistance, Total=Total Assistance, NT=Not Tested    BALANCE: Good Fair+ Fair Fair- Poor NT Comments   Sitting Static [x] [] [] [] [] []    Sitting Dynamic [x] [] [] [] [] []              Standing Static [x] [] [] [] [] []    Standing Dynamic [x] [] [] [] [] []      GAIT: I Mod I S SBA CGA Min Mod Max Total  NT x2 Comments:   Level of Assistance [] [] [] [] [x] [] [] [] [] [] []    Distance 400  feet    DME None    Gait Quality slow    Weightbearing Status      Stairs      I=Independent, Mod I=Modified Independent, S=Supervision, SBA=Standby Assistance, CGA=Contact Guard Assistance,   Min=Minimal Assistance, Mod=Moderate Assistance, Max=Maximal Assistance, Total=Total Assistance, NT=Not Tested    PLAN:   ACUTE PHYSICAL THERAPY GOALS:   (Developed with and agreed upon by patient and/or caregiver.)    1. Ms. Wendy Ordoñez will perform supine to sit and sit to supine with bed flat and no rail independently in 7 days. 2.  Ms. Wendy Ordoñez will perform sit to stand and bed to chair independently in 7 days. GOAL MET 5/29/2022    3. Ms. Wendy Ordoñez will perform gait >1000 ft independently in 7 days. 4.  Ms. Wendy Ordoñez will go up and down 4 steps with rail independently in 7 days. GOAL MET 5/29/2022    FREQUENCY AND DURATION: BID for duration of hospital stay or until stated goals are met, whichever comes first.    TREATMENT:   TREATMENT:   Therapeutic Activity (13 Minutes): Therapeutic activity included Transfer Training, Ambulation on level ground, Stair Training, Sitting balance  and Standing balance to improve functional Activity tolerance, Balance, Coordination, Mobility and Strength. Therapeutic Exercise (11 Minutes): Therapeutic exercises noted below to improve functional activity tolerance, AROM, strength and mobility.      TREATMENT GRID:     Date:  05/30/22 Date:  05/31/22 Date:     ACTIVITY/EXERCISE AM PM AM PM AM PM   LAQ 15  15      Shoulder shrugs 15  15      Gluteal sets 15  15      marching 15  15      Ankle pumps 15  15      abduction 15  15               B = bilateral; AA = active assistive; A = active; P = passive    AFTER TREATMENT PRECAUTIONS: Call light within reach, Chair, Needs within reach and RN notified    INTERDISCIPLINARY COLLABORATION:  RN/ PCT and PT/ PTA    EDUCATION:      TIME IN/OUT:  Time In: 0900  Time Out: 4750  Minutes: 24    Shamika Edwards-Melly, PTA

## 2022-05-31 NOTE — PROGRESS NOTES
Cardiac Rehab: Spoke with patient regarding referral to cardiac rehab. Patient meets admission criteria based on CABGx2 (5/25/22). Written information about Cardiac Rehab given and reviewed with patient. Discussed lifestyle modifications to promote cardiac wellness. Patient indicated that she may want to participate in the cardiac rehab program when appropriate and asks that I follow up with her in 3 weeks. I will contact her as requested. Her Cardiologist is Dr. Verne Cooks.       Thank you,  LESTER GallegosN, RN  Cardiopulmonary Rehabilitation Nurse Liaison  [facility]

## 2022-06-01 ENCOUNTER — APPOINTMENT (OUTPATIENT)
Dept: GENERAL RADIOLOGY | Age: 57
DRG: 236 | End: 2022-06-01
Attending: THORACIC SURGERY (CARDIOTHORACIC VASCULAR SURGERY)

## 2022-06-01 VITALS
WEIGHT: 166 LBS | DIASTOLIC BLOOD PRESSURE: 71 MMHG | TEMPERATURE: 97.1 F | RESPIRATION RATE: 20 BRPM | BODY MASS INDEX: 28.34 KG/M2 | HEART RATE: 73 BPM | HEIGHT: 64 IN | SYSTOLIC BLOOD PRESSURE: 144 MMHG | OXYGEN SATURATION: 97 %

## 2022-06-01 LAB
EKG ATRIAL RATE: 80 BPM
EKG ATRIAL RATE: 80 BPM
EKG DIAGNOSIS: NORMAL
EKG DIAGNOSIS: NORMAL
EKG P AXIS: 49 DEGREES
EKG P AXIS: 54 DEGREES
EKG P-R INTERVAL: 146 MS
EKG P-R INTERVAL: 160 MS
EKG Q-T INTERVAL: 390 MS
EKG Q-T INTERVAL: 410 MS
EKG QRS DURATION: 64 MS
EKG QRS DURATION: 68 MS
EKG QTC CALCULATION (BAZETT): 449 MS
EKG QTC CALCULATION (BAZETT): 472 MS
EKG R AXIS: 14 DEGREES
EKG R AXIS: 29 DEGREES
EKG T AXIS: 39 DEGREES
EKG T AXIS: 62 DEGREES
EKG VENTRICULAR RATE: 80 BPM
EKG VENTRICULAR RATE: 80 BPM
GLUCOSE BLD STRIP.AUTO-MCNC: 203 MG/DL (ref 65–100)
SERVICE CMNT-IMP: ABNORMAL

## 2022-06-01 PROCEDURE — 97530 THERAPEUTIC ACTIVITIES: CPT

## 2022-06-01 PROCEDURE — 6370000000 HC RX 637 (ALT 250 FOR IP): Performed by: PHYSICIAN ASSISTANT

## 2022-06-01 PROCEDURE — 71045 X-RAY EXAM CHEST 1 VIEW: CPT

## 2022-06-01 PROCEDURE — 6370000000 HC RX 637 (ALT 250 FOR IP): Performed by: THORACIC SURGERY (CARDIOTHORACIC VASCULAR SURGERY)

## 2022-06-01 PROCEDURE — 2580000003 HC RX 258: Performed by: THORACIC SURGERY (CARDIOTHORACIC VASCULAR SURGERY)

## 2022-06-01 PROCEDURE — 6370000000 HC RX 637 (ALT 250 FOR IP): Performed by: FAMILY MEDICINE

## 2022-06-01 PROCEDURE — 82962 GLUCOSE BLOOD TEST: CPT

## 2022-06-01 PROCEDURE — 99024 POSTOP FOLLOW-UP VISIT: CPT | Performed by: PHYSICIAN ASSISTANT

## 2022-06-01 PROCEDURE — 97110 THERAPEUTIC EXERCISES: CPT

## 2022-06-01 RX ORDER — TRAMADOL HYDROCHLORIDE 50 MG/1
50 TABLET ORAL EVERY 6 HOURS PRN
Qty: 30 TABLET | Refills: 0 | Status: SHIPPED | OUTPATIENT
Start: 2022-06-01 | End: 2022-06-15

## 2022-06-01 RX ORDER — ATORVASTATIN CALCIUM 80 MG/1
80 TABLET, FILM COATED ORAL NIGHTLY
Qty: 90 TABLET | Refills: 3 | Status: SHIPPED | OUTPATIENT
Start: 2022-06-01

## 2022-06-01 RX ADMIN — METOPROLOL TARTRATE 25 MG: 25 TABLET, FILM COATED ORAL at 08:46

## 2022-06-01 RX ADMIN — AMIODARONE HYDROCHLORIDE 200 MG: 200 TABLET ORAL at 08:46

## 2022-06-01 RX ADMIN — INSULIN LISPRO 6 UNITS: 100 INJECTION, SOLUTION INTRAVENOUS; SUBCUTANEOUS at 08:27

## 2022-06-01 RX ADMIN — TRAMADOL HYDROCHLORIDE 50 MG: 50 TABLET, COATED ORAL at 06:27

## 2022-06-01 RX ADMIN — Medication 1 AMPULE: at 08:47

## 2022-06-01 RX ADMIN — FAMOTIDINE 20 MG: 20 TABLET, FILM COATED ORAL at 08:45

## 2022-06-01 RX ADMIN — INSULIN GLARGINE 14 UNITS: 100 INJECTION, SOLUTION SUBCUTANEOUS at 08:26

## 2022-06-01 RX ADMIN — SODIUM CHLORIDE, PRESERVATIVE FREE 10 ML: 5 INJECTION INTRAVENOUS at 08:47

## 2022-06-01 RX ADMIN — ASPIRIN 81 MG: 81 TABLET ORAL at 08:46

## 2022-06-01 ASSESSMENT — PAIN DESCRIPTION - DESCRIPTORS
DESCRIPTORS: ACHING;SORE
DESCRIPTORS: ACHING

## 2022-06-01 ASSESSMENT — PAIN DESCRIPTION - ORIENTATION
ORIENTATION: ANTERIOR;MID
ORIENTATION: MID

## 2022-06-01 ASSESSMENT — PAIN DESCRIPTION - LOCATION
LOCATION: CHEST;INCISION
LOCATION: CHEST;INCISION

## 2022-06-01 ASSESSMENT — PAIN DESCRIPTION - PAIN TYPE
TYPE: SURGICAL PAIN
TYPE: SURGICAL PAIN

## 2022-06-01 ASSESSMENT — PAIN DESCRIPTION - ONSET
ONSET: ON-GOING
ONSET: ON-GOING

## 2022-06-01 ASSESSMENT — PAIN - FUNCTIONAL ASSESSMENT: PAIN_FUNCTIONAL_ASSESSMENT: ACTIVITIES ARE NOT PREVENTED

## 2022-06-01 ASSESSMENT — PAIN SCALES - GENERAL
PAINLEVEL_OUTOF10: 2
PAINLEVEL_OUTOF10: 0
PAINLEVEL_OUTOF10: 6
PAINLEVEL_OUTOF10: 0

## 2022-06-01 ASSESSMENT — PAIN DESCRIPTION - FREQUENCY
FREQUENCY: INTERMITTENT
FREQUENCY: INTERMITTENT

## 2022-06-01 NOTE — PLAN OF CARE
Problem: Chronic Conditions and Co-morbidities  Goal: Patient's chronic conditions and co-morbidity symptoms are monitored and maintained or improved  6/1/2022 1108 by Yuliana Galarza RN  Outcome: Completed  5/31/2022 2319 by Johnny Braxton RN  Outcome: Progressing  Flowsheets (Taken 5/31/2022 2045)  Care Plan - Patient's Chronic Conditions and Co-Morbidity Symptoms are Monitored and Maintained or Improved: Monitor and assess patient's chronic conditions and comorbid symptoms for stability, deterioration, or improvement     Problem: Discharge Planning  Goal: Discharge to home or other facility with appropriate resources  6/1/2022 1108 by Yuliana Galarza RN  Outcome: Completed  5/31/2022 2319 by Johnny Braxton RN  Outcome: Progressing  Flowsheets (Taken 5/31/2022 2045)  Discharge to home or other facility with appropriate resources: Identify barriers to discharge with patient and caregiver     Problem: Pain  Goal: Verbalizes/displays adequate comfort level or baseline comfort level  6/1/2022 1108 by Yuliana Galarza RN  Outcome: Completed  5/31/2022 2319 by Johnny Braxton RN  Outcome: Progressing  Flowsheets (Taken 5/31/2022 2045)  Verbalizes/displays adequate comfort level or baseline comfort level: Encourage patient to monitor pain and request assistance     Problem: Skin/Tissue Integrity  Goal: Absence of new skin breakdown  Description: 1. Monitor for areas of redness and/or skin breakdown  2. Assess vascular access sites hourly  3. Every 4-6 hours minimum:  Change oxygen saturation probe site  4. Every 4-6 hours:  If on nasal continuous positive airway pressure, respiratory therapy assess nares and determine need for appliance change or resting period.   6/1/2022 1108 by Yuliana Galarza RN  Outcome: Completed  5/31/2022 2319 by Johnny Braxton RN  Outcome: Progressing     Problem: Safety - Adult  Goal: Free from fall injury  6/1/2022 1108 by Yuliana Galarza RN  Outcome: Completed  5/31/2022 2319 by Sharyn Ross RN  Outcome: Progressing  Flowsheets (Taken 5/31/2022 2045)  Free From Fall Injury: Instruct family/caregiver on patient safety     Problem: ABCDS Injury Assessment  Goal: Absence of physical injury  Outcome: Completed     Problem: Respiratory - Adult  Goal: Achieves optimal ventilation and oxygenation  6/1/2022 1108 by Chip Burton RN  Outcome: Completed  5/31/2022 2319 by Sharyn Ross RN  Outcome: Progressing     Problem: Skin/Tissue Integrity - Adult  Goal: Incisions, wounds, or drain sites healing without S/S of infection  6/1/2022 1108 by Chip Burton RN  Outcome: Completed  5/31/2022 2319 by Sharyn Ross RN  Outcome: Progressing  Flowsheets (Taken 5/31/2022 2045)  Incisions, Wounds, or Drain Sites Healing Without Sign and Symptoms of Infection:   Implement wound care per orders   ADMISSION and DAILY: Assess and document risk factors for pressure ulcer development     Problem: Musculoskeletal - Adult  Goal: Return ADL status to a safe level of function  6/1/2022 1108 by Chip Burton RN  Outcome: Completed  5/31/2022 2319 by Sharyn Ross RN  Outcome: Progressing     Problem: Metabolic/Fluid and Electrolytes - Adult  Goal: Glucose maintained within prescribed range  6/1/2022 1108 by Chip Burton RN  Outcome: Completed  5/31/2022 2319 by Sharyn Ross RN  Outcome: Progressing  Flowsheets (Taken 5/31/2022 2045)  Glucose maintained within prescribed range: Monitor blood glucose as ordered

## 2022-06-01 NOTE — PROGRESS NOTES
Discharge instructions, follow up appointments and prescriptions reviewed with patient and family. Both verbalize understanding. All personal belongings taken with patient. Family member will drive patient home. Patient escorted to discharge area via wheelchair. Patient is stable at discharge. PIV and cardiac monitor removed.

## 2022-06-01 NOTE — PROGRESS NOTES
Pt with discharge orders this day. Pt to return home with her mother. Home health arranged with [facility] [place] and clinicals/referral already made. No additional CM needs at discharge. ASSESSMENT NOTE    Attending Physician: Marisol Ely MD  Admit Problem: Atherosclerosis of native coronary artery of native heart with unstable angina pectoris ([facility] [address] [I25.110]  Abnormal stress test [R94.39]  Chest pain [R07.9]  Date/Time of Admission: 5/25/2022  8:41 AM  Problem List:  Patient Active Problem List   Diagnosis    Osteoarthritis of lumbosacral spine    Elevated BP without diagnosis of hypertension    DM (diabetes mellitus) ([facility] [address] [facility]    Hyperlipidemia associated with type 2 diabetes mellitus ([facility] [address] [facility]    Anxiety    Dysthymic disorder    Class 1 obesity with serious comorbidity and body mass index (BMI) of 30.0 to 30.9 in adult    Uncontrolled type 2 diabetes mellitus with hyperglycemia (HCC)    Plantar wart of right foot    Menopausal syndrome    Overweight (BMI 25.0-29. 9)    Chest pain    Hypertension    S/P CABG x 2    Encounter for weaning from ventilator [facility])    Hypoxia       Service Assessment  Patient Orientation Alert and Oriented   Cognition Alert   History Provided By Patient   Primary Caregiver Family   Accompanied By/Relationship     Support Systems Family Members   Patient's Healthcare Decision Maker is:     PCP Verified by CM Yes   Last Visit to PCP     Prior Functional Level Independent in ADLs/IADLs   Current Functional Level     Can patient return to prior living arrangement Yes   Ability to make needs known: Good   Family able to assist with home care needs: Yes   Would you like for me to discuss the discharge plan with any other family members/significant others, and if so, who?      Financial Resources     Community Resources     CM/SW Referral       Social/Functional History  Lives With Parent   Type of [address] [place] [address]     Entrance Stairs - Number of Steps Entrance Stairs - Rails     Bathroom Shower/Tub     Bathroom Toilet     Bathroom Equipment     Bathroom Accessibility     Home Equipment     Receives Help From Family   ADL Assistance Independent   Bath     Dressing     Grooming     Feeding     Toileting     [address] Work     Driving     Shopping          Other (Comment)     Homemaking Responsibilities     Meal Prep Responsibility     Laundry Responsibility     Cleaning Responsibility     Bill Paying/Finance Responsibility     Shopping Responsibility     Dependent Care Responsibility     Health Care Management     Other (Comment)     Ambuation Assistance     Transfer Assisstance     Active      Patient's  Info     Mode of Transportation     Education     Occupation     Type of Occupation       Discharge Planning   Type of Residence House   Living Arrangements Family Members   Support Systems Family Members   Current Services Prior To Admission None   Potential Assistance Needed N/A   DME     DME     DME Ordered? Potential Assistance Purchasing Medications     Meds-to-Beds: Does the patient want to have any new prescriptions delivered to bedside prior to discharge? Type of Home Care Services None   Patient expects to be discharged to: House   Follow Up Appointment: Best Day/Time     One/Two Story Residence:     # of Interior Steps     Height of Each Step (in)     [employer] Available     History of Falls? Services At/After Discharge  Transition of Care Consult (CM Consult): Internal Home Health     Internal Hospice     Reason Outside Agency [address]     Partner SNF     Reason Why Partner SNF Not Chosen     Internal Comfort Care     Reason Outside [address] [facility] [address] [facility] Provided?      Mode of Transport at Discharge Other (see comment) (family) Hospital Transport Time of Discharge     Confirm Follow Up Transport Family     Condition of Participation: Discharge Planning  The plan for Transition of Care is related to the following treatment goals: home with home health   The Patient and/or Patient Representative was provided with a Choice of Provider? Patient   Name of the Patient Representative who was provided with the Choice of Provider and agrees with the Discharge Plan? The Patient and/or Patient Representative Agree with the Discharge Plan? Yes   Freedom of Choice list was provided with basic dialogue that supports the individualized plan of care/goals, treatment preferences, and shares the quality data associated with the providers?  Yes     Documentation for Discharge Appeal  Discharge Appealed by     Date notified by QIO of appeal request:     Time notified by QIO of appeal request:     Detailed Notice of Discharge given to:     Date Notice of Discharge given:     Time Notice of Discharge given:     Date records sent to [address]     Time records sent to [address]     Date Notified of Outcome     Time Notified of Outcome     Outcome of appeal           TRINH Ferrer 06/01/22 11:43 AM

## 2022-06-01 NOTE — PROGRESS NOTES
Hospitalist Progress Note   Admit Date:  2022  8:41 AM   Name:  Kenna Forde   Age:  64 y.o. Sex:  female  :  1965   MRN:  [id number]   Room:      Presenting Complaint: Chest pain  Reason(s) for Admission: Atherosclerosis of native coronary artery of native heart with unstable angina pectoris (HCC) [I25.110]  Abnormal stress test [R94.39]  Chest pain [R07.9]     Hospital Course & Interval History:   Ms. Jere Perera is a nice 65 y/o WF with a h/o DM2, HTN, HLD who was admitted for CABG on . Hospitalist service consulted for DM management on . On basal + SSI. Home regimen is Humulin N 15U qAM, 7U qPM.     Subjective/24hr Events (22):  Up to chair, feels well. Good appetite. No chest pain or SOB. AM glucose 203. Assessment & Plan:   # DM2   - Uncontrolled. A1C 22 was 15.4, on  was 11.5.   - Con't Lantus with SSI.   - Would resume home regimen at discharge but may need to start with 18-20U each morning since she tends to run high later in the day. She needs outpatient PCP follow up for further titration. Advised to keep glucose log as well. # CAD s/p CABG   - ASA, statin   - Per primary    # HLD   - Statin    # HTN   - Con't BB    Ok for discharge from medical standpoint, needs PCP follow up for uncontrolled DM.       Hospital Problems           Last Modified POA    * (Principal) S/P CABG x 2 2022 Yes    Encounter for weaning from ventilator ([facility] [address] 2022 Yes    Hypoxia 2022 Yes    DM (diabetes mellitus) ([facility] [address] 2022 Yes    Hyperlipidemia associated with type 2 diabetes mellitus ([facility] [address] 2022 Yes    Chest pain 2022 Yes    Hypertension 2022 Yes          Objective:     Patient Vitals for the past 24 hrs:   Temp Pulse Resp BP SpO2   22 0707 97.1 °F (36.2 °C) 73 20 (!) 144/71 97 %   22 0627 -- -- 18 -- --   22 0330 -- -- -- -- 92 %   22 0326 98.3 °F (36.8 °C) 71 18 (!) 111/57 (!) 89 %   22 0025 98.5 °F (36.9 °C) 74 18 (!) 104/56 91 %   05/31/22 2135 -- -- 18 -- --   05/31/22 1900 98.3 °F (36.8 °C) 79 18 119/67 94 %   05/31/22 1529 98 °F (36.7 °C) 68 18 120/67 --   05/31/22 1123 98 °F (36.7 °C) 72 17 123/72 94 %       Estimated body mass index is 28.49 kg/m² as calculated from the following:    Height as of this encounter: 5' 4\" (1.626 m). Weight as of this encounter: 166 lb (75.3 kg). Intake/Output Summary (Last 24 hours) at 6/1/2022 0829  Last data filed at 6/1/2022 8357  Gross per 24 hour   Intake 690 ml   Output 700 ml   Net -10 ml         Physical Exam:   Blood pressure (!) 144/71, pulse 73, temperature 97.1 °F (36.2 °C), temperature source Temporal, resp. rate 20, height 5' 4\" (1.626 m), weight 166 lb (75.3 kg), SpO2 97 %. General:    Well nourished. No overt distress. Head:  Normocephalic, atraumatic  Eyes:  Sclerae appear normal. Pupils equally round. ENT:  Nares appear normal, no drainage. Moist oral mucosa  Neck:  No restricted ROM. Trachea midline. CV:   RRR. No m/r/g. No jugular venous distension. Sternal wound vac in place. Lungs:   CTAB. No wheezing, rhonchi, or rales. Respirations even, unlabored. Abdomen: Bowel sounds present. Soft, nontender, nondistended. Extremities: No cyanosis or clubbing. No edema. Skin:     No rashes and normal coloration. Warm and dry. Neuro:  CN II-XII grossly intact. Sensation intact. A&Ox3  Psych:  Normal mood and affect. I have reviewed ordered lab tests and independently visualized imaging below:    Recent Labs:  Recent Results (from the past 48 hour(s))   POCT Glucose    Collection Time: 05/30/22 11:26 AM   Result Value Ref Range    POC Glucose 266 (H) 65 - 100 mg/dL    Performed by:  Duane    POCT Glucose    Collection Time: 05/30/22  3:52 PM   Result Value Ref Range    POC Glucose 195 (H) 65 - 100 mg/dL    Performed by: Sanaz    POCT Glucose    Collection Time: 05/30/22  9:00 PM   Result Value Ref Range    POC Glucose 244 (H) 65 - 100 mg/dL    Performed by: Hailey    POCT Glucose    Collection Time: 05/31/22  7:13 AM   Result Value Ref Range    POC Glucose 187 (H) 65 - 100 mg/dL    Performed by: Hailey    POCT Glucose    Collection Time: 05/31/22 12:05 PM   Result Value Ref Range    POC Glucose 261 (H) 65 - 100 mg/dL    Performed by: Marito    POCT Glucose    Collection Time: 05/31/22  4:55 PM   Result Value Ref Range    POC Glucose 203 (H) 65 - 100 mg/dL    Performed by: Marito    POCT Glucose    Collection Time: 05/31/22  8:55 PM   Result Value Ref Range    POC Glucose 141 (H) 65 - 100 mg/dL    Performed by: Bacilio Mcneill    POCT Glucose    Collection Time: 06/01/22  6:13 AM   Result Value Ref Range    POC Glucose 203 (H) 65 - 100 mg/dL    Performed by: Aldo Francisco          Other Studies:  XR CHEST PORTABLE    Result Date: 6/1/2022  EXAMINATION: One view chest HISTORY: Post op open heart surgery TECHNIQUE: Frontal chest. COMPARISON: 5/31/2022 FINDINGS:  Mild bibasilar atelectasis or infiltrates. No significant change. There is no significant pneumothorax. There is no pleural effusion. The heart is unchanged. No other significant interval changes. 1. Findings as described above. XR CHEST PORTABLE    Result Date: 5/31/2022  EXAMINATION: One view chest HISTORY: Post op open heart surgery TECHNIQUE: Frontal chest. COMPARISON: 5/30/2022 FINDINGS:  Persistent mild, bibasilar lung infiltrates. There is a stable, tiny, left apical pneumothorax. There is no pleural effusion. The heart is unchanged. No other significant interval changes. 1. Findings as described above.        Current Meds:  Current Facility-Administered Medications   Medication Dose Route Frequency    insulin glargine (LANTUS) injection vial 14 Units  14 Units SubCUTAneous Daily    citalopram (CELEXA) tablet 20 mg  20 mg Oral Nightly    acetaminophen (TYLENOL) tablet 650 mg  650 mg Oral Q4H PRN    oxyCODONE-acetaminophen (PERCOCET) 5-325 MG per tablet 1 tablet  1 tablet Oral Q6H PRN    sennosides-docusate sodium (SENOKOT-S) 8.6-50 MG tablet 1 tablet  1 tablet Oral BID    polyethylene glycol (GLYCOLAX) packet 17 g  17 g Oral Daily PRN    magnesium hydroxide (MILK OF MAGNESIA) 400 MG/5ML suspension 30 mL  30 mL Oral Daily PRN    glucose chewable tablet 16 g  4 tablet Oral PRN    dextrose bolus 10% 125 mL  125 mL IntraVENous PRN    Or    dextrose bolus 10% 250 mL  250 mL IntraVENous PRN    insulin lispro (HUMALOG) injection vial 0-9 Units  0-9 Units SubCUTAneous Nightly    traMADol (ULTRAM) tablet 50 mg  50 mg Oral Q6H PRN    magnesium oxide (MAG-OX) tablet 400 mg  400 mg Oral TID PRN    magnesium oxide (MAG-OX) tablet 400 mg  400 mg Oral 4x Daily PRN    potassium chloride (KLOR-CON M) extended release tablet 20 mEq  20 mEq Oral BID PRN    potassium chloride (KLOR-CON M) extended release tablet 40 mEq  40 mEq Oral BID PRN    alcohol 62% (NOZIN) nasal  1 ampule  1 ampule Topical Q12H    insulin lispro (HUMALOG) injection vial 0-18 Units  0-18 Units SubCUTAneous TID WC    glucagon injection 1 mg  1 mg IntraMUSCular PRN    sodium chloride flush 0.9 % injection 5-40 mL  5-40 mL IntraVENous 2 times per day    sodium chloride flush 0.9 % injection 5-40 mL  5-40 mL IntraVENous PRN    ondansetron (ZOFRAN) injection 4 mg  4 mg IntraVENous Q4H PRN    aspirin EC tablet 81 mg  81 mg Oral Daily    amiodarone (CORDARONE) tablet 200 mg  200 mg Oral BID    metoprolol tartrate (LOPRESSOR) tablet 25 mg  25 mg Oral BID    atorvastatin (LIPITOR) tablet 80 mg  80 mg Oral Nightly    famotidine (PEPCID) tablet 20 mg  20 mg Oral BID    Or    famotidine (PEPCID) 20 mg in sodium chloride (PF) 10 mL injection  20 mg IntraVENous BID       Signed:  Elizabeth Manzano MD    Part of this note may have been written by using a voice dictation software.   The note has been proof read but may still contain some grammatical/other typographical errors.

## 2022-06-01 NOTE — PROGRESS NOTES
PHYSICAL THERAPY Daily Note and AM  (Link to Caseload Tracking: PT Visit Days : 2  Time In/Out PT Charge Capture  Rehab Caseload Tracker  Orders      Kenna Forde is a 64 y.o. female   PRIMARY DIAGNOSIS: S/P CABG x 2  Atherosclerosis of native coronary artery of native heart with unstable angina pectoris (HCC) [I25.110]  Abnormal stress test [R94.39]  Chest pain [R07.9]  Procedure(s) (LRB):  CORONARY ARTERY BYPASS GRAFT (CABG X 2), LIMA; ENDOSCOPIC VEIN HARVEST LEFT GREATER SAPHENOUS (N/A)  TRANSESOPHAGEAL ECHOCARDIOGRAM (N/A)  7 Days Post-Op  Inpatient: Payor: /     ASSESSMENT:     REHAB RECOMMENDATIONS:   Recommendation to date pending progress:  [address] [place] [address]    Equipment:     None     ASSESSMENT:  Ms. Jere Perera is in recliner and agreeable to therapy. Sit to stand with supervision. Gait training with slight hand held assist.  Stairs x 2. Patient is returned to the recliner and after a rest break performed therapeutic exercises. Good session. Making progress towards goals. Patient is a ron to work with. Continue PT efforts.   Home with HHPT     SUBJECTIVE:   Ms. Jere Perera states, \"Good morning\"     Social/Functional Lives With: Parent  Type of Home: House  Receives Help From: Family  ADL Assistance: Independent  OBJECTIVE:     PAIN: Jayleen Fee / O2: PRECAUTION / Paula Madrigal / [place] Martinez:   Pre Treatment:  0/10         Post Treatment: 0/10 Vitals        Oxygen    Wound Vac    RESTRICTIONS/PRECAUTIONS:        MOBILITY: I Mod I S SBA CGA Min Mod Max Total  NT x2 Comments:   Bed Mobility    Rolling [] [] [] [] [] [] [] [] [] [x] []    Supine to Sit [] [] [] [] [] [] [] [] [] [x] []    Scooting [] [] [] [] [] [] [] [] [] [x] []    Sit to Supine [] [] [] [] [] [] [] [] [] [x] []    Transfers    Sit to Stand [x] [] [] [] [] [] [] [] [] [] []    Bed to Chair [] [] [] [] [] [] [] [] [] [x] []    Stand to Sit [x] [] [] [] [] [] [] [] [] [] []    I=Independent, Mod I=Modified Independent, S=Supervision, SBA=Standby Assistance, CGA=Contact Guard Assistance,   Min=Minimal Assistance, Mod=Moderate Assistance, Max=Maximal Assistance, Total=Total Assistance, NT=Not Tested    BALANCE: Good Fair+ Fair Fair- Poor NT Comments   Sitting Static [x] [] [] [] [] []    Sitting Dynamic [x] [] [] [] [] []              Standing Static [x] [] [] [] [] []    Standing Dynamic [x] [] [] [] [] []      GAIT: I Mod I S SBA CGA Min Mod Max Total  NT x2 Comments:   Level of Assistance [] [] [] [x] [] [] [] [] [] [] []    Distance 400  feet    DME None    Gait Quality slow    Weightbearing Status      Stairs      I=Independent, Mod I=Modified Independent, S=Supervision, SBA=Standby Assistance, CGA=Contact Guard Assistance,   Min=Minimal Assistance, Mod=Moderate Assistance, Max=Maximal Assistance, Total=Total Assistance, NT=Not Tested    PLAN:   ACUTE PHYSICAL THERAPY GOALS:   (Developed with and agreed upon by patient and/or caregiver.)    1. Ms. Caroline Marcos will perform supine to sit and sit to supine with bed flat and no rail independently in 7 days. 2.  Ms. Caroline Marcos will perform sit to stand and bed to chair independently in 7 days. GOAL MET 5/29/2022    3. Ms. Caroline Marcos will perform gait >1000 ft independently in 7 days. 4.  Ms. Caroline Marcos will go up and down 4 steps with rail independently in 7 days. GOAL MET 5/29/2022    FREQUENCY AND DURATION: BID for duration of hospital stay or until stated goals are met, whichever comes first.    TREATMENT:   TREATMENT:   Therapeutic Activity (13 Minutes): Therapeutic activity included Transfer Training, Ambulation on level ground, Stair Training, Sitting balance  and Standing balance to improve functional Activity tolerance, Balance, Coordination, Mobility and Strength. Therapeutic Exercise (11 Minutes): Therapeutic exercises noted below to improve functional activity tolerance, AROM, strength and mobility.      TREATMENT GRID:     Date:  05/30/22 Date:  05/31/22 Date:  06/01/22 ACTIVITY/EXERCISE AM PM AM PM AM PM   LAQ 15  15  15    Shoulder shrugs 15  15  15    Gluteal sets 15  15  15    marching 15  15  15    Ankle pumps 15  15  15    abduction 15  15  15             B = bilateral; AA = active assistive; A = active; P = passive    AFTER TREATMENT PRECAUTIONS: Call light within reach, Chair, Needs within reach and RN notified    INTERDISCIPLINARY COLLABORATION:  RN/ PCT and PT/ PTA    EDUCATION:      TIME IN/OUT:  Time In: 6790  Time Out: 9950  Minutes: 25    Shamika Edwards-Melly, PTA

## 2022-06-01 NOTE — OP NOTE
[address] [facility]  OPERATIVE REPORT    Name:  Noel Colon  MR#:  [id number]  :  1965  ACCOUNT #:  [de-identified]  DATE OF SERVICE:  2022    PREOPERATIVE DIAGNOSIS:  Coronary artery occlusive disease. POSTOPERATIVE DIAGNOSIS:  Coronary artery occlusive disease. PROCEDURES PERFORMED:  Two-vessel coronary artery bypass grafting using reverse saphenous vein graft to the second obtuse marginal coronary, left internal mammary artery to left anterior descending coronary; endoscopic vein harvest; (arterial line placed by Anesthesia); temporary right ventricular pacing wires. SURGEON:  Marisol Mesa. Nadia Denny MD    ASSISTANT:       ANESTHESIA:  General.    COMPLICATIONS:   .    SPECIMENS REMOVED:   .    IMPLANTS:   .    ESTIMATED BLOOD LOSS:  minimal.    CARDIOPULMONARY BYPASS TIME:  69 minutes. AORTIC CROSS-CLAMP TIME:  51 minutes. PREOPERATIVE HISTORY:  This is a 66-year-old lady with family history for coronary disease who began experiencing angina. Her cardiac catheterization showed severe two-vessel coronary disease. Surgical revascularization was now recommended. WHAT WAS FOUND AND WHAT WAS DONE:  The heart was exposed through a median sternotomy. The heart was normal in size and contracted well. There was no transmural scarring. Two coronaries were grafted placing reverse vein to the second obtuse marginal coronary, the internal mammary artery was used to bypass the mid left anterior descending coronary. The patient came off bypass without trouble. PROCEDURE IN DETAIL:  The patient was premedicated and brought to the operating room. The patient was placed supine on the table. Appropriate monitoring lines were placed including an arterial line with flow tract monitoring. General endotracheal anesthesia was given. The anterior body and both legs were then prepped with Betadine. The patient was draped into a sterile field.   Endoscopic vein harvest was carried out with removal of the vein from the left leg. The vein was prepared, and the skin of the leg was closed with a subcuticular closure technique. The chest was opened in the midline. A sternotomy was carried out. The left pleural cavity was opened widely. The mammary artery was taken down. Next, pericardium was opened vertically and retracted. Heparin at 300 units per kg was given. The patient was cannulated, placed on bypass and cooled to 32-degree centigrade. The aorta was then cross-clamped. Blood cardioplegia was given antegrade. The second obtuse marginal coronary was identified, opened for a 5-mm length and reverse vein was sutured to this vessel with 7-0 Prolene. Next, the internal mammary artery used to bypass the mid left anterior descending coronary with a running 7-0 Prolene, and finally a 4-mm button of aortic wall was removed and the proximal end of the vein grafts sutured directly to the aorta with a 6-0 Prolene. The aortic cross-clamp was then released. The patient was rewarmed to 37 degrees centigrade. The patient was weaned from bypass without difficulty. All blood was then returned to the patient after which cannulas were removed and the pursestring sutures tied. Protamine was then given to reverse the heparin. Temporary right ventricular pacing wires were placed. 32-Brazilian tubes were left to drain the mediastinum. Hemostasis was satisfactory. The sternum was reapproximated with interrupted stainless steel wire, soft tissues were closed with Vicryl, and the skin was closed with Vicryl using subcuticular closure technique. The patient tolerated the procedure well and was moved to the intensive care unit in stable condition. Sponge, needle, and instrument counts were reported as correct.         MD PRESTON [id number]  D:  06/01/2022 11:58  T:  06/01/2022 13:11  JOB #:  [id number]

## 2022-06-01 NOTE — DISCHARGE SUMMARY
planned. She underwent CABG X 2 on 5/25/22. She did well post operatively and [facility] Chest tubes were left in place due to small air leak. She was nauseated and had a poor appetite. Chest tubes were removed. She was weaned off of O2. The hospitalist service followed for DM. She progressed well with PT. Her appetite improved. She remained in sinus rhythm. The morning of 6/1/22, patient was feeling well and ambulating without any symptoms. Patient was determined stable and ready for discharge. Patient was instructed on the importance of medication compliance and outpatient follow up. For maximized medical therapy for CAD, patient will continue ASA, BB, ACE-I and statin as well. The patient will have close transitional care follow up with [facility] Cardiology Dr. Tres Gastelum on June 9th at 10:15am (Allyson Da Silva7). The patient will follow up with Dr. Mana Martinez on June 21st at 2:10pm and has been referred to cardiac rehab. She will follow up with PCP for further titration of insulin and management of DM. DISPOSITION: The patient is being discharged home with home health services in stable condition on a low saturated fat, low cholesterol and low salt diet. The patient is instructed to advance activities as tolerated to the limit of fatigue or shortness of breath. The patient is instructed to avoid all heavy lifting or activities that strain the chest or upper arm muscles. Strenuous activity should be avoided. The patient is instructed to watch for signs of infection which include: increasing area of redness, fever or purulent drainage at the incision site. The patient is instructed to call the office or return to the ER for immediate evaluation for any shortness of breath or chest pain not relieved by NTG.     Discharge Exam: BP (!) 144/71   Pulse 73   Temp 97.1 °F (36.2 °C) (Temporal)   Resp 20   Ht 5' 4\" (1.626 m)   Wt 166 lb (75.3 kg)   SpO2 97%   BMI 28.49 kg/m² Physical Exam:  General: Well Developed, Well Nourished, No Acute Distress, Alert & Oriented  Neck: supple, no JVD  Heart: S1S2 with RRR without murmurs or gallops  Lungs: Clear throughout auscultation bilaterally without adventitious sounds  Abd: soft, nontender, nondistended, with good bowel sounds  Ext: warm, no edema  Sternal incision: clean, dry, and intact  Skin: warm and dry      Recent Results (from the past 24 hour(s))   POCT Glucose    Collection Time: 05/31/22 12:05 PM   Result Value Ref Range    POC Glucose 261 (H) 65 - 100 mg/dL    Performed by: Marito    POCT Glucose    Collection Time: 05/31/22  4:55 PM   Result Value Ref Range    POC Glucose 203 (H) 65 - 100 mg/dL    Performed by: Marito    POCT Glucose    Collection Time: 05/31/22  8:55 PM   Result Value Ref Range    POC Glucose 141 (H) 65 - 100 mg/dL    Performed by: Bacilio Mcneill    POCT Glucose    Collection Time: 06/01/22  6:13 AM   Result Value Ref Range    POC Glucose 203 (H) 65 - 100 mg/dL    Performed by: Hailey          Patient Instructions:   Current Discharge Medication List      START taking these medications    Details   traMADol (ULTRAM) 50 MG tablet Take 1 tablet by mouth every 6 hours as needed for Pain for up to 14 days.   Qty: 30 tablet, Refills: 0    Comments: Reduce doses taken as pain becomes manageable  Associated Diagnoses: S/P CABG x 2         CONTINUE these medications which have CHANGED    Details   atorvastatin (LIPITOR) 80 MG tablet Take 1 tablet by mouth at bedtime  Qty: 90 tablet, Refills: 3      metoprolol tartrate (LOPRESSOR) 25 MG tablet Take 1 tablet by mouth 2 times daily  Qty: 60 tablet, Refills: 3      insulin NPH (HUMULIN N;NOVOLIN N) 100 UNIT/ML injection vial 18 units in the morning and 7 units at night  Qty: 100 mL, Refills: 3         CONTINUE these medications which have NOT CHANGED    Details   aspirin 81 MG EC tablet Take 81 mg by mouth at bedtime      citalopram (CELEXA) 20 MG tablet Take 20 mg by mouth at bedtime       lisinopril (PRINIVIL;ZESTRIL) 20 MG tablet Take 20 mg by mouth at bedtime TAKE 1 TABLET BY MOUTH EVERY DAY         STOP taking these medications       AMIODARONE HCL PO Comments:   Reason for Stopping:                 Signed:  Gurpreet Camacho PA-C  6/1/2022  9:34 AM

## 2022-06-01 NOTE — PLAN OF CARE
Problem: Chronic Conditions and Co-morbidities  Goal: Patient's chronic conditions and co-morbidity symptoms are monitored and maintained or improved  Outcome: Progressing  Flowsheets (Taken 5/31/2022 2045)  Care Plan - Patient's Chronic Conditions and Co-Morbidity Symptoms are Monitored and Maintained or Improved: Monitor and assess patient's chronic conditions and comorbid symptoms for stability, deterioration, or improvement     Problem: Discharge Planning  Goal: Discharge to home or other facility with appropriate resources  Outcome: Progressing  Flowsheets (Taken 5/31/2022 2045)  Discharge to home or other facility with appropriate resources: Identify barriers to discharge with patient and caregiver     Problem: Pain  Goal: Verbalizes/displays adequate comfort level or baseline comfort level  Outcome: Progressing     Problem: Skin/Tissue Integrity  Goal: Absence of new skin breakdown  Description: 1. Monitor for areas of redness and/or skin breakdown  2. Assess vascular access sites hourly  3. Every 4-6 hours minimum:  Change oxygen saturation probe site  4. Every 4-6 hours:  If on nasal continuous positive airway pressure, respiratory therapy assess nares and determine need for appliance change or resting period.   Outcome: Progressing     Problem: Safety - Adult  Goal: Free from fall injury  Outcome: Progressing  Flowsheets (Taken 5/31/2022 2045)  Free From Fall Injury: Instruct family/caregiver on patient safety     Problem: Respiratory - Adult  Goal: Achieves optimal ventilation and oxygenation  Outcome: Progressing     Problem: Skin/Tissue Integrity - Adult  Goal: Incisions, wounds, or drain sites healing without S/S of infection  Outcome: Progressing  Flowsheets (Taken 5/31/2022 2045)  Incisions, Wounds, or Drain Sites Healing Without Sign and Symptoms of Infection:   Implement wound care per orders   ADMISSION and DAILY: Assess and document risk factors for pressure ulcer development     Problem: Musculoskeletal - Adult  Goal: Return ADL status to a safe level of function  Outcome: Progressing     Problem: Metabolic/Fluid and Electrolytes - Adult  Goal: Glucose maintained within prescribed range  Outcome: Progressing  Flowsheets (Taken 5/31/2022 2045)  Glucose maintained within prescribed range: Monitor blood glucose as ordered     Problem: ABCDS Injury Assessment  Goal: Absence of physical injury  Recent Flowsheet Documentation  Taken 5/31/2022 2045 by Penelope Mae RN  Absence of Physical Injury: Implement safety measures based on patient assessment

## 2022-06-02 ENCOUNTER — HOME CARE VISIT (OUTPATIENT)
Dept: SCHEDULING | Facility: HOME HEALTH | Age: 57
End: 2022-06-02
Payer: COMMERCIAL

## 2022-06-02 VITALS
SYSTOLIC BLOOD PRESSURE: 112 MMHG | HEART RATE: 66 BPM | DIASTOLIC BLOOD PRESSURE: 66 MMHG | RESPIRATION RATE: 16 BRPM | OXYGEN SATURATION: 99 % | TEMPERATURE: 97.9 F

## 2022-06-02 PROCEDURE — G0299 HHS/HOSPICE OF RN EA 15 MIN: HCPCS

## 2022-06-02 PROCEDURE — 400013 HH SOC

## 2022-06-02 ASSESSMENT — ENCOUNTER SYMPTOMS: HEMOPTYSIS: 0

## 2022-06-07 ENCOUNTER — HOME CARE VISIT (OUTPATIENT)
Dept: SCHEDULING | Facility: HOME HEALTH | Age: 57
End: 2022-06-07
Payer: COMMERCIAL

## 2022-06-07 VITALS
OXYGEN SATURATION: 97 % | DIASTOLIC BLOOD PRESSURE: 74 MMHG | RESPIRATION RATE: 17 BRPM | SYSTOLIC BLOOD PRESSURE: 110 MMHG | TEMPERATURE: 98.1 F | HEART RATE: 84 BPM

## 2022-06-07 PROCEDURE — G0299 HHS/HOSPICE OF RN EA 15 MIN: HCPCS

## 2022-06-07 PROCEDURE — G0151 HHCP-SERV OF PT,EA 15 MIN: HCPCS

## 2022-06-07 PROCEDURE — G0155 HHCP-SVS OF CSW,EA 15 MIN: HCPCS

## 2022-06-07 ASSESSMENT — ENCOUNTER SYMPTOMS
PAIN LOCATION - PAIN QUALITY: SORENESS
DYSPNEA ACTIVITY LEVEL: AFTER AMBULATING 10 - 20 FT
CONSTIPATION: 1
PAIN LOCATION - PAIN QUALITY: INCISIONAL
DYSPNEA ACTIVITY LEVEL: AFTER AMBULATING MORE THAN 20 FT
STOOL DESCRIPTION: HARD

## 2022-06-08 VITALS
DIASTOLIC BLOOD PRESSURE: 78 MMHG | OXYGEN SATURATION: 99 % | TEMPERATURE: 98.1 F | HEART RATE: 74 BPM | SYSTOLIC BLOOD PRESSURE: 122 MMHG | RESPIRATION RATE: 16 BRPM

## 2022-06-08 ASSESSMENT — ENCOUNTER SYMPTOMS
STOOL DESCRIPTION: HARD
PAIN LOCATION - PAIN QUALITY: INCISIONAL

## 2022-06-09 ENCOUNTER — OFFICE VISIT (OUTPATIENT)
Dept: CARDIOLOGY CLINIC | Age: 57
End: 2022-06-09
Payer: COMMERCIAL

## 2022-06-09 VITALS
WEIGHT: 165 LBS | HEART RATE: 72 BPM | DIASTOLIC BLOOD PRESSURE: 58 MMHG | BODY MASS INDEX: 28.17 KG/M2 | SYSTOLIC BLOOD PRESSURE: 108 MMHG | HEIGHT: 64 IN

## 2022-06-09 DIAGNOSIS — E78.5 HYPERLIPIDEMIA, UNSPECIFIED HYPERLIPIDEMIA TYPE: ICD-10-CM

## 2022-06-09 DIAGNOSIS — Z95.1 HX OF CABG: Primary | ICD-10-CM

## 2022-06-09 DIAGNOSIS — I10 HYPERTENSION, UNSPECIFIED TYPE: ICD-10-CM

## 2022-06-09 PROCEDURE — 99214 OFFICE O/P EST MOD 30 MIN: CPT | Performed by: INTERNAL MEDICINE

## 2022-06-09 NOTE — PROGRESS NOTES
[facility] CARDIOLOGY Follow Up                 Reason for Visit: Stable ischemic heart disease    Subjective:     Patient is a 64 y.o. female with a PMH of CAD status post CABG, hyperlipidemia, hypertension, and diabetes who presents for follow-up. The patient was referred to an [facility] for an abnormal cardiovascular stress test.  She was noted to have multivessel CAD. The patient underwent CABG x2 on May 25. The patient had an echocardiogram in May 2022 that was noted to demonstrate a normal EF. She does not report angina or dyspnea. Past Medical History:   Diagnosis Date    Anxiety 11/28/2012    CAD (coronary artery disease)     No MI; no stents    Depression     managed with med    DM (diabetes mellitus) ([address] 11/28/2012    insulin reliant; AVG ; pt denies s.s. of hypoglycemia; last A1C    HLD (hyperlipidemia)     managed with med    Hypertension     managed with med    Ulcerative esophagitis       Past Surgical History:   Procedure Laterality Date    CARDIAC CATHETERIZATION Left     CORONARY ARTERY BYPASS GRAFT N/A 5/25/2022    CORONARY ARTERY BYPASS GRAFT (CABG X 2), LIMA; ENDOSCOPIC VEIN HARVEST LEFT GREATER SAPHENOUS performed by Jerri Miller MD at [facility] MAIN OR    TRANSESOPHAGEAL ECHOCARDIOGRAM N/A 5/25/2022    TRANSESOPHAGEAL ECHOCARDIOGRAM performed by Jerri Miller MD at [facility] MAIN OR    UPPER GASTROINTESTINAL ENDOSCOPY      ulcerative espophagitis      Family History   Problem Relation Age of Onset    Diabetes Father       Social History     Tobacco Use    Smoking status: Never Smoker    Smokeless tobacco: Never Used   Substance Use Topics    Alcohol use: Yes     Comment: rarely      No Known Allergies      ROS:  No obvious pertinent positives on review of systems except for what was outlined above.        Objective:       BP (!) 108/58   Pulse 72   Ht 5' 4\" (1.626 m)   Wt 165 lb (74.8 kg) Comment: with shoes  BMI 28.32 kg/m²     BP Readings from Last 3 Encounters:   06/09/22 (!) 108/58   06/07/22 110/74   06/07/22 122/78       Wt Readings from Last 3 Encounters:   06/09/22 165 lb (74.8 kg)   06/01/22 166 lb (75.3 kg)   05/23/22 164 lb 12.8 oz (74.8 kg)       General/Constitutional:   Alert and oriented x 3, no acute distress  HEENT:   normocephalic, atraumatic, moist mucous membranes  Neck:   No JVD or carotid bruits bilaterally  Cardiovascular:   regular rate and rhythm, no rub/gallop appreciated  Pulmonary:   clear to auscultation bilaterally, no respiratory distress  Abdomen:   soft, non-tender, non-distended  Ext:   No sig LE edema bilaterally  Skin:  warm and dry, no obvious rashes seen  Neuro:   no obvious sensory or motor deficits  Psychiatric:   normal mood and affect    Data Review:   Lab Results   Component Value Date    CHOL 217 (H) 04/27/2022    CHOL 242 (H) 06/19/2019     Lab Results   Component Value Date    TRIG 259 (H) 04/27/2022    TRIG 171 (H) 06/19/2019     Lab Results   Component Value Date    HDL 40 04/27/2022    HDL 44 06/19/2019     Lab Results   Component Value Date    LDLCALC 131 (H) 04/27/2022    LDLCALC 164 (H) 06/19/2019     Lab Results   Component Value Date    LABVLDL 34 06/19/2019    VLDL 46 (H) 04/27/2022     No results found for: [facility]     Lab Results   Component Value Date     05/30/2022     05/28/2022     05/27/2022    K 4.0 05/30/2022    K 3.9 05/30/2022    K 4.0 05/29/2022     05/30/2022     05/28/2022     05/27/2022    CO2 30 05/30/2022    CO2 25 05/28/2022    CO2 25 05/27/2022    BUN 13 05/30/2022    BUN 26 05/28/2022    BUN 21 05/27/2022    CREATININE 0.70 05/30/2022    CREATININE 0.80 05/28/2022    CREATININE 0.80 05/27/2022    GLUCOSE 181 05/30/2022    GLUCOSE 236 05/28/2022    GLUCOSE 179 05/27/2022    CALCIUM 8.8 05/30/2022    CALCIUM 8.8 05/28/2022    CALCIUM 8.7 05/27/2022         Lab Results   Component Value Date    ALT 23 05/23/2022    ALT 17 04/27/2022    ALT 21 06/19/2019    AST 14 (L) 05/23/2022 AST 13 04/27/2022    AST 23 06/19/2019        Assessment/Plan:   1. Hx of CABG  - Continue with Lipitor, Lopressor and baby aspirin daily  - Follow-up with CTS    2. Hypertension, unspecified type  - Well-controlled  - Continue with Lopressor  - Currently on lisinopril    3.  Hyperlipidemia, unspecified hyperlipidemia type  - Continue Lipitor    F/U: 3 months    Josue Lima MD

## 2022-06-10 ENCOUNTER — HOME CARE VISIT (OUTPATIENT)
Dept: SCHEDULING | Facility: HOME HEALTH | Age: 57
End: 2022-06-10
Payer: COMMERCIAL

## 2022-06-10 PROCEDURE — G0299 HHS/HOSPICE OF RN EA 15 MIN: HCPCS

## 2022-06-12 VITALS
RESPIRATION RATE: 16 BRPM | DIASTOLIC BLOOD PRESSURE: 68 MMHG | TEMPERATURE: 97.1 F | SYSTOLIC BLOOD PRESSURE: 134 MMHG | OXYGEN SATURATION: 99 % | HEART RATE: 66 BPM

## 2022-06-12 ASSESSMENT — ENCOUNTER SYMPTOMS: PAIN LOCATION - PAIN QUALITY: ACHING

## 2022-06-13 ASSESSMENT — ENCOUNTER SYMPTOMS: STOOL DESCRIPTION: FORMED

## 2022-06-14 ENCOUNTER — HOME CARE VISIT (OUTPATIENT)
Dept: SCHEDULING | Facility: HOME HEALTH | Age: 57
End: 2022-06-14
Payer: COMMERCIAL

## 2022-06-14 PROCEDURE — G0299 HHS/HOSPICE OF RN EA 15 MIN: HCPCS

## 2022-06-15 VITALS
DIASTOLIC BLOOD PRESSURE: 68 MMHG | TEMPERATURE: 98.1 F | SYSTOLIC BLOOD PRESSURE: 98 MMHG | RESPIRATION RATE: 16 BRPM | OXYGEN SATURATION: 98 % | HEART RATE: 78 BPM

## 2022-06-15 ASSESSMENT — ENCOUNTER SYMPTOMS
STOOL DESCRIPTION: FORMED
PAIN LOCATION - PAIN QUALITY: INCISIONAL

## 2022-06-17 ENCOUNTER — HOME CARE VISIT (OUTPATIENT)
Dept: SCHEDULING | Facility: HOME HEALTH | Age: 57
End: 2022-06-17
Payer: COMMERCIAL

## 2022-06-17 PROCEDURE — G0299 HHS/HOSPICE OF RN EA 15 MIN: HCPCS

## 2022-06-17 ASSESSMENT — ENCOUNTER SYMPTOMS
STOOL DESCRIPTION: LOOSE
DIARRHEA: 1

## 2022-06-20 VITALS
OXYGEN SATURATION: 97 % | SYSTOLIC BLOOD PRESSURE: 126 MMHG | HEART RATE: 80 BPM | RESPIRATION RATE: 20 BRPM | DIASTOLIC BLOOD PRESSURE: 78 MMHG | TEMPERATURE: 98 F

## 2022-06-21 ENCOUNTER — OFFICE VISIT (OUTPATIENT)
Dept: CARDIOTHORACIC SURGERY | Age: 57
End: 2022-06-21

## 2022-06-21 VITALS
HEIGHT: 64 IN | DIASTOLIC BLOOD PRESSURE: 70 MMHG | HEART RATE: 102 BPM | BODY MASS INDEX: 27.66 KG/M2 | WEIGHT: 162 LBS | SYSTOLIC BLOOD PRESSURE: 131 MMHG

## 2022-06-21 DIAGNOSIS — Z95.1 S/P CABG X 2: ICD-10-CM

## 2022-06-21 PROCEDURE — 99024 POSTOP FOLLOW-UP VISIT: CPT | Performed by: THORACIC SURGERY (CARDIOTHORACIC VASCULAR SURGERY)

## 2022-06-21 ASSESSMENT — PATIENT HEALTH QUESTIONNAIRE - PHQ9
SUM OF ALL RESPONSES TO PHQ QUESTIONS 1-9: 0
2. FEELING DOWN, DEPRESSED OR HOPELESS: 0
SUM OF ALL RESPONSES TO PHQ QUESTIONS 1-9: 0
SUM OF ALL RESPONSES TO PHQ9 QUESTIONS 1 & 2: 0
SUM OF ALL RESPONSES TO PHQ QUESTIONS 1-9: 0
1. LITTLE INTEREST OR PLEASURE IN DOING THINGS: 0
SUM OF ALL RESPONSES TO PHQ QUESTIONS 1-9: 0

## 2022-06-21 NOTE — PATIENT INSTRUCTIONS
Patient Education        Secondhand Smoke: Care Instructions  Your Care Instructions     Secondhand smoke comes from the burning end of a cigarette, cigar, or pipe and the smoke that a smoker exhales. The smoke contains nicotine and many other harmful chemicals. Breathing secondhand smoke can cause or worsen health problems including cancer, asthma, coronary artery disease, and respiratoryinfections. It can make your eyes and nose burn and cause a sore throat. Secondhand smoke is especially bad for babies and young children whose lungs are still developing. Babies whose parents smoke are more likely to have ear infections, pneumonia, and bronchitis in the first few years of their lives. Secondhand smoke can make asthma symptoms worse in children. If you are pregnant, it is important that you not smoke and that you avoid secondhand smoke. You are more likely to give birth to a baby who weighs less than expected (low birth weight) if you smoke. And your baby may have a greater risk for sudden infant death syndrome (SIDS). Babies whose mothers are exposedto secondhand smoke during pregnancy have a higher risk for health problems. Follow-up care is a key part of your treatment and safety. Be sure to make and go to all appointments, and call your doctor if you are having problems. It's also a good idea to know your test results and keep alist of the medicines you take. How can you care for yourself at home?  Do not smoke or let anyone else smoke in your home. If people must smoke, ask them to go outside.  If people do smoke in your home, choose a room where you can open a window or use a fan to get the smoke outside.  Do not let anyone smoke in your car. If someone must smoke, pull over in a safe place and let them smoke away from the car.  Ask your employer to make sure that you have a smoke-free work area.    Make sure that your children aren't exposed to secondhand smoke at day care, school, and after-school programs.  Try to choose nonsmoking bars, restaurants, and other public places when you go out.  Help your family and friends who smoke to quit by encouraging them to try. Tell them about treatment resources. Having support from others often helps.  If you smoke, quit. Quitting is hard, but there are ways to boost your chance of quitting tobacco for good. ? Use nicotine gum, patches, or lozenges. Call a quitline. Ask your doctor about stop-smoking programs and medicines. ? Keep trying. When should you call for help? Watch closely for changes in your health, and be sure to contact your doctor if you have any problems. Where can you learn more? Go to https://[employer]. org and sign in to your [employer] account. Enter L004 in the mPowa box to learn more about \"Secondhand Smoke: Care Instructions. \"     If you do not have an account, please click on the \"Sign Up Now\" link. Current as of: October 28, 2021               Content Version: 13.3  © 2006-2022 Healthwise, Incorporated. Care instructions adapted under license by [facility] ([facility]). If you have questions about a medical condition or this instruction, always ask your healthcare professional. [place] [address] any warranty or liability for your use of this information.

## 2022-06-21 NOTE — PROGRESS NOTES
[address] [facility]  Reid Morales. Mana Martinez MD     [facility]. Jackie Magallanes MD          [facility] Presley Nash       [id number]  6/21/2022 1965      Tash Nash is seen today for follow-up status post CABG X 2 with LIMA to the LAD and reverse SVG to the OM2 on 5/25/22. she is active and ambulatory. There is no fever or shortness of breath. Appetite is good. Pain has improved. Pt is sleeping ok. She had some constipation after going home. She has had some diarrhea the last few days but has continued Miralax every day. EXAM:  Vitals:  Blood pressure 131/70, pulse (!) 102, height 5' 4\" (1.626 m), weight 162 lb (73.5 kg). Lungs:  Clear to auscultation bilaterally. Heart: Regular rate and rhythm without murmurs. Incision: Sternum stable. Incision healing well. Leg incisions healing well. IMPRESSION and PLAN:  Recommend that she decrease the frequency of use of Miralax. DC from CT surgery. Pt may drive. Pt is planning to begin cardiac rehab. Sternal precautions: Pt advised to avoid any heavy lifting for another 4 weeks but can increase activity as tolerated. Pt has cardiology follow-up with Dr. Tres Gastelum. No need to schedule follow-up at this point but the patient may call or return anytime if issues or questions arise. Margarito Adrian.  QUIANA Camacho  6/21/2022 2:20 PM

## 2022-06-23 ENCOUNTER — HOME CARE VISIT (OUTPATIENT)
Dept: SCHEDULING | Facility: HOME HEALTH | Age: 57
End: 2022-06-23
Payer: COMMERCIAL

## 2022-06-23 PROCEDURE — G0299 HHS/HOSPICE OF RN EA 15 MIN: HCPCS

## 2022-06-23 ASSESSMENT — ENCOUNTER SYMPTOMS: HEMOPTYSIS: 0

## 2022-06-24 VITALS
RESPIRATION RATE: 16 BRPM | TEMPERATURE: 97.6 F | DIASTOLIC BLOOD PRESSURE: 64 MMHG | OXYGEN SATURATION: 99 % | SYSTOLIC BLOOD PRESSURE: 116 MMHG | HEART RATE: 68 BPM

## 2022-06-24 ASSESSMENT — ENCOUNTER SYMPTOMS
PAIN LOCATION - PAIN QUALITY: TIGHTNESS, SORENESS
STOOL DESCRIPTION: FORMED

## 2022-06-28 ENCOUNTER — HOSPITAL ENCOUNTER (INPATIENT)
Age: 57
LOS: 4 days | Discharge: HOME | DRG: 074 | End: 2022-07-02
Attending: EMERGENCY MEDICINE
Payer: COMMERCIAL

## 2022-06-28 ENCOUNTER — APPOINTMENT (OUTPATIENT)
Dept: CT IMAGING | Age: 57
DRG: 074 | End: 2022-06-28
Payer: COMMERCIAL

## 2022-06-28 ENCOUNTER — APPOINTMENT (OUTPATIENT)
Dept: ULTRASOUND IMAGING | Age: 57
DRG: 074 | End: 2022-06-28
Payer: COMMERCIAL

## 2022-06-28 DIAGNOSIS — R11.2 NAUSEA AND VOMITING, INTRACTABILITY OF VOMITING NOT SPECIFIED, UNSPECIFIED VOMITING TYPE: Primary | ICD-10-CM

## 2022-06-28 DIAGNOSIS — K85.90 ACUTE PANCREATITIS, UNSPECIFIED COMPLICATION STATUS, UNSPECIFIED PANCREATITIS TYPE: ICD-10-CM

## 2022-06-28 PROBLEM — E83.42 HYPOMAGNESEMIA: Status: ACTIVE | Noted: 2022-06-28

## 2022-06-28 PROBLEM — E87.1 HYPONATREMIA: Status: ACTIVE | Noted: 2022-06-28

## 2022-06-28 PROBLEM — I25.10 CAD (CORONARY ARTERY DISEASE): Status: ACTIVE | Noted: 2022-06-28

## 2022-06-28 PROBLEM — K85.00 IDIOPATHIC ACUTE PANCREATITIS: Status: ACTIVE | Noted: 2022-06-28

## 2022-06-28 LAB
ALBUMIN SERPL-MCNC: 3.7 G/DL (ref 3.5–5)
ALBUMIN/GLOB SERPL: 0.8 {RATIO} (ref 1.2–3.5)
ALP SERPL-CCNC: 162 U/L (ref 50–136)
ALT SERPL-CCNC: 22 U/L (ref 12–65)
ANION GAP SERPL CALC-SCNC: 14 MMOL/L (ref 7–16)
AST SERPL-CCNC: 21 U/L (ref 15–37)
BASOPHILS # BLD: 0.1 K/UL (ref 0–0.2)
BASOPHILS NFR BLD: 1 % (ref 0–2)
BILIRUB SERPL-MCNC: 0.5 MG/DL (ref 0.2–1.1)
BILIRUB UR QL: NEGATIVE
BUN SERPL-MCNC: 11 MG/DL (ref 6–23)
CALCIUM SERPL-MCNC: 9.7 MG/DL (ref 8.3–10.4)
CHLORIDE SERPL-SCNC: 98 MMOL/L (ref 98–107)
CO2 SERPL-SCNC: 22 MMOL/L (ref 21–32)
CREAT SERPL-MCNC: 1 MG/DL (ref 0.6–1)
DIFFERENTIAL METHOD BLD: NORMAL
EOSINOPHIL # BLD: 0.1 K/UL (ref 0–0.8)
EOSINOPHIL NFR BLD: 1 % (ref 0.5–7.8)
ERYTHROCYTE [DISTWIDTH] IN BLOOD BY AUTOMATED COUNT: 12.8 % (ref 11.9–14.6)
GLOBULIN SER CALC-MCNC: 4.8 G/DL (ref 2.3–3.5)
GLUCOSE BLD STRIP.AUTO-MCNC: 286 MG/DL (ref 65–100)
GLUCOSE BLD STRIP.AUTO-MCNC: 303 MG/DL (ref 65–100)
GLUCOSE BLD STRIP.AUTO-MCNC: 343 MG/DL (ref 65–100)
GLUCOSE SERPL-MCNC: 343 MG/DL (ref 65–100)
GLUCOSE UR QL STRIP.AUTO: >1000 MG/DL
HCT VFR BLD AUTO: 38.8 % (ref 35.8–46.3)
HGB BLD-MCNC: 13.4 G/DL (ref 11.7–15.4)
IMM GRANULOCYTES # BLD AUTO: 0.1 K/UL (ref 0–0.5)
IMM GRANULOCYTES NFR BLD AUTO: 1 % (ref 0–5)
KETONES UR-MCNC: 80 MG/DL
LEUKOCYTE ESTERASE UR QL STRIP: NEGATIVE
LIPASE SERPL-CCNC: 594 U/L (ref 73–393)
LYMPHOCYTES # BLD: 2.8 K/UL (ref 0.5–4.6)
LYMPHOCYTES NFR BLD: 26 % (ref 13–44)
MAGNESIUM SERPL-MCNC: 1.4 MG/DL (ref 1.8–2.4)
MCH RBC QN AUTO: 27.6 PG (ref 26.1–32.9)
MCHC RBC AUTO-ENTMCNC: 34.5 G/DL (ref 31.4–35)
MCV RBC AUTO: 79.8 FL (ref 79.6–97.8)
MONOCYTES # BLD: 0.6 K/UL (ref 0.1–1.3)
MONOCYTES NFR BLD: 5 % (ref 4–12)
NEUTS SEG # BLD: 7.2 K/UL (ref 1.7–8.2)
NEUTS SEG NFR BLD: 67 % (ref 43–78)
NITRITE UR QL: NEGATIVE
NRBC # BLD: 0 K/UL (ref 0–0.2)
PH UR: 7 [PH] (ref 5–9)
PLATELET # BLD AUTO: 282 K/UL (ref 150–450)
PMV BLD AUTO: 10.3 FL (ref 9.4–12.3)
POTASSIUM SERPL-SCNC: 3.7 MMOL/L (ref 3.5–5.1)
PROT SERPL-MCNC: 8.5 G/DL (ref 6.3–8.2)
PROT UR QL: 30 MG/DL
RBC # BLD AUTO: 4.86 M/UL (ref 4.05–5.2)
RBC # UR STRIP: ABNORMAL /UL
SARS-COV-2 RDRP RESP QL NAA+PROBE: NOT DETECTED
SERVICE CMNT-IMP: ABNORMAL
SODIUM SERPL-SCNC: 134 MMOL/L (ref 136–145)
SOURCE: NORMAL
SP GR UR: 1.02 (ref 1–1.02)
TSH W FREE THYROID IF ABNORMAL: 2.93 UIU/ML (ref 0.36–3.74)
UROBILINOGEN UR QL: 0.2 EU/DL (ref 0.2–1)
WBC # BLD AUTO: 10.8 K/UL (ref 4.3–11.1)

## 2022-06-28 PROCEDURE — 1100000000 HC RM PRIVATE

## 2022-06-28 PROCEDURE — 6360000004 HC RX CONTRAST MEDICATION: Performed by: EMERGENCY MEDICINE

## 2022-06-28 PROCEDURE — 96361 HYDRATE IV INFUSION ADD-ON: CPT

## 2022-06-28 PROCEDURE — 96375 TX/PRO/DX INJ NEW DRUG ADDON: CPT

## 2022-06-28 PROCEDURE — A4216 STERILE WATER/SALINE, 10 ML: HCPCS | Performed by: STUDENT IN AN ORGANIZED HEALTH CARE EDUCATION/TRAINING PROGRAM

## 2022-06-28 PROCEDURE — 36415 COLL VENOUS BLD VENIPUNCTURE: CPT

## 2022-06-28 PROCEDURE — 6360000002 HC RX W HCPCS: Performed by: EMERGENCY MEDICINE

## 2022-06-28 PROCEDURE — 96376 TX/PRO/DX INJ SAME DRUG ADON: CPT

## 2022-06-28 PROCEDURE — 82962 GLUCOSE BLOOD TEST: CPT

## 2022-06-28 PROCEDURE — 83690 ASSAY OF LIPASE: CPT

## 2022-06-28 PROCEDURE — 6370000000 HC RX 637 (ALT 250 FOR IP): Performed by: EMERGENCY MEDICINE

## 2022-06-28 PROCEDURE — 2580000003 HC RX 258: Performed by: EMERGENCY MEDICINE

## 2022-06-28 PROCEDURE — 84443 ASSAY THYROID STIM HORMONE: CPT

## 2022-06-28 PROCEDURE — 74177 CT ABD & PELVIS W/CONTRAST: CPT

## 2022-06-28 PROCEDURE — 85025 COMPLETE CBC W/AUTO DIFF WBC: CPT

## 2022-06-28 PROCEDURE — 94760 N-INVAS EAR/PLS OXIMETRY 1: CPT

## 2022-06-28 PROCEDURE — 2580000003 HC RX 258: Performed by: HOSPITALIST

## 2022-06-28 PROCEDURE — 80053 COMPREHEN METABOLIC PANEL: CPT

## 2022-06-28 PROCEDURE — 81003 URINALYSIS AUTO W/O SCOPE: CPT

## 2022-06-28 PROCEDURE — 6360000002 HC RX W HCPCS: Performed by: STUDENT IN AN ORGANIZED HEALTH CARE EDUCATION/TRAINING PROGRAM

## 2022-06-28 PROCEDURE — 99285 EMERGENCY DEPT VISIT HI MDM: CPT

## 2022-06-28 PROCEDURE — 87635 SARS-COV-2 COVID-19 AMP PRB: CPT

## 2022-06-28 PROCEDURE — 76705 ECHO EXAM OF ABDOMEN: CPT

## 2022-06-28 PROCEDURE — 83735 ASSAY OF MAGNESIUM: CPT

## 2022-06-28 PROCEDURE — C9113 INJ PANTOPRAZOLE SODIUM, VIA: HCPCS | Performed by: STUDENT IN AN ORGANIZED HEALTH CARE EDUCATION/TRAINING PROGRAM

## 2022-06-28 PROCEDURE — 2580000003 HC RX 258: Performed by: STUDENT IN AN ORGANIZED HEALTH CARE EDUCATION/TRAINING PROGRAM

## 2022-06-28 PROCEDURE — 96374 THER/PROPH/DIAG INJ IV PUSH: CPT

## 2022-06-28 PROCEDURE — 1100000003 HC PRIVATE W/ TELEMETRY

## 2022-06-28 PROCEDURE — 83036 HEMOGLOBIN GLYCOSYLATED A1C: CPT

## 2022-06-28 PROCEDURE — 6370000000 HC RX 637 (ALT 250 FOR IP): Performed by: STUDENT IN AN ORGANIZED HEALTH CARE EDUCATION/TRAINING PROGRAM

## 2022-06-28 RX ORDER — ATORVASTATIN CALCIUM 80 MG/1
80 TABLET, FILM COATED ORAL NIGHTLY
Status: DISCONTINUED | OUTPATIENT
Start: 2022-06-28 | End: 2022-07-02 | Stop reason: HOSPADM

## 2022-06-28 RX ORDER — ACETAMINOPHEN 325 MG/1
650 TABLET ORAL EVERY 6 HOURS PRN
Status: DISCONTINUED | OUTPATIENT
Start: 2022-06-28 | End: 2022-07-02 | Stop reason: HOSPADM

## 2022-06-28 RX ORDER — 0.9 % SODIUM CHLORIDE 0.9 %
100 INTRAVENOUS SOLUTION INTRAVENOUS ONCE
Status: COMPLETED | OUTPATIENT
Start: 2022-06-28 | End: 2022-06-28

## 2022-06-28 RX ORDER — ACETAMINOPHEN 650 MG/1
650 SUPPOSITORY RECTAL EVERY 6 HOURS PRN
Status: DISCONTINUED | OUTPATIENT
Start: 2022-06-28 | End: 2022-07-02 | Stop reason: HOSPADM

## 2022-06-28 RX ORDER — ONDANSETRON 2 MG/ML
4 INJECTION INTRAMUSCULAR; INTRAVENOUS
Status: COMPLETED | OUTPATIENT
Start: 2022-06-28 | End: 2022-06-28

## 2022-06-28 RX ORDER — DEXTROSE MONOHYDRATE 50 MG/ML
100 INJECTION, SOLUTION INTRAVENOUS PRN
Status: DISCONTINUED | OUTPATIENT
Start: 2022-06-28 | End: 2022-07-02 | Stop reason: HOSPADM

## 2022-06-28 RX ORDER — LABETALOL HYDROCHLORIDE 5 MG/ML
10 INJECTION, SOLUTION INTRAVENOUS EVERY 4 HOURS PRN
Status: DISCONTINUED | OUTPATIENT
Start: 2022-06-28 | End: 2022-07-02 | Stop reason: HOSPADM

## 2022-06-28 RX ORDER — POLYETHYLENE GLYCOL 3350 17 G/17G
17 POWDER, FOR SOLUTION ORAL DAILY PRN
Status: DISCONTINUED | OUTPATIENT
Start: 2022-06-28 | End: 2022-07-02 | Stop reason: HOSPADM

## 2022-06-28 RX ORDER — SODIUM CHLORIDE 0.9 % (FLUSH) 0.9 %
3 SYRINGE (ML) INJECTION EVERY 8 HOURS
Status: DISCONTINUED | OUTPATIENT
Start: 2022-06-28 | End: 2022-07-01 | Stop reason: SDUPTHER

## 2022-06-28 RX ORDER — SODIUM CHLORIDE 0.9 % (FLUSH) 0.9 %
10 SYRINGE (ML) INJECTION
Status: COMPLETED | OUTPATIENT
Start: 2022-06-28 | End: 2022-06-28

## 2022-06-28 RX ORDER — HYDRALAZINE HYDROCHLORIDE 20 MG/ML
10 INJECTION INTRAMUSCULAR; INTRAVENOUS EVERY 6 HOURS PRN
Status: DISCONTINUED | OUTPATIENT
Start: 2022-06-28 | End: 2022-07-02 | Stop reason: HOSPADM

## 2022-06-28 RX ORDER — MORPHINE SULFATE 2 MG/ML
2 INJECTION, SOLUTION INTRAMUSCULAR; INTRAVENOUS EVERY 4 HOURS PRN
Status: DISCONTINUED | OUTPATIENT
Start: 2022-06-28 | End: 2022-07-02 | Stop reason: HOSPADM

## 2022-06-28 RX ORDER — ENOXAPARIN SODIUM 100 MG/ML
40 INJECTION SUBCUTANEOUS DAILY
Status: DISCONTINUED | OUTPATIENT
Start: 2022-06-29 | End: 2022-07-02 | Stop reason: HOSPADM

## 2022-06-28 RX ORDER — INSULIN LISPRO 100 [IU]/ML
0-4 INJECTION, SOLUTION INTRAVENOUS; SUBCUTANEOUS NIGHTLY
Status: DISCONTINUED | OUTPATIENT
Start: 2022-06-28 | End: 2022-07-02 | Stop reason: HOSPADM

## 2022-06-28 RX ORDER — 0.9 % SODIUM CHLORIDE 0.9 %
1000 INTRAVENOUS SOLUTION INTRAVENOUS
Status: COMPLETED | OUTPATIENT
Start: 2022-06-28 | End: 2022-06-28

## 2022-06-28 RX ORDER — ONDANSETRON 4 MG/1
4 TABLET, ORALLY DISINTEGRATING ORAL EVERY 8 HOURS PRN
Status: DISCONTINUED | OUTPATIENT
Start: 2022-06-28 | End: 2022-07-02 | Stop reason: HOSPADM

## 2022-06-28 RX ORDER — POTASSIUM CHLORIDE 20 MEQ/1
40 TABLET, EXTENDED RELEASE ORAL PRN
Status: DISCONTINUED | OUTPATIENT
Start: 2022-06-28 | End: 2022-06-30

## 2022-06-28 RX ORDER — PROCHLORPERAZINE EDISYLATE 5 MG/ML
10 INJECTION INTRAMUSCULAR; INTRAVENOUS EVERY 6 HOURS PRN
Status: DISCONTINUED | OUTPATIENT
Start: 2022-06-28 | End: 2022-07-02 | Stop reason: HOSPADM

## 2022-06-28 RX ORDER — LISINOPRIL 20 MG/1
20 TABLET ORAL NIGHTLY
Status: DISCONTINUED | OUTPATIENT
Start: 2022-06-28 | End: 2022-07-02 | Stop reason: HOSPADM

## 2022-06-28 RX ORDER — INSULIN LISPRO 100 [IU]/ML
0-4 INJECTION, SOLUTION INTRAVENOUS; SUBCUTANEOUS
Status: DISCONTINUED | OUTPATIENT
Start: 2022-06-28 | End: 2022-07-02 | Stop reason: HOSPADM

## 2022-06-28 RX ORDER — ONDANSETRON 2 MG/ML
4 INJECTION INTRAMUSCULAR; INTRAVENOUS
Status: DISCONTINUED | OUTPATIENT
Start: 2022-06-28 | End: 2022-06-28

## 2022-06-28 RX ORDER — MAGNESIUM SULFATE IN WATER 40 MG/ML
2000 INJECTION, SOLUTION INTRAVENOUS PRN
Status: DISCONTINUED | OUTPATIENT
Start: 2022-06-28 | End: 2022-06-30

## 2022-06-28 RX ORDER — SODIUM CHLORIDE 0.9 % (FLUSH) 0.9 %
5-40 SYRINGE (ML) INJECTION EVERY 12 HOURS SCHEDULED
Status: DISCONTINUED | OUTPATIENT
Start: 2022-06-28 | End: 2022-07-02 | Stop reason: HOSPADM

## 2022-06-28 RX ORDER — CITALOPRAM 20 MG/1
20 TABLET ORAL NIGHTLY
Status: DISCONTINUED | OUTPATIENT
Start: 2022-06-28 | End: 2022-07-02 | Stop reason: HOSPADM

## 2022-06-28 RX ORDER — PROCHLORPERAZINE EDISYLATE 5 MG/ML
10 INJECTION INTRAMUSCULAR; INTRAVENOUS ONCE
Status: COMPLETED | OUTPATIENT
Start: 2022-06-28 | End: 2022-06-28

## 2022-06-28 RX ORDER — ONDANSETRON 2 MG/ML
4 INJECTION INTRAMUSCULAR; INTRAVENOUS EVERY 6 HOURS PRN
Status: DISCONTINUED | OUTPATIENT
Start: 2022-06-28 | End: 2022-07-02 | Stop reason: HOSPADM

## 2022-06-28 RX ORDER — ONDANSETRON 2 MG/ML
4 INJECTION INTRAMUSCULAR; INTRAVENOUS ONCE
Status: COMPLETED | OUTPATIENT
Start: 2022-06-28 | End: 2022-06-28

## 2022-06-28 RX ORDER — SODIUM CHLORIDE 0.9 % (FLUSH) 0.9 %
5-40 SYRINGE (ML) INJECTION PRN
Status: DISCONTINUED | OUTPATIENT
Start: 2022-06-28 | End: 2022-07-02 | Stop reason: HOSPADM

## 2022-06-28 RX ORDER — SODIUM CHLORIDE 9 MG/ML
INJECTION, SOLUTION INTRAVENOUS PRN
Status: DISCONTINUED | OUTPATIENT
Start: 2022-06-28 | End: 2022-07-02 | Stop reason: HOSPADM

## 2022-06-28 RX ORDER — POTASSIUM CHLORIDE 7.45 MG/ML
10 INJECTION INTRAVENOUS PRN
Status: DISCONTINUED | OUTPATIENT
Start: 2022-06-28 | End: 2022-06-30

## 2022-06-28 RX ORDER — SODIUM CHLORIDE 9 MG/ML
INJECTION, SOLUTION INTRAVENOUS CONTINUOUS
Status: DISCONTINUED | OUTPATIENT
Start: 2022-06-28 | End: 2022-07-02 | Stop reason: HOSPADM

## 2022-06-28 RX ORDER — 0.9 % SODIUM CHLORIDE 0.9 %
1000 INTRAVENOUS SOLUTION INTRAVENOUS ONCE
Status: COMPLETED | OUTPATIENT
Start: 2022-06-28 | End: 2022-06-28

## 2022-06-28 RX ORDER — ASPIRIN 81 MG/1
81 TABLET ORAL NIGHTLY
Status: DISCONTINUED | OUTPATIENT
Start: 2022-06-28 | End: 2022-07-02 | Stop reason: HOSPADM

## 2022-06-28 RX ADMIN — PROCHLORPERAZINE EDISYLATE 10 MG: 5 INJECTION INTRAMUSCULAR; INTRAVENOUS at 13:29

## 2022-06-28 RX ADMIN — SODIUM CHLORIDE 1000 ML: 9 INJECTION, SOLUTION INTRAVENOUS at 21:15

## 2022-06-28 RX ADMIN — ONDANSETRON 4 MG: 2 INJECTION INTRAMUSCULAR; INTRAVENOUS at 08:59

## 2022-06-28 RX ADMIN — SODIUM CHLORIDE, PRESERVATIVE FREE 10 ML: 5 INJECTION INTRAVENOUS at 14:53

## 2022-06-28 RX ADMIN — ATORVASTATIN CALCIUM 80 MG: 80 TABLET, FILM COATED ORAL at 21:55

## 2022-06-28 RX ADMIN — SODIUM CHLORIDE: 9 INJECTION, SOLUTION INTRAVENOUS at 18:39

## 2022-06-28 RX ADMIN — ONDANSETRON 4 MG: 2 INJECTION INTRAMUSCULAR; INTRAVENOUS at 11:39

## 2022-06-28 RX ADMIN — SODIUM CHLORIDE 100 ML: 9 INJECTION, SOLUTION INTRAVENOUS at 14:53

## 2022-06-28 RX ADMIN — SODIUM CHLORIDE, PRESERVATIVE FREE 3 ML: 5 INJECTION INTRAVENOUS at 18:41

## 2022-06-28 RX ADMIN — SODIUM CHLORIDE, PRESERVATIVE FREE 40 MG: 5 INJECTION INTRAVENOUS at 19:34

## 2022-06-28 RX ADMIN — SODIUM CHLORIDE 1000 ML: 9 INJECTION, SOLUTION INTRAVENOUS at 10:18

## 2022-06-28 RX ADMIN — PROCHLORPERAZINE EDISYLATE 10 MG: 5 INJECTION INTRAMUSCULAR; INTRAVENOUS at 19:34

## 2022-06-28 RX ADMIN — ASPIRIN 81 MG: 81 TABLET ORAL at 21:55

## 2022-06-28 RX ADMIN — CITALOPRAM HYDROBROMIDE 20 MG: 20 TABLET ORAL at 21:55

## 2022-06-28 RX ADMIN — SODIUM CHLORIDE, PRESERVATIVE FREE 10 ML: 5 INJECTION INTRAVENOUS at 21:55

## 2022-06-28 RX ADMIN — INSULIN HUMAN 8 UNITS: 100 INJECTION, SOLUTION PARENTERAL at 13:29

## 2022-06-28 RX ADMIN — IOPAMIDOL 100 ML: 755 INJECTION, SOLUTION INTRAVENOUS at 14:53

## 2022-06-28 ASSESSMENT — PAIN - FUNCTIONAL ASSESSMENT: PAIN_FUNCTIONAL_ASSESSMENT: NONE - DENIES PAIN

## 2022-06-28 ASSESSMENT — ENCOUNTER SYMPTOMS
VOMITING: 1
NAUSEA: 1
BACK PAIN: 0
COUGH: 0
ABDOMINAL PAIN: 1

## 2022-06-28 NOTE — ED PROVIDER NOTES
[facility] Emergency Department Provider Note                   PCP:                Tres King DO               Age: 64 y.o. Sex: female     No diagnosis found. DISPOSITION         New Prescriptions    No medications on file       Orders Placed This Encounter   Procedures    COVID-19, Rapid    CBC with Auto Differential    Comprehensive Metabolic Panel    Magnesium    Lipase    POCT Urine Dipstick    Continuous Pulse Oximetry    Saline lock IV        Kathleen Mathews MD 9:18 AM      MDM  Number of Diagnoses or Management Options  Acute pancreatitis, unspecified complication status, unspecified pancreatitis type  Nausea and vomiting, intractability of vomiting not specified, unspecified vomiting type  Diagnosis management comments: Blood work is unremarkable except for hyperglycemia 343 without DKA and elevated lipase 594. Alkaline phosphatase also elevated. Bilirubin normal.  Patient initially treated with IV fluids and Zofran. She continued to have nausea and vomiting was treated with a second dose of Zofran. This too was ineffective. She was then treated with Compazine. Nausea has improved a bit but not resolved. She also received insulin for hyperglycemia. Ultrasound shows no gallstones or other abnormality. Will obtain CT scan. As she is nausea and vomiting associated with pancreatitis will discuss with hospitalist for admission.        Amount and/or Complexity of Data Reviewed  Clinical lab tests: ordered and reviewed  Tests in the radiology section of CPT®: ordered and reviewed  Tests in the medicine section of CPT®: ordered and reviewed  Discuss the patient with other providers: yes  Independent visualization of images, tracings, or specimens: yes    Risk of Complications, Morbidity, and/or Mortality  Presenting problems: moderate  Diagnostic procedures: moderate  Management options: lc Morataya is a 64 y.o. female who presents to the Emergency Department  Frequency of Communication with Friends and Family: Not on file    Frequency of Social Gatherings with Friends and Family: Not on file    Attends Latter-day Services: Not on file    Active Member of Clubs or Organizations: Not on file    Attends Club or Organization Meetings: Not on file    Marital Status: Not on file        No Known Allergies     Vitals signs and nursing note reviewed. Patient Vitals for the past 4 hrs:   Temp Pulse Resp BP SpO2   06/28/22 0848 97.5 °F (36.4 °C) 98 20 (!) 152/88 100 %          Physical Exam  Vitals and nursing note reviewed. Constitutional:       General: She is not in acute distress. Appearance: Normal appearance. She is not toxic-appearing. HENT:      Head: Normocephalic and atraumatic. Nose: Nose normal.      Mouth/Throat:      Mouth: Mucous membranes are moist.      Pharynx: Oropharynx is clear. Eyes:      Conjunctiva/sclera: Conjunctivae normal.      Pupils: Pupils are equal, round, and reactive to light. Cardiovascular:      Rate and Rhythm: Normal rate and regular rhythm. Pulmonary:      Effort: Pulmonary effort is normal.   Abdominal:      General: There is no distension. Palpations: Abdomen is soft. Tenderness: There is no abdominal tenderness. There is no guarding. Musculoskeletal:         General: No swelling. Normal range of motion. Cervical back: Normal range of motion and neck supple. Skin:     General: Skin is warm and dry. Neurological:      Mental Status: She is alert and oriented to person, place, and time. Psychiatric:         Mood and Affect: Mood normal.         Behavior: Behavior normal.          Procedures      Labs Reviewed   COVID-19, RAPID   CBC WITH AUTO DIFFERENTIAL   COMPREHENSIVE METABOLIC PANEL   MAGNESIUM   LIPASE        No orders to display                          Voice dictation software was used during the making of this note.   This software is not perfect and grammatical and other typographical errors may be present. This note has not been completely proofread for errors.      Ady Vega MD  06/28/22 [id number]

## 2022-06-28 NOTE — ED TRIAGE NOTES
Pt arrives from home via EMS stating malaise, n/v x today. Pt states hx of CABG x 5 weeks ago. Pt denies abdominal pain. Pt denies diarrhea. Pt states she is unable to keep foods/fluids down.  Pt denies fevers at home    VS with EMS   /98  Hr 94  RR 24

## 2022-06-29 PROBLEM — E83.42 HYPOMAGNESEMIA: Status: RESOLVED | Noted: 2022-06-28 | Resolved: 2022-06-29

## 2022-06-29 PROBLEM — E87.1 HYPONATREMIA: Status: RESOLVED | Noted: 2022-06-28 | Resolved: 2022-06-29

## 2022-06-29 LAB
ALBUMIN SERPL-MCNC: 3.1 G/DL (ref 3.5–5)
ALBUMIN/GLOB SERPL: 0.7 {RATIO} (ref 1.2–3.5)
ALP SERPL-CCNC: 107 U/L (ref 50–136)
ALT SERPL-CCNC: 19 U/L (ref 12–65)
ANION GAP SERPL CALC-SCNC: 8 MMOL/L (ref 7–16)
AST SERPL-CCNC: 13 U/L (ref 15–37)
BASOPHILS # BLD: 0 K/UL (ref 0–0.2)
BASOPHILS NFR BLD: 0 % (ref 0–2)
BILIRUB SERPL-MCNC: 0.4 MG/DL (ref 0.2–1.1)
BUN SERPL-MCNC: 14 MG/DL (ref 6–23)
CALCIUM SERPL-MCNC: 8.7 MG/DL (ref 8.3–10.4)
CHLORIDE SERPL-SCNC: 107 MMOL/L (ref 98–107)
CO2 SERPL-SCNC: 25 MMOL/L (ref 21–32)
CREAT SERPL-MCNC: 0.8 MG/DL (ref 0.6–1)
DIFFERENTIAL METHOD BLD: ABNORMAL
EOSINOPHIL # BLD: 0 K/UL (ref 0–0.8)
EOSINOPHIL NFR BLD: 0 % (ref 0.5–7.8)
ERYTHROCYTE [DISTWIDTH] IN BLOOD BY AUTOMATED COUNT: 13.1 % (ref 11.9–14.6)
EST. AVERAGE GLUCOSE BLD GHB EST-MCNC: 226 MG/DL
GLOBULIN SER CALC-MCNC: 4.2 G/DL (ref 2.3–3.5)
GLUCOSE BLD STRIP.AUTO-MCNC: 119 MG/DL (ref 65–100)
GLUCOSE BLD STRIP.AUTO-MCNC: 131 MG/DL (ref 65–100)
GLUCOSE BLD STRIP.AUTO-MCNC: 181 MG/DL (ref 65–100)
GLUCOSE SERPL-MCNC: 171 MG/DL (ref 65–100)
HBA1C MFR BLD: 9.5 % (ref 4.2–6.3)
HCT VFR BLD AUTO: 36.3 % (ref 35.8–46.3)
HGB BLD-MCNC: 12.1 G/DL (ref 11.7–15.4)
IMM GRANULOCYTES # BLD AUTO: 0 K/UL (ref 0–0.5)
IMM GRANULOCYTES NFR BLD AUTO: 0 % (ref 0–5)
LYMPHOCYTES # BLD: 4 K/UL (ref 0.5–4.6)
LYMPHOCYTES NFR BLD: 30 % (ref 13–44)
MCH RBC QN AUTO: 27 PG (ref 26.1–32.9)
MCHC RBC AUTO-ENTMCNC: 33.3 G/DL (ref 31.4–35)
MCV RBC AUTO: 81 FL (ref 79.6–97.8)
MONOCYTES # BLD: 1 K/UL (ref 0.1–1.3)
MONOCYTES NFR BLD: 7 % (ref 4–12)
NEUTS SEG # BLD: 8.3 K/UL (ref 1.7–8.2)
NEUTS SEG NFR BLD: 63 % (ref 43–78)
NRBC # BLD: 0 K/UL (ref 0–0.2)
PLATELET # BLD AUTO: 274 K/UL (ref 150–450)
PMV BLD AUTO: 10.3 FL (ref 9.4–12.3)
POTASSIUM SERPL-SCNC: 3.9 MMOL/L (ref 3.5–5.1)
PROT SERPL-MCNC: 7.3 G/DL (ref 6.3–8.2)
RBC # BLD AUTO: 4.48 M/UL (ref 4.05–5.2)
SERVICE CMNT-IMP: ABNORMAL
SODIUM SERPL-SCNC: 140 MMOL/L (ref 136–145)
WBC # BLD AUTO: 13.3 K/UL (ref 4.3–11.1)

## 2022-06-29 PROCEDURE — 2580000003 HC RX 258: Performed by: STUDENT IN AN ORGANIZED HEALTH CARE EDUCATION/TRAINING PROGRAM

## 2022-06-29 PROCEDURE — 97535 SELF CARE MNGMENT TRAINING: CPT

## 2022-06-29 PROCEDURE — 97161 PT EVAL LOW COMPLEX 20 MIN: CPT

## 2022-06-29 PROCEDURE — 97165 OT EVAL LOW COMPLEX 30 MIN: CPT

## 2022-06-29 PROCEDURE — 1100000000 HC RM PRIVATE

## 2022-06-29 PROCEDURE — 82962 GLUCOSE BLOOD TEST: CPT

## 2022-06-29 PROCEDURE — 1100000003 HC PRIVATE W/ TELEMETRY

## 2022-06-29 PROCEDURE — 6370000000 HC RX 637 (ALT 250 FOR IP): Performed by: STUDENT IN AN ORGANIZED HEALTH CARE EDUCATION/TRAINING PROGRAM

## 2022-06-29 PROCEDURE — 6360000002 HC RX W HCPCS: Performed by: STUDENT IN AN ORGANIZED HEALTH CARE EDUCATION/TRAINING PROGRAM

## 2022-06-29 PROCEDURE — 2580000003 HC RX 258: Performed by: EMERGENCY MEDICINE

## 2022-06-29 PROCEDURE — 85025 COMPLETE CBC W/AUTO DIFF WBC: CPT

## 2022-06-29 PROCEDURE — 97116 GAIT TRAINING THERAPY: CPT

## 2022-06-29 PROCEDURE — 80053 COMPREHEN METABOLIC PANEL: CPT

## 2022-06-29 PROCEDURE — A4216 STERILE WATER/SALINE, 10 ML: HCPCS | Performed by: STUDENT IN AN ORGANIZED HEALTH CARE EDUCATION/TRAINING PROGRAM

## 2022-06-29 PROCEDURE — C9113 INJ PANTOPRAZOLE SODIUM, VIA: HCPCS | Performed by: STUDENT IN AN ORGANIZED HEALTH CARE EDUCATION/TRAINING PROGRAM

## 2022-06-29 RX ADMIN — METOPROLOL TARTRATE 25 MG: 25 TABLET, FILM COATED ORAL at 07:41

## 2022-06-29 RX ADMIN — INSULIN HUMAN 7 UNITS: 100 INJECTION, SUSPENSION SUBCUTANEOUS at 21:25

## 2022-06-29 RX ADMIN — SODIUM CHLORIDE, PRESERVATIVE FREE 10 ML: 5 INJECTION INTRAVENOUS at 07:46

## 2022-06-29 RX ADMIN — SODIUM CHLORIDE: 9 INJECTION, SOLUTION INTRAVENOUS at 06:07

## 2022-06-29 RX ADMIN — SODIUM CHLORIDE, PRESERVATIVE FREE 3 ML: 5 INJECTION INTRAVENOUS at 07:41

## 2022-06-29 RX ADMIN — SODIUM CHLORIDE, PRESERVATIVE FREE 10 ML: 5 INJECTION INTRAVENOUS at 21:27

## 2022-06-29 RX ADMIN — METOPROLOL TARTRATE 25 MG: 25 TABLET, FILM COATED ORAL at 21:13

## 2022-06-29 RX ADMIN — LISINOPRIL 20 MG: 20 TABLET ORAL at 21:13

## 2022-06-29 RX ADMIN — SODIUM CHLORIDE, PRESERVATIVE FREE 40 MG: 5 INJECTION INTRAVENOUS at 07:41

## 2022-06-29 RX ADMIN — INSULIN HUMAN 18 UNITS: 100 INJECTION, SUSPENSION SUBCUTANEOUS at 08:32

## 2022-06-29 RX ADMIN — PROCHLORPERAZINE EDISYLATE 10 MG: 5 INJECTION INTRAMUSCULAR; INTRAVENOUS at 19:23

## 2022-06-29 RX ADMIN — SODIUM CHLORIDE, PRESERVATIVE FREE 3 ML: 5 INJECTION INTRAVENOUS at 17:29

## 2022-06-29 RX ADMIN — ENOXAPARIN SODIUM 40 MG: 100 INJECTION SUBCUTANEOUS at 07:41

## 2022-06-29 RX ADMIN — ASPIRIN 81 MG: 81 TABLET ORAL at 21:13

## 2022-06-29 NOTE — PROGRESS NOTES
Pt had no acute events during shift. Diarrhea and vomiting have resolved. Pt denied pain throughout shift. Pt remains SR on monitor. Pt sitting in chair most of shift. Hourly rounding completed per protocol.  Will report off to oncoming RN

## 2022-06-29 NOTE — PROGRESS NOTES
OCCUPATIONAL THERAPY Initial Assessment, Daily Note, Discharge and AM       OT Visit Days: 1  Acknowledge Orders  Time  OT Charge Capture  Rehab Caseload Tracker      Stanley Messina is a 64 y.o. female   PRIMARY DIAGNOSIS: Intractable nausea and vomiting  Idiopathic acute pancreatitis [K85.00]  Nausea and vomiting, intractability of vomiting not specified, unspecified vomiting type [R11.2]  Acute pancreatitis, unspecified complication status, unspecified pancreatitis type [K85.90]       Reason for Referral: Generalized Muscle Weakness (M62.81)  Other lack of cordination (R27.8)  Inpatient: Payor: Hallie Melo / Plan: Cruzito Lom[place] / Product Type: *No Product type* /     ASSESSMENT:     REHAB RECOMMENDATIONS:   Recommendation to date pending progress:  Setting:   No further skilled therapy after discharge from hospital; states she was supposed to start cardiac rehab today    Equipment:     None     ASSESSMENT:  Ms. Annie Whiting presents to the hospital with nausea/vomiting due to acute pancreatitis. Pt reports hx of CABG ~ 5 weeks ago. Pt is sitting up in the chair. Pt is alert and appropriate for her age. Pt is pleasant and cooperative. Pt denies any pain or complaints at this time. Pt moving well today needing supervision to independence with all functional transfers and observed ADL. Pt denies any dizziness or c/o fatigue. Pt worked on prolonged standing at the sink for grooming tasks. Pt appreciative of care. Pt appears to be functioning at or close to her baseline at this time and has no further OT needs. Pt's plan is to hopefully follow-up with her plan to start cardiac rehab.       [address] [facility] [address] [id number] AM-PAC 6 Clicks Daily Activity Inpatient Short Form:    AM-PAC Daily Activity Inpatient   How much help for putting on and taking off regular lower body clothing?: None  How much help for Bathing?: None  How much help for Toileting?: None  How much help for putting on and taking off regular upper body clothing?: None  How much help for taking care of personal grooming?: None  How much help for eating meals?: None  AM-PAC Inpatient Daily Activity Raw Score: 24  AM-PAC Inpatient ADL T-Scale Score : 57.54  ADL Inpatient CMS 0-100% Score: 0  ADL Inpatient CMS G-Code Modifier : CH           SUBJECTIVE:     Ms. Paco Sumner states, \"I go back to work \"     Social/Functional Lives With: Parent  Type of Home: [facility] Layout: One level  Home Access: Stairs to enter with rails  Entrance Stairs - Number of Steps: 3  Entrance Stairs - Rails: Both  Bathroom Shower/Tub: Tub/Shower unit,Walk-in shower  Bathroom Toilet: Standard  Receives Help From: Family  ADL Assistance: [address] [place] [address] Avenue: Independent  Homemaking Responsibilities: Yes  Ambulation Assistance: Independent  Transfer Assistance: Independent  Active : Yes    OBJECTIVE:     Sandro Bradley / Octavia Xiong / Joshua Lunafe: IV    RESTRICTIONS/PRECAUTIONS:  Restrictions/Precautions: Fall Risk    PAIN: Ck Mccoy / O2:   Pre Treatment:   Pain Assessment: None - Denies Pain      Post Treatment: same       Vitals          Oxygen            GROSS EVALUATION: INTACT IMPAIRED   (See Comments)   UE AROM [] []stiffness at shoulders due recent hx of CABG   UE PROM [] []   Strength []   appears functional    Posture / Balance [x]     Sensation [x]     Coordination [x]       Tone [x]       Edema [x]    Activity Tolerance []       Hand Dominance R [x] L []      COGNITION/  PERCEPTION: INTACT IMPAIRED   (See Comments)   Orientation [x]     Vision [x]     Hearing [x]     Cognition  [x]     Perception [x]       MOBILITY: I Mod I S SBA CGA Min Mod Max Total  NT x2 Comments:   Bed Mobility    Rolling [] [] [] [] [] [] [] [] [] [x] []    Supine to Sit [] [] [] [] [] [] [] [] [] [x] []    Scooting [x] [] [] [] [] [] [] [] [] [] []    Sit to Supine [] [] [] [] [] [] [] [] [] [x] []    Transfers    Sit to Stand [x] [] [] [] [] [] [] [] [] [] []    Bed to Chair [x] [] [] [] [] [] [] [] [] [] [] Stand to Sit [x] [] [] [] [] [] [] [] [] [] []    Tub/Shower [] [] [] [] [] [] [] [] [] [x] []     Toilet [] [] [] [] [] [] [] [] [] [x] []      [] [] [] [] [] [] [] [] [] [] []    I=Independent, Mod I=Modified Independent, S=Supervision/Setup, SBA=Standby Assistance, CGA=Contact Guard Assistance, Min=Minimal Assistance, Mod=Moderate Assistance, Max=Maximal Assistance, Total=Total Assistance, NT=Not Tested    ACTIVITIES OF DAILY LIVING: I Mod I S SBA CGA Min Mod Max Total NT Comments   BASIC ADLs:              Upper Body Bathing  [] [] [] [] [] [] [] [] [] [x]    Lower Body Bathing [] [] [] [] [] [] [] [] [] [x]    Toileting [] [] [] [] [] [] [] [] [] [x]    Upper Body Dressing [] [] [] [] [] [] [] [] [] [x]    Lower Body Dressing [] [] [] [] [] [] [] [] [] [x]    Feeding [] [] [] [] [] [] [] [] [] [x]    Grooming [x] [] [] [] [] [] [] [] [] [] Standing sink side    Personal Device Care [] [] [] [] [] [] [] [] [] [x]    Functional Mobility [x] [] [] [] [] [] [] [] [] [] No AD   I=Independent, Mod I=Modified Independent, S=Supervision/Setup, SBA=Standby Assistance, CGA=Contact Guard Assistance, Min=Minimal Assistance, Mod=Moderate Assistance, Max=Maximal Assistance, Total=Total Assistance, NT=Not Tested    PLAN:     FREQUENCY/DURATION   OT Plan of Care:  (Discharge) for duration of hospital stay or until stated goals are met, whichever comes first.    ACUTE OCCUPATIONAL THERAPY GOALS:   (Developed with and agreed upon by patient and/or caregiver.)  1. Patient will tolerate functional transfers, functional mobility, and observed ADL with independence, good safety, and appropriate activity tolerance.  MET   Timeframe: 1 visit      PROBLEM LIST:   (Skilled intervention is medically necessary to address:)  n/a   INTERVENTIONS PLANNED:  (Benefits and precautions of occupational therapy have been discussed with the patient.)  Self Care Training         TREATMENT:     EVALUATION: LOW COMPLEXITY: (Untimed Charge)    TREATMENT:   Self Care (10 minutes): Patient participated in grooming ADLs in standing with no tactile cueing to increase independence and increase activity tolerance. Patient also participated in functional mobility training to increase independence and increase activity tolerance.      TREATMENT GRID:  N/A    AFTER TREATMENT PRECAUTIONS: Bed/Chair Locked, Call light within reach, Chair, Needs within reach, RN notified and Visitors at bedside    INTERDISCIPLINARY COLLABORATION:  RN/ PCT and OT/ GROSS    EDUCATION:  Education Given To: Patient  Education Provided: Role of Therapy;Plan of Care  Education Method: Demonstration;Verbal  Barriers to Learning: None  Education Outcome: Verbalized understanding;Demonstrated understanding    TOTAL TREATMENT DURATION AND TIME:  Time In: 1109  Time Out: [address]  Minutes: 30 [facility], OT

## 2022-06-29 NOTE — PROGRESS NOTES
2106 Pt B/P 92/62 . Pt very drowsy. Writer concerned about the drop in B/P. Dr. Cuco Dos Santos notified. New orders placed. Evening metetoprolol and lisinopril held. Pt states she took home medication before arriving to the floor.

## 2022-06-29 NOTE — H&P
for patient's symptoms. Stomach is decompressed. Probable fatty infiltration in the liver    Coronary artery disease   Status post CABG 5 weeks ago   Continue Lipitor, Lopressor, aspirin    Hyponatremia   Continue maintenance IV fluids   Follow-up daily BMP    Hypomagnesemia   Replete as needed   Follow-up repeat magnesium    Type 2 diabetes   Sliding scale insulin   Continue to monitor daily blood glucose levels   A1c pending    Hyperlipidemia   Continue home atorvastatin    Hypertension   Continue home antihypertensives   As needed hydralazine      Dispo/Discharge Planning:   Dispo pending clinical course    Diet: Diet NPO Exceptions are: Sips of Water with Meds  VTE ppx: Lovenox  Code status: Full Code    Hospital Problems:  Principal Problem:    Intractable nausea and vomiting  Resolved Problems:    * No resolved hospital problems.  *       Past History:     Past Medical History:   Diagnosis Date    Anxiety 11/28/2012    CAD (coronary artery disease)     No MI; no stents    Depression     managed with med    DM (diabetes mellitus) ([facility] [address] 11/28/2012    insulin reliant; AVG ; pt denies s.s. of hypoglycemia; last A1C    HLD (hyperlipidemia)     managed with med    Hypertension     managed with med    Ulcerative esophagitis      Past Surgical History:   Procedure Laterality Date    CARDIAC CATHETERIZATION Left     CORONARY ARTERY BYPASS GRAFT N/A 5/25/2022    CORONARY ARTERY BYPASS GRAFT (CABG X 2), [place]; ENDOSCOPIC VEIN HARVEST LEFT GREATER SAPHENOUS performed by Madiha Weiss MD at [address] TRANSESOPHAGEAL ECHOCARDIOGRAM N/A 5/25/2022    TRANSESOPHAGEAL ECHOCARDIOGRAM performed by Madiha Weiss MD at [address] ENDOSCOPY      ulcerative espophagitis      Social History     Tobacco Use    Smoking status: Never Smoker    Smokeless tobacco: Never Used   Substance Use Topics    Alcohol use: Yes     Comment: rarely      Social History     Substance and Sexual Activity   Drug Use No     Family History   Problem Relation Age of Onset    Diabetes Father       Family history reviewed and negative except as noted above. There is no immunization history on file for this patient. No Known Allergies  Prior to Admit Medications:  Current Outpatient Medications   Medication Instructions    acetaminophen (TYLENOL) 500 mg, Oral, EVERY 4 HOURS PRN    aspirin 81 mg, Oral, Nightly    atorvastatin (LIPITOR) 80 mg, Oral, Nightly    citalopram (CELEXA) 20 mg, Oral, Nightly    Docusate Calcium (STOOL SOFTENER PO) 1 capsule, Oral, DAILY PRN    insulin NPH (HUMULIN N;NOVOLIN N) 100 UNIT/ML injection vial 18 units in the morning and 7 units at night    lisinopril (PRINIVIL;ZESTRIL) 20 mg, Oral, Nightly, TAKE 1 TABLET BY MOUTH EVERY DAY    metoprolol tartrate (LOPRESSOR) 25 mg, Oral, 2 TIMES DAILY    polyethylene glycol (MIRALAX) 17 g, Oral, DAILY PRN         Objective:     Patient Vitals for the past 24 hrs:   Temp Pulse Resp BP SpO2   06/28/22 2155  96  113/75    06/28/22 2100  (!) 106  92/62    06/28/22 2034 98.4 °F (36.9 °C) (!) 113 16 101/62 93 %   06/28/22 1732 98.6 °F (37 °C) (!) 135 21 (!) 180/102 95 %   06/28/22 1403  99 27 (!) 169/89 95 %   06/28/22 1001  98 19 (!) 162/89 99 %   06/28/22 0911  99 24 (!) 156/85 100 %   06/28/22 0901  100 28 (!) 157/84 100 %   06/28/22 0848 97.5 °F (36.4 °C) 98 20 (!) 152/88 100 %       Oxygen Therapy  SpO2: 93 %  Pulse Oximeter Device Mode: Intermittent  O2 Device: Nasal cannula  O2 Flow Rate (L/min): 2 L/min    Estimated body mass index is 27.57 kg/m² as calculated from the following:    Height as of 6/21/22: 5' 4\" (1.626 m). Weight as of this encounter: 160 lb 9.6 oz (72.8 kg). No intake or output data in the 24 hours ending 06/28/22 2334      Physical Exam:  Blood pressure 113/75, pulse 96, temperature 98.4 °F (36.9 °C), temperature source Oral, resp.  rate 16, weight 160 lb 9.6 oz (72.8 kg), SpO2 93 %.  General:    Well nourished. Head:  Normocephalic, atraumatic  Eyes:  Sclerae appear normal.  Pupils equally round. ENT:  Nares appear normal, no drainage. Moist oral mucosa  Neck:  No restricted ROM. Trachea midline   CV:   RRR. No m/r/g. No jugular venous distension. Lungs:   CTAB. No wheezing, rhonchi, or rales. Respirations even, unlabored  Abdomen: Bowel sounds present. Soft, nontender, nondistended. Extremities: No cyanosis or clubbing. No edema  Skin:     No rashes and normal coloration. Warm and dry. Neuro:  CN II-XII grossly intact. Sensation intact. A&Ox3  Psych:  Normal mood and affect.       I have reviewed ordered lab tests and independently visualized imaging below:    Last 24hr Labs:  Recent Results (from the past 24 hour(s))   CBC with Auto Differential    Collection Time: 06/28/22  8:54 AM   Result Value Ref Range    WBC 10.8 4.3 - 11.1 K/uL    RBC 4.86 4.05 - 5.2 M/uL    Hemoglobin 13.4 11.7 - 15.4 g/dL    Hematocrit 38.8 35.8 - 46.3 %    MCV 79.8 79.6 - 97.8 FL    MCH 27.6 26.1 - 32.9 PG    MCHC 34.5 31.4 - 35.0 g/dL    RDW 12.8 11.9 - 14.6 %    Platelets 209 332 - 911 K/uL    MPV 10.3 9.4 - 12.3 FL    nRBC 0.00 0.0 - 0.2 K/uL    Differential Type AUTOMATED      Seg Neutrophils 67 43 - 78 %    Lymphocytes 26 13 - 44 %    Monocytes 5 4.0 - 12.0 %    Eosinophils % 1 0.5 - 7.8 %    Basophils 1 0.0 - 2.0 %    Immature Granulocytes 1 0.0 - 5.0 %    Segs Absolute 7.2 1.7 - 8.2 K/UL    Absolute Lymph # 2.8 0.5 - 4.6 K/UL    Absolute Mono # 0.6 0.1 - 1.3 K/UL    Absolute Eos # 0.1 0.0 - 0.8 K/UL    Basophils Absolute 0.1 0.0 - 0.2 K/UL    Absolute Immature Granulocyte 0.1 0.0 - 0.5 K/UL   Comprehensive Metabolic Panel    Collection Time: 06/28/22  8:54 AM   Result Value Ref Range    Sodium 134 (L) 136 - 145 mmol/L    Potassium 3.7 3.5 - 5.1 mmol/L    Chloride 98 98 - 107 mmol/L    CO2 22 21 - 32 mmol/L    Anion Gap 14 7 - 16 mmol/L    Glucose 343 (H) 65 - 100 mg/dL    BUN 11 6 - 23 MG/DL    CREATININE 1.00 0.6 - 1.0 MG/DL    GFR African American >60 >60 ml/min/1.73m2    GFR Non- >60 >60 ml/min/1.73m2    Calcium 9.7 8.3 - 10.4 MG/DL    Total Bilirubin 0.5 0.2 - 1.1 MG/DL    ALT 22 12 - 65 U/L    AST 21 15 - 37 U/L    Alk Phosphatase 162 (H) 50 - 136 U/L    Total Protein 8.5 (H) 6.3 - 8.2 g/dL    Albumin 3.7 3.5 - 5.0 g/dL    Globulin 4.8 (H) 2.3 - 3.5 g/dL    Albumin/Globulin Ratio 0.8 (L) 1.2 - 3.5     Magnesium    Collection Time: 06/28/22  8:54 AM   Result Value Ref Range    Magnesium 1.4 (L) 1.8 - 2.4 mg/dL   Lipase    Collection Time: 06/28/22  8:54 AM   Result Value Ref Range    Lipase 594 (H) 73 - 393 U/L   COVID-19, Rapid    Collection Time: 06/28/22 10:05 AM    Specimen: Nasopharyngeal   Result Value Ref Range    Source NASAL      SARS-CoV-2, Rapid Not detected NOTD     POCT Urinalysis no Micro    Collection Time: 06/28/22 12:23 PM   Result Value Ref Range    Specific Gravity, Urine, POC 1.020 1.001 - 1.023      pH, Urine, POC 7.0 5.0 - 9.0      Protein, Urine, POC 30 (A) NEG mg/dL    Glucose, UA POC >1,000 (A) NEG mg/dL    KETONES, Urine, POC 80 (A) NEG mg/dL    Bilirubin, Urine, POC Negative NEG      Blood, UA POC Trace Intact (A) NEG      URINE UROBILINOGEN POC 0.2 0.2 - 1.0 EU/dL    Nitrate, Urine, POC Negative NEG      Leukocyte Est, UA POC Negative NEG      Performed by: Artemio Chanel    POCT Glucose    Collection Time: 06/28/22  1:22 PM   Result Value Ref Range    POC Glucose 343 (H) 65 - 100 mg/dL    Performed by: Donna    POCT Glucose    Collection Time: 06/28/22  4:34 PM   Result Value Ref Range    POC Glucose 303 (H) 65 - 100 mg/dL    Performed by: Donna    POCT Glucose    Collection Time: 06/28/22  8:42 PM   Result Value Ref Range    POC Glucose 286 (H) 65 - 100 mg/dL    Performed by:  Rohit        Other Studies:  CT ABDOMEN PELVIS W IV CONTRAST Additional Contrast? None    Result Date: 6/28/2022  CT OF THE ABDOMEN AND PELVIS INDICATION: Nausea and vomiting. CABG 5 weeks ago. Multiple axial images were obtained through the abdomen and pelvis. Oral contrast was used for bowel opacification. 100 mL of Isovue 370 intravenous contrast was used for better evaluation of solid organs and vascular structures. Radiation dose reduction techniques were used for this study. All CT scans performed at this facility use one or all of the following: Automated exposure control, adjustment of the mA and/or kVp according to patient's size, iterative reconstruction. COMPARISON: Abdominal ultrasound 6/28/2022. FINDINGS: LOWER CHEST: Normal. HEPATOBILIARY: Probable fatty infiltration liver without focal mass. Gallbladder is unremarkable. PANCREAS: Normal. SPLEEN: Normal. ADRENAL GLANDS: Normal. KIDNEYS/BLADDER: Normal. BOWEL: No bowel obstruction, diverticulitis or appendicitis. Stomach is decompressed. LYMPH NODES: No enlarged abdominal or pelvic lymph nodes. VASCULATURE: Unremarkable. PELVIC ORGANS: Uterus, ovaries and rectum are unremarkable. No pelvic free fluid. MUSCULOSKELETAL: Normal.     No acute changes in the abdomen or pelvis to account for patient's symptoms. No bowel obstruction, diverticulitis or appendicitis. Stomach is decompressed. Probable fatty infiltration liver. US ABDOMEN LIMITED    Result Date: 6/28/2022  RIGHT UPPER QUADRANT SONOGRAPHY: CLINICAL HISTORY: Abdominal pain with pancreatitis. FINDINGS: Multiple images from real time ultrasound evaluation of the RUQ show that the gallbladder is normal in size and configuration, without evidence of stone, wall thickening, or pericholecystic fluid. No tenderness was elicited while scanning over the gallbladder. The common duct is normal in caliber at 2 mm, and no significant intrahepatic bilary ductal dilatation is evident. No focal liver lesion is seen. The pancreas is normal in contour and echogenicity where seen. The right kidney is not obstructed.      Unremarkable examination. Echocardiogram:  No results found for this or any previous visit.       Meds previously ordered:  Orders Placed This Encounter   Medications    sodium chloride flush 0.9 % injection 3 mL    ondansetron (ZOFRAN) injection 4 mg    0.9 % sodium chloride bolus    DISCONTD: ondansetron (ZOFRAN) injection 4 mg    ondansetron (ZOFRAN) injection 4 mg    insulin regular (HUMULIN R;NOVOLIN R) injection 8 Units    prochlorperazine (COMPAZINE) injection 10 mg    0.9 % sodium chloride bolus    sodium chloride flush 0.9 % injection 10 mL    iopamidol (ISOVUE-370) 76 % injection 100 mL    aspirin EC tablet 81 mg    atorvastatin (LIPITOR) tablet 80 mg    metoprolol tartrate (LOPRESSOR) tablet 25 mg    lisinopril (PRINIVIL;ZESTRIL) tablet 20 mg    insulin NPH (HUMULIN N;NOVOLIN N) injection vial 7-18 Units    citalopram (CELEXA) tablet 20 mg    sodium chloride flush 0.9 % injection 5-40 mL    sodium chloride flush 0.9 % injection 5-40 mL    0.9 % sodium chloride infusion    enoxaparin (LOVENOX) injection 40 mg     Order Specific Question:   Indication of Use     Answer:   Prophylaxis-DVT/PE    OR Linked Order Group     ondansetron (ZOFRAN-ODT) disintegrating tablet 4 mg     ondansetron (ZOFRAN) injection 4 mg    polyethylene glycol (GLYCOLAX) packet 17 g    OR Linked Order Group     acetaminophen (TYLENOL) tablet 650 mg     acetaminophen (TYLENOL) suppository 650 mg    magnesium sulfate 2000 mg in 50 mL IVPB premix    OR Linked Order Group     potassium chloride (KLOR-CON M) extended release tablet 40 mEq     potassium bicarb-citric acid (EFFER-K) effervescent tablet 40 mEq     potassium chloride 10 mEq/100 mL IVPB (Peripheral Line)    0.9 % sodium chloride infusion    morphine injection 2 mg    insulin lispro (HUMALOG) injection vial 0-4 Units    insulin lispro (HUMALOG) injection vial 0-4 Units    glucose chewable tablet 16 g    OR Linked Order Group     dextrose bolus 10% 125 mL     dextrose bolus 10% 250 mL    glucagon (rDNA) injection 1 mg    dextrose 5 % solution    prochlorperazine (COMPAZINE) injection 10 mg    labetalol (NORMODYNE;TRANDATE) injection 10 mg    pantoprazole (PROTONIX) 40 mg in sodium chloride (PF) 10 mL injection    0.9 % sodium chloride bolus       Signed:  Ngozi Tesfaye MD    Part of this note may have been written by using a voice dictation software. The note has been proof read but may still contain some grammatical/other typographical errors.

## 2022-06-29 NOTE — THERAPY EVALUATION
PHYSICAL THERAPY Initial Assessment  (Link to Caseload Tracking: PT Visit Days : 1  Acknowledge Orders  Time In/Out  PT Charge Capture  Rehab Caseload Tracker    Maggi Alex is a 64 y.o. female   PRIMARY DIAGNOSIS: Intractable nausea and vomiting  Idiopathic acute pancreatitis [K85.00]  Nausea and vomiting, intractability of vomiting not specified, unspecified vomiting type [R11.2]  Acute pancreatitis, unspecified complication status, unspecified pancreatitis type [K85.90]       Reason for Referral: Generalized Muscle Weakness (M62.81)  Other abnormalities of gait and mobility (R26.89)  Inpatient: Payor: Lia Mcallister / Plan: Charlee Gram SC / Product Type: *No Product type* /     ASSESSMENT:     REHAB RECOMMENDATIONS:   Recommendation to date pending progress:  Setting:   Outpatient Therapy   Cardiac rehab (supposed to start 6/29)    Equipment:     None     ASSESSMENT:  Ms. Kenyatta Arauz is a 64year old female who presents 5wks s/p CABG with nausea and vomiting. This date pt performs mobility including bed mobility, transfers, and ambulation with CGA/SBA. Patient is very pleasant to work with and was supposed to start cardiac rehab 6/29. PT recommends cardiac rehab after DC with no other DME or services needed at this time. Pt presents as functioning below her baseline, with deficits in mobility including transfers, gait, balance, and activity tolerance. Pt will benefit from skilled therapy services to address stated deficits to promote return to highest level of function, independence, and safety. Will continue to follow.        [facility] [address] [address] AM-PAC 6 Clicks Basic Mobility Inpatient Short Form  AM-PAC Mobility Inpatient   How much difficulty turning over in bed?: None  How much difficulty sitting down on / standing up from a chair with arms?: None  How much difficulty moving from lying on back to sitting on side of bed?: None  How much help from another person moving to and from a bed to a chair?: None  How much help from another person needed to walk in hospital room?: A Little  How much help from another person for climbing 3-5 steps with a railing?: A Little  AM-PAC Inpatient Mobility Raw Score : 22  AM-PAC Inpatient T-Scale Score : 53.28  Mobility Inpatient CMS 0-100% Score: 20.91  Mobility Inpatient CMS G-Code Modifier : CJ    SUBJECTIVE:   Ms. Bartolo Diggs states, \"I was doing great until this happened\"     Social/Functional Lives With: Parent  Type of Home: House  Home Layout: One level  Home Access: Stairs to enter with rails  Entrance Stairs - Number of Steps: 3  ADL Assistance: Independent  Ambulation Assistance: Independent  Transfer Assistance: Independent  Active : Yes    OBJECTIVE:     PAIN: Derotha Boys / O2: Barrie Hawkins / Prasanna Garg / Oxana Dawley:   Pre Treatment:   Pain Assessment: None - Denies Pain      Post Treatment: 0/10 Vitals        Oxygen      IV    RESTRICTIONS/PRECAUTIONS:  Restrictions/Precautions: Fall Risk                 GROSS EVALUATION: Intact Impaired (Comments):   AROM [x]     PROM [x]    Strength [x]     Balance [] Posture: Good  Sitting - Static: Good  Sitting - Dynamic: Good  Standing - Static: Good  Standing - Dynamic: Fair,+   Posture [x] N/A   Sensation [x]     Coordination [x]      Tone [x]     Edema [x]    Activity Tolerance [] Patient Tolerated treatment well,Patient limited by fatigue    []      COGNITION/  PERCEPTION: Intact Impaired (Comments):   Orientation [x]     Vision [x]     Hearing [x]     Cognition  [x]       MOBILITY: I Mod I S SBA CGA Min Mod Max Total  NT x2 Comments:   Bed Mobility    Rolling [] [] [] [] [] [] [] [] [] [x] []    Supine to Sit [] [] [] [x] [] [] [] [] [] [] []    Scooting [] [] [] [] [] [] [] [] [] [x] []    Sit to Supine [] [] [] [] [] [] [] [] [] [x] []    Transfers    Sit to Stand [] [] [] [] [x] [] [] [] [] [] []    Bed to Chair [] [] [] [] [] [] [] [] [] [] []    Stand to Sit [] [] [] [] [x] [] [] [] [] [] []     [] [] [] [] [] [] [] [] [] [] [] day(s). FREQUENCY AND DURATION: 3 times/week for duration of hospital stay or until stated goals are met, whichever comes first.    THERAPY PROGNOSIS: Excellent    PROBLEM LIST:   (Skilled intervention is medically necessary to address:)  Decreased Activity Tolerance  Decreased Gait Ability INTERVENTIONS PLANNED:   (Benefits and precautions of physical therapy have been discussed with the patient.)  Therapeutic Activity  Therapeutic Exercise/HEP  Neuromuscular Re-education  Gait Training  Education       TREATMENT:   EVALUATION: LOW COMPLEXITY: (Untimed Charge)    TREATMENT:   Gait Training (9 Minutes): Pre-gait activities including standing transfers and standing balance activities. Gait training for 500 feet utilizing Gait Belt. Patient required Tactile and Verbal cueing to improve Activity Pacing and Gait Mechanics. TREATMENT GRID:  N/A    AFTER TREATMENT PRECAUTIONS: Bed/Chair Locked, Call light within reach, Chair and Needs within reach    INTERDISCIPLINARY COLLABORATION:  RN/ PCT, PT/ PTA and OT/ GROSS    EDUCATION: Education Given To: Patient  Education Provided: Role of Therapy;Plan of Care;Energy Conservation; Fall Prevention Strategies  Education Method: Verbal  Barriers to Learning: None  Education Outcome: Verbalized understanding    TIME IN/OUT:  Time In: 2398  Time Out: Eduar Richards 45  Minutes: Brennen Soliman, PT

## 2022-06-29 NOTE — PROGRESS NOTES
END OF SHIFT NOTE:    INTAKE/OUTPUT  06/28 0701 - 06/29 0700  In: 1850 [I.V.:1850]  Out: 1100 [Urine:1100]  Voiding: Yes  Catheter: No  Drain:              Flatus: Patient does have flatus present. Stool:  occurrences. Characteristics:           Stool Assessment  Last BM (including prior to admit): 06/28/22 (per pt)    Emesis:  occurrences. Characteristics:        VITAL SIGNS  Patient Vitals for the past 12 hrs:   Temp Pulse Resp BP SpO2   06/29/22 0400  81      06/29/22 0356 98.6 °F (37 °C) 81 20 119/64 99 %   06/29/22 0022 98.6 °F (37 °C) 80 20 114/76 100 %   06/29/22 0000  82      06/28/22 2155  96  113/75    06/28/22 2105  (!) 104      06/28/22 2100  (!) 106  92/62    06/28/22 2034 98.4 °F (36.9 °C) (!) 113 16 101/62 93 %       Pain Assessment  @BSHSIFLOW(13)@          Ambulating  Yes    Shift report given to oncoming nurse at the bedside.     Domingo Bhatia RN

## 2022-06-29 NOTE — PROGRESS NOTES
Hospitalist Progress Note   Admit Date:  2022  8:37 AM   Name:  Reta Gil   Age:  64 y.o. Sex:  female  :  1965   MRN:  [id number]   Room:      Presenting Complaint: Emesis     Reason(s) for Admission: Idiopathic acute pancreatitis [K85.00]  Nausea and vomiting, intractability of vomiting not specified, unspecified vomiting type [R11.2]  Acute pancreatitis, unspecified complication status, unspecified pancreatitis type [facility] Course & Interval History:   Please refer to the admission H&P for details of presentation. In summary, Reta Gil is a 64 y.o. female with medical history significant for coronary artery disease status post CABG 5 weeks ago, hyperlipidemia, hypertension, diabetes type 2 who presented with intractable nausea and vomiting for about 1 day. Unable to keep anything down. Subjective/24 hr Events (22) : Patient is seen and examined at bedside. No acute events reported overnight by nursing staff. Patient reports improvement in her nausea and vomiting. Still slightly nauseated but symptoms have improved. Plan to try clear liquid diet. Patient reports that she is unclear whether she had anything unusual to eat. Have any abdominal pain or diarrhea. No neurological symptoms such as dizziness, blurry vision, unsteady gait. Denies fever, chills, chest pains, shortness of breath. Review of Systems: 10 point review of systems is otherwise negative with the exception of the elements mentioned above. Assessment & Plan:   Intractable nausea and vomiting  Initial concern for pancreatitis due to elevated lipase but without any radiological evidence of pancreatitis or abdominal pain. Possible due to elevated BG.  22: CT imaging reviewed. Improved but still persist.  No vomiting so far.    Continue Zofran and compazine PRN for n/v   Trial of Clears    Continue maintenance IV fluids   PPI       Coronary artery disease s/p CABG (5 weeks ago)   HTN // HLD   Continue Lipitor, Lopressor, aspirin     Type 2 diabetes  06/29/22: Continues to be hyperglycemic. A1c of 9.5 on admission.  Sliding scale insulin   Obtain fingerstick glucose 3 times daily prior to meals and at bedtime   Continue with home NPH 18 units in the morning and 7 units at nighttime      Diet:  ADULT DIET; Clear Liquid; 5 carb choices (75 gm/meal)  DVT PPx: lovenox  Code status: Full Code        I have reviewed and ordered clinical lab tests and independently visualized images, tracing. I spent 31 minutes of time caring for this patient at bedside or nearby, more than 50 percent of which was spent on coordination of care and/or counseling regarding the disease process, treatment options, and treatment plan. Hospital Problems:  Principal Problem:    Intractable nausea and vomiting  Active Problems:    Hx of CABG    Hyperlipidemia    S/P CABG x 2    CAD (coronary artery disease)    DM (diabetes mellitus) ([facility] [address]    Uncontrolled type 2 diabetes mellitus with hyperglycemia ([facility] [address]    Hypertension  Resolved Problems:    Hyponatremia    Hypomagnesemia      Objective:     Patient Vitals for the past 24 hrs:   Temp Pulse Resp BP SpO2   06/29/22 0733 98.6 °F (37 °C) 86 20 118/70 96 %   06/29/22 0400  81      06/29/22 0356 98.6 °F (37 °C) 81 20 119/64 99 %   06/29/22 0022 98.6 °F (37 °C) 80 20 114/76 100 %   06/29/22 0000  82      06/28/22 2155  96  113/75    06/28/22 2105  (!) 104      06/28/22 2100  (!) 106  92/62    06/28/22 2034 98.4 °F (36.9 °C) (!) 113 16 101/62 93 %   06/28/22 1732 98.6 °F (37 °C) (!) 135 21 (!) 180/102 95 %   06/28/22 1403  99 27 (!) 169/89 95 %       Oxygen Therapy  SpO2: 96 %  Pulse Oximeter Device Mode: Intermittent  O2 Device: None (Room air)  O2 Flow Rate (L/min): 2 L/min    Estimated body mass index is 26.71 kg/m² as calculated from the following:    Height as of 6/21/22: 5' 4\" (1.626 m).     Weight as of this encounter: 155 lb 9.6 oz (70.6 kg). Intake/Output Summary (Last 24 hours) at 6/29/2022 1014  Last data filed at 6/29/2022 0733  Gross per 24 hour   Intake 1850 ml   Output 1100 ml   Net 750 ml         Physical Exam:     Blood pressure 118/70, pulse 86, temperature 98.6 °F (37 °C), temperature source Oral, resp. rate 20, weight 155 lb 9.6 oz (70.6 kg), SpO2 96 %. General:     Alert and awake  Head:  Normocephalic, atraumatic  Eyes:  Sclerae appear normal.  Pupils equally round. ENT:  Nares appear normal, no drainage. Moist oral mucosa  Neck:  No restricted ROM. Trachea midline   CV:   RRR. No m/r/g. No jugular venous distension. Healed sternotomy  Lungs:   CTAB. No wheezing. Respirations even, unlabored  Abdomen: Bowel sounds present. Soft, nontender, nondistended. Extremities: No cyanosis or clubbing. No edema  Skin:     No rashes and normal coloration. Warm and dry. Neuro:  CN II-XII grossly intact. A&Ox3  Psych:  Normal mood and affect.       I have reviewed ordered lab tests and independently visualized imaging below:    Recent Labs:  Recent Results (from the past 48 hour(s))   CBC with Auto Differential    Collection Time: 06/28/22  8:54 AM   Result Value Ref Range    WBC 10.8 4.3 - 11.1 K/uL    RBC 4.86 4.05 - 5.2 M/uL    Hemoglobin 13.4 11.7 - 15.4 g/dL    Hematocrit 38.8 35.8 - 46.3 %    MCV 79.8 79.6 - 97.8 FL    MCH 27.6 26.1 - 32.9 PG    MCHC 34.5 31.4 - 35.0 g/dL    RDW 12.8 11.9 - 14.6 %    Platelets 143 704 - 310 K/uL    MPV 10.3 9.4 - 12.3 FL    nRBC 0.00 0.0 - 0.2 K/uL    Differential Type AUTOMATED      Seg Neutrophils 67 43 - 78 %    Lymphocytes 26 13 - 44 %    Monocytes 5 4.0 - 12.0 %    Eosinophils % 1 0.5 - 7.8 %    Basophils 1 0.0 - 2.0 %    Immature Granulocytes 1 0.0 - 5.0 %    Segs Absolute 7.2 1.7 - 8.2 K/UL    Absolute Lymph # 2.8 0.5 - 4.6 K/UL    Absolute Mono # 0.6 0.1 - 1.3 K/UL    Absolute Eos # 0.1 0.0 - 0.8 K/UL    Basophils Absolute 0.1 0.0 - 0.2 K/UL Absolute Immature Granulocyte 0.1 0.0 - 0.5 K/UL   Comprehensive Metabolic Panel    Collection Time: 06/28/22  8:54 AM   Result Value Ref Range    Sodium 134 (L) 136 - 145 mmol/L    Potassium 3.7 3.5 - 5.1 mmol/L    Chloride 98 98 - 107 mmol/L    CO2 22 21 - 32 mmol/L    Anion Gap 14 7 - 16 mmol/L    Glucose 343 (H) 65 - 100 mg/dL    BUN 11 6 - 23 MG/DL    CREATININE 1.00 0.6 - 1.0 MG/DL    GFR African American >60 >60 ml/min/1.73m2    GFR Non- >60 >60 ml/min/1.73m2    Calcium 9.7 8.3 - 10.4 MG/DL    Total Bilirubin 0.5 0.2 - 1.1 MG/DL    ALT 22 12 - 65 U/L    AST 21 15 - 37 U/L    Alk Phosphatase 162 (H) 50 - 136 U/L    Total Protein 8.5 (H) 6.3 - 8.2 g/dL    Albumin 3.7 3.5 - 5.0 g/dL    Globulin 4.8 (H) 2.3 - 3.5 g/dL    Albumin/Globulin Ratio 0.8 (L) 1.2 - 3.5     Magnesium    Collection Time: 06/28/22  8:54 AM   Result Value Ref Range    Magnesium 1.4 (L) 1.8 - 2.4 mg/dL   Lipase    Collection Time: 06/28/22  8:54 AM   Result Value Ref Range    Lipase 594 (H) 73 - 393 U/L   Hemoglobin A1C    Collection Time: 06/28/22  8:54 AM   Result Value Ref Range    Hemoglobin A1C 9.5 (H) 4.20 - 6.30 %    eAG 226 mg/dL   COVID-19, Rapid    Collection Time: 06/28/22 10:05 AM    Specimen: Nasopharyngeal   Result Value Ref Range    Source NASAL      SARS-CoV-2, Rapid Not detected NOTD     POCT Urinalysis no Micro    Collection Time: 06/28/22 12:23 PM   Result Value Ref Range    Specific Gravity, Urine, POC 1.020 1.001 - 1.023      pH, Urine, POC 7.0 5.0 - 9.0      Protein, Urine, POC 30 (A) NEG mg/dL    Glucose, UA POC >1,000 (A) NEG mg/dL    KETONES, Urine, POC 80 (A) NEG mg/dL    Bilirubin, Urine, POC Negative NEG      Blood, UA POC Trace Intact (A) NEG      URINE UROBILINOGEN POC 0.2 0.2 - 1.0 EU/dL    Nitrate, Urine, POC Negative NEG      Leukocyte Est, UA POC Negative NEG      Performed by: Uzair Henderson    POCT Glucose    Collection Time: 06/28/22  1:22 PM   Result Value Ref Range    POC Glucose 343 (H) 65 - 100 mg/dL    Performed by: Donna    POCT Glucose    Collection Time: 06/28/22  4:34 PM   Result Value Ref Range    POC Glucose 303 (H) 65 - 100 mg/dL    Performed by: Donna    POCT Glucose    Collection Time: 06/28/22  8:42 PM   Result Value Ref Range    POC Glucose 286 (H) 65 - 100 mg/dL    Performed by:  Rohit    TSH with Reflex    Collection Time: 06/28/22  9:21 PM   Result Value Ref Range    TSH w Free Thyroid if Abnormal 2.93 0.358 - 3.740 UIU/ML   Comprehensive Metabolic Panel w/ Reflex to MG    Collection Time: 06/29/22  4:54 AM   Result Value Ref Range    Sodium 140 136 - 145 mmol/L    Potassium 3.9 3.5 - 5.1 mmol/L    Chloride 107 98 - 107 mmol/L    CO2 25 21 - 32 mmol/L    Anion Gap 8 7 - 16 mmol/L    Glucose 171 (H) 65 - 100 mg/dL    BUN 14 6 - 23 MG/DL    CREATININE 0.80 0.6 - 1.0 MG/DL    GFR African American >60 >60 ml/min/1.73m2    GFR Non- >60 >60 ml/min/1.73m2    Calcium 8.7 8.3 - 10.4 MG/DL    Total Bilirubin 0.4 0.2 - 1.1 MG/DL    ALT 19 12 - 65 U/L    AST 13 (L) 15 - 37 U/L    Alk Phosphatase 107 50 - 136 U/L    Total Protein 7.3 6.3 - 8.2 g/dL    Albumin 3.1 (L) 3.5 - 5.0 g/dL    Globulin 4.2 (H) 2.3 - 3.5 g/dL    Albumin/Globulin Ratio 0.7 (L) 1.2 - 3.5     CBC with Auto Differential    Collection Time: 06/29/22  4:54 AM   Result Value Ref Range    WBC 13.3 (H) 4.3 - 11.1 K/uL    RBC 4.48 4.05 - 5.2 M/uL    Hemoglobin 12.1 11.7 - 15.4 g/dL    Hematocrit 36.3 35.8 - 46.3 %    MCV 81.0 79.6 - 97.8 FL    MCH 27.0 26.1 - 32.9 PG    MCHC 33.3 31.4 - 35.0 g/dL    RDW 13.1 11.9 - 14.6 %    Platelets 275 892 - 539 K/uL    MPV 10.3 9.4 - 12.3 FL    nRBC 0.00 0.0 - 0.2 K/uL    Differential Type AUTOMATED      Seg Neutrophils 63 43 - 78 %    Lymphocytes 30 13 - 44 %    Monocytes 7 4.0 - 12.0 %    Eosinophils % 0 (L) 0.5 - 7.8 %    Basophils 0 0.0 - 2.0 %    Immature Granulocytes 0 0.0 - 5.0 %    Segs Absolute 8.3 (H) 1.7 - 8.2 K/UL    Absolute Lymph # 4.0 0.5 - 4.6 K/UL    Absolute Mono # 1.0 0.1 - 1.3 K/UL    Absolute Eos # 0.0 0.0 - 0.8 K/UL    Basophils Absolute 0.0 0.0 - 0.2 K/UL    Absolute Immature Granulocyte 0.0 0.0 - 0.5 K/UL       Other Studies:  CT ABDOMEN PELVIS W IV CONTRAST Additional Contrast? None   Final Result   No acute changes in the abdomen or pelvis to account for patient's   symptoms. No bowel obstruction, diverticulitis or appendicitis. Stomach is   decompressed. Probable fatty infiltration liver. US ABDOMEN LIMITED   Final Result   Unremarkable examination.                 Current Meds:  Current Facility-Administered Medications   Medication Dose Route Frequency    insulin NPH (HUMULIN N;NOVOLIN N) injection vial 18 Units  18 Units SubCUTAneous QAM    insulin NPH (HUMULIN N;NOVOLIN N) injection vial 7 Units  7 Units SubCUTAneous Nightly    sodium chloride flush 0.9 % injection 3 mL  3 mL IntraVENous Q8H    aspirin EC tablet 81 mg  81 mg Oral Nightly    atorvastatin (LIPITOR) tablet 80 mg  80 mg Oral Nightly    metoprolol tartrate (LOPRESSOR) tablet 25 mg  25 mg Oral BID    lisinopril (PRINIVIL;ZESTRIL) tablet 20 mg  20 mg Oral Nightly    citalopram (CELEXA) tablet 20 mg  20 mg Oral Nightly    sodium chloride flush 0.9 % injection 5-40 mL  5-40 mL IntraVENous 2 times per day    sodium chloride flush 0.9 % injection 5-40 mL  5-40 mL IntraVENous PRN    0.9 % sodium chloride infusion   IntraVENous PRN    enoxaparin (LOVENOX) injection 40 mg  40 mg SubCUTAneous Daily    ondansetron (ZOFRAN-ODT) disintegrating tablet 4 mg  4 mg Oral Q8H PRN    Or    ondansetron (ZOFRAN) injection 4 mg  4 mg IntraVENous Q6H PRN    polyethylene glycol (GLYCOLAX) packet 17 g  17 g Oral Daily PRN    acetaminophen (TYLENOL) tablet 650 mg  650 mg Oral Q6H PRN    Or    acetaminophen (TYLENOL) suppository 650 mg  650 mg Rectal Q6H PRN    magnesium sulfate 2000 mg in 50 mL IVPB premix  2,000 mg IntraVENous PRN    potassium chloride (KLOR-CON M) extended release tablet 40 mEq  40 mEq Oral PRN    Or    potassium bicarb-citric acid (EFFER-K) effervescent tablet 40 mEq  40 mEq Oral PRN    Or    potassium chloride 10 mEq/100 mL IVPB (Peripheral Line)  10 mEq IntraVENous PRN    0.9 % sodium chloride infusion   IntraVENous Continuous    morphine injection 2 mg  2 mg IntraVENous Q4H PRN    insulin lispro (HUMALOG) injection vial 0-4 Units  0-4 Units SubCUTAneous TID WC    insulin lispro (HUMALOG) injection vial 0-4 Units  0-4 Units SubCUTAneous Nightly    glucose chewable tablet 16 g  4 tablet Oral PRN    dextrose bolus 10% 125 mL  125 mL IntraVENous PRN    Or    dextrose bolus 10% 250 mL  250 mL IntraVENous PRN    glucagon (rDNA) injection 1 mg  1 mg IntraMUSCular PRN    dextrose 5 % solution  100 mL/hr IntraVENous PRN    prochlorperazine (COMPAZINE) injection 10 mg  10 mg IntraVENous Q6H PRN    labetalol (NORMODYNE;TRANDATE) injection 10 mg  10 mg IntraVENous Q4H PRN    pantoprazole (PROTONIX) 40 mg in sodium chloride (PF) 10 mL injection  40 mg IntraVENous Daily    hydrALAZINE (APRESOLINE) injection 10 mg  10 mg IntraVENous Q6H PRN       Signed:  Harini Salmeron MD    Part of this note may have been written by using a voice dictation software. The note has been proof read but may still contain some grammatical/other typographical errors.

## 2022-06-30 LAB
ANION GAP SERPL CALC-SCNC: 10 MMOL/L (ref 7–16)
BASOPHILS # BLD: 0 K/UL (ref 0–0.2)
BASOPHILS NFR BLD: 0 % (ref 0–2)
BUN SERPL-MCNC: 10 MG/DL (ref 6–23)
CALCIUM SERPL-MCNC: 8.5 MG/DL (ref 8.3–10.4)
CHLORIDE SERPL-SCNC: 99 MMOL/L (ref 98–107)
CO2 SERPL-SCNC: 24 MMOL/L (ref 21–32)
CREAT SERPL-MCNC: 0.5 MG/DL (ref 0.6–1)
DIFFERENTIAL METHOD BLD: ABNORMAL
EKG ATRIAL RATE: 103 BPM
EKG DIAGNOSIS: NORMAL
EKG P AXIS: 57 DEGREES
EKG P-R INTERVAL: 138 MS
EKG Q-T INTERVAL: 394 MS
EKG QRS DURATION: 68 MS
EKG QTC CALCULATION (BAZETT): 516 MS
EKG R AXIS: -16 DEGREES
EKG T AXIS: 69 DEGREES
EKG VENTRICULAR RATE: 103 BPM
EOSINOPHIL # BLD: 0 K/UL (ref 0–0.8)
EOSINOPHIL NFR BLD: 0 % (ref 0.5–7.8)
ERYTHROCYTE [DISTWIDTH] IN BLOOD BY AUTOMATED COUNT: 12.8 % (ref 11.9–14.6)
GLUCOSE BLD STRIP.AUTO-MCNC: 132 MG/DL (ref 65–100)
GLUCOSE BLD STRIP.AUTO-MCNC: 148 MG/DL (ref 65–100)
GLUCOSE BLD STRIP.AUTO-MCNC: 158 MG/DL (ref 65–100)
GLUCOSE BLD STRIP.AUTO-MCNC: 179 MG/DL (ref 65–100)
GLUCOSE SERPL-MCNC: 138 MG/DL (ref 65–100)
HCT VFR BLD AUTO: 35.5 % (ref 35.8–46.3)
HGB BLD-MCNC: 12.5 G/DL (ref 11.7–15.4)
IMM GRANULOCYTES # BLD AUTO: 0 K/UL (ref 0–0.5)
IMM GRANULOCYTES NFR BLD AUTO: 0 % (ref 0–5)
LYMPHOCYTES # BLD: 2.6 K/UL (ref 0.5–4.6)
LYMPHOCYTES NFR BLD: 23 % (ref 13–44)
MAGNESIUM SERPL-MCNC: 1.7 MG/DL (ref 1.8–2.4)
MCH RBC QN AUTO: 28 PG (ref 26.1–32.9)
MCHC RBC AUTO-ENTMCNC: 35.2 G/DL (ref 31.4–35)
MCV RBC AUTO: 79.6 FL (ref 79.6–97.8)
MONOCYTES # BLD: 0.4 K/UL (ref 0.1–1.3)
MONOCYTES NFR BLD: 4 % (ref 4–12)
NEUTS SEG # BLD: 8.2 K/UL (ref 1.7–8.2)
NEUTS SEG NFR BLD: 73 % (ref 43–78)
NRBC # BLD: 0 K/UL (ref 0–0.2)
PLATELET # BLD AUTO: 241 K/UL (ref 150–450)
PMV BLD AUTO: 10.2 FL (ref 9.4–12.3)
POTASSIUM SERPL-SCNC: 3.3 MMOL/L (ref 3.5–5.1)
RBC # BLD AUTO: 4.46 M/UL (ref 4.05–5.2)
SERVICE CMNT-IMP: ABNORMAL
SODIUM SERPL-SCNC: 133 MMOL/L (ref 136–145)
WBC # BLD AUTO: 11.2 K/UL (ref 4.3–11.1)

## 2022-06-30 PROCEDURE — 6360000002 HC RX W HCPCS: Performed by: INTERNAL MEDICINE

## 2022-06-30 PROCEDURE — 1100000003 HC PRIVATE W/ TELEMETRY

## 2022-06-30 PROCEDURE — 93005 ELECTROCARDIOGRAM TRACING: CPT | Performed by: INTERNAL MEDICINE

## 2022-06-30 PROCEDURE — 83735 ASSAY OF MAGNESIUM: CPT

## 2022-06-30 PROCEDURE — 6360000002 HC RX W HCPCS: Performed by: STUDENT IN AN ORGANIZED HEALTH CARE EDUCATION/TRAINING PROGRAM

## 2022-06-30 PROCEDURE — C9113 INJ PANTOPRAZOLE SODIUM, VIA: HCPCS | Performed by: STUDENT IN AN ORGANIZED HEALTH CARE EDUCATION/TRAINING PROGRAM

## 2022-06-30 PROCEDURE — 6370000000 HC RX 637 (ALT 250 FOR IP): Performed by: STUDENT IN AN ORGANIZED HEALTH CARE EDUCATION/TRAINING PROGRAM

## 2022-06-30 PROCEDURE — 36415 COLL VENOUS BLD VENIPUNCTURE: CPT

## 2022-06-30 PROCEDURE — 85025 COMPLETE CBC W/AUTO DIFF WBC: CPT

## 2022-06-30 PROCEDURE — 80048 BASIC METABOLIC PNL TOTAL CA: CPT

## 2022-06-30 PROCEDURE — 82962 GLUCOSE BLOOD TEST: CPT

## 2022-06-30 PROCEDURE — 2580000003 HC RX 258: Performed by: EMERGENCY MEDICINE

## 2022-06-30 PROCEDURE — 2580000003 HC RX 258: Performed by: STUDENT IN AN ORGANIZED HEALTH CARE EDUCATION/TRAINING PROGRAM

## 2022-06-30 PROCEDURE — 6370000000 HC RX 637 (ALT 250 FOR IP): Performed by: FAMILY MEDICINE

## 2022-06-30 RX ORDER — METOCLOPRAMIDE HYDROCHLORIDE 5 MG/ML
10 INJECTION INTRAMUSCULAR; INTRAVENOUS ONCE
Status: COMPLETED | OUTPATIENT
Start: 2022-06-30 | End: 2022-06-30

## 2022-06-30 RX ORDER — POTASSIUM CHLORIDE 7.45 MG/ML
10 INJECTION INTRAVENOUS
Status: COMPLETED | OUTPATIENT
Start: 2022-06-30 | End: 2022-06-30

## 2022-06-30 RX ORDER — MAGNESIUM SULFATE IN WATER 40 MG/ML
2000 INJECTION, SOLUTION INTRAVENOUS ONCE
Status: COMPLETED | OUTPATIENT
Start: 2022-06-30 | End: 2022-06-30

## 2022-06-30 RX ORDER — LANOLIN ALCOHOL/MO/W.PET/CERES
3 CREAM (GRAM) TOPICAL ONCE
Status: COMPLETED | OUTPATIENT
Start: 2022-06-30 | End: 2022-06-30

## 2022-06-30 RX ADMIN — ONDANSETRON 4 MG: 2 INJECTION INTRAMUSCULAR; INTRAVENOUS at 17:55

## 2022-06-30 RX ADMIN — ONDANSETRON 4 MG: 2 INJECTION INTRAMUSCULAR; INTRAVENOUS at 09:35

## 2022-06-30 RX ADMIN — POTASSIUM CHLORIDE 10 MEQ: 7.46 INJECTION, SOLUTION INTRAVENOUS at 14:15

## 2022-06-30 RX ADMIN — POTASSIUM CHLORIDE 10 MEQ: 7.46 INJECTION, SOLUTION INTRAVENOUS at 16:43

## 2022-06-30 RX ADMIN — ATORVASTATIN CALCIUM 80 MG: 80 TABLET, FILM COATED ORAL at 20:59

## 2022-06-30 RX ADMIN — INSULIN HUMAN 7 UNITS: 100 INJECTION, SUSPENSION SUBCUTANEOUS at 21:05

## 2022-06-30 RX ADMIN — SODIUM CHLORIDE, PRESERVATIVE FREE 3 ML: 5 INJECTION INTRAVENOUS at 17:53

## 2022-06-30 RX ADMIN — SODIUM CHLORIDE, PRESERVATIVE FREE 10 ML: 5 INJECTION INTRAVENOUS at 09:34

## 2022-06-30 RX ADMIN — SODIUM CHLORIDE, PRESERVATIVE FREE 3 ML: 5 INJECTION INTRAVENOUS at 09:37

## 2022-06-30 RX ADMIN — MAGNESIUM SULFATE HEPTAHYDRATE 2000 MG: 40 INJECTION, SOLUTION INTRAVENOUS at 13:24

## 2022-06-30 RX ADMIN — POTASSIUM CHLORIDE 10 MEQ: 7.46 INJECTION, SOLUTION INTRAVENOUS at 15:30

## 2022-06-30 RX ADMIN — SODIUM CHLORIDE, PRESERVATIVE FREE 3 ML: 5 INJECTION INTRAVENOUS at 01:00

## 2022-06-30 RX ADMIN — POTASSIUM CHLORIDE 10 MEQ: 7.46 INJECTION, SOLUTION INTRAVENOUS at 17:53

## 2022-06-30 RX ADMIN — PROCHLORPERAZINE EDISYLATE 10 MG: 5 INJECTION INTRAMUSCULAR; INTRAVENOUS at 20:59

## 2022-06-30 RX ADMIN — ONDANSETRON 4 MG: 2 INJECTION INTRAMUSCULAR; INTRAVENOUS at 00:42

## 2022-06-30 RX ADMIN — SODIUM CHLORIDE, PRESERVATIVE FREE 40 MG: 5 INJECTION INTRAVENOUS at 09:35

## 2022-06-30 RX ADMIN — SODIUM CHLORIDE, PRESERVATIVE FREE 10 ML: 5 INJECTION INTRAVENOUS at 21:10

## 2022-06-30 RX ADMIN — MORPHINE SULFATE 2 MG: 2 INJECTION, SOLUTION INTRAMUSCULAR; INTRAVENOUS at 21:00

## 2022-06-30 RX ADMIN — SODIUM CHLORIDE: 9 INJECTION, SOLUTION INTRAVENOUS at 16:46

## 2022-06-30 RX ADMIN — Medication 3 MG: at 01:07

## 2022-06-30 RX ADMIN — PROCHLORPERAZINE EDISYLATE 10 MG: 5 INJECTION INTRAMUSCULAR; INTRAVENOUS at 03:50

## 2022-06-30 RX ADMIN — METOCLOPRAMIDE 10 MG: 5 INJECTION, SOLUTION INTRAMUSCULAR; INTRAVENOUS at 13:24

## 2022-06-30 RX ADMIN — ASPIRIN 81 MG: 81 TABLET ORAL at 20:59

## 2022-06-30 RX ADMIN — CITALOPRAM HYDROBROMIDE 20 MG: 20 TABLET ORAL at 21:00

## 2022-06-30 RX ADMIN — METOPROLOL TARTRATE 25 MG: 25 TABLET, FILM COATED ORAL at 21:01

## 2022-06-30 RX ADMIN — LISINOPRIL 20 MG: 20 TABLET ORAL at 21:00

## 2022-06-30 RX ADMIN — SODIUM CHLORIDE: 9 INJECTION, SOLUTION INTRAVENOUS at 03:50

## 2022-06-30 ASSESSMENT — PAIN DESCRIPTION - DESCRIPTORS: DESCRIPTORS: SORE

## 2022-06-30 ASSESSMENT — PAIN DESCRIPTION - ORIENTATION: ORIENTATION: LOWER

## 2022-06-30 ASSESSMENT — PAIN DESCRIPTION - LOCATION: LOCATION: ABDOMEN

## 2022-06-30 ASSESSMENT — PAIN SCALES - GENERAL: PAINLEVEL_OUTOF10: 4

## 2022-06-30 NOTE — PROGRESS NOTES
Physical Therapy Note:    Attempted to see patient this PM for physical therapy treatment  session. This writer has checked on Ms. Matos x 3 today and the patient has been sound asleep each time. Will follow and re-attempt as schedule permits/patient available.  Thank you,    Marianela Proctor, hospitals     Rehab Caseload Tracker

## 2022-06-30 NOTE — PROGRESS NOTES
END OF SHIFT NOTE:    INTAKE/OUTPUT  06/29 0701 - 06/30 0700  In: 370 [P.O.:370]  Out: 2100 [Urine:1900]  Voiding: Yes  Catheter: No  Drain:              Flatus: Patient does have flatus present. Stool: 0 occurrences. Characteristics:           Stool Assessment  Last BM (including prior to admit): 06/28/22    Emesis:  occurrences. Characteristics:   Emesis Appearance: Red    VITAL SIGNS  Patient Vitals for the past 12 hrs:   Temp Pulse Resp BP SpO2   06/30/22 0433 98.6 °F (37 °C) 98 16 (!) 147/84 95 %   06/30/22 0224  91      06/29/22 2212 98.5 °F (36.9 °C) 81 18 (!) 172/89 99 %   06/29/22 1951 98.8 °F (37.1 °C) 94 18 (!) 164/80 98 %   06/29/22 1900  90          Pain Assessment  @BSHSIFLOW(13)@          Ambulating  Yes    Shift report given to oncoming nurse at the bedside.     Nathan Rich RN

## 2022-06-30 NOTE — PROGRESS NOTES
Hospitalist Progress Note   Admit Date:  2022  8:37 AM   Name:  Yosi Henry   Age:  64 y.o. Sex:  female  :  1965   MRN:  [id number]   Room:      Presenting Complaint: Emesis     Reason(s) for Admission: Idiopathic acute pancreatitis [K85.00]  Nausea and vomiting, intractability of vomiting not specified, unspecified vomiting type [R11.2]  Acute pancreatitis, unspecified complication status, unspecified pancreatitis type [facility] Hospital Course & Interval History:   Please refer to the admission H&P for details of presentation. In summary, Yosi Henry is a 64 y.o. female with medical history significant for coronary artery disease status post CABG 5 weeks ago, hyperlipidemia, hypertension, diabetes type 2 who presented with intractable nausea and vomiting for about 1 day. Unable to keep anything down. Subjective/24 hr Events (22) : Patient is seen and examined at bedside. Tolerated clears for lunch yesterday but did not tolerate it for dinner. Had nausea with vomiting overnight and could not keep anything down this AM.     No neurological symptoms such as dizziness, blurry vision, unsteady gait. Denies fever, chills, chest pains, shortness of breath. Review of Systems: 10 point review of systems is otherwise negative with the exception of the elements mentioned above. Assessment & Plan:   Intractable nausea and vomiting  Initial concern for pancreatitis due to elevated lipase but without any radiological evidence of pancreatitis or abdominal pain. Possible due to elevated BG. CT without any acute changes. 22: Nausea vomiting persist still overnight through this morning.   Unable to tolerate any p.o.   Continue Zofran and compazine PRN for n/v   will keep on Clears    Continue maintenance IV fluids   PPI  - Trial of reglan and NM gastroparesis scan       Coronary artery disease s/p CABG (5 weeks ago)   HTN // HLD   Continue Lipitor, Lopressor, aspirin     Type 2 diabetes  06/30/22: Continues to be hyperglycemic. A1c of 9.5 on admission.  Sliding scale insulin   Obtain fingerstick glucose 3 times daily prior to meals and at bedtime   Continue with home NPH 18 units in the morning and 7 units at nighttime      Diet:  ADULT DIET; Clear Liquid; 5 carb choices (75 gm/meal)  DVT PPx: lovenox  Code status: Full Code        I have reviewed and ordered clinical lab tests and independently visualized images, tracing. I spent 30 minutes of time caring for this patient at bedside or nearby, more than 50 percent of which was spent on coordination of care and/or counseling regarding the disease process, treatment options, and treatment plan. Hospital Problems:  Principal Problem:    Intractable nausea and vomiting  Active Problems:    Hx of CABG    Hyperlipidemia    S/P CABG x 2    CAD (coronary artery disease)    DM (diabetes mellitus) ([facility] [address]    Uncontrolled type 2 diabetes mellitus with hyperglycemia (HCC)    Hypertension  Resolved Problems:    Hyponatremia    Hypomagnesemia      Objective:     Patient Vitals for the past 24 hrs:   Temp Pulse Resp BP SpO2   06/30/22 0751 98.7 °F (37.1 °C) 98 18 (!) 153/80 97 %   06/30/22 0433 98.6 °F (37 °C) 98 16 (!) 147/84 95 %   06/30/22 0224  91      06/29/22 2212 98.5 °F (36.9 °C) 81 18 (!) 172/89 99 %   06/29/22 1951 98.8 °F (37.1 °C) 94 18 (!) 164/80 98 %   06/29/22 1900  90      06/29/22 1516 98.5 °F (36.9 °C) 86 18 105/72 98 %       Oxygen Therapy  SpO2: 97 %  Pulse Oximeter Device Mode: Intermittent  O2 Device: None (Room air)  O2 Flow Rate (L/min): 2 L/min    Estimated body mass index is 26.33 kg/m² as calculated from the following:    Height as of 6/21/22: 5' 4\" (1.626 m). Weight as of this encounter: 153 lb 6.4 oz (69.6 kg).     Intake/Output Summary (Last 24 hours) at 6/30/2022 1150  Last data filed at 6/30/2022 1044  Gross per 24 hour   Intake 370 ml   Output 2300 ml Net -1930 ml         Physical Exam:     Blood pressure (!) 153/80, pulse 98, temperature 98.7 °F (37.1 °C), temperature source Oral, resp. rate 18, weight 153 lb 6.4 oz (69.6 kg), SpO2 97 %. General:     Alert and awake  Head:  Normocephalic, atraumatic  Eyes:  Sclerae appear normal.  Pupils equally round. ENT:  Nares appear normal, no drainage. Moist oral mucosa  Neck:  No restricted ROM. Trachea midline   CV:   RRR. No m/r/g. No jugular venous distension. Healed sternotomy  Lungs:   CTAB. No wheezing. Respirations even, unlabored  Abdomen: Bowel sounds present. Soft, nontender, nondistended. Extremities: No cyanosis or clubbing. No edema  Skin:     No rashes and normal coloration. Warm and dry. Neuro:  CN II-XII grossly intact. A&Ox3  Psych:  Normal mood and affect. I have reviewed ordered lab tests and independently visualized imaging below:    Recent Labs:  Recent Results (from the past 48 hour(s))   POCT Urinalysis no Micro    Collection Time: 06/28/22 12:23 PM   Result Value Ref Range    Specific Gravity, Urine, POC 1.020 1.001 - 1.023      pH, Urine, POC 7.0 5.0 - 9.0      Protein, Urine, POC 30 (A) NEG mg/dL    Glucose, UA POC >1,000 (A) NEG mg/dL    KETONES, Urine, POC 80 (A) NEG mg/dL    Bilirubin, Urine, POC Negative NEG      Blood, UA POC Trace Intact (A) NEG      URINE UROBILINOGEN POC 0.2 0.2 - 1.0 EU/dL    Nitrate, Urine, POC Negative NEG      Leukocyte Est, UA POC Negative NEG      Performed by: Pallavi Mishra    POCT Glucose    Collection Time: 06/28/22  1:22 PM   Result Value Ref Range    POC Glucose 343 (H) 65 - 100 mg/dL    Performed by: Donna    POCT Glucose    Collection Time: 06/28/22  4:34 PM   Result Value Ref Range    POC Glucose 303 (H) 65 - 100 mg/dL    Performed by: Donna    POCT Glucose    Collection Time: 06/28/22  8:42 PM   Result Value Ref Range    POC Glucose 286 (H) 65 - 100 mg/dL    Performed by:  Rohit CHURCH with Reflex    Collection Time: 06/28/22  9:21 PM   Result Value Ref Range    TSH w Free Thyroid if Abnormal 2.93 0.358 - 3.740 UIU/ML   Comprehensive Metabolic Panel w/ Reflex to MG    Collection Time: 06/29/22  4:54 AM   Result Value Ref Range    Sodium 140 136 - 145 mmol/L    Potassium 3.9 3.5 - 5.1 mmol/L    Chloride 107 98 - 107 mmol/L    CO2 25 21 - 32 mmol/L    Anion Gap 8 7 - 16 mmol/L    Glucose 171 (H) 65 - 100 mg/dL    BUN 14 6 - 23 MG/DL    CREATININE 0.80 0.6 - 1.0 MG/DL    GFR African American >60 >60 ml/min/1.73m2    GFR Non- >60 >60 ml/min/1.73m2    Calcium 8.7 8.3 - 10.4 MG/DL    Total Bilirubin 0.4 0.2 - 1.1 MG/DL    ALT 19 12 - 65 U/L    AST 13 (L) 15 - 37 U/L    Alk Phosphatase 107 50 - 136 U/L    Total Protein 7.3 6.3 - 8.2 g/dL    Albumin 3.1 (L) 3.5 - 5.0 g/dL    Globulin 4.2 (H) 2.3 - 3.5 g/dL    Albumin/Globulin Ratio 0.7 (L) 1.2 - 3.5     CBC with Auto Differential    Collection Time: 06/29/22  4:54 AM   Result Value Ref Range    WBC 13.3 (H) 4.3 - 11.1 K/uL    RBC 4.48 4.05 - 5.2 M/uL    Hemoglobin 12.1 11.7 - 15.4 g/dL    Hematocrit 36.3 35.8 - 46.3 %    MCV 81.0 79.6 - 97.8 FL    MCH 27.0 26.1 - 32.9 PG    MCHC 33.3 31.4 - 35.0 g/dL    RDW 13.1 11.9 - 14.6 %    Platelets 101 418 - 084 K/uL    MPV 10.3 9.4 - 12.3 FL    nRBC 0.00 0.0 - 0.2 K/uL    Differential Type AUTOMATED      Seg Neutrophils 63 43 - 78 %    Lymphocytes 30 13 - 44 %    Monocytes 7 4.0 - 12.0 %    Eosinophils % 0 (L) 0.5 - 7.8 %    Basophils 0 0.0 - 2.0 %    Immature Granulocytes 0 0.0 - 5.0 %    Segs Absolute 8.3 (H) 1.7 - 8.2 K/UL    Absolute Lymph # 4.0 0.5 - 4.6 K/UL    Absolute Mono # 1.0 0.1 - 1.3 K/UL    Absolute Eos # 0.0 0.0 - 0.8 K/UL    Basophils Absolute 0.0 0.0 - 0.2 K/UL    Absolute Immature Granulocyte 0.0 0.0 - 0.5 K/UL   POCT Glucose    Collection Time: 06/29/22 11:56 AM   Result Value Ref Range    POC Glucose 181 (H) 65 - 100 mg/dL    Performed by: Nathalia    POCT Glucose Collection Time: 06/29/22  5:26 PM   Result Value Ref Range    POC Glucose 131 (H) 65 - 100 mg/dL    Performed by: Nathalia    POCT Glucose    Collection Time: 06/29/22  9:05 PM   Result Value Ref Range    POC Glucose 119 (H) 65 - 100 mg/dL    Performed by: Taylor    CBC with Auto Differential    Collection Time: 06/30/22  7:49 AM   Result Value Ref Range    WBC 11.2 (H) 4.3 - 11.1 K/uL    RBC 4.46 4.05 - 5.2 M/uL    Hemoglobin 12.5 11.7 - 15.4 g/dL    Hematocrit 35.5 (L) 35.8 - 46.3 %    MCV 79.6 79.6 - 97.8 FL    MCH 28.0 26.1 - 32.9 PG    MCHC 35.2 (H) 31.4 - 35.0 g/dL    RDW 12.8 11.9 - 14.6 %    Platelets 847 255 - 565 K/uL    MPV 10.2 9.4 - 12.3 FL    nRBC 0.00 0.0 - 0.2 K/uL    Differential Type AUTOMATED      Seg Neutrophils 73 43 - 78 %    Lymphocytes 23 13 - 44 %    Monocytes 4 4.0 - 12.0 %    Eosinophils % 0 (L) 0.5 - 7.8 %    Basophils 0 0.0 - 2.0 %    Immature Granulocytes 0 0.0 - 5.0 %    Segs Absolute 8.2 1.7 - 8.2 K/UL    Absolute Lymph # 2.6 0.5 - 4.6 K/UL    Absolute Mono # 0.4 0.1 - 1.3 K/UL    Absolute Eos # 0.0 0.0 - 0.8 K/UL    Basophils Absolute 0.0 0.0 - 0.2 K/UL    Absolute Immature Granulocyte 0.0 0.0 - 0.5 K/UL   Basic Metabolic Panel w/ Reflex to MG    Collection Time: 06/30/22  7:49 AM   Result Value Ref Range    Sodium 133 (L) 136 - 145 mmol/L    Potassium 3.3 (L) 3.5 - 5.1 mmol/L    Chloride 99 98 - 107 mmol/L    CO2 24 21 - 32 mmol/L    Anion Gap 10 7 - 16 mmol/L    Glucose 138 (H) 65 - 100 mg/dL    BUN 10 6 - 23 MG/DL    CREATININE 0.50 (L) 0.6 - 1.0 MG/DL    GFR African American >60 >60 ml/min/1.73m2    GFR Non- >60 >60 ml/min/1.73m2    Calcium 8.5 8.3 - 10.4 MG/DL   Magnesium    Collection Time: 06/30/22  7:49 AM   Result Value Ref Range    Magnesium 1.7 (L) 1.8 - 2.4 mg/dL   POCT Glucose    Collection Time: 06/30/22  8:12 AM   Result Value Ref Range    POC Glucose 148 (H) 65 - 100 mg/dL    Performed by:  Vanessa    POCT Glucose    Collection Time: 06/30/22 11:14 AM   Result Value Ref Range    POC Glucose 132 (H) 65 - 100 mg/dL    Performed by: Vanessa        Other Studies:  CT ABDOMEN PELVIS W IV CONTRAST Additional Contrast? None   Final Result   No acute changes in the abdomen or pelvis to account for patient's   symptoms. No bowel obstruction, diverticulitis or appendicitis. Stomach is   decompressed. Probable fatty infiltration liver. US ABDOMEN LIMITED   Final Result   Unremarkable examination.             NM GASTRIC EMPTYING    (Results Pending)       Current Meds:  Current Facility-Administered Medications   Medication Dose Route Frequency    magnesium sulfate 2000 mg in 50 mL IVPB premix  2,000 mg IntraVENous Once    potassium chloride 10 mEq/100 mL IVPB (Peripheral Line)  10 mEq IntraVENous Q1H    insulin NPH (HUMULIN N;NOVOLIN N) injection vial 18 Units  18 Units SubCUTAneous QAM    insulin NPH (HUMULIN N;NOVOLIN N) injection vial 7 Units  7 Units SubCUTAneous Nightly    sodium chloride flush 0.9 % injection 3 mL  3 mL IntraVENous Q8H    aspirin EC tablet 81 mg  81 mg Oral Nightly    atorvastatin (LIPITOR) tablet 80 mg  80 mg Oral Nightly    metoprolol tartrate (LOPRESSOR) tablet 25 mg  25 mg Oral BID    lisinopril (PRINIVIL;ZESTRIL) tablet 20 mg  20 mg Oral Nightly    citalopram (CELEXA) tablet 20 mg  20 mg Oral Nightly    sodium chloride flush 0.9 % injection 5-40 mL  5-40 mL IntraVENous 2 times per day    sodium chloride flush 0.9 % injection 5-40 mL  5-40 mL IntraVENous PRN    0.9 % sodium chloride infusion   IntraVENous PRN    enoxaparin (LOVENOX) injection 40 mg  40 mg SubCUTAneous Daily    ondansetron (ZOFRAN-ODT) disintegrating tablet 4 mg  4 mg Oral Q8H PRN    Or    ondansetron (ZOFRAN) injection 4 mg  4 mg IntraVENous Q6H PRN    polyethylene glycol (GLYCOLAX) packet 17 g  17 g Oral Daily PRN    acetaminophen (TYLENOL) tablet 650 mg  650 mg Oral Q6H PRN    Or    acetaminophen (TYLENOL) suppository 650 mg 650 mg Rectal Q6H PRN    potassium chloride (KLOR-CON M) extended release tablet 40 mEq  40 mEq Oral PRN    Or    potassium bicarb-citric acid (EFFER-K) effervescent tablet 40 mEq  40 mEq Oral PRN    Or    potassium chloride 10 mEq/100 mL IVPB (Peripheral Line)  10 mEq IntraVENous PRN    0.9 % sodium chloride infusion   IntraVENous Continuous    morphine injection 2 mg  2 mg IntraVENous Q4H PRN    insulin lispro (HUMALOG) injection vial 0-4 Units  0-4 Units SubCUTAneous TID WC    insulin lispro (HUMALOG) injection vial 0-4 Units  0-4 Units SubCUTAneous Nightly    glucose chewable tablet 16 g  4 tablet Oral PRN    dextrose bolus 10% 125 mL  125 mL IntraVENous PRN    Or    dextrose bolus 10% 250 mL  250 mL IntraVENous PRN    glucagon (rDNA) injection 1 mg  1 mg IntraMUSCular PRN    dextrose 5 % solution  100 mL/hr IntraVENous PRN    prochlorperazine (COMPAZINE) injection 10 mg  10 mg IntraVENous Q6H PRN    labetalol (NORMODYNE;TRANDATE) injection 10 mg  10 mg IntraVENous Q4H PRN    pantoprazole (PROTONIX) 40 mg in sodium chloride (PF) 10 mL injection  40 mg IntraVENous Daily    hydrALAZINE (APRESOLINE) injection 10 mg  10 mg IntraVENous Q6H PRN       Signed:  Pratik Estrada MD    Part of this note may have been written by using a voice dictation software. The note has been proof read but may still contain some grammatical/other typographical errors.

## 2022-06-30 NOTE — PROGRESS NOTES
END OF SHIFT NOTE:    INTAKE/OUTPUT  06/29 0701 - 06/30 0700  In: 370 [P.O.:370]  Out: 2100 [Urine:1900]  Voiding: Yes  Catheter: No  Drain:              Flatus: Patient does have flatus present. Stool:  0 occurrences. Characteristics:           Stool Assessment  Last BM (including prior to admit): 06/28/22    Emesis:  occurrences. Characteristics:   Emesis Appearance: Red    VITAL SIGNS  Patient Vitals for the past 12 hrs:   Temp Pulse Resp BP SpO2   06/30/22 1920 98.8 °F (37.1 °C) (!) 101  136/77 96 %   06/30/22 1522 98.7 °F (37.1 °C) (!) 113 18 (!) 155/94 98 %   06/30/22 0751 98.7 °F (37.1 °C) 98 18 (!) 153/80 97 %       Pain Assessment  @BSHSIFLOW(13)@          Ambulating  Yes    Shift report given to oncoming nurse at the bedside. Melba Reynoso

## 2022-07-01 ENCOUNTER — APPOINTMENT (OUTPATIENT)
Dept: GENERAL RADIOLOGY | Age: 57
DRG: 074 | End: 2022-07-01
Payer: COMMERCIAL

## 2022-07-01 ENCOUNTER — APPOINTMENT (OUTPATIENT)
Dept: NUCLEAR MEDICINE | Age: 57
DRG: 074 | End: 2022-07-01
Payer: COMMERCIAL

## 2022-07-01 LAB
ANION GAP SERPL CALC-SCNC: 8 MMOL/L (ref 7–16)
BASOPHILS # BLD: 0 K/UL (ref 0–0.2)
BASOPHILS NFR BLD: 0 % (ref 0–2)
BUN SERPL-MCNC: 9 MG/DL (ref 6–23)
CALCIUM SERPL-MCNC: 8.6 MG/DL (ref 8.3–10.4)
CHLORIDE SERPL-SCNC: 100 MMOL/L (ref 98–107)
CO2 SERPL-SCNC: 25 MMOL/L (ref 21–32)
CREAT SERPL-MCNC: 0.6 MG/DL (ref 0.6–1)
DIFFERENTIAL METHOD BLD: ABNORMAL
EKG ATRIAL RATE: 101 BPM
EKG DIAGNOSIS: NORMAL
EKG P AXIS: 59 DEGREES
EKG P-R INTERVAL: 134 MS
EKG Q-T INTERVAL: 378 MS
EKG QRS DURATION: 70 MS
EKG QTC CALCULATION (BAZETT): 490 MS
EKG R AXIS: 36 DEGREES
EKG T AXIS: 84 DEGREES
EKG VENTRICULAR RATE: 101 BPM
EOSINOPHIL # BLD: 0 K/UL (ref 0–0.8)
EOSINOPHIL NFR BLD: 0 % (ref 0.5–7.8)
ERYTHROCYTE [DISTWIDTH] IN BLOOD BY AUTOMATED COUNT: 12.6 % (ref 11.9–14.6)
GLUCOSE BLD STRIP.AUTO-MCNC: 122 MG/DL (ref 65–100)
GLUCOSE BLD STRIP.AUTO-MCNC: 146 MG/DL (ref 65–100)
GLUCOSE BLD STRIP.AUTO-MCNC: 170 MG/DL (ref 65–100)
GLUCOSE BLD STRIP.AUTO-MCNC: 184 MG/DL (ref 65–100)
GLUCOSE SERPL-MCNC: 183 MG/DL (ref 65–100)
HCT VFR BLD AUTO: 37.4 % (ref 35.8–46.3)
HGB BLD-MCNC: 13 G/DL (ref 11.7–15.4)
IMM GRANULOCYTES # BLD AUTO: 0.1 K/UL (ref 0–0.5)
IMM GRANULOCYTES NFR BLD AUTO: 0 % (ref 0–5)
LYMPHOCYTES # BLD: 2.9 K/UL (ref 0.5–4.6)
LYMPHOCYTES NFR BLD: 25 % (ref 13–44)
MCH RBC QN AUTO: 27.4 PG (ref 26.1–32.9)
MCHC RBC AUTO-ENTMCNC: 34.8 G/DL (ref 31.4–35)
MCV RBC AUTO: 78.7 FL (ref 79.6–97.8)
MONOCYTES # BLD: 0.6 K/UL (ref 0.1–1.3)
MONOCYTES NFR BLD: 5 % (ref 4–12)
NEUTS SEG # BLD: 7.8 K/UL (ref 1.7–8.2)
NEUTS SEG NFR BLD: 70 % (ref 43–78)
NRBC # BLD: 0 K/UL (ref 0–0.2)
PLATELET # BLD AUTO: 277 K/UL (ref 150–450)
PMV BLD AUTO: 9.9 FL (ref 9.4–12.3)
POTASSIUM SERPL-SCNC: 3.7 MMOL/L (ref 3.5–5.1)
RBC # BLD AUTO: 4.75 M/UL (ref 4.05–5.2)
SERVICE CMNT-IMP: ABNORMAL
SODIUM SERPL-SCNC: 133 MMOL/L (ref 136–145)
WBC # BLD AUTO: 11.3 K/UL (ref 4.3–11.1)

## 2022-07-01 PROCEDURE — 74018 RADEX ABDOMEN 1 VIEW: CPT

## 2022-07-01 PROCEDURE — 2580000003 HC RX 258: Performed by: EMERGENCY MEDICINE

## 2022-07-01 PROCEDURE — 2580000003 HC RX 258: Performed by: STUDENT IN AN ORGANIZED HEALTH CARE EDUCATION/TRAINING PROGRAM

## 2022-07-01 PROCEDURE — 6360000002 HC RX W HCPCS: Performed by: INTERNAL MEDICINE

## 2022-07-01 PROCEDURE — 1100000003 HC PRIVATE W/ TELEMETRY

## 2022-07-01 PROCEDURE — 80048 BASIC METABOLIC PNL TOTAL CA: CPT

## 2022-07-01 PROCEDURE — 6360000002 HC RX W HCPCS: Performed by: STUDENT IN AN ORGANIZED HEALTH CARE EDUCATION/TRAINING PROGRAM

## 2022-07-01 PROCEDURE — 6370000000 HC RX 637 (ALT 250 FOR IP): Performed by: STUDENT IN AN ORGANIZED HEALTH CARE EDUCATION/TRAINING PROGRAM

## 2022-07-01 PROCEDURE — 94760 N-INVAS EAR/PLS OXIMETRY 1: CPT

## 2022-07-01 PROCEDURE — A9541 TC99M SULFUR COLLOID: HCPCS | Performed by: INTERNAL MEDICINE

## 2022-07-01 PROCEDURE — C9113 INJ PANTOPRAZOLE SODIUM, VIA: HCPCS | Performed by: STUDENT IN AN ORGANIZED HEALTH CARE EDUCATION/TRAINING PROGRAM

## 2022-07-01 PROCEDURE — 82962 GLUCOSE BLOOD TEST: CPT

## 2022-07-01 PROCEDURE — 93005 ELECTROCARDIOGRAM TRACING: CPT | Performed by: INTERNAL MEDICINE

## 2022-07-01 PROCEDURE — 78264 GASTRIC EMPTYING IMG STUDY: CPT

## 2022-07-01 PROCEDURE — 36415 COLL VENOUS BLD VENIPUNCTURE: CPT

## 2022-07-01 PROCEDURE — A4216 STERILE WATER/SALINE, 10 ML: HCPCS | Performed by: STUDENT IN AN ORGANIZED HEALTH CARE EDUCATION/TRAINING PROGRAM

## 2022-07-01 PROCEDURE — 3430000000 HC RX DIAGNOSTIC RADIOPHARMACEUTICAL: Performed by: INTERNAL MEDICINE

## 2022-07-01 PROCEDURE — 85025 COMPLETE CBC W/AUTO DIFF WBC: CPT

## 2022-07-01 RX ORDER — METOCLOPRAMIDE HYDROCHLORIDE 5 MG/ML
10 INJECTION INTRAMUSCULAR; INTRAVENOUS 3 TIMES DAILY
Status: DISCONTINUED | OUTPATIENT
Start: 2022-07-01 | End: 2022-07-01

## 2022-07-01 RX ORDER — METOCLOPRAMIDE HYDROCHLORIDE 5 MG/ML
10 INJECTION INTRAMUSCULAR; INTRAVENOUS 3 TIMES DAILY
Status: DISCONTINUED | OUTPATIENT
Start: 2022-07-01 | End: 2022-07-02 | Stop reason: HOSPADM

## 2022-07-01 RX ADMIN — SODIUM CHLORIDE, PRESERVATIVE FREE 3 ML: 5 INJECTION INTRAVENOUS at 16:13

## 2022-07-01 RX ADMIN — METOPROLOL TARTRATE 25 MG: 25 TABLET, FILM COATED ORAL at 21:17

## 2022-07-01 RX ADMIN — SODIUM CHLORIDE, PRESERVATIVE FREE 10 ML: 5 INJECTION INTRAVENOUS at 08:52

## 2022-07-01 RX ADMIN — SODIUM CHLORIDE, PRESERVATIVE FREE 3 ML: 5 INJECTION INTRAVENOUS at 08:51

## 2022-07-01 RX ADMIN — METOCLOPRAMIDE 10 MG: 5 INJECTION, SOLUTION INTRAMUSCULAR; INTRAVENOUS at 21:22

## 2022-07-01 RX ADMIN — SODIUM CHLORIDE: 9 INJECTION, SOLUTION INTRAVENOUS at 04:26

## 2022-07-01 RX ADMIN — LISINOPRIL 20 MG: 20 TABLET ORAL at 21:17

## 2022-07-01 RX ADMIN — ATORVASTATIN CALCIUM 80 MG: 80 TABLET, FILM COATED ORAL at 21:20

## 2022-07-01 RX ADMIN — SODIUM CHLORIDE: 9 INJECTION, SOLUTION INTRAVENOUS at 21:24

## 2022-07-01 RX ADMIN — SODIUM CHLORIDE, PRESERVATIVE FREE 40 MG: 5 INJECTION INTRAVENOUS at 08:48

## 2022-07-01 RX ADMIN — ASPIRIN 81 MG: 81 TABLET ORAL at 21:16

## 2022-07-01 RX ADMIN — CITALOPRAM HYDROBROMIDE 20 MG: 20 TABLET ORAL at 21:16

## 2022-07-01 RX ADMIN — INSULIN HUMAN 7 UNITS: 100 INJECTION, SUSPENSION SUBCUTANEOUS at 21:19

## 2022-07-01 RX ADMIN — METOPROLOL TARTRATE 25 MG: 25 TABLET, FILM COATED ORAL at 16:12

## 2022-07-01 RX ADMIN — Medication 1 MILLICURIE: at 11:10

## 2022-07-01 RX ADMIN — METOCLOPRAMIDE 10 MG: 5 INJECTION, SOLUTION INTRAMUSCULAR; INTRAVENOUS at 16:12

## 2022-07-01 RX ADMIN — SODIUM CHLORIDE, PRESERVATIVE FREE 3 ML: 5 INJECTION INTRAVENOUS at 01:00

## 2022-07-01 NOTE — PROGRESS NOTES
END OF SHIFT NOTE:    INTAKE/OUTPUT  06/30 0701 - 07/01 0700  In: 370 [P.O.:370]  Out: 1500 [Urine:1500]  Voiding: Yes  Catheter: No  Drain:              Flatus: Patient does have flatus present. Stool:  occurrences. Characteristics:           Stool Assessment  Last BM (including prior to admit): 07/28/22    Emesis:  occurrences. Characteristics:   Emesis Appearance: Bilious    VITAL SIGNS  Patient Vitals for the past 12 hrs:   Temp Pulse Resp BP SpO2   07/01/22 1654  100      07/01/22 1627 98.2 °F (36.8 °C) (!) 106 19 100/63 96 %   07/01/22 1340 98.8 °F (37.1 °C) (!) 109 17 (!) 155/97 97 %   07/01/22 0744  (!) 105  (!) 154/95 97 %   07/01/22 0742 98.6 °F (37 °C) (!) 109 18 (!) 151/97 97 %       Pain Assessment  @BSHSIFLOW(13)@  Pain Location: Abdomen  Patient's Stated Pain Goal: 0 - No pain    Ambulating  Yes    Shift report given to oncoming nurse at the bedside. Melba Arnold

## 2022-07-01 NOTE — PROGRESS NOTES
Patient were discussed in [facility] [facility] team meeting this AM.  Patient was admitted for Idiopathic acute pancreatitis nausea and vomiting. CM met with patient / mother at bedside to discuss d/c plan needs. Patient alert/orient x4. Patient verified demographic no changes needed. Patient verified PCP and insurance. Patient voiced no concerns about purchasing or affording medication. Patient states her mother lives with her in a one level home with three steps to enter into the home with handrails on both sides. Patient states she's independent with her ADL's and has no DME's in the home. Patient states she was d/c from [facility] last week and not interested in services again at this time. Patient will be d/c home no needs are identified at this time. CM will continue to monitor and remain available for any needs, concerns or questions that may occur. 1605: Patient were discussed in [facility] [facility] team meeting this AM.  Patient has a possibility of being d/c home on tomorrow. CM will continue to monitor. 06/29/22 1158   Service Assessment   Patient Orientation Alert and Oriented;Person;Place;Situation;Self   Cognition Alert   History Provided By Patient   Primary Caregiver Self   Support Systems Family Members   PCP Verified by CM Yes   Last Visit to PCP Within last 3 months   Prior Functional Level Independent in ADLs/IADLs   Current Functional Level Independent in ADLs/IADLs   Can patient return to prior living arrangement Yes   Ability to make needs known: Good   Family able to assist with home care needs: Yes   Social/Functional History   Lives With Parent   Type of [address] One level   Home Access Stairs to enter with rails   Entrance Stairs - Number of Steps 3   Entrance Stairs - Rails Both   Bathroom Shower/Tub Tub/Shower unit; Walk-in shower   Bathroom Toilet Standard   Receives Help From Family   ADL Assistance Independent   Homemaking Assistance Independent   Homemaking Responsibilities Yes

## 2022-07-01 NOTE — PROGRESS NOTES
BS this morning 170, last night at bedtime 179. Patient with N/V, unable to tolerate nothing by mouth.  Dr Dena Chanel notified and approved to hold insulin dose this am.

## 2022-07-01 NOTE — PROGRESS NOTES
END OF SHIFT NOTE:    INTAKE/OUTPUT  06/30 0701 - 07/01 0700  In: 370 [P.O.:370]  Out: 1500 [Urine:1500]  Voiding: Yes  Catheter: No  Drain:              Flatus: Patient does have flatus present. Stool: 0 occurrences. Characteristics:           Stool Assessment  Last BM (including prior to admit): 06/28/22    Emesis: 0 occurrences. Characteristics:   Emesis Appearance: Red    VITAL SIGNS  Patient Vitals for the past 12 hrs:   Temp Pulse Resp BP SpO2   07/01/22 0220 98.6 °F (37 °C) 87 16 (!) 153/83 95 %   06/30/22 2220 99 °F (37.2 °C) (!) 102 18 (!) 143/90 97 %   06/30/22 1920 98.8 °F (37.1 °C) (!) 101 16 136/77 96 %       Pain Assessment  @BSHSIFLOW(13)@  Pain Location: Abdomen  Patient's Stated Pain Goal: 0 - No pain    Ambulating  Yes    Shift report given to oncoming nurse at the bedside.     Nitin Azul RN

## 2022-07-01 NOTE — PROGRESS NOTES
Hospitalist Progress Note   Admit Date:  2022  8:37 AM   Name:  Emily Herndon   Age:  64 y.o. Sex:  female  :  1965   MRN:  [id number]   Room:      Presenting Complaint: Emesis     Reason(s) for Admission: Idiopathic acute pancreatitis [K85.00]  Nausea and vomiting, intractability of vomiting not specified, unspecified vomiting type [R11.2]  Acute pancreatitis, unspecified complication status, unspecified pancreatitis type [facility] Course & Interval History:   Please refer to the admission H&P for details of presentation. In summary, Emily Herndon is a 64 y.o. female with medical history significant for coronary artery disease status post CABG 5 weeks ago, hyperlipidemia, hypertension, diabetes type 2 who presented with intractable nausea and vomiting for about 1 day. Unable to keep anything down. Subjective/24 hr Events (22) : Patient is seen and examined at bedside. Patient reports slight improvement in her nausea and vomiting during the daytime yesterday after getting Reglan. Able to tolerate clears for lunch and dinner, however, overnight she had nausea and vomited a few times. This morning, patient was having nausea without any vomiting. No elbow pain. Has not had any bowel movements. No neurological symptoms such as dizziness, blurry vision, unsteady gait. Denies fever, chills, chest pains, shortness of breath. Review of Systems: 10 point review of systems is otherwise negative with the exception of the elements mentioned above. Assessment & Plan:   Intractable nausea and vomiting  Initial concern for pancreatitis due to elevated lipase but without any radiological evidence of pancreatitis or abdominal pain. Possible due to elevated BG. CT without any acute changes. 22: She was able to tolerate clear liquid for lunch and dinner, she had nausea with multiple episodes of vomiting overnight.    Continue Zofran and compazine PRN for n/v   will keep on Clears    Continue maintenance IV fluids   PPI  - EKG reviewed with qTC of 490. Trial of reglan TID today. KUB ordered. NM gastric emptying study ordered.     Coronary artery disease s/p CABG (5 weeks ago)   HTN // HLD   Continue Lipitor, Lopressor, aspirin     Type 2 diabetes  07/01/22: Continues to be hyperglycemic. A1c of 9.5 on admission.  Sliding scale insulin   Obtain fingerstick glucose 3 times daily prior to meals and at bedtime   Continue with home NPH 18 units in the morning and 7 units at nighttime      Diet:  ADULT DIET; Clear Liquid; 5 carb choices (75 gm/meal)  DVT PPx: lovenox  Code status: Full Code      I have reviewed and ordered clinical lab tests and independently visualized images, tracing. I spent 30 minutes of time caring for this patient at bedside or nearby, more than 50 percent of which was spent on coordination of care and/or counseling regarding the disease process, treatment options, and treatment plan.       Hospital Problems:  Principal Problem:    Intractable nausea and vomiting  Active Problems:    Hx of CABG    Hyperlipidemia    S/P CABG x 2    CAD (coronary artery disease)    DM (diabetes mellitus) ([facility] [address]    Uncontrolled type 2 diabetes mellitus with hyperglycemia (HCC)    Hypertension  Resolved Problems:    Hyponatremia    Hypomagnesemia      Objective:     Patient Vitals for the past 24 hrs:   Temp Pulse Resp BP SpO2   07/01/22 0744  (!) 105  (!) 154/95 97 %   07/01/22 0742 98.6 °F (37 °C) (!) 109 18 (!) 151/97 97 %   07/01/22 0220 98.6 °F (37 °C) 87 16 (!) 153/83 95 %   06/30/22 2220 99 °F (37.2 °C) (!) 102 18 (!) 143/90 97 %   06/30/22 1920 98.8 °F (37.1 °C) (!) 101 16 136/77 96 %   06/30/22 1522 98.7 °F (37.1 °C) (!) 113 18 (!) 155/94 98 %       Oxygen Therapy  SpO2: 97 %  Pulse Oximeter Device Mode: Intermittent  O2 Device: None (Room air)  O2 Flow Rate (L/min): 2 L/min    Estimated body mass index is 27.15 kg/m² as calculated from the following:    Height as of 6/21/22: 5' 4\" (1.626 m). Weight as of this encounter: 158 lb 3.2 oz (71.8 kg). Intake/Output Summary (Last 24 hours) at 7/1/2022 1149  Last data filed at 7/1/2022 0919  Gross per 24 hour   Intake 590 ml   Output 900 ml   Net -310 ml         Physical Exam:     Blood pressure (!) 154/95, pulse (!) 105, temperature 98.6 °F (37 °C), temperature source Oral, resp. rate 18, weight 158 lb 3.2 oz (71.8 kg), SpO2 97 %. General:     Alert and awake  Head:  Normocephalic, atraumatic  Eyes:  Sclerae appear normal.  Pupils equally round. ENT:  Nares appear normal, no drainage. Moist oral mucosa  Neck:  No restricted ROM. Trachea midline   CV:   RRR. No m/r/g. No jugular venous distension. Healed sternotomy  Lungs:   CTAB. No wheezing. Respirations even, unlabored  Abdomen: Bowel sounds present. Soft, nontender, nondistended. Extremities: No cyanosis or clubbing. No edema  Skin:     No rashes and normal coloration. Warm and dry. Neuro:  CN II-XII grossly intact. A&Ox3  Psych:  Normal mood and affect.       I have reviewed ordered lab tests and independently visualized imaging below:    Recent Labs:  Recent Results (from the past 48 hour(s))   POCT Glucose    Collection Time: 06/29/22 11:56 AM   Result Value Ref Range    POC Glucose 181 (H) 65 - 100 mg/dL    Performed by: Nathalia    POCT Glucose    Collection Time: 06/29/22  5:26 PM   Result Value Ref Range    POC Glucose 131 (H) 65 - 100 mg/dL    Performed by: Nathalia    POCT Glucose    Collection Time: 06/29/22  9:05 PM   Result Value Ref Range    POC Glucose 119 (H) 65 - 100 mg/dL    Performed by: Taylor    CBC with Auto Differential    Collection Time: 06/30/22  7:49 AM   Result Value Ref Range    WBC 11.2 (H) 4.3 - 11.1 K/uL    RBC 4.46 4.05 - 5.2 M/uL    Hemoglobin 12.5 11.7 - 15.4 g/dL    Hematocrit 35.5 (L) 35.8 - 46.3 %    MCV 79.6 79.6 - 97.8 FL    MCH 28.0 26.1 - 32.9 Performed by:  Vanessa    POCT Glucose    Collection Time: 06/30/22  8:41 PM   Result Value Ref Range    POC Glucose 179 (H) 65 - 100 mg/dL    Performed by: Tatiana Andrade    POCT Glucose    Collection Time: 07/01/22  7:43 AM   Result Value Ref Range    POC Glucose 170 (H) 65 - 100 mg/dL    Performed by: LalyPCA    Basic Metabolic Panel w/ Reflex to MG    Collection Time: 07/01/22  8:18 AM   Result Value Ref Range    Sodium 133 (L) 136 - 145 mmol/L    Potassium 3.7 3.5 - 5.1 mmol/L    Chloride 100 98 - 107 mmol/L    CO2 25 21 - 32 mmol/L    Anion Gap 8 7 - 16 mmol/L    Glucose 183 (H) 65 - 100 mg/dL    BUN 9 6 - 23 MG/DL    CREATININE 0.60 0.6 - 1.0 MG/DL    GFR African American >60 >60 ml/min/1.73m2    GFR Non- >60 >60 ml/min/1.73m2    Calcium 8.6 8.3 - 10.4 MG/DL   CBC with Auto Differential    Collection Time: 07/01/22  8:18 AM   Result Value Ref Range    WBC 11.3 (H) 4.3 - 11.1 K/uL    RBC 4.75 4.05 - 5.2 M/uL    Hemoglobin 13.0 11.7 - 15.4 g/dL    Hematocrit 37.4 35.8 - 46.3 %    MCV 78.7 (L) 79.6 - 97.8 FL    MCH 27.4 26.1 - 32.9 PG    MCHC 34.8 31.4 - 35.0 g/dL    RDW 12.6 11.9 - 14.6 %    Platelets 942 960 - 849 K/uL    MPV 9.9 9.4 - 12.3 FL    nRBC 0.00 0.0 - 0.2 K/uL    Differential Type AUTOMATED      Seg Neutrophils 70 43 - 78 %    Lymphocytes 25 13 - 44 %    Monocytes 5 4.0 - 12.0 %    Eosinophils % 0 (L) 0.5 - 7.8 %    Basophils 0 0.0 - 2.0 %    Immature Granulocytes 0 0.0 - 5.0 %    Segs Absolute 7.8 1.7 - 8.2 K/UL    Absolute Lymph # 2.9 0.5 - 4.6 K/UL    Absolute Mono # 0.6 0.1 - 1.3 K/UL    Absolute Eos # 0.0 0.0 - 0.8 K/UL    Basophils Absolute 0.0 0.0 - 0.2 K/UL    Absolute Immature Granulocyte 0.1 0.0 - 0.5 K/UL   EKG 12 Lead    Collection Time: 07/01/22  9:24 AM   Result Value Ref Range    Ventricular Rate 101 BPM    Atrial Rate 101 BPM    P-R Interval 134 ms    QRS Duration 70 ms    Q-T Interval 378 ms    QTc Calculation (Bazett) 490 ms    P Axis 59 degrees    R Axis 36 degrees    T Axis 84 degrees    Diagnosis Sinus tachycardia        Other Studies:  CT ABDOMEN PELVIS W IV CONTRAST Additional Contrast? None   Final Result   No acute changes in the abdomen or pelvis to account for patient's   symptoms. No bowel obstruction, diverticulitis or appendicitis. Stomach is   decompressed. Probable fatty infiltration liver. US ABDOMEN LIMITED   Final Result   Unremarkable examination.             NM GASTRIC EMPTYING    (Results Pending)   XR ABDOMEN (KUB) (SINGLE AP VIEW)    (Results Pending)       Current Meds:  Current Facility-Administered Medications   Medication Dose Route Frequency    metoclopramide (REGLAN) injection 10 mg  10 mg IntraVENous TID    insulin NPH (HUMULIN N;NOVOLIN N) injection vial 18 Units  18 Units SubCUTAneous QAM    insulin NPH (HUMULIN N;NOVOLIN N) injection vial 7 Units  7 Units SubCUTAneous Nightly    sodium chloride flush 0.9 % injection 3 mL  3 mL IntraVENous Q8H    aspirin EC tablet 81 mg  81 mg Oral Nightly    atorvastatin (LIPITOR) tablet 80 mg  80 mg Oral Nightly    metoprolol tartrate (LOPRESSOR) tablet 25 mg  25 mg Oral BID    lisinopril (PRINIVIL;ZESTRIL) tablet 20 mg  20 mg Oral Nightly    citalopram (CELEXA) tablet 20 mg  20 mg Oral Nightly    sodium chloride flush 0.9 % injection 5-40 mL  5-40 mL IntraVENous 2 times per day    sodium chloride flush 0.9 % injection 5-40 mL  5-40 mL IntraVENous PRN    0.9 % sodium chloride infusion   IntraVENous PRN    enoxaparin (LOVENOX) injection 40 mg  40 mg SubCUTAneous Daily    ondansetron (ZOFRAN-ODT) disintegrating tablet 4 mg  4 mg Oral Q8H PRN    Or    ondansetron (ZOFRAN) injection 4 mg  4 mg IntraVENous Q6H PRN    polyethylene glycol (GLYCOLAX) packet 17 g  17 g Oral Daily PRN    acetaminophen (TYLENOL) tablet 650 mg  650 mg Oral Q6H PRN    Or    acetaminophen (TYLENOL) suppository 650 mg  650 mg Rectal Q6H PRN    0.9 % sodium chloride infusion   IntraVENous Continuous    morphine injection 2 mg  2 mg IntraVENous Q4H PRN    insulin lispro (HUMALOG) injection vial 0-4 Units  0-4 Units SubCUTAneous TID WC    insulin lispro (HUMALOG) injection vial 0-4 Units  0-4 Units SubCUTAneous Nightly    glucose chewable tablet 16 g  4 tablet Oral PRN    dextrose bolus 10% 125 mL  125 mL IntraVENous PRN    Or    dextrose bolus 10% 250 mL  250 mL IntraVENous PRN    glucagon (rDNA) injection 1 mg  1 mg IntraMUSCular PRN    dextrose 5 % solution  100 mL/hr IntraVENous PRN    prochlorperazine (COMPAZINE) injection 10 mg  10 mg IntraVENous Q6H PRN    labetalol (NORMODYNE;TRANDATE) injection 10 mg  10 mg IntraVENous Q4H PRN    pantoprazole (PROTONIX) 40 mg in sodium chloride (PF) 10 mL injection  40 mg IntraVENous Daily    hydrALAZINE (APRESOLINE) injection 10 mg  10 mg IntraVENous Q6H PRN       Signed:  Aniket Dean MD    Part of this note may have been written by using a voice dictation software. The note has been proof read but may still contain some grammatical/other typographical errors.

## 2022-07-02 VITALS
SYSTOLIC BLOOD PRESSURE: 122 MMHG | TEMPERATURE: 97.3 F | DIASTOLIC BLOOD PRESSURE: 74 MMHG | WEIGHT: 161.9 LBS | HEART RATE: 86 BPM | OXYGEN SATURATION: 97 % | RESPIRATION RATE: 19 BRPM | BODY MASS INDEX: 27.79 KG/M2

## 2022-07-02 PROBLEM — K31.84 GASTROPARESIS DUE TO DM (HCC): Status: ACTIVE | Noted: 2022-07-02

## 2022-07-02 PROBLEM — R11.2 INTRACTABLE NAUSEA AND VOMITING: Status: RESOLVED | Noted: 2022-06-28 | Resolved: 2022-07-02

## 2022-07-02 PROBLEM — E11.43 GASTROPARESIS DUE TO DM (HCC): Status: ACTIVE | Noted: 2022-07-02

## 2022-07-02 LAB
ANION GAP SERPL CALC-SCNC: 5 MMOL/L (ref 7–16)
BASOPHILS # BLD: 0.1 K/UL (ref 0–0.2)
BASOPHILS NFR BLD: 1 % (ref 0–2)
BUN SERPL-MCNC: 19 MG/DL (ref 6–23)
CALCIUM SERPL-MCNC: 8.6 MG/DL (ref 8.3–10.4)
CHLORIDE SERPL-SCNC: 105 MMOL/L (ref 98–107)
CO2 SERPL-SCNC: 26 MMOL/L (ref 21–32)
CREAT SERPL-MCNC: 0.9 MG/DL (ref 0.6–1)
DIFFERENTIAL METHOD BLD: ABNORMAL
EKG ATRIAL RATE: 74 BPM
EKG DIAGNOSIS: NORMAL
EKG P AXIS: 64 DEGREES
EKG P-R INTERVAL: 136 MS
EKG Q-T INTERVAL: 484 MS
EKG QRS DURATION: 70 MS
EKG QTC CALCULATION (BAZETT): 537 MS
EKG R AXIS: 55 DEGREES
EKG T AXIS: 95 DEGREES
EKG VENTRICULAR RATE: 74 BPM
EOSINOPHIL # BLD: 0.1 K/UL (ref 0–0.8)
EOSINOPHIL NFR BLD: 1 % (ref 0.5–7.8)
ERYTHROCYTE [DISTWIDTH] IN BLOOD BY AUTOMATED COUNT: 12.8 % (ref 11.9–14.6)
GLUCOSE BLD STRIP.AUTO-MCNC: 104 MG/DL (ref 65–100)
GLUCOSE BLD STRIP.AUTO-MCNC: 182 MG/DL (ref 65–100)
GLUCOSE SERPL-MCNC: 109 MG/DL (ref 65–100)
HCT VFR BLD AUTO: 32.7 % (ref 35.8–46.3)
HGB BLD-MCNC: 11.3 G/DL (ref 11.7–15.4)
IMM GRANULOCYTES # BLD AUTO: 0 K/UL (ref 0–0.5)
IMM GRANULOCYTES NFR BLD AUTO: 0 % (ref 0–5)
LYMPHOCYTES # BLD: 4.1 K/UL (ref 0.5–4.6)
LYMPHOCYTES NFR BLD: 47 % (ref 13–44)
MAGNESIUM SERPL-MCNC: 2.2 MG/DL (ref 1.8–2.4)
MCH RBC QN AUTO: 27.5 PG (ref 26.1–32.9)
MCHC RBC AUTO-ENTMCNC: 34.6 G/DL (ref 31.4–35)
MCV RBC AUTO: 79.6 FL (ref 79.6–97.8)
MONOCYTES # BLD: 0.7 K/UL (ref 0.1–1.3)
MONOCYTES NFR BLD: 8 % (ref 4–12)
NEUTS SEG # BLD: 3.7 K/UL (ref 1.7–8.2)
NEUTS SEG NFR BLD: 43 % (ref 43–78)
NRBC # BLD: 0 K/UL (ref 0–0.2)
PLATELET # BLD AUTO: 233 K/UL (ref 150–450)
PMV BLD AUTO: 9.7 FL (ref 9.4–12.3)
POTASSIUM SERPL-SCNC: 3.4 MMOL/L (ref 3.5–5.1)
RBC # BLD AUTO: 4.11 M/UL (ref 4.05–5.2)
SERVICE CMNT-IMP: ABNORMAL
SERVICE CMNT-IMP: ABNORMAL
SODIUM SERPL-SCNC: 136 MMOL/L (ref 136–145)
WBC # BLD AUTO: 8.6 K/UL (ref 4.3–11.1)

## 2022-07-02 PROCEDURE — 6360000002 HC RX W HCPCS: Performed by: STUDENT IN AN ORGANIZED HEALTH CARE EDUCATION/TRAINING PROGRAM

## 2022-07-02 PROCEDURE — 93005 ELECTROCARDIOGRAM TRACING: CPT | Performed by: INTERNAL MEDICINE

## 2022-07-02 PROCEDURE — 82962 GLUCOSE BLOOD TEST: CPT

## 2022-07-02 PROCEDURE — 36415 COLL VENOUS BLD VENIPUNCTURE: CPT

## 2022-07-02 PROCEDURE — 6370000000 HC RX 637 (ALT 250 FOR IP): Performed by: STUDENT IN AN ORGANIZED HEALTH CARE EDUCATION/TRAINING PROGRAM

## 2022-07-02 PROCEDURE — A4216 STERILE WATER/SALINE, 10 ML: HCPCS | Performed by: STUDENT IN AN ORGANIZED HEALTH CARE EDUCATION/TRAINING PROGRAM

## 2022-07-02 PROCEDURE — C9113 INJ PANTOPRAZOLE SODIUM, VIA: HCPCS | Performed by: STUDENT IN AN ORGANIZED HEALTH CARE EDUCATION/TRAINING PROGRAM

## 2022-07-02 PROCEDURE — 2580000003 HC RX 258: Performed by: STUDENT IN AN ORGANIZED HEALTH CARE EDUCATION/TRAINING PROGRAM

## 2022-07-02 PROCEDURE — 80048 BASIC METABOLIC PNL TOTAL CA: CPT

## 2022-07-02 PROCEDURE — 83735 ASSAY OF MAGNESIUM: CPT

## 2022-07-02 PROCEDURE — 85025 COMPLETE CBC W/AUTO DIFF WBC: CPT

## 2022-07-02 PROCEDURE — 6360000002 HC RX W HCPCS: Performed by: INTERNAL MEDICINE

## 2022-07-02 PROCEDURE — 6370000000 HC RX 637 (ALT 250 FOR IP): Performed by: INTERNAL MEDICINE

## 2022-07-02 RX ORDER — PANTOPRAZOLE SODIUM 40 MG/1
40 TABLET, DELAYED RELEASE ORAL
Status: DISCONTINUED | OUTPATIENT
Start: 2022-07-03 | End: 2022-07-02 | Stop reason: HOSPADM

## 2022-07-02 RX ORDER — POTASSIUM CHLORIDE 20 MEQ/1
40 TABLET, EXTENDED RELEASE ORAL ONCE
Status: COMPLETED | OUTPATIENT
Start: 2022-07-02 | End: 2022-07-02

## 2022-07-02 RX ADMIN — SODIUM CHLORIDE: 9 INJECTION, SOLUTION INTRAVENOUS at 08:30

## 2022-07-02 RX ADMIN — SODIUM CHLORIDE, PRESERVATIVE FREE 10 ML: 5 INJECTION INTRAVENOUS at 08:28

## 2022-07-02 RX ADMIN — ENOXAPARIN SODIUM 40 MG: 100 INJECTION SUBCUTANEOUS at 08:31

## 2022-07-02 RX ADMIN — METOCLOPRAMIDE 10 MG: 5 INJECTION, SOLUTION INTRAMUSCULAR; INTRAVENOUS at 08:26

## 2022-07-02 RX ADMIN — SODIUM CHLORIDE, PRESERVATIVE FREE 40 MG: 5 INJECTION INTRAVENOUS at 08:26

## 2022-07-02 RX ADMIN — INSULIN HUMAN 18 UNITS: 100 INJECTION, SUSPENSION SUBCUTANEOUS at 08:31

## 2022-07-02 RX ADMIN — POTASSIUM CHLORIDE 40 MEQ: 20 TABLET, EXTENDED RELEASE ORAL at 08:26

## 2022-07-02 NOTE — DISCHARGE SUMMARY
Hospitalist Discharge Summary   Admit Date:  2022  8:37 AM   DC Note date: 2022  Name:  Martin Zavala   Age:  64 y.o. Sex:  female  :  1965   MRN:  [id number]   Room:    PCP:  Nery Ordoñez DO    Presenting Complaint: Emesis     Initial Admission Diagnosis: Idiopathic acute pancreatitis [K85.00]  Nausea and vomiting, intractability of vomiting not specified, unspecified vomiting type [R11.2]  Acute pancreatitis, unspecified complication status, unspecified pancreatitis type [K85.90]     Problem List for this Hospitalization (present on admission):    Principal Problem (Resolved): Intractable nausea and vomiting  Active Problems:    Hx of CABG    Hyperlipidemia    S/P CABG x 2    CAD (coronary artery disease)    Gastropares[facility] due to DM ([facility])    DM (diabetes mellitus) ([facility] [address]    Uncontrolled type 2 diabetes mellitus with hyperglycemia ([facility] [address]    Hypertension  Resolved Problems:    Hyponatremia    Hypomagnesemia    Did Patient have Sepsis (YES OR NO): No    Hospital Course:  Please refer to the admission H&P for details of presentation. In summary, Martin Zavala is a 64 y.o. female with medical history significant for coronary artery disease status post CABG 5 weeks ago, hyperlipidemia, hypertension, diabetes type 2 who presented with intractable nausea and vomiting for about 1 day. Unable to keep anything down. NM Gastric emptying study is positive for gastroparesis. Has been started on trial of Reglan with improvement in her nausea. Patient is medically stable for discharge. Patient is to continue taking medications as prescribed and to follow up with PCP on discharge. Patient is instructed to to call a physician or return to ED if any concerns/symptoms worsened. Discharge summary and encounter summary was sent to PCP electronically via \"Comm Mgt\" link in [facility], if possible. Disposition: Home  Diet: ADULT DIET;  Full Liquid; 4 carb choices (60 gm/meal)  Code Status: Full Code    Follow Ups:   Follow-up Information     Tres King DO. Specialty: Internal Medicine  Why: As needed  Contact information:  [address]  [place] [address] [facility] In 2 weeks. Why: Gastroparesis  Contact information:  [address] [facility] [address] [facility]  [phone]                   Time spent in patient discharge and coordination 35 minutes. Plan was discussed with patient. All questions answered. Patient was stable at time of discharge. Instructions given to call a physician or return if any concerns. Current Discharge Medication List      CONTINUE these medications which have NOT CHANGED    Details   polyethylene glycol (MIRALAX) 17 g packet Take 17 g by mouth daily as needed for Constipation. Docusate Calcium (STOOL SOFTENER PO) Take 1 capsule by mouth daily as needed (constipation). acetaminophen (TYLENOL) 500 MG tablet Take 500 mg by mouth every 4 hours as needed for Pain (for breakthrough pain). atorvastatin (LIPITOR) 80 MG tablet Take 1 tablet by mouth at bedtime  Qty: 90 tablet, Refills: 3      metoprolol tartrate (LOPRESSOR) 25 MG tablet Take 1 tablet by mouth 2 times daily  Qty: 60 tablet, Refills: 3      insulin NPH (HUMULIN N;NOVOLIN N) 100 UNIT/ML injection vial 18 units in the morning and 7 units at night  Qty: 100 mL, Refills: 3      aspirin 81 MG EC tablet Take 81 mg by mouth at bedtime      citalopram (CELEXA) 20 MG tablet Take 20 mg by mouth at bedtime       lisinopril (PRINIVIL;ZESTRIL) 20 MG tablet Take 20 mg by mouth at bedtime TAKE 1 TABLET BY MOUTH EVERY DAY             Procedures done this admission:  * No surgery found *    Consults this admission:  None    Echocardiogram results:  No results found for this or any previous visit.       Diagnostic Imaging/Tests:   CT ABDOMEN PELVIS W IV CONTRAST Additional Contrast? None    Result Date: 6/28/2022  CT OF THE ABDOMEN AND PELVIS INDICATION: Nausea and vomiting. CABG 5 weeks ago. Multiple axial images were obtained through the abdomen and pelvis. Oral contrast was used for bowel opacification. 100 mL of Isovue 370 intravenous contrast was used for better evaluation of solid organs and vascular structures. Radiation dose reduction techniques were used for this study. All CT scans performed at this facility use one or all of the following: Automated exposure control, adjustment of the mA and/or kVp according to patient's size, iterative reconstruction. COMPARISON: Abdominal ultrasound 6/28/2022. FINDINGS: LOWER CHEST: Normal. HEPATOBILIARY: Probable fatty infiltration liver without focal mass. Gallbladder is unremarkable. PANCREAS: Normal. SPLEEN: Normal. ADRENAL GLANDS: Normal. KIDNEYS/BLADDER: Normal. BOWEL: No bowel obstruction, diverticulitis or appendicitis. Stomach is decompressed. LYMPH NODES: No enlarged abdominal or pelvic lymph nodes. VASCULATURE: Unremarkable. PELVIC ORGANS: Uterus, ovaries and rectum are unremarkable. No pelvic free fluid. MUSCULOSKELETAL: Normal.     No acute changes in the abdomen or pelvis to account for patient's symptoms. No bowel obstruction, diverticulitis or appendicitis. Stomach is decompressed. Probable fatty infiltration liver. US ABDOMEN LIMITED    Result Date: 6/28/2022  RIGHT UPPER QUADRANT SONOGRAPHY: CLINICAL HISTORY: Abdominal pain with pancreatitis. FINDINGS: Multiple images from real time ultrasound evaluation of the RUQ show that the gallbladder is normal in size and configuration, without evidence of stone, wall thickening, or pericholecystic fluid. No tenderness was elicited while scanning over the gallbladder. The common duct is normal in caliber at 2 mm, and no significant intrahepatic bilary ductal dilatation is evident. No focal liver lesion is seen.  The pancreas is normal in contour and echogenicity where seen. The right kidney is not obstructed. Unremarkable examination. Labs: Results:       BMP, Mg, Phos Recent Labs     06/30/22  0749 07/01/22  0818 07/02/22  0612   * 133* 136   K 3.3* 3.7 3.4*   CL 99 100 105   CO2 24 25 26   BUN 10 9 19   MG 1.7*  --  2.2      CBC Recent Labs     06/30/22  0749 07/01/22  0818 07/02/22  0612   WBC 11.2* 11.3* 8.6   RBC 4.46 4.75 4.11   HGB 12.5 13.0 11.3*   HCT 35.5* 37.4 32.7*    277 233      LFT No results for input(s): ALT, TP, ALB, GLOB in the last 72 hours.     Invalid input(s): SGOT, TBIL, AP, AGRAT, GPT   Cardiac Testing No results found for: BNP, CPK, RCK1, CKMB   Coagulation Tests Lab Results   Component Value Date/Time    INR 1.4 05/25/2022 03:39 PM    INR 1.0 05/23/2022 09:27 AM    INR 1.1 05/09/2022 08:51 AM    APTT 32.5 05/25/2022 03:39 PM    APTT 27.3 05/23/2022 09:27 AM      A1c No results found for: HBA1C   Lipid Panel Lab Results   Component Value Date/Time    CHOL 217 04/27/2022 03:48 PM    HDL 40 04/27/2022 03:48 PM    VLDL 46 04/27/2022 03:48 PM      Thyroid Panel Lab Results   Component Value Date/Time    TSH 1.980 04/27/2022 03:48 PM    TSH 1.980 06/19/2019 11:00 AM        Most Recent UA No results found for: MUCUS, UCOM       All Labs from Last 24 Hrs:  Recent Results (from the past 24 hour(s))   POCT Glucose    Collection Time: 07/01/22  1:42 PM   Result Value Ref Range    POC Glucose 184 (H) 65 - 100 mg/dL    Performed by: [email or web address]    POCT Glucose    Collection Time: 07/01/22  4:29 PM   Result Value Ref Range    POC Glucose 146 (H) 65 - 100 mg/dL    Performed by: [email or web address]    POCT Glucose    Collection Time: 07/01/22  8:39 PM   Result Value Ref Range    POC Glucose 122 (H) 65 - 100 mg/dL    Performed by: Lorena    Basic Metabolic Panel w/ Reflex to MG    Collection Time: 07/02/22  6:12 AM   Result Value Ref Range    Sodium 136 136 - 145 mmol/L    Potassium 3.4 (L) 3.5 - 5.1 mmol/L    Chloride 105 98 - 107 mmol/L    CO2 26 21 - 32 mmol/L    Anion Gap 5 (L) 7 - 16 mmol/L    Glucose 109 (H) 65 - 100 mg/dL    BUN 19 6 - 23 MG/DL    CREATININE 0.90 0.6 - 1.0 MG/DL    GFR African American >60 >60 ml/min/1.73m2    GFR Non- >60 >60 ml/min/1.73m2    Calcium 8.6 8.3 - 10.4 MG/DL   CBC with Auto Differential    Collection Time: 07/02/22  6:12 AM   Result Value Ref Range    WBC 8.6 4.3 - 11.1 K/uL    RBC 4.11 4.05 - 5.2 M/uL    Hemoglobin 11.3 (L) 11.7 - 15.4 g/dL    Hematocrit 32.7 (L) 35.8 - 46.3 %    MCV 79.6 79.6 - 97.8 FL    MCH 27.5 26.1 - 32.9 PG    MCHC 34.6 31.4 - 35.0 g/dL    RDW 12.8 11.9 - 14.6 %    Platelets 731 109 - 629 K/uL    MPV 9.7 9.4 - 12.3 FL    nRBC 0.00 0.0 - 0.2 K/uL    Differential Type AUTOMATED      Seg Neutrophils 43 43 - 78 %    Lymphocytes 47 (H) 13 - 44 %    Monocytes 8 4.0 - 12.0 %    Eosinophils % 1 0.5 - 7.8 %    Basophils 1 0.0 - 2.0 %    Immature Granulocytes 0 0.0 - 5.0 %    Segs Absolute 3.7 1.7 - 8.2 K/UL    Absolute Lymph # 4.1 0.5 - 4.6 K/UL    Absolute Mono # 0.7 0.1 - 1.3 K/UL    Absolute Eos # 0.1 0.0 - 0.8 K/UL    Basophils Absolute 0.1 0.0 - 0.2 K/UL    Absolute Immature Granulocyte 0.0 0.0 - 0.5 K/UL   Magnesium    Collection Time: 07/02/22  6:12 AM   Result Value Ref Range    Magnesium 2.2 1.8 - 2.4 mg/dL   POCT Glucose    Collection Time: 07/02/22  7:23 AM   Result Value Ref Range    POC Glucose 104 (H) 65 - 100 mg/dL    Performed by: [email or web address]    EKG 12 Lead    Collection Time: 07/02/22  8:03 AM   Result Value Ref Range    Ventricular Rate 74 BPM    Atrial Rate 74 BPM    P-R Interval 136 ms    QRS Duration 70 ms    Q-T Interval 484 ms    QTc Calculation (Bazett) 537 ms    P Axis 64 degrees    R Axis 55 degrees    T Axis 95 degrees    Diagnosis Normal sinus rhythm    POCT Glucose    Collection Time: 07/02/22 11:36 AM   Result Value Ref Range    POC Glucose 182 (H) 65 - 100 mg/dL    Performed by: Juliano        No Known Allergies    There is no immunization history on file for this patient. Recent Vital Data:  Patient Vitals for the past 24 hrs:   Temp Pulse Resp BP SpO2   07/02/22 1134 97.3 °F (36.3 °C) 86 19 122/74 97 %   07/02/22 0743  77      07/02/22 0722 97.3 °F (36.3 °C) 84 19 99/68 96 %   07/02/22 0356 97.8 °F (36.6 °C) 78 20 (!) 99/59 95 %   07/02/22 0002 97.9 °F (36.6 °C) 82 16 99/61 97 %   07/01/22 2117  (!) 105  112/74    07/01/22 1935 98.6 °F (37 °C) 85 20 103/67 96 %   07/01/22 1654  100      07/01/22 1627 98.2 °F (36.8 °C) (!) 106 19 100/63 96 %   07/01/22 1340 98.8 °F (37.1 °C) (!) 109 17 (!) 155/97 97 %       Oxygen Therapy  SpO2: 97 %  Pulse Oximeter Device Mode: Intermittent  O2 Device: None (Room air)  O2 Flow Rate (L/min): 2 L/min    Estimated body mass index is 27.79 kg/m² as calculated from the following:    Height as of 6/21/22: 5' 4\" (1.626 m). Weight as of this encounter: 161 lb 14.4 oz (73.4 kg). Intake/Output Summary (Last 24 hours) at 7/2/2022 1309  Last data filed at 7/2/2022 1136  Gross per 24 hour   Intake 757 ml   Output 1375 ml   Net -618 ml         Physical Exam:    General:           Alert and awake  Head:               Normocephalic, atraumatic  Eyes:               Sclerae appear normal.  Pupils equally round. ENT:                Nares appear normal, no drainage. Moist oral mucosa  Neck:               No restricted ROM. Trachea midline   CV:                  RRR. No m/r/g. No jugular venous distension. Healed sternotomy  Lungs:             CTAB. No wheezing. Respirations even, unlabored  Abdomen: Bowel sounds present. Soft, nontender, nondistended. Extremities:     No cyanosis or clubbing. No edema  Skin:                No rashes and normal coloration. Warm and dry. Neuro:             CN II-XII grossly intact. A&Ox3  Psych:             Normal mood and affect.         Signed:  Kamryn Heard MD    Part of this note may have been written by using a voice dictation software. The note has been proof read but may still contain some grammatical/other typographical errors.

## 2022-07-02 NOTE — PROGRESS NOTES
Hospitalist Progress Note   Admit Date:  2022  8:37 AM   Name:  Yosi Henry   Age:  64 y.o. Sex:  female  :  1965   MRN:  [id number]   Room:      Presenting Complaint: Emesis     Reason(s) for Admission: Idiopathic acute pancreatitis [K85.00]  Nausea and vomiting, intractability of vomiting not specified, unspecified vomiting type [R11.2]  Acute pancreatitis, unspecified complication status, unspecified pancreatitis type [facility] Course & Interval History:   Please refer to the admission H&P for details of presentation. In summary, Yosi Henry is a 64 y.o. female with medical history significant for coronary artery disease status post CABG 5 weeks ago, hyperlipidemia, hypertension, diabetes type 2 who presented with intractable nausea and vomiting for about 1 day. Unable to keep anything down. NM Gastric emptying study is positive for gastroparesis. Has been started on trial of Reglan with improvement in her nausea. Subjective/24 hr Events (22) : Patient is seen and examined at bedside. No acute events reported overnight. Patient has been able to tolerate clear liquid diet with minimal nausea yesterday. Patient had no nausea or vomiting overnight. No nausea currently. No BM yet. No abdominal pain. No neurological symptoms such as dizziness, blurry vision, unsteady gait. Denies fever, chills, chest pains, shortness of breath. Review of Systems: 10 point review of systems is otherwise negative with the exception of the elements mentioned above. Assessment & Plan:   Intractable nausea and vomiting possibly due to diabetic gastroparesis  Initial concern for pancreatitis due to elevated lipase but without any radiological evidence of pancreatitis or abdominal pain. Possible due to elevated BG. CT without any acute changes. 22: Gastric emptying starting is positive for gastroparesis.   Started on trial of Reglan and kg).    Intake/Output Summary (Last 24 hours) at 7/2/2022 0941  Last data filed at 7/2/2022 0936  Gross per 24 hour   Intake 497 ml   Output 1050 ml   Net -553 ml         Physical Exam:     Blood pressure 99/68, pulse 77, temperature 97.3 °F (36.3 °C), temperature source Oral, resp. rate 19, weight 161 lb 14.4 oz (73.4 kg), SpO2 96 %. General:     Alert and awake  Head:  Normocephalic, atraumatic  Eyes:  Sclerae appear normal.  Pupils equally round. ENT:  Nares appear normal, no drainage. Moist oral mucosa  Neck:  No restricted ROM. Trachea midline   CV:   RRR. No m/r/g. No jugular venous distension. Healed sternotomy  Lungs:   CTAB. No wheezing. Respirations even, unlabored  Abdomen: Bowel sounds present. Soft, nontender, nondistended. Extremities: No cyanosis or clubbing. No edema  Skin:     No rashes and normal coloration. Warm and dry. Neuro:  CN II-XII grossly intact. A&Ox3  Psych:  Normal mood and affect. I have reviewed ordered lab tests and independently visualized imaging below:    Recent Labs:  Recent Results (from the past 48 hour(s))   EKG 12 Lead    Collection Time: 06/30/22 11:06 AM   Result Value Ref Range    Ventricular Rate 103 BPM    Atrial Rate 103 BPM    P-R Interval 138 ms    QRS Duration 68 ms    Q-T Interval 394 ms    QTc Calculation (Bazett) 516 ms    P Axis 57 degrees    R Axis -16 degrees    T Axis 69 degrees    Diagnosis Sinus tachycardia    POCT Glucose    Collection Time: 06/30/22 11:14 AM   Result Value Ref Range    POC Glucose 132 (H) 65 - 100 mg/dL    Performed by: Vanessa    POCT Glucose    Collection Time: 06/30/22  4:57 PM   Result Value Ref Range    POC Glucose 158 (H) 65 - 100 mg/dL    Performed by:  Vanessa    POCT Glucose    Collection Time: 06/30/22  8:41 PM   Result Value Ref Range    POC Glucose 179 (H) 65 - 100 mg/dL    Performed by: Yoon Mancuso    POCT Glucose    Collection Time: 07/01/22  7:43 AM   Result Value Ref Range    POC Glucose 170 (H) 65 - 100 mg/dL    Performed by: Shayne FoodsA    Basic Metabolic Panel w/ Reflex to MG    Collection Time: 07/01/22  8:18 AM   Result Value Ref Range    Sodium 133 (L) 136 - 145 mmol/L    Potassium 3.7 3.5 - 5.1 mmol/L    Chloride 100 98 - 107 mmol/L    CO2 25 21 - 32 mmol/L    Anion Gap 8 7 - 16 mmol/L    Glucose 183 (H) 65 - 100 mg/dL    BUN 9 6 - 23 MG/DL    CREATININE 0.60 0.6 - 1.0 MG/DL    GFR African American >60 >60 ml/min/1.73m2    GFR Non- >60 >60 ml/min/1.73m2    Calcium 8.6 8.3 - 10.4 MG/DL   CBC with Auto Differential    Collection Time: 07/01/22  8:18 AM   Result Value Ref Range    WBC 11.3 (H) 4.3 - 11.1 K/uL    RBC 4.75 4.05 - 5.2 M/uL    Hemoglobin 13.0 11.7 - 15.4 g/dL    Hematocrit 37.4 35.8 - 46.3 %    MCV 78.7 (L) 79.6 - 97.8 FL    MCH 27.4 26.1 - 32.9 PG    MCHC 34.8 31.4 - 35.0 g/dL    RDW 12.6 11.9 - 14.6 %    Platelets 518 241 - 522 K/uL    MPV 9.9 9.4 - 12.3 FL    nRBC 0.00 0.0 - 0.2 K/uL    Differential Type AUTOMATED      Seg Neutrophils 70 43 - 78 %    Lymphocytes 25 13 - 44 %    Monocytes 5 4.0 - 12.0 %    Eosinophils % 0 (L) 0.5 - 7.8 %    Basophils 0 0.0 - 2.0 %    Immature Granulocytes 0 0.0 - 5.0 %    Segs Absolute 7.8 1.7 - 8.2 K/UL    Absolute Lymph # 2.9 0.5 - 4.6 K/UL    Absolute Mono # 0.6 0.1 - 1.3 K/UL    Absolute Eos # 0.0 0.0 - 0.8 K/UL    Basophils Absolute 0.0 0.0 - 0.2 K/UL    Absolute Immature Granulocyte 0.1 0.0 - 0.5 K/UL   EKG 12 Lead    Collection Time: 07/01/22  9:24 AM   Result Value Ref Range    Ventricular Rate 101 BPM    Atrial Rate 101 BPM    P-R Interval 134 ms    QRS Duration 70 ms    Q-T Interval 378 ms    QTc Calculation (Bazett) 490 ms    P Axis 59 degrees    R Axis 36 degrees    T Axis 84 degrees    Diagnosis Sinus tachycardia    POCT Glucose    Collection Time: 07/01/22  1:42 PM   Result Value Ref Range    POC Glucose 184 (H) 65 - 100 mg/dL    Performed by: CombHardikneyPCA    POCT Glucose    Collection Time: 07/01/22 4:29 PM   Result Value Ref Range    POC Glucose 146 (H) 65 - 100 mg/dL    Performed by: LalyPCA    POCT Glucose    Collection Time: 07/01/22  8:39 PM   Result Value Ref Range    POC Glucose 122 (H) 65 - 100 mg/dL    Performed by: Lorena    Basic Metabolic Panel w/ Reflex to MG    Collection Time: 07/02/22  6:12 AM   Result Value Ref Range    Sodium 136 136 - 145 mmol/L    Potassium 3.4 (L) 3.5 - 5.1 mmol/L    Chloride 105 98 - 107 mmol/L    CO2 26 21 - 32 mmol/L    Anion Gap 5 (L) 7 - 16 mmol/L    Glucose 109 (H) 65 - 100 mg/dL    BUN 19 6 - 23 MG/DL    CREATININE 0.90 0.6 - 1.0 MG/DL    GFR African American >60 >60 ml/min/1.73m2    GFR Non- >60 >60 ml/min/1.73m2    Calcium 8.6 8.3 - 10.4 MG/DL   CBC with Auto Differential    Collection Time: 07/02/22  6:12 AM   Result Value Ref Range    WBC 8.6 4.3 - 11.1 K/uL    RBC 4.11 4.05 - 5.2 M/uL    Hemoglobin 11.3 (L) 11.7 - 15.4 g/dL    Hematocrit 32.7 (L) 35.8 - 46.3 %    MCV 79.6 79.6 - 97.8 FL    MCH 27.5 26.1 - 32.9 PG    MCHC 34.6 31.4 - 35.0 g/dL    RDW 12.8 11.9 - 14.6 %    Platelets 573 347 - 332 K/uL    MPV 9.7 9.4 - 12.3 FL    nRBC 0.00 0.0 - 0.2 K/uL    Differential Type AUTOMATED      Seg Neutrophils 43 43 - 78 %    Lymphocytes 47 (H) 13 - 44 %    Monocytes 8 4.0 - 12.0 %    Eosinophils % 1 0.5 - 7.8 %    Basophils 1 0.0 - 2.0 %    Immature Granulocytes 0 0.0 - 5.0 %    Segs Absolute 3.7 1.7 - 8.2 K/UL    Absolute Lymph # 4.1 0.5 - 4.6 K/UL    Absolute Mono # 0.7 0.1 - 1.3 K/UL    Absolute Eos # 0.1 0.0 - 0.8 K/UL    Basophils Absolute 0.1 0.0 - 0.2 K/UL    Absolute Immature Granulocyte 0.0 0.0 - 0.5 K/UL   Magnesium    Collection Time: 07/02/22  6:12 AM   Result Value Ref Range    Magnesium 2.2 1.8 - 2.4 mg/dL   POCT Glucose    Collection Time: 07/02/22  7:23 AM   Result Value Ref Range    POC Glucose 104 (H) 65 - 100 mg/dL    Performed by: Juliano    EKG 12 Lead    Collection Time: 07/02/22  8:03 AM   Result Value Ref packet 17 g  17 g Oral Daily PRN    acetaminophen (TYLENOL) tablet 650 mg  650 mg Oral Q6H PRN    Or    acetaminophen (TYLENOL) suppository 650 mg  650 mg Rectal Q6H PRN    0.9 % sodium chloride infusion   IntraVENous Continuous    morphine injection 2 mg  2 mg IntraVENous Q4H PRN    insulin lispro (HUMALOG) injection vial 0-4 Units  0-4 Units SubCUTAneous TID WC    insulin lispro (HUMALOG) injection vial 0-4 Units  0-4 Units SubCUTAneous Nightly    glucose chewable tablet 16 g  4 tablet Oral PRN    dextrose bolus 10% 125 mL  125 mL IntraVENous PRN    Or    dextrose bolus 10% 250 mL  250 mL IntraVENous PRN    glucagon (rDNA) injection 1 mg  1 mg IntraMUSCular PRN    dextrose 5 % solution  100 mL/hr IntraVENous PRN    prochlorperazine (COMPAZINE) injection 10 mg  10 mg IntraVENous Q6H PRN    labetalol (NORMODYNE;TRANDATE) injection 10 mg  10 mg IntraVENous Q4H PRN    pantoprazole (PROTONIX) 40 mg in sodium chloride (PF) 10 mL injection  40 mg IntraVENous Daily    hydrALAZINE (APRESOLINE) injection 10 mg  10 mg IntraVENous Q6H PRN       Signed:  Kamryn Heard MD    Part of this note may have been written by using a voice dictation software. The note has been proof read but may still contain some grammatical/other typographical errors.

## 2022-07-02 NOTE — PROGRESS NOTES
D/c instructions reviewed with pt and family at bedside. All questions answered. PIV removed. Signed acknowledgement/undertstanding placed in chart. Pt d/c from unit via w/c accompanied by staff.

## 2022-07-02 NOTE — PROGRESS NOTES
END OF SHIFT NOTE:    INTAKE/OUTPUT  07/01 0701 - 07/02 0700  In: 717 [P.O.:460; I.V.:257]  Out: 900 [Urine:900]  Voiding: yes  Catheter: no  Drain:              Flatus: Patient does have flatus present. Stool:  0 occurrences. Characteristics:       Emesis: 0 occurrences. States no nausea tonight  Characteristics:   Emesis Assessment  Emesis Appearance: Bilious    VITAL SIGNS  Patient Vitals for the past 12 hrs:   Temp Pulse Resp BP SpO2   07/02/22 0356 97.8 °F (36.6 °C) 78 20 (!) 99/59 95 %   07/02/22 0002 97.9 °F (36.6 °C) 82 16 99/61 97 %   07/01/22 2117  (!) 105  112/74    07/01/22 1935 98.6 °F (37 °C) 85 20 103/67 96 %       Pain Assessment  @BSHSIFLOW(1171)@             Ambulating  Yes in room. Slept in chair. Souleymane Herrera to go home. Shift report given to oncoming nurse at the bedside.     Josue Garsia RN

## 2022-07-02 NOTE — PROGRESS NOTES
Patient will be d/c home today. Patient has no needs identified at being d/c home today. Family will transport home. Patient has met all treatment goals / milestones. CM will continue to monitor and remain available for any needs that may occur. ASSESSMENT NOTE    Attending Physician: Rachael Nicole MD  Admit Problem: Idiopathic acute pancreatitis [K85.00]  Nausea and vomiting, intractability of vomiting not specified, unspecified vomiting type [R11.2]  Acute pancreatitis, unspecified complication status, unspecified pancreatitis type [K85.90]  Date/Time of Admission: 6/28/2022  8:37 AM  Problem List:  Patient Active Problem List   Diagnosis    Osteoarthritis of lumbosacral spine    Elevated BP without diagnosis of hypertension    [facility]    Hyperlipidemia associated with type 2 diabetes mellitus ([facility]    Anxiety    Dysthymic disorder    Class 1 obesity with serious comorbidity and body mass index (BMI) of 30.0 to 30.9 in adult    Uncontrolled type 2 diabetes mellitus with hyperglycemia (HCC)    Plantar wart of right foot    Menopausal syndrome    Overweight (BMI 25.0-29. 9)    Chest pain    Hypertension    Hx of CABG    Encounter for weaning from ventilator ([facility]    Hypoxia    Hyperlipidemia    S/P CABG x 2    Idiopathic acute pancreatitis    CAD (coronary artery disease)    Gastroparesis due to DM [facility])       Service Assessment  Patient Orientation Alert and [address]   History Provided By Patient   Primary Caregiver Self   Accompanied By/Relationship     Support Systems Family Members   Patient's Healthcare Decision Maker is:     PCP Verified by CM Yes   Last Visit to PCP Within last 3 months   Prior Functional Level Independent in ADLs/IADLs   Current Functional Level Independent in ADLs/IADLs   Can patient return to prior living arrangement Yes   Ability to make needs known: Good   Family able to assist with home care needs: Yes   Would you like for me to discuss the discharge plan with any other family members/significant others, and if so, who? Financial Resources     Community Resources     CM/SW Referral       Social/Functional History  Lives With Parent   Type of [address] One level   Home Access Stairs to enter with rails   Entrance Stairs - Number of Steps 3   Entrance Stairs - Rails Both   Bathroom Shower/Tub Tub/Shower unit,Walk-in shower   Bathroom Toilet Standard   Bathroom Equipment     Bathroom Accessibility Accessible   Home Equipment     Receives Help From Family   ADL Assistance Independent   Bath     Dressing     Grooming     Feeding     Toileting     [place]   Meal Prep     Laundry     Vacuuming     Cleaning     Gardening     Yard Work     Driving     Shopping          Other (Comment)     Homemaking Responsibilities Yes   Meal [address] Paying/Finance Responsibility     [address] Management     Other (Comment)     Ambuation Assistance Independent   Transfer Assisstance Independent   Active  Yes   Patient's  Info     Mode of Transportation     Education     Occupation     Type of Occupation       Discharge Planning   Type of Residence [employer]   Living Arrangements Family Members   Support Systems Family Members   Current Services Prior To Admission None   Potential Assistance Needed N/A   DME     DME     DME Ordered? No   Potential Assistance Purchasing Medications No   Meds-to-Beds: Does the patient want to have any new prescriptions delivered to bedside prior to discharge?      Type of Home Care Services     Patient expects to be discharged to: House   Follow Up Appointment: Best Day/Time     One/Two Story Residence: One story   # of Interior Steps     Height of Each Step (in)     [employer] Available     History of Falls? No     Services At/After Discharge  Transition of Care Consult (CM Consult): Internal Home Health     Internal Hospice     Reason Outside Agency [address]     Partner SNF     Reason Why Partner SNF Not Chosen     Internal Comfort Care     Reason Outside [address] Discharge None    Resource Information Provided? No   Mode of Transport at Discharge Other (see comment)   Hospital Transport Time of Discharge     Confirm Follow Up Transport Family     Condition of Participation: Discharge Planning  The plan for Transition of Care is related to the following treatment goals: The Patient and/or Patient Representative was provided with a Choice of Provider? Name of the Patient Representative who was provided with the Choice of Provider and agrees with the Discharge Plan? The Patient and/or Patient Representative Agree with the Discharge Plan? Freedom of Choice list was provided with basic dialogue that supports the individualized plan of care/goals, treatment preferences, and shares the quality data associated with the providers?        Documentation for Discharge Appeal  Discharge Appealed by     Date notified by [facility] of appeal request:     Time notified by [facility] of appeal request:     Detailed Notice of Discharge given to:     Date Notice of Discharge given:     Time Notice of Discharge given:     Date records sent to [facility]IO     Time records sent to Julia Blanca     Date Notified of Outcome     Time Notified of Outcome     Outcome of appeal           TRINH Tolentino 07/02/22 1:45 PM

## 2022-07-11 ENCOUNTER — COMMUNITY OUTREACH (OUTPATIENT)
Dept: INTERNAL MEDICINE CLINIC | Facility: CLINIC | Age: 57
End: 2022-07-11

## 2022-07-11 NOTE — PROGRESS NOTES
Patient's HM shows they are overdue for Colorectal Screening. Care Everywhere and  files searched without success. No results to attach to order nor HM updated.

## 2022-07-19 ENCOUNTER — HOSPITAL ENCOUNTER (OUTPATIENT)
Dept: CARDIAC REHAB | Age: 57
Setting detail: RECURRING SERIES
Discharge: HOME | End: 2022-07-22
Payer: COMMERCIAL

## 2022-07-19 ENCOUNTER — TELEPHONE (OUTPATIENT)
Dept: DIABETES SERVICES | Age: 57
End: 2022-07-19

## 2022-07-19 VITALS — BODY MASS INDEX: 27.86 KG/M2 | OXYGEN SATURATION: 98 % | WEIGHT: 163.2 LBS | HEIGHT: 64 IN

## 2022-07-19 PROCEDURE — 93798 PHYS/QHP OP CAR RHAB W/ECG: CPT

## 2022-07-19 ASSESSMENT — EXERCISE STRESS TEST
PEAK_METS: 2.3
PEAK_RPE: 9
PEAK_BP: 114/74
PEAK_BP: 114/74
PEAK_HR: 118
PEAK_HR: 118
PEAK_BP: 114/74
PEAK_RPD: 4
PEAK_RPE: 9

## 2022-07-19 ASSESSMENT — EJECTION FRACTION
EF_VALUE: 55
EF_VALUE: 55

## 2022-07-19 ASSESSMENT — LIFESTYLE VARIABLES: SMOKELESS_TOBACCO: NO

## 2022-07-19 NOTE — TELEPHONE ENCOUNTER
Received an e mail yesterday, patient is very interested in Diabetes Education. She will begin Cardiac rehab today. Will ask PCP for referral for Diabetes Education.

## 2022-07-22 ENCOUNTER — HOSPITAL ENCOUNTER (OUTPATIENT)
Dept: CARDIAC REHAB | Age: 57
Setting detail: RECURRING SERIES
Discharge: HOME | End: 2022-07-25
Payer: COMMERCIAL

## 2022-07-22 PROCEDURE — 93798 PHYS/QHP OP CAR RHAB W/ECG: CPT

## 2022-07-22 ASSESSMENT — EXERCISE STRESS TEST
PEAK_HR: 109
PEAK_METS: 2.3
PEAK_BP: 108/70

## 2022-07-23 PROCEDURE — 99284 EMERGENCY DEPT VISIT MOD MDM: CPT | Performed by: EMERGENCY MEDICINE

## 2022-07-23 PROCEDURE — 6360000002 HC RX W HCPCS: Performed by: EMERGENCY MEDICINE

## 2022-07-23 PROCEDURE — 83735 ASSAY OF MAGNESIUM: CPT

## 2022-07-23 PROCEDURE — 83690 ASSAY OF LIPASE: CPT

## 2022-07-23 PROCEDURE — 85025 COMPLETE CBC W/AUTO DIFF WBC: CPT

## 2022-07-23 PROCEDURE — 96376 TX/PRO/DX INJ SAME DRUG ADON: CPT | Performed by: EMERGENCY MEDICINE

## 2022-07-23 PROCEDURE — 96365 THER/PROPH/DIAG IV INF INIT: CPT | Performed by: EMERGENCY MEDICINE

## 2022-07-23 PROCEDURE — 80053 COMPREHEN METABOLIC PANEL: CPT

## 2022-07-23 PROCEDURE — 96366 THER/PROPH/DIAG IV INF ADDON: CPT | Performed by: EMERGENCY MEDICINE

## 2022-07-23 PROCEDURE — 96375 TX/PRO/DX INJ NEW DRUG ADDON: CPT | Performed by: EMERGENCY MEDICINE

## 2022-07-23 PROCEDURE — 93005 ELECTROCARDIOGRAM TRACING: CPT | Performed by: EMERGENCY MEDICINE

## 2022-07-23 PROCEDURE — 96361 HYDRATE IV INFUSION ADD-ON: CPT | Performed by: EMERGENCY MEDICINE

## 2022-07-23 RX ORDER — 0.9 % SODIUM CHLORIDE 0.9 %
1000 INTRAVENOUS SOLUTION INTRAVENOUS ONCE
Status: COMPLETED | OUTPATIENT
Start: 2022-07-23 | End: 2022-07-24

## 2022-07-23 RX ORDER — ONDANSETRON 2 MG/ML
4 INJECTION INTRAMUSCULAR; INTRAVENOUS ONCE
Status: COMPLETED | OUTPATIENT
Start: 2022-07-23 | End: 2022-07-23

## 2022-07-23 RX ADMIN — ONDANSETRON 4 MG: 2 INJECTION INTRAMUSCULAR; INTRAVENOUS at 23:59

## 2022-07-23 ASSESSMENT — PAIN SCALES - GENERAL: PAINLEVEL_OUTOF10: 3

## 2022-07-23 ASSESSMENT — PAIN DESCRIPTION - LOCATION: LOCATION: ABDOMEN

## 2022-07-23 ASSESSMENT — PAIN - FUNCTIONAL ASSESSMENT: PAIN_FUNCTIONAL_ASSESSMENT: 0-10

## 2022-07-24 ENCOUNTER — HOSPITAL ENCOUNTER (EMERGENCY)
Age: 57
Discharge: HOME | End: 2022-07-24
Attending: EMERGENCY MEDICINE | Admitting: EMERGENCY MEDICINE
Payer: COMMERCIAL

## 2022-07-24 VITALS
TEMPERATURE: 98.7 F | HEART RATE: 125 BPM | HEIGHT: 63 IN | WEIGHT: 162 LBS | RESPIRATION RATE: 18 BRPM | OXYGEN SATURATION: 93 % | BODY MASS INDEX: 28.7 KG/M2 | SYSTOLIC BLOOD PRESSURE: 120 MMHG | DIASTOLIC BLOOD PRESSURE: 74 MMHG

## 2022-07-24 DIAGNOSIS — E86.0 DEHYDRATION: ICD-10-CM

## 2022-07-24 DIAGNOSIS — R73.9 HYPERGLYCEMIA: ICD-10-CM

## 2022-07-24 DIAGNOSIS — K52.9 ACUTE GASTROENTERITIS: Primary | ICD-10-CM

## 2022-07-24 LAB
ALBUMIN SERPL-MCNC: 3.4 G/DL (ref 3.5–5)
ALBUMIN/GLOB SERPL: 0.7 {RATIO} (ref 1.2–3.5)
ALP SERPL-CCNC: 106 U/L (ref 50–136)
ALT SERPL-CCNC: 23 U/L (ref 12–65)
ANION GAP SERPL CALC-SCNC: 13 MMOL/L (ref 7–16)
AST SERPL-CCNC: 18 U/L (ref 15–37)
BASOPHILS # BLD: 0 K/UL (ref 0–0.2)
BASOPHILS NFR BLD: 0 % (ref 0–2)
BILIRUB SERPL-MCNC: 0.5 MG/DL (ref 0.2–1.1)
BUN SERPL-MCNC: 16 MG/DL (ref 6–23)
CALCIUM SERPL-MCNC: 9.3 MG/DL (ref 8.3–10.4)
CHLORIDE SERPL-SCNC: 102 MMOL/L (ref 98–107)
CO2 SERPL-SCNC: 22 MMOL/L (ref 21–32)
CREAT SERPL-MCNC: 1.2 MG/DL (ref 0.6–1)
DIFFERENTIAL METHOD BLD: ABNORMAL
EKG ATRIAL RATE: 121 BPM
EKG DIAGNOSIS: NORMAL
EKG P AXIS: 72 DEGREES
EKG P-R INTERVAL: 126 MS
EKG Q-T INTERVAL: 354 MS
EKG QRS DURATION: 70 MS
EKG QTC CALCULATION (BAZETT): 502 MS
EKG R AXIS: 63 DEGREES
EKG T AXIS: 88 DEGREES
EKG VENTRICULAR RATE: 121 BPM
EOSINOPHIL # BLD: 0 K/UL (ref 0–0.8)
EOSINOPHIL NFR BLD: 0 % (ref 0.5–7.8)
ERYTHROCYTE [DISTWIDTH] IN BLOOD BY AUTOMATED COUNT: 13.2 % (ref 11.9–14.6)
GLOBULIN SER CALC-MCNC: 4.6 G/DL (ref 2.3–3.5)
GLUCOSE BLD STRIP.AUTO-MCNC: 253 MG/DL (ref 65–100)
GLUCOSE SERPL-MCNC: 372 MG/DL (ref 65–100)
HCT VFR BLD AUTO: 38.4 % (ref 35.8–46.3)
HGB BLD-MCNC: 13.2 G/DL (ref 11.7–15.4)
IMM GRANULOCYTES # BLD AUTO: 0 K/UL (ref 0–0.5)
IMM GRANULOCYTES NFR BLD AUTO: 0 % (ref 0–5)
LIPASE SERPL-CCNC: 189 U/L (ref 73–393)
LYMPHOCYTES # BLD: 1.5 K/UL (ref 0.5–4.6)
LYMPHOCYTES NFR BLD: 15 % (ref 13–44)
MAGNESIUM SERPL-MCNC: 1.3 MG/DL (ref 1.8–2.4)
MCH RBC QN AUTO: 28 PG (ref 26.1–32.9)
MCHC RBC AUTO-ENTMCNC: 34.4 G/DL (ref 31.4–35)
MCV RBC AUTO: 81.4 FL (ref 79.6–97.8)
MONOCYTES # BLD: 0.3 K/UL (ref 0.1–1.3)
MONOCYTES NFR BLD: 3 % (ref 4–12)
NEUTS SEG # BLD: 8.2 K/UL (ref 1.7–8.2)
NEUTS SEG NFR BLD: 82 % (ref 43–78)
NRBC # BLD: 0 K/UL (ref 0–0.2)
PLATELET # BLD AUTO: 280 K/UL (ref 150–450)
PMV BLD AUTO: 10 FL (ref 9.4–12.3)
POTASSIUM SERPL-SCNC: 4.1 MMOL/L (ref 3.5–5.1)
PROT SERPL-MCNC: 8 G/DL (ref 6.3–8.2)
RBC # BLD AUTO: 4.72 M/UL (ref 4.05–5.2)
SERVICE CMNT-IMP: ABNORMAL
SODIUM SERPL-SCNC: 137 MMOL/L (ref 136–145)
WBC # BLD AUTO: 10.1 K/UL (ref 4.3–11.1)

## 2022-07-24 PROCEDURE — C9113 INJ PANTOPRAZOLE SODIUM, VIA: HCPCS | Performed by: EMERGENCY MEDICINE

## 2022-07-24 PROCEDURE — 6370000000 HC RX 637 (ALT 250 FOR IP): Performed by: EMERGENCY MEDICINE

## 2022-07-24 PROCEDURE — 2580000003 HC RX 258: Performed by: EMERGENCY MEDICINE

## 2022-07-24 PROCEDURE — 82962 GLUCOSE BLOOD TEST: CPT

## 2022-07-24 PROCEDURE — A4216 STERILE WATER/SALINE, 10 ML: HCPCS | Performed by: EMERGENCY MEDICINE

## 2022-07-24 PROCEDURE — 6360000002 HC RX W HCPCS: Performed by: EMERGENCY MEDICINE

## 2022-07-24 RX ORDER — MAGNESIUM SULFATE IN WATER 40 MG/ML
2000 INJECTION, SOLUTION INTRAVENOUS
Status: COMPLETED | OUTPATIENT
Start: 2022-07-24 | End: 2022-07-24

## 2022-07-24 RX ORDER — ONDANSETRON 2 MG/ML
4 INJECTION INTRAMUSCULAR; INTRAVENOUS
Status: COMPLETED | OUTPATIENT
Start: 2022-07-24 | End: 2022-07-24

## 2022-07-24 RX ORDER — 0.9 % SODIUM CHLORIDE 0.9 %
1000 INTRAVENOUS SOLUTION INTRAVENOUS ONCE
Status: COMPLETED | OUTPATIENT
Start: 2022-07-24 | End: 2022-07-24

## 2022-07-24 RX ORDER — HYOSCYAMINE SULFATE 0.12 MG/1
1 TABLET SUBLINGUAL EVERY 6 HOURS PRN
Qty: 20 EACH | Refills: 0 | Status: SHIPPED | OUTPATIENT
Start: 2022-07-24 | End: 2022-09-14

## 2022-07-24 RX ORDER — ONDANSETRON 8 MG/1
8 TABLET, ORALLY DISINTEGRATING ORAL EVERY 8 HOURS PRN
Qty: 15 TABLET | Refills: 0 | Status: ON HOLD | OUTPATIENT
Start: 2022-07-24 | End: 2022-09-14 | Stop reason: SDUPTHER

## 2022-07-24 RX ADMIN — SODIUM CHLORIDE 500 ML: 900 INJECTION, SOLUTION INTRAVENOUS at 00:04

## 2022-07-24 RX ADMIN — INSULIN HUMAN 5 UNITS: 100 INJECTION, SOLUTION PARENTERAL at 03:12

## 2022-07-24 RX ADMIN — PANTOPRAZOLE SODIUM 40 MG: 40 INJECTION, POWDER, LYOPHILIZED, FOR SOLUTION INTRAVENOUS at 03:19

## 2022-07-24 RX ADMIN — MAGNESIUM SULFATE HEPTAHYDRATE 2000 MG: 40 INJECTION, SOLUTION INTRAVENOUS at 02:45

## 2022-07-24 RX ADMIN — SODIUM CHLORIDE 1000 ML: 9 INJECTION, SOLUTION INTRAVENOUS at 02:45

## 2022-07-24 RX ADMIN — ONDANSETRON 4 MG: 2 INJECTION INTRAMUSCULAR; INTRAVENOUS at 02:19

## 2022-07-24 ASSESSMENT — ENCOUNTER SYMPTOMS
CHEST TIGHTNESS: 0
COUGH: 0
DIARRHEA: 1
VOMITING: 1
ABDOMINAL PAIN: 0
SORE THROAT: 0
CHOKING: 0
NAUSEA: 1
SHORTNESS OF BREATH: 0
BLOOD IN STOOL: 0
TROUBLE SWALLOWING: 0
ABDOMINAL DISTENTION: 0

## 2022-07-24 NOTE — ED PROVIDER NOTES
[facility] Emergency Department Provider Note                   PCP:                Tres King DO               Age: 64 y.o. Sex: female     No diagnosis found. DISPOSITION          MDM  Number of Diagnoses or Management Options  Acute gastroenteritis  Dehydration  Hyperglycemia  Diagnosis management comments: At the time my evaluation after the patient been placed in a room, blood pressure significantly improved although she remains mildly tachycardic after some IV fluids. Fluid bolus will be repeated and 5 units of insulin given for hyperglycemia with a blood sugar greater than 300. Patient is not acidotic, not in DKA. Amount and/or Complexity of Data Reviewed  Clinical lab tests: ordered and reviewed  Independent visualization of images, tracings, or specimens: yes    Risk of Complications, Morbidity, and/or Mortality  Presenting problems: moderate  Diagnostic procedures: moderate  Management options: moderate    Patient Progress  Patient progress: stable       Orders Placed This Encounter   Procedures    CBC with Auto Differential    Comprehensive Metabolic Panel    Lipase    Magnesium    Continuous Pulse Oximetry    POCT Urine Dipstick    POCT Glucose    EKG 12 Lead (Select if upper abdominal pain or symptoms of SOB,Diaphoresis, or Tachycardia)        Ajith Morataya is a 64 y.o. female who presents to the Emergency Department with chief complaint of  No chief complaint on file. Patient presents emerged from with a history of vomiting and diarrhea for the past 24 hours. She states onset of symptoms about 3 AM yesterday and has had about 5 episodes of diarrhea and multiple episodes of vomiting. The patient is diabetic. She complains of burning sensation in the epigastrium. She denies hematemesis or hematochezia. She denies fever or chills. There are no known sick contacts and no recent antibiotic use. The history is provided by the patient and medical records.        Review of 500 MG TABLET    Take 500 mg by mouth every 4 hours as needed for Pain (for breakthrough pain). ASPIRIN 81 MG EC TABLET    Take 81 mg by mouth at bedtime    ATORVASTATIN (LIPITOR) 80 MG TABLET    Take 1 tablet by mouth at bedtime    CITALOPRAM (CELEXA) 20 MG TABLET    Take 20 mg by mouth at bedtime     DOCUSATE CALCIUM (STOOL SOFTENER PO)    Take 1 capsule by mouth daily as needed (constipation). INSULIN NPH (HUMULIN N;NOVOLIN N) 100 UNIT/ML INJECTION VIAL    18 units in the morning and 7 units at night    LISINOPRIL (PRINIVIL;ZESTRIL) 20 MG TABLET    Take 20 mg by mouth at bedtime TAKE 1 TABLET BY MOUTH EVERY DAY    METOPROLOL TARTRATE (LOPRESSOR) 25 MG TABLET    Take 1 tablet by mouth 2 times daily    POLYETHYLENE GLYCOL (MIRALAX) 17 G PACKET    Take 17 g by mouth daily as needed for Constipation. Vitals signs and nursing note reviewed. Patient Vitals for the past 4 hrs:   Temp Pulse Resp BP SpO2   07/23/22 2337 98.7 °F (37.1 °C) (!) 125 18 96/67 96 %          Physical Exam  Vitals and nursing note reviewed. Constitutional:       General: She is not in acute distress. Appearance: Normal appearance. She is not ill-appearing, toxic-appearing or diaphoretic. HENT:      Head: Normocephalic and atraumatic. Mouth/Throat:      Mouth: Mucous membranes are dry. Pharynx: Oropharynx is clear. Eyes:      Extraocular Movements: Extraocular movements intact. Conjunctiva/sclera: Conjunctivae normal.      Pupils: Pupils are equal, round, and reactive to light. Cardiovascular:      Rate and Rhythm: Regular rhythm. Tachycardia present. Pulmonary:      Effort: Pulmonary effort is normal.      Breath sounds: Normal breath sounds. Abdominal:      General: Bowel sounds are normal. There is no distension. Palpations: Abdomen is soft. Tenderness: There is no abdominal tenderness. There is no right CVA tenderness, left CVA tenderness or guarding.    Musculoskeletal:         General: Normal range of motion. Cervical back: Normal range of motion and neck supple. Skin:     General: Skin is warm and dry. Capillary Refill: Capillary refill takes less than 2 seconds. Neurological:      General: No focal deficit present. Mental Status: She is alert and oriented to person, place, and time. Mental status is at baseline. Psychiatric:         Mood and Affect: Mood normal.         Behavior: Behavior normal.         Thought Content: Thought content normal.        Procedures      Labs Reviewed   CBC WITH AUTO DIFFERENTIAL - Abnormal; Notable for the following components:       Result Value    Seg Neutrophils 82 (*)     Monocytes 3 (*)     Eosinophils % 0 (*)     All other components within normal limits   COMPREHENSIVE METABOLIC PANEL - Abnormal; Notable for the following components:    Glucose 372 (*)     Creatinine 1.20 (*)     GFR  60 (*)     GFR Non- 49 (*)     Albumin 3.4 (*)     Globulin 4.6 (*)     Albumin/Globulin Ratio 0.7 (*)     All other components within normal limits   MAGNESIUM - Abnormal; Notable for the following components:    Magnesium 1.3 (*)     All other components within normal limits   LIPASE   POCT GLUCOSE        No orders to display                          Voice dictation software was used during the making of this note. This software is not perfect and grammatical and other typographical errors may be present. This note has not been completely proofread for errors.      Jaylen Johnson,   07/24/22 3663

## 2022-07-24 NOTE — ED TRIAGE NOTES
Pt presents with vomiting all day, began this morning, diarrhea last night, denies fever/CP.  Unable to keep liquids down, reports BGL was 341 before arrival.

## 2022-07-25 ENCOUNTER — HOSPITAL ENCOUNTER (OUTPATIENT)
Dept: CARDIAC REHAB | Age: 57
Setting detail: RECURRING SERIES
End: 2022-07-25
Payer: COMMERCIAL

## 2022-07-27 ENCOUNTER — HOSPITAL ENCOUNTER (OUTPATIENT)
Dept: CARDIAC REHAB | Age: 57
Setting detail: RECURRING SERIES
Discharge: HOME | End: 2022-07-30
Payer: COMMERCIAL

## 2022-07-27 VITALS — WEIGHT: 164.4 LBS | BODY MASS INDEX: 29.12 KG/M2

## 2022-07-27 PROCEDURE — 93798 PHYS/QHP OP CAR RHAB W/ECG: CPT

## 2022-07-27 ASSESSMENT — EXERCISE STRESS TEST
PEAK_BP: 126/66
PEAK_METS: 2.3
PEAK_HR: 103

## 2022-07-28 ENCOUNTER — OFFICE VISIT (OUTPATIENT)
Dept: INTERNAL MEDICINE CLINIC | Facility: CLINIC | Age: 57
End: 2022-07-28
Payer: COMMERCIAL

## 2022-07-28 VITALS
TEMPERATURE: 92 F | WEIGHT: 167 LBS | DIASTOLIC BLOOD PRESSURE: 70 MMHG | BODY MASS INDEX: 29.58 KG/M2 | OXYGEN SATURATION: 98 % | SYSTOLIC BLOOD PRESSURE: 110 MMHG

## 2022-07-28 DIAGNOSIS — E11.43 GASTROPARESIS DUE TO DM (HCC): ICD-10-CM

## 2022-07-28 DIAGNOSIS — E11.65 UNCONTROLLED TYPE 2 DIABETES MELLITUS WITH HYPERGLYCEMIA (HCC): Primary | ICD-10-CM

## 2022-07-28 DIAGNOSIS — K31.84 GASTROPARESIS DUE TO DM (HCC): ICD-10-CM

## 2022-07-28 PROCEDURE — 3046F HEMOGLOBIN A1C LEVEL >9.0%: CPT | Performed by: INTERNAL MEDICINE

## 2022-07-28 PROCEDURE — 99214 OFFICE O/P EST MOD 30 MIN: CPT | Performed by: INTERNAL MEDICINE

## 2022-07-28 RX ORDER — GABAPENTIN 100 MG/1
CAPSULE ORAL
Qty: 30 CAPSULE | Refills: 5 | Status: SHIPPED | OUTPATIENT
Start: 2022-07-28 | End: 2023-01-27

## 2022-07-28 ASSESSMENT — PATIENT HEALTH QUESTIONNAIRE - PHQ9
1. LITTLE INTEREST OR PLEASURE IN DOING THINGS: 0
2. FEELING DOWN, DEPRESSED OR HOPELESS: 1
4. FEELING TIRED OR HAVING LITTLE ENERGY: 1
5. POOR APPETITE OR OVEREATING: 0
9. THOUGHTS THAT YOU WOULD BE BETTER OFF DEAD, OR OF HURTING YOURSELF: 0
SUM OF ALL RESPONSES TO PHQ QUESTIONS 1-9: 3
SUM OF ALL RESPONSES TO PHQ QUESTIONS 1-9: 3
7. TROUBLE CONCENTRATING ON THINGS, SUCH AS READING THE NEWSPAPER OR WATCHING TELEVISION: 0
10. IF YOU CHECKED OFF ANY PROBLEMS, HOW DIFFICULT HAVE THESE PROBLEMS MADE IT FOR YOU TO DO YOUR WORK, TAKE CARE OF THINGS AT HOME, OR GET ALONG WITH OTHER PEOPLE: 0
SUM OF ALL RESPONSES TO PHQ QUESTIONS 1-9: 3
8. MOVING OR SPEAKING SO SLOWLY THAT OTHER PEOPLE COULD HAVE NOTICED. OR THE OPPOSITE, BEING SO FIGETY OR RESTLESS THAT YOU HAVE BEEN MOVING AROUND A LOT MORE THAN USUAL: 0
3. TROUBLE FALLING OR STAYING ASLEEP: 1
SUM OF ALL RESPONSES TO PHQ9 QUESTIONS 1 & 2: 1
6. FEELING BAD ABOUT YOURSELF - OR THAT YOU ARE A FAILURE OR HAVE LET YOURSELF OR YOUR FAMILY DOWN: 0
SUM OF ALL RESPONSES TO PHQ QUESTIONS 1-9: 3

## 2022-07-28 NOTE — PROGRESS NOTES
Kelton Saxena was seen today for follow-up from hospital and peripheral neuropathy. Diagnoses and all orders for this visit:    Uncontrolled type 2 diabetes mellitus with hyperglycemia ([facility]  -     [address] Outpatient Nutrition Counseling  -     Hemoglobin A1C; Future  -     Basic Metabolic Panel; Future    Gastroparesis due to DM ([facility]    Other orders  -     empagliflozin (JARDIANCE) 10 MG tablet; Take 1 tablet by mouth in the morning.  -     gabapentin (NEURONTIN) 100 MG capsule; 1 at night prn foot pain neuropathy        Yemi Ramsey is a 64 y.o. female    Chief Complaint   Patient presents with    Follow-Up from Hospital    Peripheral Neuropathy     At night worse     Lab Results   Component Value Date    LABA1C 9.5 (H) 06/28/2022    LABA1C 11.5 (H) 05/23/2022    LABA1C 15.4 (H) 04/27/2022     Lab Results   Component Value Date    LDLCALC 131 (H) 04/27/2022    CREATININE 1.20 (H) 07/23/2022           No results found for this visit on 07/28/22.     Past Medical History:   Diagnosis Date    Anxiety 11/28/2012    CAD (coronary artery disease)     No MI; no stents    Depression     managed with med    DM (diabetes mellitus) ([facility] 11/28/2012    insulin reliant; AVG ; pt denies s.s. of hypoglycemia; last A1C    HLD (hyperlipidemia)     managed with med    Hypertension     managed with med    Ulcerative esophagitis        Family History   Problem Relation Age of Onset    Heart Attack Father     Diabetes Father         Social History     Socioeconomic History    Marital status:      Spouse name: Not on file    Number of children: Not on file    Years of education: Not on file    Highest education level: Not on file   Occupational History    Not on file   Tobacco Use    Smoking status: Never    Smokeless tobacco: Never   Vaping Use    Vaping Use: Never used   Substance and Sexual Activity    Alcohol use: Not Currently     Comment: rarely    Drug use: No    Sexual activity: Not on file Other Topics Concern    Not on file   Social History Narrative    Not on file     Social Determinants of Health     Financial Resource Strain: Not on file   Food Insecurity: Not on file   Transportation Needs: Not on file   Physical Activity: Not on file   Stress: Not on file   Social Connections: Not on file   Intimate Partner Violence: Not on file   Housing Stability: Not on file         Current Outpatient Medications:     empagliflozin (JARDIANCE) 10 MG tablet, Take 1 tablet by mouth in the morning., Disp: 30 tablet, Rfl: 5    gabapentin (NEURONTIN) 100 MG capsule, 1 at night prn foot pain neuropathy, Disp: 30 capsule, Rfl: 5    Hyoscyamine Sulfate SL (LEVSIN/SL) 0.125 MG SUBL, Place 1 tablet under the tongue every 6 hours as needed (diarrhea), Disp: 20 each, Rfl: 0    ondansetron (ZOFRAN ODT) 8 MG TBDP disintegrating tablet, Place 1 tablet under the tongue every 8 hours as needed for Nausea or Vomiting, Disp: 15 tablet, Rfl: 0    polyethylene glycol (GLYCOLAX) 17 g packet, Take 17 g by mouth daily as needed for Constipation. , Disp: , Rfl:     Docusate Calcium (STOOL SOFTENER PO), Take 1 capsule by mouth daily as needed (constipation). , Disp: , Rfl:     acetaminophen (TYLENOL) 500 MG tablet, Take 500 mg by mouth every 4 hours as needed for Pain (for breakthrough pain). , Disp: , Rfl:     atorvastatin (LIPITOR) 80 MG tablet, Take 1 tablet by mouth at bedtime, Disp: 90 tablet, Rfl: 3    metoprolol tartrate (LOPRESSOR) 25 MG tablet, Take 1 tablet by mouth 2 times daily, Disp: 60 tablet, Rfl: 3    insulin NPH (HUMULIN N;NOVOLIN N) 100 UNIT/ML injection vial, 18 units in the morning and 7 units at night (Patient taking differently: Inject 7-18 Units into the skin daily 18 units in the morning and 7 units at night), Disp: 100 mL, Rfl: 3    aspirin 81 MG EC tablet, Take 81 mg by mouth at bedtime, Disp: , Rfl:     citalopram (CELEXA) 20 MG tablet, Take 20 mg by mouth at bedtime , Disp: , Rfl:     lisinopril (PRINIVIL;ZESTRIL) 20 MG tablet, Take 20 mg by mouth at bedtime TAKE 1 TABLET BY MOUTH EVERY DAY, Disp: , Rfl:     Allergies   Allergen Reactions    Metformin And Related Nausea Only         Review of Systems      Vitals:    07/28/22 1527   BP: 110/70   Temp: (!) 92 °F (33.3 °C)   SpO2: 98%   Weight: 167 lb (75.8 kg)           Physical Exam       Lorrie Bartholomew was seen today for follow-up from hospital and peripheral neuropathy. Diagnoses and all orders for this visit:    Uncontrolled type 2 diabetes mellitus with hyperglycemia ([facility]  -     [facility] Outpatient Nutrition Counseling  -     Hemoglobin A1C; Future  -     Basic Metabolic Panel; Future    Gastroparesis due to DM ([facility]    Other orders  -     empagliflozin (JARDIANCE) 10 MG tablet;  Take 1 tablet by mouth in the morning.  -     gabapentin (NEURONTIN) 100 MG capsule; 1 at night prn foot pain neuropathy      20 units n am, 10 u evening         Rey Kilpatrick DO

## 2022-07-29 ENCOUNTER — HOSPITAL ENCOUNTER (OUTPATIENT)
Dept: CARDIAC REHAB | Age: 57
Setting detail: RECURRING SERIES
Discharge: HOME | End: 2022-08-01
Payer: COMMERCIAL

## 2022-07-29 PROCEDURE — 93798 PHYS/QHP OP CAR RHAB W/ECG: CPT

## 2022-07-29 ASSESSMENT — EXERCISE STRESS TEST
PEAK_BP: 124/74
PEAK_HR: 118
PEAK_METS: 2.3

## 2022-08-01 ENCOUNTER — APPOINTMENT (OUTPATIENT)
Dept: CARDIAC REHAB | Age: 57
End: 2022-08-01
Payer: COMMERCIAL

## 2022-08-03 ENCOUNTER — HOSPITAL ENCOUNTER (OUTPATIENT)
Dept: CARDIAC REHAB | Age: 57
Setting detail: RECURRING SERIES
Discharge: HOME | End: 2022-08-06
Payer: COMMERCIAL

## 2022-08-03 VITALS — WEIGHT: 164.2 LBS | BODY MASS INDEX: 29.09 KG/M2

## 2022-08-03 PROCEDURE — 93798 PHYS/QHP OP CAR RHAB W/ECG: CPT

## 2022-08-03 ASSESSMENT — EXERCISE STRESS TEST
PEAK_BP: 96/58
PEAK_METS: 2.2
PEAK_HR: 114

## 2022-08-04 ENCOUNTER — HOSPITAL ENCOUNTER (OUTPATIENT)
Dept: CARDIAC REHAB | Age: 57
Setting detail: RECURRING SERIES
Discharge: HOME | End: 2022-08-07
Payer: COMMERCIAL

## 2022-08-04 PROCEDURE — 93798 PHYS/QHP OP CAR RHAB W/ECG: CPT

## 2022-08-04 ASSESSMENT — EXERCISE STRESS TEST
PEAK_METS: 2.8
PEAK_BP: 92/56
PEAK_HR: 110

## 2022-08-05 ENCOUNTER — HOSPITAL ENCOUNTER (OUTPATIENT)
Dept: CARDIAC REHAB | Age: 57
Setting detail: RECURRING SERIES
End: 2022-08-05
Payer: COMMERCIAL

## 2022-08-08 ENCOUNTER — APPOINTMENT (OUTPATIENT)
Dept: CARDIAC REHAB | Age: 57
End: 2022-08-08
Payer: COMMERCIAL

## 2022-08-10 ENCOUNTER — APPOINTMENT (OUTPATIENT)
Dept: CARDIAC REHAB | Age: 57
End: 2022-08-10
Payer: COMMERCIAL

## 2022-08-12 ENCOUNTER — TELEPHONE (OUTPATIENT)
Dept: NUTRITION | Age: 57
End: 2022-08-12

## 2022-08-12 NOTE — TELEPHONE ENCOUNTER
Nutrition Counseling: Contacted pt regarding referral. See notes documented in Nutrition Counseling Referral for details. No further follow-up contact from pt. Will close referral for this office.     [address] [facility]  [phone]

## 2022-08-15 ENCOUNTER — APPOINTMENT (OUTPATIENT)
Dept: CARDIAC REHAB | Age: 57
End: 2022-08-15
Payer: COMMERCIAL

## 2022-08-16 ASSESSMENT — EXERCISE STRESS TEST: PEAK_BP: 96/58

## 2022-08-16 ASSESSMENT — LIFESTYLE VARIABLES: SMOKELESS_TOBACCO: NO

## 2022-08-16 ASSESSMENT — EJECTION FRACTION: EF_VALUE: 55

## 2022-08-17 ENCOUNTER — HOSPITAL ENCOUNTER (OUTPATIENT)
Dept: CARDIAC REHAB | Age: 57
Setting detail: RECURRING SERIES
End: 2022-08-17
Payer: COMMERCIAL

## 2022-08-18 ENCOUNTER — HOSPITAL ENCOUNTER (OUTPATIENT)
Dept: CARDIAC REHAB | Age: 57
Setting detail: RECURRING SERIES
Discharge: HOME | End: 2022-08-21
Payer: COMMERCIAL

## 2022-08-18 VITALS — BODY MASS INDEX: 29.62 KG/M2 | WEIGHT: 167.2 LBS

## 2022-08-18 PROCEDURE — 93798 PHYS/QHP OP CAR RHAB W/ECG: CPT

## 2022-08-18 ASSESSMENT — EXERCISE STRESS TEST
PEAK_METS: 3.3
PEAK_BP: 110/60
PEAK_HR: 105

## 2022-08-19 ENCOUNTER — APPOINTMENT (OUTPATIENT)
Dept: CARDIAC REHAB | Age: 57
End: 2022-08-19
Payer: COMMERCIAL

## 2022-08-22 ENCOUNTER — HOSPITAL ENCOUNTER (OUTPATIENT)
Dept: CARDIAC REHAB | Age: 57
Setting detail: RECURRING SERIES
Discharge: HOME | End: 2022-08-25
Payer: COMMERCIAL

## 2022-08-22 PROCEDURE — 93798 PHYS/QHP OP CAR RHAB W/ECG: CPT

## 2022-08-22 ASSESSMENT — EXERCISE STRESS TEST
PEAK_HR: 105
PEAK_METS: 3.3

## 2022-08-24 ENCOUNTER — HOSPITAL ENCOUNTER (OUTPATIENT)
Dept: CARDIAC REHAB | Age: 57
Setting detail: RECURRING SERIES
Discharge: HOME | End: 2022-08-27
Payer: COMMERCIAL

## 2022-08-24 VITALS — BODY MASS INDEX: 29.12 KG/M2 | WEIGHT: 164.4 LBS

## 2022-08-24 PROCEDURE — 93798 PHYS/QHP OP CAR RHAB W/ECG: CPT

## 2022-08-24 ASSESSMENT — EXERCISE STRESS TEST
PEAK_METS: 3.3
PEAK_HR: 103
PEAK_BP: 108/62

## 2022-08-26 ENCOUNTER — APPOINTMENT (OUTPATIENT)
Dept: CARDIAC REHAB | Age: 57
End: 2022-08-26
Payer: COMMERCIAL

## 2022-08-29 ENCOUNTER — HOSPITAL ENCOUNTER (OUTPATIENT)
Dept: CARDIAC REHAB | Age: 57
Setting detail: RECURRING SERIES
Discharge: HOME | End: 2022-09-01
Payer: COMMERCIAL

## 2022-08-29 PROCEDURE — 93798 PHYS/QHP OP CAR RHAB W/ECG: CPT

## 2022-08-29 ASSESSMENT — EXERCISE STRESS TEST
PEAK_HR: 96
PEAK_METS: 3.3
PEAK_BP: 94/62

## 2022-08-31 ENCOUNTER — APPOINTMENT (OUTPATIENT)
Dept: CARDIAC REHAB | Age: 57
End: 2022-08-31
Payer: COMMERCIAL

## 2022-09-02 ENCOUNTER — APPOINTMENT (OUTPATIENT)
Dept: CARDIAC REHAB | Age: 57
End: 2022-09-02
Payer: COMMERCIAL

## 2022-09-07 ENCOUNTER — HOSPITAL ENCOUNTER (OUTPATIENT)
Dept: CARDIAC REHAB | Age: 57
Setting detail: RECURRING SERIES
Discharge: HOME | End: 2022-09-10
Payer: COMMERCIAL

## 2022-09-07 VITALS — WEIGHT: 167.4 LBS | BODY MASS INDEX: 29.65 KG/M2

## 2022-09-07 PROCEDURE — 93798 PHYS/QHP OP CAR RHAB W/ECG: CPT

## 2022-09-07 ASSESSMENT — EXERCISE STRESS TEST
PEAK_HR: 106
PEAK_METS: 3.3
PEAK_BP: 100/60

## 2022-09-09 ENCOUNTER — APPOINTMENT (OUTPATIENT)
Dept: CARDIAC REHAB | Age: 57
End: 2022-09-09
Payer: COMMERCIAL

## 2022-09-12 ENCOUNTER — HOSPITAL ENCOUNTER (OUTPATIENT)
Dept: CARDIAC REHAB | Age: 57
Setting detail: RECURRING SERIES
End: 2022-09-12
Payer: COMMERCIAL

## 2022-09-12 LAB
BASOPHILS # BLD: 0.1 K/UL (ref 0–0.2)
BASOPHILS NFR BLD: 0 % (ref 0–2)
DIFFERENTIAL METHOD BLD: ABNORMAL
EOSINOPHIL # BLD: 0 K/UL (ref 0–0.8)
EOSINOPHIL NFR BLD: 0 % (ref 0.5–7.8)
ERYTHROCYTE [DISTWIDTH] IN BLOOD BY AUTOMATED COUNT: 13.4 % (ref 11.9–14.6)
HCT VFR BLD AUTO: 44.3 % (ref 35.8–46.3)
HGB BLD-MCNC: 14.7 G/DL (ref 11.7–15.4)
IMM GRANULOCYTES # BLD AUTO: 0.1 K/UL (ref 0–0.5)
IMM GRANULOCYTES NFR BLD AUTO: 1 % (ref 0–5)
LYMPHOCYTES # BLD: 2.2 K/UL (ref 0.5–4.6)
LYMPHOCYTES NFR BLD: 13 % (ref 13–44)
MCH RBC QN AUTO: 27.9 PG (ref 26.1–32.9)
MCHC RBC AUTO-ENTMCNC: 33.2 G/DL (ref 31.4–35)
MCV RBC AUTO: 84.1 FL (ref 79.6–97.8)
MONOCYTES # BLD: 0.3 K/UL (ref 0.1–1.3)
MONOCYTES NFR BLD: 2 % (ref 4–12)
NEUTS SEG # BLD: 14.7 K/UL (ref 1.7–8.2)
NEUTS SEG NFR BLD: 84 % (ref 43–78)
NRBC # BLD: 0 K/UL (ref 0–0.2)
PLATELET # BLD AUTO: 352 K/UL (ref 150–450)
PMV BLD AUTO: 10.2 FL (ref 9.4–12.3)
RBC # BLD AUTO: 5.27 M/UL (ref 4.05–5.2)
WBC # BLD AUTO: 17.3 K/UL (ref 4.3–11.1)

## 2022-09-12 PROCEDURE — 6360000002 HC RX W HCPCS: Performed by: EMERGENCY MEDICINE

## 2022-09-12 PROCEDURE — 85025 COMPLETE CBC W/AUTO DIFF WBC: CPT

## 2022-09-12 PROCEDURE — 96375 TX/PRO/DX INJ NEW DRUG ADDON: CPT

## 2022-09-12 PROCEDURE — 83735 ASSAY OF MAGNESIUM: CPT

## 2022-09-12 PROCEDURE — 83690 ASSAY OF LIPASE: CPT

## 2022-09-12 PROCEDURE — 96374 THER/PROPH/DIAG INJ IV PUSH: CPT

## 2022-09-12 PROCEDURE — 80053 COMPREHEN METABOLIC PANEL: CPT

## 2022-09-12 PROCEDURE — 99285 EMERGENCY DEPT VISIT HI MDM: CPT

## 2022-09-12 PROCEDURE — 83036 HEMOGLOBIN GLYCOSYLATED A1C: CPT

## 2022-09-12 PROCEDURE — 96376 TX/PRO/DX INJ SAME DRUG ADON: CPT

## 2022-09-12 RX ORDER — ONDANSETRON 2 MG/ML
INJECTION INTRAMUSCULAR; INTRAVENOUS
Status: DISPENSED
Start: 2022-09-12 | End: 2022-09-13

## 2022-09-12 RX ORDER — ONDANSETRON 2 MG/ML
4 INJECTION INTRAMUSCULAR; INTRAVENOUS ONCE
Status: COMPLETED | OUTPATIENT
Start: 2022-09-12 | End: 2022-09-12

## 2022-09-12 RX ADMIN — ONDANSETRON 4 MG: 2 INJECTION INTRAMUSCULAR; INTRAVENOUS at 22:40

## 2022-09-12 ASSESSMENT — PAIN - FUNCTIONAL ASSESSMENT: PAIN_FUNCTIONAL_ASSESSMENT: 0-10

## 2022-09-12 ASSESSMENT — PAIN SCALES - GENERAL: PAINLEVEL_OUTOF10: 4

## 2022-09-13 ENCOUNTER — HOSPITAL ENCOUNTER (INPATIENT)
Age: 57
LOS: 1 days | Discharge: HOME | DRG: 872 | End: 2022-09-14
Attending: EMERGENCY MEDICINE | Admitting: FAMILY MEDICINE
Payer: COMMERCIAL

## 2022-09-13 DIAGNOSIS — N17.9 AKI (ACUTE KIDNEY INJURY) (HCC): ICD-10-CM

## 2022-09-13 DIAGNOSIS — R11.2 INTRACTABLE VOMITING WITH NAUSEA: Primary | ICD-10-CM

## 2022-09-13 DIAGNOSIS — E86.0 DEHYDRATION: ICD-10-CM

## 2022-09-13 PROBLEM — E87.5 HYPERKALEMIA: Status: ACTIVE | Noted: 2022-09-13

## 2022-09-13 PROBLEM — R65.20 SEVERE SEPSIS (HCC): Status: ACTIVE | Noted: 2022-09-13

## 2022-09-13 PROBLEM — E87.20 LACTIC ACIDOSIS: Status: ACTIVE | Noted: 2022-09-13

## 2022-09-13 PROBLEM — A41.9 SEVERE SEPSIS (HCC): Status: ACTIVE | Noted: 2022-09-13

## 2022-09-13 PROBLEM — A08.4 VIRAL GASTROENTERITIS: Status: ACTIVE | Noted: 2022-09-13

## 2022-09-13 LAB
ALBUMIN SERPL-MCNC: 3.7 G/DL (ref 3.5–5)
ALBUMIN/GLOB SERPL: 0.7 {RATIO} (ref 1.2–3.5)
ALP SERPL-CCNC: 110 U/L (ref 50–136)
ALT SERPL-CCNC: 27 U/L (ref 12–65)
ANION GAP SERPL CALC-SCNC: 5 MMOL/L (ref 4–13)
ANION GAP SERPL CALC-SCNC: 7 MMOL/L (ref 4–13)
ANION GAP SERPL CALC-SCNC: 8 MMOL/L (ref 4–13)
APPEARANCE UR: CLEAR
AST SERPL-CCNC: 18 U/L (ref 15–37)
BACTERIA URNS QL MICRO: NEGATIVE /HPF
BILIRUB SERPL-MCNC: 0.5 MG/DL (ref 0.2–1.1)
BILIRUB UR QL: NEGATIVE
BUN SERPL-MCNC: 33 MG/DL (ref 6–23)
BUN SERPL-MCNC: 33 MG/DL (ref 6–23)
BUN SERPL-MCNC: 39 MG/DL (ref 6–23)
CALCIUM SERPL-MCNC: 10 MG/DL (ref 8.3–10.4)
CALCIUM SERPL-MCNC: 8.6 MG/DL (ref 8.3–10.4)
CALCIUM SERPL-MCNC: 9.9 MG/DL (ref 8.3–10.4)
CASTS URNS QL MICRO: ABNORMAL /LPF
CHLORIDE SERPL-SCNC: 105 MMOL/L (ref 101–110)
CHLORIDE SERPL-SCNC: 107 MMOL/L (ref 101–110)
CHLORIDE SERPL-SCNC: 114 MMOL/L (ref 101–110)
CO2 SERPL-SCNC: 19 MMOL/L (ref 21–32)
CO2 SERPL-SCNC: 22 MMOL/L (ref 21–32)
CO2 SERPL-SCNC: 22 MMOL/L (ref 21–32)
COLOR UR: ABNORMAL
CREAT SERPL-MCNC: 1.3 MG/DL (ref 0.6–1)
CREAT SERPL-MCNC: 1.5 MG/DL (ref 0.6–1)
CREAT SERPL-MCNC: 1.6 MG/DL (ref 0.6–1)
EPI CELLS #/AREA URNS HPF: ABNORMAL /HPF
GLOBULIN SER CALC-MCNC: 5.2 G/DL (ref 2.3–3.5)
GLUCOSE BLD STRIP.AUTO-MCNC: 130 MG/DL (ref 65–100)
GLUCOSE BLD STRIP.AUTO-MCNC: 293 MG/DL (ref 65–100)
GLUCOSE BLD STRIP.AUTO-MCNC: 295 MG/DL (ref 65–100)
GLUCOSE BLD STRIP.AUTO-MCNC: 300 MG/DL (ref 65–100)
GLUCOSE SERPL-MCNC: 278 MG/DL (ref 65–100)
GLUCOSE SERPL-MCNC: 339 MG/DL (ref 65–100)
GLUCOSE SERPL-MCNC: 407 MG/DL (ref 65–100)
GLUCOSE UR STRIP.AUTO-MCNC: >1000 MG/DL
HGB UR QL STRIP: NEGATIVE
KETONES UR QL STRIP.AUTO: NEGATIVE MG/DL
LACTATE SERPL-SCNC: 3.1 MMOL/L (ref 0.4–2)
LACTATE SERPL-SCNC: 4.3 MMOL/L (ref 0.4–2)
LEUKOCYTE ESTERASE UR QL STRIP.AUTO: NEGATIVE
LIPASE SERPL-CCNC: 96 U/L (ref 73–393)
MAGNESIUM SERPL-MCNC: 2.2 MG/DL (ref 1.8–2.4)
MAGNESIUM SERPL-MCNC: 2.4 MG/DL (ref 1.8–2.4)
MUCOUS THREADS URNS QL MICRO: 0 /LPF
NITRITE UR QL STRIP.AUTO: NEGATIVE
PH UR STRIP: 5 [PH] (ref 5–9)
POTASSIUM SERPL-SCNC: 4.3 MMOL/L (ref 3.5–5.1)
POTASSIUM SERPL-SCNC: 4.4 MMOL/L (ref 3.5–5.1)
POTASSIUM SERPL-SCNC: 5.2 MMOL/L (ref 3.5–5.1)
POTASSIUM SERPL-SCNC: 5.5 MMOL/L (ref 3.5–5.1)
PROCALCITONIN SERPL-MCNC: 0.17 NG/ML (ref 0–0.49)
PROT SERPL-MCNC: 8.9 G/DL (ref 6.3–8.2)
PROT UR STRIP-MCNC: NEGATIVE MG/DL
RBC #/AREA URNS HPF: ABNORMAL /HPF
SERVICE CMNT-IMP: ABNORMAL
SODIUM SERPL-SCNC: 134 MMOL/L (ref 136–145)
SODIUM SERPL-SCNC: 134 MMOL/L (ref 136–145)
SODIUM SERPL-SCNC: 141 MMOL/L (ref 136–145)
SP GR UR REFRACTOMETRY: 1.03 (ref 1–1.02)
URINE CULTURE IF INDICATED: ABNORMAL
UROBILINOGEN UR QL STRIP.AUTO: 0.2 EU/DL (ref 0.2–1)
WBC URNS QL MICRO: ABNORMAL /HPF

## 2022-09-13 PROCEDURE — 36415 COLL VENOUS BLD VENIPUNCTURE: CPT

## 2022-09-13 PROCEDURE — 2580000003 HC RX 258: Performed by: EMERGENCY MEDICINE

## 2022-09-13 PROCEDURE — 1100000000 HC RM PRIVATE

## 2022-09-13 PROCEDURE — 81001 URINALYSIS AUTO W/SCOPE: CPT

## 2022-09-13 PROCEDURE — 6360000002 HC RX W HCPCS: Performed by: EMERGENCY MEDICINE

## 2022-09-13 PROCEDURE — C9113 INJ PANTOPRAZOLE SODIUM, VIA: HCPCS | Performed by: EMERGENCY MEDICINE

## 2022-09-13 PROCEDURE — A4216 STERILE WATER/SALINE, 10 ML: HCPCS | Performed by: EMERGENCY MEDICINE

## 2022-09-13 PROCEDURE — 6370000000 HC RX 637 (ALT 250 FOR IP): Performed by: FAMILY MEDICINE

## 2022-09-13 PROCEDURE — 80048 BASIC METABOLIC PNL TOTAL CA: CPT

## 2022-09-13 PROCEDURE — 84145 PROCALCITONIN (PCT): CPT

## 2022-09-13 PROCEDURE — 83735 ASSAY OF MAGNESIUM: CPT

## 2022-09-13 PROCEDURE — 6360000002 HC RX W HCPCS: Performed by: FAMILY MEDICINE

## 2022-09-13 PROCEDURE — 2580000003 HC RX 258: Performed by: FAMILY MEDICINE

## 2022-09-13 PROCEDURE — 97161 PT EVAL LOW COMPLEX 20 MIN: CPT

## 2022-09-13 PROCEDURE — 84132 ASSAY OF SERUM POTASSIUM: CPT

## 2022-09-13 PROCEDURE — 83605 ASSAY OF LACTIC ACID: CPT

## 2022-09-13 PROCEDURE — 87040 BLOOD CULTURE FOR BACTERIA: CPT

## 2022-09-13 PROCEDURE — 82962 GLUCOSE BLOOD TEST: CPT

## 2022-09-13 RX ORDER — POLYETHYLENE GLYCOL 3350 17 G/17G
17 POWDER, FOR SOLUTION ORAL DAILY PRN
Status: DISCONTINUED | OUTPATIENT
Start: 2022-09-13 | End: 2022-09-14 | Stop reason: HOSPADM

## 2022-09-13 RX ORDER — INSULIN GLARGINE 100 [IU]/ML
20 INJECTION, SOLUTION SUBCUTANEOUS DAILY
Status: DISCONTINUED | OUTPATIENT
Start: 2022-09-13 | End: 2022-09-14 | Stop reason: HOSPADM

## 2022-09-13 RX ORDER — ACETAMINOPHEN 650 MG/1
650 SUPPOSITORY RECTAL EVERY 6 HOURS PRN
Status: DISCONTINUED | OUTPATIENT
Start: 2022-09-13 | End: 2022-09-14 | Stop reason: HOSPADM

## 2022-09-13 RX ORDER — INSULIN LISPRO 100 [IU]/ML
0-4 INJECTION, SOLUTION INTRAVENOUS; SUBCUTANEOUS NIGHTLY
Status: DISCONTINUED | OUTPATIENT
Start: 2022-09-13 | End: 2022-09-14 | Stop reason: HOSPADM

## 2022-09-13 RX ORDER — SODIUM CHLORIDE 0.9 % (FLUSH) 0.9 %
5-40 SYRINGE (ML) INJECTION PRN
Status: DISCONTINUED | OUTPATIENT
Start: 2022-09-13 | End: 2022-09-14 | Stop reason: HOSPADM

## 2022-09-13 RX ORDER — PROCHLORPERAZINE EDISYLATE 5 MG/ML
10 INJECTION INTRAMUSCULAR; INTRAVENOUS
Status: COMPLETED | OUTPATIENT
Start: 2022-09-13 | End: 2022-09-13

## 2022-09-13 RX ORDER — INSULIN LISPRO 100 [IU]/ML
0-8 INJECTION, SOLUTION INTRAVENOUS; SUBCUTANEOUS
Status: DISCONTINUED | OUTPATIENT
Start: 2022-09-13 | End: 2022-09-14 | Stop reason: HOSPADM

## 2022-09-13 RX ORDER — ASPIRIN 81 MG/1
81 TABLET ORAL NIGHTLY
Status: DISCONTINUED | OUTPATIENT
Start: 2022-09-13 | End: 2022-09-14 | Stop reason: HOSPADM

## 2022-09-13 RX ORDER — SODIUM CHLORIDE 9 MG/ML
INJECTION, SOLUTION INTRAVENOUS PRN
Status: DISCONTINUED | OUTPATIENT
Start: 2022-09-13 | End: 2022-09-14 | Stop reason: HOSPADM

## 2022-09-13 RX ORDER — 0.9 % SODIUM CHLORIDE 0.9 %
1000 INTRAVENOUS SOLUTION INTRAVENOUS ONCE
Status: COMPLETED | OUTPATIENT
Start: 2022-09-13 | End: 2022-09-13

## 2022-09-13 RX ORDER — ONDANSETRON 2 MG/ML
4 INJECTION INTRAMUSCULAR; INTRAVENOUS EVERY 6 HOURS PRN
Status: DISCONTINUED | OUTPATIENT
Start: 2022-09-13 | End: 2022-09-14 | Stop reason: HOSPADM

## 2022-09-13 RX ORDER — SODIUM CHLORIDE 9 MG/ML
INJECTION, SOLUTION INTRAVENOUS CONTINUOUS
Status: DISCONTINUED | OUTPATIENT
Start: 2022-09-13 | End: 2022-09-14

## 2022-09-13 RX ORDER — ENOXAPARIN SODIUM 100 MG/ML
40 INJECTION SUBCUTANEOUS DAILY
Status: DISCONTINUED | OUTPATIENT
Start: 2022-09-13 | End: 2022-09-14 | Stop reason: HOSPADM

## 2022-09-13 RX ORDER — CITALOPRAM 20 MG/1
20 TABLET ORAL NIGHTLY
Status: DISCONTINUED | OUTPATIENT
Start: 2022-09-13 | End: 2022-09-14 | Stop reason: HOSPADM

## 2022-09-13 RX ORDER — ACETAMINOPHEN 325 MG/1
650 TABLET ORAL EVERY 6 HOURS PRN
Status: DISCONTINUED | OUTPATIENT
Start: 2022-09-13 | End: 2022-09-14 | Stop reason: HOSPADM

## 2022-09-13 RX ORDER — SODIUM CHLORIDE 0.9 % (FLUSH) 0.9 %
5-40 SYRINGE (ML) INJECTION EVERY 12 HOURS SCHEDULED
Status: DISCONTINUED | OUTPATIENT
Start: 2022-09-13 | End: 2022-09-14 | Stop reason: HOSPADM

## 2022-09-13 RX ORDER — ONDANSETRON 2 MG/ML
4 INJECTION INTRAMUSCULAR; INTRAVENOUS ONCE
Status: COMPLETED | OUTPATIENT
Start: 2022-09-13 | End: 2022-09-13

## 2022-09-13 RX ORDER — ATORVASTATIN CALCIUM 40 MG/1
80 TABLET, FILM COATED ORAL NIGHTLY
Status: DISCONTINUED | OUTPATIENT
Start: 2022-09-13 | End: 2022-09-14 | Stop reason: HOSPADM

## 2022-09-13 RX ORDER — 0.9 % SODIUM CHLORIDE 0.9 %
1000 INTRAVENOUS SOLUTION INTRAVENOUS
Status: COMPLETED | OUTPATIENT
Start: 2022-09-13 | End: 2022-09-13

## 2022-09-13 RX ORDER — ONDANSETRON 4 MG/1
4 TABLET, ORALLY DISINTEGRATING ORAL EVERY 8 HOURS PRN
Status: DISCONTINUED | OUTPATIENT
Start: 2022-09-13 | End: 2022-09-14 | Stop reason: HOSPADM

## 2022-09-13 RX ORDER — GABAPENTIN 100 MG/1
100 CAPSULE ORAL 2 TIMES DAILY
Status: DISCONTINUED | OUTPATIENT
Start: 2022-09-13 | End: 2022-09-14 | Stop reason: HOSPADM

## 2022-09-13 RX ADMIN — ONDANSETRON 4 MG: 2 INJECTION INTRAMUSCULAR; INTRAVENOUS at 06:27

## 2022-09-13 RX ADMIN — INSULIN LISPRO 2 UNITS: 100 INJECTION, SOLUTION INTRAVENOUS; SUBCUTANEOUS at 13:25

## 2022-09-13 RX ADMIN — INSULIN GLARGINE 20 UNITS: 100 INJECTION, SOLUTION SUBCUTANEOUS at 10:51

## 2022-09-13 RX ADMIN — INSULIN LISPRO 4 UNITS: 100 INJECTION, SOLUTION INTRAVENOUS; SUBCUTANEOUS at 16:26

## 2022-09-13 RX ADMIN — METOPROLOL TARTRATE 25 MG: 25 TABLET, FILM COATED ORAL at 13:29

## 2022-09-13 RX ADMIN — SODIUM CHLORIDE: 9 INJECTION, SOLUTION INTRAVENOUS at 22:47

## 2022-09-13 RX ADMIN — METOPROLOL TARTRATE 25 MG: 25 TABLET, FILM COATED ORAL at 21:41

## 2022-09-13 RX ADMIN — INSULIN LISPRO 6 UNITS: 100 INJECTION, SOLUTION INTRAVENOUS; SUBCUTANEOUS at 10:25

## 2022-09-13 RX ADMIN — ATORVASTATIN CALCIUM 80 MG: 40 TABLET, FILM COATED ORAL at 21:41

## 2022-09-13 RX ADMIN — GABAPENTIN 100 MG: 100 CAPSULE ORAL at 13:48

## 2022-09-13 RX ADMIN — SODIUM CHLORIDE: 9 INJECTION, SOLUTION INTRAVENOUS at 10:27

## 2022-09-13 RX ADMIN — SODIUM CHLORIDE 1000 ML: 9 INJECTION, SOLUTION INTRAVENOUS at 09:29

## 2022-09-13 RX ADMIN — GABAPENTIN 100 MG: 100 CAPSULE ORAL at 21:41

## 2022-09-13 RX ADMIN — SODIUM ZIRCONIUM CYCLOSILICATE 5 G: 5 POWDER, FOR SUSPENSION ORAL at 16:24

## 2022-09-13 RX ADMIN — ENOXAPARIN SODIUM 40 MG: 40 INJECTION SUBCUTANEOUS at 13:48

## 2022-09-13 RX ADMIN — SODIUM CHLORIDE 1000 ML: 9 INJECTION, SOLUTION INTRAVENOUS at 06:26

## 2022-09-13 RX ADMIN — SODIUM CHLORIDE, PRESERVATIVE FREE 10 ML: 5 INJECTION INTRAVENOUS at 21:41

## 2022-09-13 RX ADMIN — CITALOPRAM HYDROBROMIDE 20 MG: 20 TABLET ORAL at 21:41

## 2022-09-13 RX ADMIN — ASPIRIN 81 MG: 81 TABLET ORAL at 21:41

## 2022-09-13 RX ADMIN — SODIUM CHLORIDE 40 MG: 9 INJECTION INTRAMUSCULAR; INTRAVENOUS; SUBCUTANEOUS at 09:28

## 2022-09-13 RX ADMIN — PROCHLORPERAZINE EDISYLATE 10 MG: 5 INJECTION INTRAMUSCULAR; INTRAVENOUS at 06:29

## 2022-09-13 ASSESSMENT — ENCOUNTER SYMPTOMS
EYE PAIN: 0
COUGH: 0
ABDOMINAL PAIN: 0
EYE REDNESS: 0
CONSTIPATION: 0
BLOOD IN STOOL: 0
VOMITING: 1
STRIDOR: 0
EYE DISCHARGE: 0
RECTAL PAIN: 0
WHEEZING: 0
ABDOMINAL DISTENTION: 0
RHINORRHEA: 0
FACIAL SWELLING: 0
ANAL BLEEDING: 0
SHORTNESS OF BREATH: 0
NAUSEA: 1
DIARRHEA: 0
BACK PAIN: 0

## 2022-09-13 ASSESSMENT — PAIN SCALES - GENERAL: PAINLEVEL_OUTOF10: 0

## 2022-09-13 NOTE — H&P
Hospitalist History and Physical   Admit Date:  2022  4:30 AM   Name:  Emma Wagner   Age:  64 y.o. Sex:  female  :  1965   MRN:  [id number]   [facility]:  [facility]/    Presenting Complaint: Emesis     Reason(s) for Admission: Dehydration [E86.0]  TERA (acute kidney injury) ([facility] [N17.9]  Intractable nausea and vomiting [R11.2]  Intractable vomiting with nausea [R11.2]     History of Present Illness:   Emma Wagner is a 64 y.o. female with medical history of hypertension, hyperlipidemia, CAD s/p CABG in May 2022, type 2 diabetes mellitus presented to ED with 1 day history of persistent nausea, NBNB vomiting and watery diarrhea. Denies hematemesis, melena or hematochezia. She is unable to keep anything down. Also complaining of dizziness and fatigue. Denies fever, chills, chest pain, shortness of breath, dysuria or abdominal pain. No recent antibiotic use. Denies any recent dietary change, weight loss, sick contact or travel. Patient reports she has similar symptoms in the past when she was diagnosed with pancreatitis. In the ED patient was tachycardic. Labs notable for leukocytosis, TERA, hyperkalemia, lactic acidosis and uncontrolled blood glucose. Hospitalist consulted for admission. Review of Systems:  10 systems reviewed and negative except as noted in HPI.   Assessment & Plan:     Severe sepsis secondary to viral gastroenteritis:  Patient met SIRS criteria (leukocytosis and tachycardia) with possible source of infection (N/V/D) with evidence of endorgan failure and lactic acidosis  UA negative for infection  Chest x-ray with no acute etiology  Blood cultures ordered  Stool culture ordered  Lipase WNL  Procalcitonin negative, likely patient had a viral etiology, will monitor off antibiotic for now  Continue IV fluids  Trend lactic acid  Antiemetic as needed  Full liquid diet, advance as tolerated    TERA:  Likely prerenal in the setting of decreased oral intake and GI loss  Continue IV fluids  Avoid nephrotoxic  Hold lisinopril    Hyperkalemia:  Potassium 5.2, likely in the setting of TERA and uncontrolled blood glucose also contributing factor  Will adjust insulin for better blood glucose control  Hold lisinopril  Lokelma once  Continue to monitor    Uncontrolled blood glucose:  Blood glucose in 400s, not in ketoacidosis  Check A1c  Hold Jardiance  Start patient on Lantus 20 unit daily and sliding scale  Will continue to adjust insulin accordingly  Inpatient diabetes management    Hypertension/hyperlipidemia/CAD s/p CABG:  Continue home meds: Aspirin, statin, metoprolol  Holding lisinopril in the setting of TERA  Hydralazine as needed    Mood disorder:  Continue Celexa      Anticipated discharge needs: Admit patient to medical floor, PT/OT    Diet: ADULT DIET; Full Liquid; 3 carb choices (45 gm/meal)  VTE ppx: Heparin  Code status: Full Code    Hospital Problems:  Principal Problem:    Intractable nausea and vomiting  Active Problems:    Hx of CABG    Hyperlipidemia    S/P CABG x 2    CAD (coronary artery disease)    Severe sepsis (HCC)    Viral gastroenteritis    TERA (acute kidney injury) (HCC)    Lactic acidosis    Hyperkalemia    DM (diabetes mellitus) ([facility]    Anxiety    Hypertension  Resolved Problems:    * No resolved hospital problems.  *       Past History:     Past Medical History:   Diagnosis Date    Anxiety 11/28/2012    CAD (coronary artery disease)     No MI; no stents    Depression     managed with med    DM (diabetes mellitus) ([address] 11/28/2012    insulin reliant; AVG ; pt denies s.s. of hypoglycemia; last A1C    HLD (hyperlipidemia)     managed with med    Hypertension     managed with med    Ulcerative esophagitis        Past Surgical History:   Procedure Laterality Date    CARDIAC CATHETERIZATION Left     CORONARY ARTERY BYPASS GRAFT N/A 5/25/2022    CORONARY ARTERY BYPASS GRAFT (CABG X 2), [place]; ENDOSCOPIC VEIN HARVEST LEFT GREATER SAPHENOUS performed by Bill Krishnan MD at [facility] MAIN OR    TRANSESOPHAGEAL ECHOCARDIOGRAM N/A 5/25/2022    TRANSESOPHAGEAL ECHOCARDIOGRAM performed by Bill Krishnan MD at [facility] MAIN OR    UPPER GASTROINTESTINAL ENDOSCOPY      ulcerative espophagitis     Social History     Tobacco Use    Smoking status: Never    Smokeless tobacco: Never   Substance Use Topics    Alcohol use: Not Currently     Comment: rarely      Social History     Substance and Sexual Activity   Drug Use No     Family History   Problem Relation Age of Onset    Heart Attack Father     Diabetes Father         Immunization History   Administered Date(s) Administered    COVID-19, MODERNA BLUE border, Primary or Immunocompromised, (age 12y+), IM, 100 mcg/0.5mL 08/08/2021, 09/28/2021     Allergies   Allergen Reactions    Metformin And Related Nausea Only     Prior to Admit Medications:  Current Outpatient Medications   Medication Instructions    acetaminophen (TYLENOL) 500 mg, Oral, EVERY 4 HOURS PRN    aspirin 81 mg, Oral, Nightly    atorvastatin (LIPITOR) 80 mg, Oral, Nightly    citalopram (CELEXA) 20 mg, Oral, Nightly    Docusate Calcium (STOOL SOFTENER PO) 1 capsule, Oral, DAILY PRN    empagliflozin (JARDIANCE) 10 mg, Oral, DAILY    gabapentin (NEURONTIN) 100 MG capsule 1 at night prn foot pain neuropathy    Hyoscyamine Sulfate SL (LEVSIN/SL) 0.125 MG SUBL 1 tablet, SubLINGual, EVERY 6 HOURS PRN    insulin NPH (HUMULIN N;NOVOLIN N) 100 UNIT/ML injection vial 18 units in the morning and 7 units at night    lisinopril (PRINIVIL;ZESTRIL) 20 mg, Oral, Nightly, TAKE 1 TABLET BY MOUTH EVERY DAY    metoprolol tartrate (LOPRESSOR) 25 mg, Oral, 2 TIMES DAILY    ondansetron (ZOFRAN ODT) 8 mg, SubLINGual, EVERY 8 HOURS PRN    polyethylene glycol (GLYCOLAX) 17 g, Oral, DAILY PRN         Objective:   Patient Vitals for the past 24 hrs:   Temp Pulse Resp BP SpO2   09/13/22 1200 97.4 °F (36.3 °C) (!) 101 15 (!) 107/57 97 %   09/13/22 1130 -- (!) 107 17 115/72 99 %   09/13/22 1100 -- (!) 101 18 (!) 163/88 95 %   09/13/22 0830 -- -- -- (!) 172/98 98 %   09/13/22 0815 -- -- -- (!) 158/88 94 %   09/13/22 0800 -- -- -- (!) 158/85 93 %   09/13/22 0745 -- -- -- (!) 158/89 96 %   09/13/22 0730 -- -- -- (!) 162/88 96 %   09/13/22 0715 -- -- -- (!) 160/98 98 %   09/13/22 0700 -- -- -- (!) 184/97 99 %   09/12/22 2237 98.6 °F (37 °C) (!) 117 16 (!) 160/93 99 %       Oxygen Therapy  SpO2: 97 %  Pulse via Oximetry: 108 beats per minute  O2 Device: None (Room air)    Estimated body mass index is 29.58 kg/m² as calculated from the following:    Height as of this encounter: 5' 3\" (1.6 m). Weight as of this encounter: 167 lb (75.8 kg). No intake or output data in the 24 hours ending 09/13/22 1252      Physical Exam:    Blood pressure (!) 107/57, pulse (!) 101, temperature 97.4 °F (36.3 °C), temperature source Temporal, resp. rate 15, height 5' 3\" (1.6 m), weight 167 lb (75.8 kg), SpO2 97 %. General:    No overt distress  Head:  Normocephalic, atraumatic  Eyes:  Sclerae appear normal.  Pupils equally round. ENT:  Nares appear normal, no drainage. Dry oral mucosa  Neck:  No restricted ROM. Trachea midline   CV:   Tachycardia no m/r/g. No jugular venous distension. Lungs:   CTAB. No wheezing, rhonchi, or rales. Symmetric expansion. Abdomen: Bowel sounds present. Soft, nontender, nondistended. Extremities: No cyanosis or clubbing. No edema  Skin:     No rashes and normal coloration. Warm and dry. Neuro:  CN II-XII grossly intact. Sensation intact. A&Ox3  Psych:  Normal mood and affect.       I have personally reviewed labs and tests showing:  Recent Labs:  Recent Results (from the past 24 hour(s))   CBC with Auto Differential    Collection Time: 09/12/22 10:41 PM   Result Value Ref Range    WBC 17.3 (H) 4.3 - 11.1 K/uL    RBC 5.27 (H) 4.05 - 5.2 M/uL    Hemoglobin 14.7 11.7 - 15.4 g/dL    Hematocrit 44.3 35.8 - 46.3 %    MCV 84.1 79.6 - 97.8 FL    MCH 27.9 26.1 - 32.9 PG    MCHC 33.2 31.4 - 35.0 g/dL    RDW 13.4 11.9 - 14.6 %    Platelets 559 227 - 138 K/uL    MPV 10.2 9.4 - 12.3 FL    nRBC 0.00 0.0 - 0.2 K/uL    Differential Type AUTOMATED      Seg Neutrophils 84 (H) 43 - 78 %    Lymphocytes 13 13 - 44 %    Monocytes 2 (L) 4.0 - 12.0 %    Eosinophils % 0 (L) 0.5 - 7.8 %    Basophils 0 0.0 - 2.0 %    Immature Granulocytes 1 0.0 - 5.0 %    Segs Absolute 14.7 (H) 1.7 - 8.2 K/UL    Absolute Lymph # 2.2 0.5 - 4.6 K/UL    Absolute Mono # 0.3 0.1 - 1.3 K/UL    Absolute Eos # 0.0 0.0 - 0.8 K/UL    Basophils Absolute 0.1 0.0 - 0.2 K/UL    Absolute Immature Granulocyte 0.1 0.0 - 0.5 K/UL   Comprehensive Metabolic Panel    Collection Time: 09/12/22 10:41 PM   Result Value Ref Range    Sodium 134 (L) 136 - 145 mmol/L    Potassium 4.4 3.5 - 5.1 mmol/L    Chloride 107 101 - 110 mmol/L    CO2 19 (L) 21 - 32 mmol/L    Anion Gap 8 4 - 13 mmol/L    Glucose 339 (H) 65 - 100 mg/dL    BUN 33 (H) 6 - 23 MG/DL    Creatinine 1.50 (H) 0.6 - 1.0 MG/DL    GFR  46 (L) >60 ml/min/1.73m2    GFR Non- 38 (L) >60 ml/min/1.73m2    Calcium 9.9 8.3 - 10.4 MG/DL    Total Bilirubin 0.5 0.2 - 1.1 MG/DL    ALT 27 12 - 65 U/L    AST 18 15 - 37 U/L    Alk Phosphatase 110 50 - 136 U/L    Total Protein 8.9 (H) 6.3 - 8.2 g/dL    Albumin 3.7 3.5 - 5.0 g/dL    Globulin 5.2 (H) 2.3 - 3.5 g/dL    Albumin/Globulin Ratio 0.7 (L) 1.2 - 3.5     Lipase    Collection Time: 09/12/22 10:41 PM   Result Value Ref Range    Lipase 96 73 - 393 U/L   Magnesium    Collection Time: 09/12/22 10:41 PM   Result Value Ref Range    Magnesium 2.2 1.8 - 2.4 mg/dL   Basic Metabolic Panel    Collection Time: 09/13/22  6:23 AM   Result Value Ref Range    Sodium 134 (L) 136 - 145 mmol/L    Potassium 5.5 (H) 3.5 - 5.1 mmol/L    Chloride 105 101 - 110 mmol/L    CO2 22 21 - 32 mmol/L    Anion Gap 7 4 - 13 mmol/L    Glucose 407 (H) 65 - 100 mg/dL    BUN 39 (H) 6 - 23 MG/DL    Creatinine 1.60 (H) 0.6 - 1.0 MG/DL    GFR  43 (L) >60 ml/min/1.73m2    GFR Non-African American 35 (L) >60 ml/min/1.73m2    Calcium 10.0 8.3 - 10.4 MG/DL   Magnesium    Collection Time: 09/13/22  6:23 AM   Result Value Ref Range    Magnesium 2.4 1.8 - 2.4 mg/dL   Potassium    Collection Time: 09/13/22  9:26 AM   Result Value Ref Range    Potassium 5.2 (H) 3.5 - 5.1 mmol/L   Urinalysis with Reflex to Culture    Collection Time: 09/13/22  9:26 AM    Specimen: Urine   Result Value Ref Range    Color, UA YELLOW/STRAW      Appearance CLEAR      Specific Gravity, UA 1.032 (H) 1.001 - 1.023      pH, Urine 5.0 5.0 - 9.0      Protein, UA Negative NEG mg/dL    Glucose, UA >1000 mg/dL    Ketones, Urine Negative NEG mg/dL    Bilirubin Urine Negative NEG      Blood, Urine Negative NEG      Urobilinogen, Urine 0.2 0.2 - 1.0 EU/dL    Nitrite, Urine Negative NEG      Leukocyte Esterase, Urine Negative NEG      Urine Culture if Indicated CULTURE NOT INDICATED BY UA RESULT      WBC, UA 0-4 (A) NORM /hpf    RBC, UA 0-5 (A) NORM /hpf    BACTERIA, URINE Negative (A) NORM /hpf    Epithelial Cells UA 0-5 (A) NORM /hpf    Casts 2-5 (A) NORM /lpf    Mucus, UA 0 0 /lpf   Lactic Acid    Collection Time: 09/13/22  9:26 AM   Result Value Ref Range    Lactic Acid, Plasma 3.1 (H) 0.4 - 2.0 MMOL/L   Procalcitonin    Collection Time: 09/13/22  9:26 AM   Result Value Ref Range    Procalcitonin 0.17 0.00 - 0.49 ng/mL   POCT Glucose    Collection Time: 09/13/22 10:25 AM   Result Value Ref Range    POC Glucose 300 (H) 65 - 100 mg/dL    Performed by: Marialuisa Yao        I have personally reviewed imaging studies showing:  No results found. Echocardiogram:  No results found for this or any previous visit.         Orders Placed This Encounter   Medications    ondansetron (ZOFRAN) injection 4 mg    ondansetron (ZOFRAN) 4 MG/2ML injection     Kaila Waite: cabinet override    0.9 % sodium chloride bolus    0.9 % sodium chloride bolus    ondansetron (ZOFRAN) injection 4 mg    prochlorperazine (COMPAZINE) injection 10 mg    pantoprazole (PROTONIX) 40 mg in sodium chloride (PF) 10 mL injection    aspirin EC tablet 81 mg    atorvastatin (LIPITOR) tablet 80 mg    citalopram (CELEXA) tablet 20 mg    gabapentin (NEURONTIN) capsule 100 mg    metoprolol tartrate (LOPRESSOR) tablet 25 mg    sodium chloride flush 0.9 % injection 5-40 mL    sodium chloride flush 0.9 % injection 5-40 mL    0.9 % sodium chloride infusion    enoxaparin (LOVENOX) injection 40 mg     Order Specific Question:   Indication of Use     Answer:   Prophylaxis-DVT/PE    OR Linked Order Group     ondansetron (ZOFRAN-ODT) disintegrating tablet 4 mg     ondansetron (ZOFRAN) injection 4 mg    polyethylene glycol (GLYCOLAX) packet 17 g    OR Linked Order Group     acetaminophen (TYLENOL) tablet 650 mg     acetaminophen (TYLENOL) suppository 650 mg    0.9 % sodium chloride infusion    insulin lispro (HUMALOG) injection vial 0-8 Units    insulin lispro (HUMALOG) injection vial 0-4 Units    insulin glargine (LANTUS) injection vial 20 Units    sodium zirconium cyclosilicate (LOKELMA) oral suspension 5 g         Signed:  Rosa M Mao MD    Part of this note may have been written by using a voice dictation software. The note has been proof read but may still contain some grammatical/other typographical errors.

## 2022-09-13 NOTE — PROGRESS NOTES
PHYSICAL THERAPY Initial Assessment and Discharge  (Link to Caseload Tracking: PT Visit Days : 1  Acknowledge Orders  Time In/Out  PT Charge Capture  Rehab Caseload Tracker    Rukhsana Downs is a 64 y.o. female   PRIMARY DIAGNOSIS: Severe sepsis ([facility]  Dehydration [E86.0]  TERA (acute kidney injury) ([facility] [N17.9]  Intractable nausea and vomiting [R11.2]  Intractable vomiting with nausea [R11.2]       Reason for Referral: Generalized Muscle Weakness (M62.81)  Inpatient: Payor: Adalberto Francois / [place] [employer] [place] / Product Type: *No Product type* /     ASSESSMENT:     REHAB RECOMMENDATIONS:   Recommendation to date pending progress:  Setting:  No further skilled therapy after discharge from hospital    Equipment:    None     ASSESSMENT:  Ms. Glen Burden presents with intractable N/V and diarrhea with a diagnosis of sepsis. She is independent at baseline, works and doesn't use an AD for mobility. Today she was able to ambulate for 240' with independence and one instance of lateral trunk sway with independent recovery. She is functioning at her mobility baseline at this time and not in need of continued therapy. Pt will be DC from caseload.       [employer] [facility] 6 Clicks Basic Mobility Inpatient Short Form  -PAC Mobility Inpatient   How much difficulty turning over in bed?: None  How much difficulty sitting down on / standing up from a chair with arms?: None  How much difficulty moving from lying on back to sitting on side of bed?: None  How much help from another person moving to and from a bed to a chair?: None  How much help from another person needed to walk in hospital room?: None  How much help from another person for climbing 3-5 steps with a railing?: None  AM-PAC Inpatient Mobility Raw Score : 24  AM-PAC Inpatient T-Scale Score : 61.14  Mobility Inpatient CMS 0-100% Score: 0  Mobility Inpatient CMS G-Code Modifier : CH    SUBJECTIVE:   Ms. Glen Burden states, \"I feel much better\"     Social/Functional Lives With: Family  Type of Home: House  Home Layout: One level  Home Access: Stairs to enter with rails  Entrance Stairs - Number of Steps: 5  Has the patient had two or more falls in the past year or any fall with injury in the past year?: No  Receives Help From: Family  ADL Assistance: Independent  Ambulation Assistance: Independent  Transfer Assistance: Independent  Active : Yes    OBJECTIVE:     PAIN: Erroll Abbot / O2: Zannie Mantle / Augustus Hamming / Minetta Tu:   Pre Treatment:   Pain Assessment: None - Denies Pain      Post Treatment: 0/10 Vitals        Oxygen      IV    RESTRICTIONS/PRECAUTIONS:                    GROSS EVALUATION: Intact Impaired (Comments):   AROM [x]     PROM []    Strength [x]     Balance [x]     Posture [] N/A   Sensation [x]     Coordination []      Tone []     Edema []    Activity Tolerance [x]      []      COGNITION/  PERCEPTION: Intact Impaired (Comments):   Orientation [x]     Vision [x]     Hearing [x]     Cognition  [x]       MOBILITY: I Mod I S SBA CGA Min Mod Max Total  NT x2 Comments:   Bed Mobility    Rolling [x] [] [] [] [] [] [] [] [] [] []    Supine to Sit [x] [] [] [] [] [] [] [] [] [] []    Scooting [x] [] [] [] [] [] [] [] [] [] []    Sit to Supine [x] [] [] [] [] [] [] [] [] [] []    Transfers    Sit to Stand [x] [] [] [] [] [] [] [] [] [] []    Bed to Chair [] [] [] [] [] [] [] [] [] [x] []    Stand to Sit [x] [] [] [] [] [] [] [] [] [] []     [] [] [] [] [] [] [] [] [] [] []    I=Independent, Mod I=Modified Independent, S=Supervision, SBA=Standby Assistance, CGA=Contact Guard Assistance,   Min=Minimal Assistance, Mod=Moderate Assistance, Max=Maximal Assistance, Total=Total Assistance, NT=Not Tested    GAIT: I Mod I S SBA CGA Min Mod Max Total  NT x2 Comments:   Level of Assistance [] [] [x] [] [] [] [] [] [] [] []    Distance 240 feet    DME None    Gait Quality Trunk sway increased    Weightbearing Status      Stairs      I=Independent, Mod I=Modified Independent, S=Supervision, SBA=Standby Assistance, CGA=Contact Guard Assistance,   Min=Minimal Assistance, Mod=Moderate Assistance, Max=Maximal Assistance, Total=Total Assistance, NT=Not Tested    PLAN:   ACUTE PHYSICAL THERAPY GOALS:   (Developed with and agreed upon by patient and/or caregiver. )  NA    FREQUENCY AND DURATION:  (no needs) for duration of hospital stay or until stated goals are met, whichever comes first.    THERAPY PROGNOSIS:  NA    PROBLEM LIST:   (Skilled intervention is medically necessary to address:)  NA INTERVENTIONS PLANNED:   (Benefits and precautions of physical therapy have been discussed with the patient.)  NA       TREATMENT:   EVALUATION: LOW COMPLEXITY: (Untimed Charge)    TREATMENT:   NA    TREATMENT GRID:  N/A    AFTER TREATMENT PRECAUTIONS: Bed, Bed/Chair Locked, Call light within reach, Needs within reach, and RN notified    INTERDISCIPLINARY COLLABORATION:  RN/ PCT and PT/ PTA    EDUCATION: Education Given To: Patient  Education Provided: Role of Therapy;Plan of Care  Education Method: Verbal  Barriers to Learning: None  Education Outcome: Verbalized understanding    TIME IN/OUT:  Time In: 1508  Time Out: Joe 89  Minutes: [address] [place], [place]

## 2022-09-13 NOTE — ED PROVIDER NOTES
[facility] Emergency Department Provider Note                   PCP:                Maureen Ochoa DO               Age: 64 y.o. Sex: female       ICD-10-CM    1. Intractable vomiting with nausea  R11.2       2. Dehydration  E86.0       3. TERA (acute kidney injury) ([facility] [address]  N17.9           DISPOSITION Decision To Admit 09/13/2022 07:09:04 AM       New Prescriptions    No medications on file       Orders Placed This Encounter   Procedures    CBC with Auto Differential    Comprehensive Metabolic Panel    Lipase    Magnesium    Basic Metabolic Panel    Magnesium    Continuous Pulse Oximetry    POCT Urine Dipstick         Benedetta Kanner is a 64 y.o. female who presents to the Emergency Department with chief complaint of    Chief Complaint   Patient presents with    Emesis      Ms. Princess Chew is a 78-year-old female with past medical history significant for hypertension, hyperlipidemia, coronary artery disease status post CABG with two-vessel bypass 6 months ago, diabetes, who presents with complaint of nausea, vomiting, inability keep any fluids down, and diarrhea for almost 24 hours now. She reports some improvement with initial Zofran doses here but reports that the nausea has come back. She denies abdominal pain or tenderness. Review of Systems   Constitutional:  Positive for activity change, appetite change and fatigue. Negative for chills, diaphoresis and fever. HENT:  Negative for congestion, facial swelling, nosebleeds and rhinorrhea. Eyes:  Negative for pain, discharge and redness. Respiratory:  Negative for cough, shortness of breath, wheezing and stridor. Cardiovascular:  Negative for chest pain, palpitations and leg swelling. Gastrointestinal:  Positive for nausea and vomiting. Negative for abdominal distention, abdominal pain, anal bleeding, blood in stool, constipation, diarrhea and rectal pain.    Genitourinary:  Negative for dysuria, frequency, hematuria, pelvic pain and vaginal discharge. Musculoskeletal:  Negative for back pain, gait problem, myalgias and neck pain. Skin:  Negative for pallor, rash and wound. Neurological:  Negative for dizziness, tremors, weakness, light-headedness, numbness and headaches. Psychiatric/Behavioral:  Negative for agitation, behavioral problems, confusion and hallucinations. The patient is not nervous/anxious and is not hyperactive. All other systems reviewed and are negative. Past Medical History:   Diagnosis Date    Anxiety 11/28/2012    CAD (coronary artery disease)     No MI; no stents    Depression     managed with med    DM (diabetes mellitus) ([facility] [address] 11/28/2012    insulin reliant; AVG ; pt denies s.s. of hypoglycemia; last A1C    HLD (hyperlipidemia)     managed with med    Hypertension     managed with med    Ulcerative esophagitis         Past Surgical History:   Procedure Laterality Date    CARDIAC CATHETERIZATION Left     CORONARY ARTERY BYPASS GRAFT N/A 5/25/2022    CORONARY ARTERY BYPASS GRAFT (CABG X 2), [place]; ENDOSCOPIC VEIN HARVEST LEFT GREATER SAPHENOUS performed by Ryanne Burton MD at [facility] MAIN OR    TRANSESOPHAGEAL ECHOCARDIOGRAM N/A 5/25/2022    TRANSESOPHAGEAL ECHOCARDIOGRAM performed by Ryanne Burton MD at [facility] MAIN OR    UPPER GASTROINTESTINAL ENDOSCOPY      ulcerative espophagitis        Family History   Problem Relation Age of Onset    Heart Attack Father     Diabetes Father         Social Connections: Not on file        Allergies   Allergen Reactions    Metformin And Related Nausea Only        Vitals signs and nursing note reviewed. No data found. Physical Exam  Vitals and nursing note reviewed. Constitutional:       General: She is not in acute distress. Appearance: Normal appearance. She is ill-appearing. She is not toxic-appearing or diaphoretic. Comments: Lying on stretcher in semireclined position in mild distress secondary to nausea. Nontoxic-appearing. Somewhat acutely ill-appearing. Affect: Mood normal.         Behavior: Behavior normal.         Thought Content: Thought content normal.         Judgment: Judgment normal.        MDM  Number of Diagnoses or Management Options  TERA (acute kidney injury) ([address]  Dehydration  Intractable vomiting with nausea  Diagnosis management comments: Appears to have intractable vomiting with associated nausea and dehydration. Mild TERA. Requires admission. I will discuss with attending hospitalist on-call to request evaluation for admission. I sincerely appreciate their involvement in the ongoing care of this patient. Amount and/or Complexity of Data Reviewed  Clinical lab tests: ordered and reviewed  Tests in the medicine section of CPT®: ordered and reviewed  Decide to obtain previous medical records or to obtain history from someone other than the patient: yes  Obtain history from someone other than the patient: yes  Review and summarize past medical records: yes        Procedures    Labs Reviewed   CBC WITH AUTO DIFFERENTIAL - Abnormal; Notable for the following components:       Result Value    WBC 17.3 (*)     RBC 5.27 (*)     Seg Neutrophils 84 (*)     Monocytes 2 (*)     Eosinophils % 0 (*)     Segs Absolute 14.7 (*)     All other components within normal limits   COMPREHENSIVE METABOLIC PANEL - Abnormal; Notable for the following components:    Sodium 134 (*)     CO2 19 (*)     Glucose 339 (*)     BUN 33 (*)     Creatinine 1.50 (*)     GFR  46 (*)     GFR Non- 38 (*)     Total Protein 8.9 (*)     Globulin 5.2 (*)     Albumin/Globulin Ratio 0.7 (*)     All other components within normal limits   LIPASE   MAGNESIUM   BASIC METABOLIC PANEL   MAGNESIUM        No orders to display            [place] Coma Scale  Eye Opening: Spontaneous  Best Verbal Response: Oriented  Best Motor Response: Obeys commands  [place] Coma Scale Score: 15                     Voice dictation software was used during the making of this note. This software is not perfect and grammatical and other typographical errors may be present. This note has not been completely proofread for errors.        Abel Ventura MD  09/13/22 [id number]

## 2022-09-13 NOTE — ED NOTES
TRANSFER - OUT REPORT:    Verbal report given to BILL Rice on [employer]  being transferred to [facility] [phone] [id number] for routine progression of patient care       Report consisted of patient's Situation, Background, Assessment and   Recommendations(SBAR). Information from the following report(s) ED SBAR was reviewed with the receiving nurse. Lines:   Peripheral IV 09/12/22 Left Antecubital (Active)   Site Assessment Clean, dry & intact 09/12/22 2240   Line Status Blood return noted 09/12/22 2240   Phlebitis Assessment No symptoms 09/12/22 2240   Infiltration Assessment 0 09/12/22 2240       Peripheral IV 09/13/22 Right Antecubital (Active)   Site Assessment Clean, dry & intact 09/13/22 0933   Line Status Blood return noted 09/13/22 0933   Phlebitis Assessment No symptoms 09/13/22 0933   Infiltration Assessment 0 09/13/22 0933        Opportunity for questions and clarification was provided.       Patient transported with:  Registered Nurse       Rey York RN  09/13/22 [phone]
What Type Of Note Output Would You Prefer (Optional)?: Standard Output
How Severe Are Your Spot(S)?: mild
Have Your Spot(S) Been Treated In The Past?: has not been treated
Hpi Title: Evaluation of a Skin Lesion
Additional History: Evaluation of place on left cheek, present for years.

## 2022-09-13 NOTE — PROGRESS NOTES
TRANSFER - IN REPORT:    Verbal report received from Jorge Alberto Fraire on [address]  being received from ED room 30 for routine progression of patient care      Report consisted of patient's Situation, Background, Assessment and   Recommendations(SBAR). Information from the following report(s) Nurse Handoff Report, ED Encounter Summary, and MAR was reviewed with the receiving nurse. Opportunity for questions and clarification was provided. Transferred to [facility] 1104. Resting with eyes closed. Resp even unlabored.

## 2022-09-13 NOTE — H&P
Medical Student History & Physical   Admit Date:  2022  4:30 AM   Name:  Abel Gonzalez   Age:  64 y.o. Sex:  female  :  1965   MRN:  [id number]     Presenting Complaint: Emesis    Reason(s) for Admission: Intractable nausea and vomiting [R11.2]     History of Present Illness:   Abel Gonzalez is a 64 y.o. female with medical history of HTN, hyperlipidemia, CAD s/p CABG two vessels May 2022, diabetes type II presented to the ED with on and off nausea, vomiting and diarrhea onset Monday morning at 5 am. She states the diarrhea started first and was watery. She is unable to keep anything down and is very thirsty. Notes she does not manage her diabetes well. Takes 20 units insulin in the morning and 10 units at night and jardiance. Denies dietary changes, weight loss, SOB, fever, chills or chest pain. No abdominal pain or urinary changes. Notes she has experienced the n/v/d and elevated BG 3x before in the past.    Glucose 407. K 5.5, BUN 39, cr 1.60, WBC 17.3      Review of Systems:  10 systems reviewed and negative except as noted in HPI. Assessment & Plan:   Intractable nausea and vomiting  Uncontrolled diabetes type II  Likely secondary to elevated blood sugar 407. WBC 17.3.   UA negative for UTI  Sliding scale insulin  IV fluids  Zofran as needed  Diabetes management    Hyperkalemia  Monitor potassium and telemetry  Lactic acid ordered    Acute kidney injury  Secondary to dehydration. IV fluids. monitor    Hyperlipidemia  Cont at home meds    Hypertension  Hold lisinopril. Avoid nephrotoxic agents    CAD s/p CABG x2  cont.  follow up plan with outpatient cardiologist      Diet: NPO  VTE ppx: SCD  Code status: [unfilled]    Active Hospital Problems    Diagnosis Date Noted    Intractable nausea and vomiting 2022     Priority: Medium       Objective:   Patient Vitals for the past 24 hrs:   Temp Pulse Resp BP SpO2   22 2237 98.6 °F (37 °C) (!) 117 16 (!) 160/93 99 %     @BSHSIFLOW(2444:last)@    Estimated body mass index is 29.58 kg/m² as calculated from the following:    Height as of this encounter: 5' 3\" (1.6 m). Weight as of this encounter: 167 lb (75.8 kg). No intake or output data in the 24 hours ending 09/13/22 0858      Physical Exam:  General:    Well nourished. No overt distress. ill-appearing  Head:  Normocephalic, atraumatic  Eyes:  Sclerae appear normal.  Pupils equally round. ENT:  Nares appear normal, no drainage. Moist oral mucosa   Neck:  No restricted ROM. Trachea midline   CV:   Tachycardic. No m/r/g. No JVD. Lungs:   CTAB. No wheezing, rhonchi, or rales. Respirations even, unlabored  Abdomen: Bowel sounds present. Soft, nontender, nondistended. Extremities: Warm and dry. No cyanosis or clubbing. Skin:     No rashes. Normal coloration. Normal turgor. Neuro:  Cranial nerves II-XII grossly intact. Sensation intact  Psych:  Normal mood and affect.   Alert and oriented x3    I have reviewed ordered lab tests and independently visualized imaging below:    Last 24hr Labs:  Recent Results (from the past 24 hour(s))   CBC with Auto Differential    Collection Time: 09/12/22 10:41 PM   Result Value Ref Range    WBC 17.3 (H) 4.3 - 11.1 K/uL    RBC 5.27 (H) 4.05 - 5.2 M/uL    Hemoglobin 14.7 11.7 - 15.4 g/dL    Hematocrit 44.3 35.8 - 46.3 %    MCV 84.1 79.6 - 97.8 FL    MCH 27.9 26.1 - 32.9 PG    MCHC 33.2 31.4 - 35.0 g/dL    RDW 13.4 11.9 - 14.6 %    Platelets 169 409 - 831 K/uL    MPV 10.2 9.4 - 12.3 FL    nRBC 0.00 0.0 - 0.2 K/uL    Differential Type AUTOMATED      Seg Neutrophils 84 (H) 43 - 78 %    Lymphocytes 13 13 - 44 %    Monocytes 2 (L) 4.0 - 12.0 %    Eosinophils % 0 (L) 0.5 - 7.8 %    Basophils 0 0.0 - 2.0 %    Immature Granulocytes 1 0.0 - 5.0 %    Segs Absolute 14.7 (H) 1.7 - 8.2 K/UL    Absolute Lymph # 2.2 0.5 - 4.6 K/UL    Absolute Mono # 0.3 0.1 - 1.3 K/UL    Absolute Eos # 0.0 0.0 - 0.8 K/UL    Basophils Absolute 0.1 0.0 - 0.2 K/UL    Absolute Immature Granulocyte 0.1 0.0 - 0.5 K/UL   Comprehensive Metabolic Panel    Collection Time: 09/12/22 10:41 PM   Result Value Ref Range    Sodium 134 (L) 136 - 145 mmol/L    Potassium 4.4 3.5 - 5.1 mmol/L    Chloride 107 101 - 110 mmol/L    CO2 19 (L) 21 - 32 mmol/L    Anion Gap 8 4 - 13 mmol/L    Glucose 339 (H) 65 - 100 mg/dL    BUN 33 (H) 6 - 23 MG/DL    Creatinine 1.50 (H) 0.6 - 1.0 MG/DL    GFR  46 (L) >60 ml/min/1.73m2    GFR Non- 38 (L) >60 ml/min/1.73m2    Calcium 9.9 8.3 - 10.4 MG/DL    Total Bilirubin 0.5 0.2 - 1.1 MG/DL    ALT 27 12 - 65 U/L    AST 18 15 - 37 U/L    Alk Phosphatase 110 50 - 136 U/L    Total Protein 8.9 (H) 6.3 - 8.2 g/dL    Albumin 3.7 3.5 - 5.0 g/dL    Globulin 5.2 (H) 2.3 - 3.5 g/dL    Albumin/Globulin Ratio 0.7 (L) 1.2 - 3.5     Lipase    Collection Time: 09/12/22 10:41 PM   Result Value Ref Range    Lipase 96 73 - 393 U/L   Magnesium    Collection Time: 09/12/22 10:41 PM   Result Value Ref Range    Magnesium 2.2 1.8 - 2.4 mg/dL   Basic Metabolic Panel    Collection Time: 09/13/22  6:23 AM   Result Value Ref Range    Sodium 134 (L) 136 - 145 mmol/L    Potassium 5.5 (H) 3.5 - 5.1 mmol/L    Chloride 105 101 - 110 mmol/L    CO2 22 21 - 32 mmol/L    Anion Gap 7 4 - 13 mmol/L    Glucose 407 (H) 65 - 100 mg/dL    BUN 39 (H) 6 - 23 MG/DL    Creatinine 1.60 (H) 0.6 - 1.0 MG/DL    GFR  43 (L) >60 ml/min/1.73m2    GFR Non- 35 (L) >60 ml/min/1.73m2    Calcium 10.0 8.3 - 10.4 MG/DL   Magnesium    Collection Time: 09/13/22  6:23 AM   Result Value Ref Range    Magnesium 2.4 1.8 - 2.4 mg/dL       [unfilled]    Other Studies:  [unfilled]      @[email or web address]@      Signed:  Sarkis Gasca    Part of this note may have been written by using a voice dictation software. The note has been proof read but may still contain some grammatical/other typographical errors.

## 2022-09-14 ENCOUNTER — HOSPITAL ENCOUNTER (OUTPATIENT)
Dept: CARDIAC REHAB | Age: 57
Setting detail: RECURRING SERIES
End: 2022-09-14
Payer: COMMERCIAL

## 2022-09-14 VITALS
HEART RATE: 77 BPM | TEMPERATURE: 98.8 F | HEIGHT: 63 IN | BODY MASS INDEX: 29.59 KG/M2 | RESPIRATION RATE: 18 BRPM | OXYGEN SATURATION: 97 % | DIASTOLIC BLOOD PRESSURE: 63 MMHG | WEIGHT: 167 LBS | SYSTOLIC BLOOD PRESSURE: 123 MMHG

## 2022-09-14 LAB
ANION GAP SERPL CALC-SCNC: 3 MMOL/L (ref 4–13)
BASOPHILS # BLD: 0.1 K/UL (ref 0–0.2)
BASOPHILS NFR BLD: 1 % (ref 0–2)
BUN SERPL-MCNC: 17 MG/DL (ref 6–23)
CALCIUM SERPL-MCNC: 8.6 MG/DL (ref 8.3–10.4)
CHLORIDE SERPL-SCNC: 109 MMOL/L (ref 101–110)
CO2 SERPL-SCNC: 27 MMOL/L (ref 21–32)
CREAT SERPL-MCNC: 0.8 MG/DL (ref 0.6–1)
DIFFERENTIAL METHOD BLD: ABNORMAL
EOSINOPHIL # BLD: 0.1 K/UL (ref 0–0.8)
EOSINOPHIL NFR BLD: 0 % (ref 0.5–7.8)
ERYTHROCYTE [DISTWIDTH] IN BLOOD BY AUTOMATED COUNT: 13.2 % (ref 11.9–14.6)
EST. AVERAGE GLUCOSE BLD GHB EST-MCNC: 209 MG/DL
GLUCOSE BLD STRIP.AUTO-MCNC: 112 MG/DL (ref 65–100)
GLUCOSE BLD STRIP.AUTO-MCNC: 167 MG/DL (ref 65–100)
GLUCOSE SERPL-MCNC: 200 MG/DL (ref 65–100)
HBA1C MFR BLD: 8.9 % (ref 4.8–5.6)
HCT VFR BLD AUTO: 35 % (ref 35.8–46.3)
HGB BLD-MCNC: 11.3 G/DL (ref 11.7–15.4)
IMM GRANULOCYTES # BLD AUTO: 0 K/UL (ref 0–0.5)
IMM GRANULOCYTES NFR BLD AUTO: 0 % (ref 0–5)
LACTATE SERPL-SCNC: 0.7 MMOL/L (ref 0.4–2)
LYMPHOCYTES # BLD: 3.4 K/UL (ref 0.5–4.6)
LYMPHOCYTES NFR BLD: 26 % (ref 13–44)
MCH RBC QN AUTO: 27.8 PG (ref 26.1–32.9)
MCHC RBC AUTO-ENTMCNC: 32.3 G/DL (ref 31.4–35)
MCV RBC AUTO: 86 FL (ref 79.6–97.8)
MONOCYTES # BLD: 0.7 K/UL (ref 0.1–1.3)
MONOCYTES NFR BLD: 5 % (ref 4–12)
NEUTS SEG # BLD: 9 K/UL (ref 1.7–8.2)
NEUTS SEG NFR BLD: 68 % (ref 43–78)
NRBC # BLD: 0 K/UL (ref 0–0.2)
PLATELET # BLD AUTO: 233 K/UL (ref 150–450)
PMV BLD AUTO: 10 FL (ref 9.4–12.3)
POTASSIUM SERPL-SCNC: 3.8 MMOL/L (ref 3.5–5.1)
RBC # BLD AUTO: 4.07 M/UL (ref 4.05–5.2)
SERVICE CMNT-IMP: ABNORMAL
SERVICE CMNT-IMP: ABNORMAL
SODIUM SERPL-SCNC: 139 MMOL/L (ref 136–145)
WBC # BLD AUTO: 13.1 K/UL (ref 4.3–11.1)

## 2022-09-14 PROCEDURE — C9113 INJ PANTOPRAZOLE SODIUM, VIA: HCPCS | Performed by: FAMILY MEDICINE

## 2022-09-14 PROCEDURE — 80048 BASIC METABOLIC PNL TOTAL CA: CPT

## 2022-09-14 PROCEDURE — 83605 ASSAY OF LACTIC ACID: CPT

## 2022-09-14 PROCEDURE — 85025 COMPLETE CBC W/AUTO DIFF WBC: CPT

## 2022-09-14 PROCEDURE — 6370000000 HC RX 637 (ALT 250 FOR IP): Performed by: FAMILY MEDICINE

## 2022-09-14 PROCEDURE — A4216 STERILE WATER/SALINE, 10 ML: HCPCS | Performed by: FAMILY MEDICINE

## 2022-09-14 PROCEDURE — 97535 SELF CARE MNGMENT TRAINING: CPT

## 2022-09-14 PROCEDURE — 2580000003 HC RX 258: Performed by: FAMILY MEDICINE

## 2022-09-14 PROCEDURE — 97165 OT EVAL LOW COMPLEX 30 MIN: CPT

## 2022-09-14 PROCEDURE — 6360000002 HC RX W HCPCS: Performed by: FAMILY MEDICINE

## 2022-09-14 PROCEDURE — 82962 GLUCOSE BLOOD TEST: CPT

## 2022-09-14 PROCEDURE — 36415 COLL VENOUS BLD VENIPUNCTURE: CPT

## 2022-09-14 RX ORDER — ONDANSETRON 8 MG/1
8 TABLET, ORALLY DISINTEGRATING ORAL EVERY 8 HOURS PRN
Qty: 15 TABLET | Refills: 0 | Status: SHIPPED | OUTPATIENT
Start: 2022-09-14 | End: 2022-09-21

## 2022-09-14 RX ORDER — SODIUM CHLORIDE 9 MG/ML
INJECTION, SOLUTION INTRAVENOUS CONTINUOUS
Status: DISCONTINUED | OUTPATIENT
Start: 2022-09-14 | End: 2022-09-14 | Stop reason: HOSPADM

## 2022-09-14 RX ORDER — PANTOPRAZOLE SODIUM 40 MG/1
40 TABLET, DELAYED RELEASE ORAL
Qty: 30 TABLET | Refills: 0 | Status: SHIPPED | OUTPATIENT
Start: 2022-09-14 | End: 2022-10-27 | Stop reason: SDUPTHER

## 2022-09-14 RX ADMIN — INSULIN GLARGINE 20 UNITS: 100 INJECTION, SOLUTION SUBCUTANEOUS at 09:04

## 2022-09-14 RX ADMIN — SODIUM CHLORIDE, PRESERVATIVE FREE 5 ML: 5 INJECTION INTRAVENOUS at 08:59

## 2022-09-14 RX ADMIN — SODIUM CHLORIDE 40 MG: 9 INJECTION INTRAMUSCULAR; INTRAVENOUS; SUBCUTANEOUS at 08:58

## 2022-09-14 RX ADMIN — METOPROLOL TARTRATE 25 MG: 25 TABLET, FILM COATED ORAL at 08:58

## 2022-09-14 RX ADMIN — GABAPENTIN 100 MG: 100 CAPSULE ORAL at 08:58

## 2022-09-14 RX ADMIN — SODIUM CHLORIDE: 9 INJECTION, SOLUTION INTRAVENOUS at 05:23

## 2022-09-14 ASSESSMENT — EJECTION FRACTION: EF_VALUE: 55

## 2022-09-14 ASSESSMENT — LIFESTYLE VARIABLES: SMOKELESS_TOBACCO: NO

## 2022-09-14 ASSESSMENT — EXERCISE STRESS TEST: PEAK_BP: 100/60

## 2022-09-14 NOTE — PROGRESS NOTES
OCCUPATIONAL THERAPY Initial Assessment, Daily Note, Discharge, and AM       OT Visit Days: 1  Acknowledge Orders  Time  OT Charge Capture  Rehab Caseload Tracker      Asael Spencer is a 64 y.o. female   PRIMARY DIAGNOSIS: Severe sepsis ([facility] [address]  Dehydration [E86.0]  TERA (acute kidney injury) ([facility] [address] [N17.9]  Intractable nausea and vomiting [R11.2]  Intractable vomiting with nausea [R11.2]       Reason for Referral: Generalized Muscle Weakness (M62.81)  Inpatient: Payor: [address] / Plan: [employer] [place] / Product Type: *No Product type* /     ASSESSMENT:     REHAB RECOMMENDATIONS:   Recommendation to date pending progress:  Setting:  No further skilled therapy after discharge from hospital    Equipment:    None     ASSESSMENT:  Ms. Haywood Gowers presents with the above diagnoses and overall deficits in strength, activity tolerance and functional mobility. At baseline, she lives with mother in a 1 level home with 5 AMADEO. She reports independence with ADLs and functional mobility. She reports having these episodes frequently since her heart surgery in May. Today, she presents supine in bed. She demonstrated ability to perform LB dressing, grooming tasks in standing and functional household mobility. She was educated on home safety techniques during her daily tasks. No further needs or concerns expressed at this time Will discharge from acute OT.      [address] [facility] [address] [address] [facility] 6 Clicks Daily Activity Inpatient Short Form:    AM-PAC Daily Activity Inpatient   How much help for putting on and taking off regular lower body clothing?: None  How much help for Bathing?: None  How much help for Toileting?: None  How much help for putting on and taking off regular upper body clothing?: None  How much help for taking care of personal grooming?: None  How much help for eating meals?: None  AM-PAC Inpatient Daily Activity Raw Score: 24  AM-PAC Inpatient ADL T-Scale Score : 57.54  ADL Inpatient CMS 0-100% Score: 0  ADL Inpatient CMS G-Code Modifier : CH           SUBJECTIVE:     Ms. Jose Eldridge states, \"Thank you for checking on me\"     Social/Functional Lives With: Family  Type of Home: House  Home Layout: One level  Home Access: Stairs to enter with rails  Entrance Stairs - Number of Steps: 5  Has the patient had two or more falls in the past year or any fall with injury in the past year?: No  Receives Help From: Family  ADL Assistance: Independent  Ambulation Assistance: Independent  Transfer Assistance: Independent  Active : Yes    OBJECTIVE:     Steve Taylor / Tess Aleman / Laurel Bibber: IV    RESTRICTIONS/PRECAUTIONS:       PAIN: [facility] Se[facility] / O2:   Pre Treatment: denies pain         Post Treatment: none       Vitals          Oxygen            GROSS EVALUATION: INTACT IMPAIRED   (See Comments)   UE AROM [x] []   UE PROM [x] []   Strength [x]       Posture / Balance [x]     Sensation [x]     Coordination [x]       Tone [x]       Edema [x]    Activity Tolerance []  Decreased but functional     Hand Dominance R [] L []      COGNITION/  PERCEPTION: INTACT IMPAIRED   (See Comments)   Orientation [x]     Vision [x]     Hearing [x]     Cognition  [x]     Perception [x]       MOBILITY: I Mod I S SBA CGA Min Mod Max Total  NT x2 Comments:   Bed Mobility    Rolling [] [] [] [] [] [] [] [] [] [] []    Supine to Sit [x] [] [] [] [] [] [] [] [] [] []    Scooting [x] [] [] [] [] [] [] [] [] [] []    Sit to Supine [x] [] [] [] [] [] [] [] [] [] []    Transfers    Sit to Stand [x] [] [] [] [] [] [] [] [] [] []    Bed to Chair [x] [] [] [] [] [] [] [] [] [] []    Stand to Sit [x] [] [] [] [] [] [] [] [] [] []    Tub/Shower [] [] [] [] [] [] [] [] [] [] []     Toilet [x] [] [] [] [] [] [] [] [] [] []      [] [] [] [] [] [] [] [] [] [] []    I=Independent, Mod I=Modified Independent, S=Supervision/Setup, SBA=Standby Assistance, CGA=Contact Guard Assistance, Min=Minimal Assistance, Mod=Moderate Assistance, Max=Maximal Assistance, Total=Total Assistance, NT=Not Tested    ACTIVITIES OF DAILY LIVING: I Mod I S SBA CGA Min Mod Max Total NT Comments   BASIC ADLs:              Upper Body Bathing  [x] [] [] [] [] [] [] [] [] []    Lower Body Bathing [x] [] [] [] [] [] [] [] [] []    Toileting [x] [] [] [] [] [] [] [] [] []    Upper Body Dressing [x] [] [] [] [] [] [] [] [] []    Lower Body Dressing [] [] [] [] [] [] [] [] [] []    Feeding [x] [] [] [] [] [] [] [] [] []    Grooming [x] [] [] [] [] [] [] [] [] []    Personal Device Care [] [] [] [] [] [] [] [] [] []    Functional Mobility [x] [] [] [] [] [] [] [] [] []    I=Independent, Mod I=Modified Independent, S=Supervision/Setup, SBA=Standby Assistance, CGA=Contact Guard Assistance, Min=Minimal Assistance, Mod=Moderate Assistance, Max=Maximal Assistance, Total=Total Assistance, NT=Not Tested    PLAN:     FREQUENCY/DURATION   OT Plan of Care:  (eval & dc) for duration of hospital stay or until stated goals are met, whichever comes first.    ACUTE OCCUPATIONAL THERAPY GOALS:   (Developed with and agreed upon by patient and/or caregiver.)  1. Patient will complete lower body bathing and dressing with SPV and adaptive equipment as needed. 2. Patient will complete toileting with SPV. 3. Patient will tolerate 25 minutes of OT treatment with 1-2 rest breaks to increase activity tolerance for ADLs. 4. Patient will complete functional transfers with SPV and adaptive equipment as needed.      Timeframe: 7 visits         PROBLEM LIST:   (Skilled intervention is medically necessary to address:)  Decreased ADL/Functional Activities  Decreased Activity Tolerance  Decreased Balance  Decreased Strength   INTERVENTIONS PLANNED:  (Benefits and precautions of occupational therapy have been discussed with the patient.)  Self Care Training  Therapeutic Activity  Therapeutic Exercise/HEP  Education         TREATMENT:     EVALUATION: LOW COMPLEXITY: (Untimed Charge)    TREATMENT:   Self Care (8 minutes): Patient participated in lower body dressing and grooming ADLs in unsupported sitting and standing with minimal verbal, manual, and tactile cueing to increase independence, increase activity tolerance, and increase safety awareness. Patient also participated in functional mobility, functional transfer, and energy conservation training to increase independence, increase activity tolerance, and increase safety awareness. TREATMENT GRID:  N/A    AFTER TREATMENT PRECAUTIONS: Bed, Bed/Chair Locked, Call light within reach, Needs within reach, and RN notified    INTERDISCIPLINARY COLLABORATION:  RN/ PCT and PT/ PTA    EDUCATION:  Education Given To: Patient  Education Provided: Role of Therapy;Plan of Care;ADL Adaptive Strategies;Transfer Training; Fall Prevention Strategies; Energy Conservation  Education Method: Demonstration;Verbal  Barriers to Learning: None  Education Outcome: Verbalized understanding;Demonstrated understanding    TOTAL TREATMENT DURATION AND TIME:  Time In: 2804  Time Out: 1010  Minutes: Bri Bravo

## 2022-09-14 NOTE — PLAN OF CARE
Problem: Discharge Planning  Goal: Discharge to home or other facility with appropriate resources  Outcome: Progressing  Flowsheets  Taken 9/13/2022 2000 by Jose Rafael Kern RN  Discharge to home or other facility with appropriate resources: Identify barriers to discharge with patient and caregiver  Taken 9/13/2022 1300 by Beverly Chiu RN  Discharge to home or other facility with appropriate resources:   Identify barriers to discharge with patient and caregiver   Arrange for needed discharge resources and transportation as appropriate   Identify discharge learning needs (meds, wound care, etc)   Arrange for interpreters to assist at discharge as needed   Refer to discharge planning if patient needs post-hospital services based on physician order or complex needs related to functional status, cognitive ability or social support system  Taken 9/13/2022 1200 by Nivia Posey RN  Discharge to home or other facility with appropriate resources:   Arrange for needed discharge resources and transportation as appropriate   Identify discharge learning needs (meds, wound care, etc)     Problem: Pain  Goal: Verbalizes/displays adequate comfort level or baseline comfort level  Outcome: Progressing  Flowsheets (Taken 9/13/2022 1357 by Beverly Chiu RN)  Verbalizes/displays adequate comfort level or baseline comfort level:   Encourage patient to monitor pain and request assistance   Assess pain using appropriate pain scale   Administer analgesics based on type and severity of pain and evaluate response   Implement non-pharmacological measures as appropriate and evaluate response   Consider cultural and social influences on pain and pain management   Notify Licensed Independent Practitioner if interventions unsuccessful or patient reports new pain     Problem: ABCDS Injury Assessment  Goal: Absence of physical injury  Outcome: Progressing  Flowsheets (Taken 9/13/2022 2000)  Absence of Physical Injury: Implement safety measures based on patient assessment     Problem: Safety - Adult  Goal: Free from fall injury  Outcome: Progressing  Flowsheets (Taken 9/13/2022 2000)  Free From Fall Injury: Instruct family/caregiver on patient safety

## 2022-09-14 NOTE — DISCHARGE SUMMARY
Hospitalist Discharge Summary   Admit Date:  2022  4:30 AM   DC Note date: 2022  Name:  Rukhsana Downs   Age:  64 y.o. Sex:  female  :  1965   MRN:  [id number]   Room:  [place] [address]  PCP:  Hank Ross DO    Presenting Complaint: Emesis     Initial Admission Diagnosis: Dehydration [E86.0]  TERA (acute kidney injury) ([facility] [address] [N17.9]  Intractable nausea and vomiting [R11.2]  Intractable vomiting with nausea [R11.2]     Problem List for this Hospitalization (present on admission):    Principal Problem:    Severe sepsis ([facility] [address]  Active Problems:    Hx of CABG    Hyperlipidemia    S/P CABG x 2    CAD (coronary artery disease)    Intractable nausea and vomiting    Viral gastroenteritis    TERA (acute kidney injury) ([facility] [address]    Lactic acidosis    Hyperkalemia    DM (diabetes mellitus) ([facility] [address]    Anxiety    Hypertension  Resolved Problems:    * No resolved hospital problems. *    HPI:  Rukhsana Downs is a 64 y.o. female with medical history of hypertension, hyperlipidemia, CAD s/p CABG in May 2022, type 2 diabetes mellitus presented to ED with 1 day history of persistent nausea, NBNB vomiting and watery diarrhea. Denies hematemesis, melena or hematochezia. She is unable to keep anything down. Also complaining of dizziness and fatigue. Denies fever, chills, chest pain, shortness of breath, dysuria or abdominal pain. No recent antibiotic use. Denies any recent dietary change, weight loss, sick contact or travel. Patient reports she has similar symptoms in the past when she was diagnosed with pancreatitis. In the ED patient was tachycardic. Labs notable for leukocytosis, TERA, hyperkalemia, lactic acidosis and uncontrolled blood glucose. Hospitalist consulted for admission.      Hospital Course:  Rukhsana Downs is a 64 y.o. female with medical history of hypertension, hyperlipidemia, CAD s/p CABG in May 2022, type 2 diabetes mellitus admitted with severe sepsis secondary to gastroenteritis. No other obvious source. UA negative. Chest x-ray without acute etiology. Started on supportive care, IV fluid, antiemetic. Monitored off antibiotics. Symptoms improved. Lactic acidosis resolved. TERA prerenal in the setting of dehydration and GI loss, responded well to IV fluids. Patient also with uncontrolled diabetes mellitus. A1c 8.9. Blood glucose in 400s on admission. Patient reports she is working on her diet and has improvement in A1c from 15.4 in April 2022 to 8.9 in September. Discussed diet and lifestyle modification. Patient to follow-up with PCP for further management of diabetes mellitus. Also patient with ongoing GI problems, concerning for gastroparesis. Patient to follow-up with GI outpatient for further management. All other chronic conditions stable and we continued essential home meds. No new complaint on the day of discharge. Nausea/vomiting/diarrhea resolved. Patient stable to discharge home today with PCP and GI. Disposition: Home   Diet: ADULT DIET; Regular; 3 carb choices (45 gm/meal)  Code Status: Full Code    Follow Ups:   Follow-up Information     Jayleen Aburto DO Follow up in 3 day(s). Specialty: Internal Medicine  Contact information:  [address]  [place] [address], DO . Specialty: Internal Medicine  Contact information:  [address]  [place] [address] Follow up in 1 week(s). Contact information:  [address] [facility] [address] [facility]  [phone]                     Time spent in patient discharge and coordination 37 minutes. Plan was discussed with patient. All questions answered. Patient was stable at time of discharge. Instructions given to call a physician or return if any concerns.     Current Discharge Medication List START taking these medications    Details   pantoprazole (PROTONIX) 40 MG tablet Take 1 tablet by mouth every morning (before breakfast)  Qty: 30 tablet, Refills: 0           CONTINUE these medications which have CHANGED    Details   ondansetron (ZOFRAN ODT) 8 MG TBDP disintegrating tablet Place 1 tablet under the tongue every 8 hours as needed for Nausea or Vomiting  Qty: 15 tablet, Refills: 0           CONTINUE these medications which have NOT CHANGED    Details   empagliflozin (JARDIANCE) 10 MG tablet Take 1 tablet by mouth in the morning. Qty: 30 tablet, Refills: 5      gabapentin (NEURONTIN) 100 MG capsule 1 at night prn foot pain neuropathy  Qty: 30 capsule, Refills: 5      atorvastatin (LIPITOR) 80 MG tablet Take 1 tablet by mouth at bedtime  Qty: 90 tablet, Refills: 3      metoprolol tartrate (LOPRESSOR) 25 MG tablet Take 1 tablet by mouth 2 times daily  Qty: 60 tablet, Refills: 3      insulin NPH (HUMULIN N;NOVOLIN N) 100 UNIT/ML injection vial 18 units in the morning and 7 units at night  Qty: 100 mL, Refills: 3      aspirin 81 MG EC tablet Take 81 mg by mouth at bedtime      citalopram (CELEXA) 20 MG tablet Take 20 mg by mouth at bedtime       lisinopril (PRINIVIL;ZESTRIL) 20 MG tablet Take 20 mg by mouth at bedtime TAKE 1 TABLET BY MOUTH EVERY DAY           STOP taking these medications       Hyoscyamine Sulfate SL (LEVSIN/SL) 0.125 MG SUBL Comments:   Reason for Stopping:         polyethylene glycol (GLYCOLAX) 17 g packet Comments:   Reason for Stopping:         Docusate Calcium (STOOL SOFTENER PO) Comments:   Reason for Stopping:         acetaminophen (TYLENOL) 500 MG tablet Comments:   Reason for Stopping:               Procedures done this admission:  * No surgery found *    Consults this admission:  None    Echocardiogram results:  No results found for this or any previous visit. Diagnostic Imaging/Tests:   No results found.      Labs: Results:       BMP, Mg, Phos Recent Labs 09/12/22 2241 09/13/22  0623 09/13/22  0926 09/13/22  1537 09/14/22  0910   * 134*  --  141 139   K 4.4 5.5* 5.2* 4.3 3.8    105  --  114* 109   CO2 19* 22  --  22 27   ANIONGAP 8 7  --  5 3*   BUN 33* 39*  --  33* 17   CREATININE 1.50* 1.60*  --  1.30* 0.80   LABGLOM 38* 35*  --  45* >60   GFRAA 46* 43*  --  54* >60   CALCIUM 9.9 10.0  --  8.6 8.6   GLUCOSE 339* 407*  --  278* 200*   MG 2.2 2.4  --   --   --       CBC Recent Labs     09/12/22 2241 09/14/22  0910   WBC 17.3* 13.1*   RBC 5.27* 4.07   HGB 14.7 11.3*   HCT 44.3 35.0*   MCV 84.1 86.0   MCH 27.9 27.8   MCHC 33.2 32.3   RDW 13.4 13.2    233   MPV 10.2 10.0   NRBC 0.00 0.00   SEGS 84* 68   LYMPHOPCT 13 26   EOSRELPCT 0* 0*   MONOPCT 2* 5   BASOPCT 0 1   IMMGRAN 1 0   SEGSABS 14.7* 9.0*   LYMPHSABS 2.2 3.4   EOSABS 0.0 0.1   MONOSABS 0.3 0.7   BASOSABS 0.1 0.1   ABSIMMGRAN 0.1 0.0      LFT Recent Labs     09/12/22 2241   BILITOT 0.5   ALKPHOS 110   AST 18   ALT 27   PROT 8.9*   LABALBU 3.7   GLOB 5.2*      Cardiac  No results found for: NTPROBNP, TROPHS   Coags Lab Results   Component Value Date/Time    PROTIME 17.3 05/25/2022 03:39 PM    PROTIME 13.3 05/23/2022 09:27 AM    PROTIME 14.3 05/09/2022 08:51 AM    INR 1.4 05/25/2022 03:39 PM    INR 1.0 05/23/2022 09:27 AM    INR 1.1 05/09/2022 08:51 AM    APTT 32.5 05/25/2022 03:39 PM    APTT 27.3 05/23/2022 09:27 AM      A1c Lab Results   Component Value Date/Time    LABA1C 8.9 09/12/2022 10:41 PM    LABA1C 9.5 06/28/2022 08:54 AM    LABA1C 11.5 05/23/2022 09:27 AM     09/12/2022 10:41 PM     06/28/2022 08:54 AM     05/23/2022 09:27 AM      Lipids Lab Results   Component Value Date/Time    CHOL 217 04/27/2022 03:48 PM    LDLCALC 131 04/27/2022 03:48 PM    LABVLDL 34 06/19/2019 11:00 AM    HDL 40 04/27/2022 03:48 PM    TRIG 259 04/27/2022 03:48 PM      Thyroid  Lab Results   Component Value Date/Time    TSHELE 2.93 06/28/2022 09:21 PM        Most Recent UA Lab Results Component Value Date/Time    COLORU YELLOW/STRAW 09/13/2022 09:26 AM    APPEARANCE CLEAR 09/13/2022 09:26 AM    SPECGRAV 1.032 09/13/2022 09:26 AM    LABPH 5.0 09/13/2022 09:26 AM    PROTEINU Negative 09/13/2022 09:26 AM    GLUCOSEU >1000 09/13/2022 09:26 AM    KETUA Negative 09/13/2022 09:26 AM    BILIRUBINUR Negative 09/13/2022 09:26 AM    BLOODU Negative 09/13/2022 09:26 AM    UROBILINOGEN 0.2 09/13/2022 09:26 AM    NITRU Negative 09/13/2022 09:26 AM    LEUKOCYTESUR Negative 09/13/2022 09:26 AM    WBCUA 0-4 09/13/2022 09:26 AM    RBCUA 0-5 09/13/2022 09:26 AM    EPITHUA 0-5 09/13/2022 09:26 AM    BACTERIA Negative 09/13/2022 09:26 AM    LABCAST 2-5 09/13/2022 09:26 AM    MUCUS 0 09/13/2022 09:26 AM        No results for input(s): CULTURE in the last 720 hours.     All Labs from Last 24 Hrs:  Recent Results (from the past 24 hour(s))   POCT Glucose    Collection Time: 09/13/22 12:42 PM   Result Value Ref Range    POC Glucose 293 (H) 65 - 100 mg/dL    Performed by: [facility]    Basic Metabolic Panel w/ Reflex to MG    Collection Time: 09/13/22  3:37 PM   Result Value Ref Range    Sodium 141 136 - 145 mmol/L    Potassium 4.3 3.5 - 5.1 mmol/L    Chloride 114 (H) 101 - 110 mmol/L    CO2 22 21 - 32 mmol/L    Anion Gap 5 4 - 13 mmol/L    Glucose 278 (H) 65 - 100 mg/dL    BUN 33 (H) 6 - 23 MG/DL    Creatinine 1.30 (H) 0.6 - 1.0 MG/DL    GFR  54 (L) >60 ml/min/1.73m2    GFR Non- 45 (L) >60 ml/min/1.73m2    Calcium 8.6 8.3 - 10.4 MG/DL   Lactic Acid    Collection Time: 09/13/22  3:37 PM   Result Value Ref Range    Lactic Acid, Plasma 4.3 (HH) 0.4 - 2.0 MMOL/L   POCT Glucose    Collection Time: 09/13/22  3:54 PM   Result Value Ref Range    POC Glucose 295 (H) 65 - 100 mg/dL    Performed by: [place]    POCT Glucose    Collection Time: 09/13/22  8:42 PM   Result Value Ref Range    POC Glucose 130 (H) 65 - 100 mg/dL    Performed by: Sofia    Lactic Acid Collection Time: 09/14/22 12:01 AM   Result Value Ref Range    Lactic Acid, Plasma 0.7 0.4 - 2.0 MMOL/L   POCT Glucose    Collection Time: 09/14/22  7:11 AM   Result Value Ref Range    POC Glucose 112 (H) 65 - 100 mg/dL    Performed by: Silvestre Silver    CBC with Auto Differential    Collection Time: 09/14/22  9:10 AM   Result Value Ref Range    WBC 13.1 (H) 4.3 - 11.1 K/uL    RBC 4.07 4.05 - 5.2 M/uL    Hemoglobin 11.3 (L) 11.7 - 15.4 g/dL    Hematocrit 35.0 (L) 35.8 - 46.3 %    MCV 86.0 79.6 - 97.8 FL    MCH 27.8 26.1 - 32.9 PG    MCHC 32.3 31.4 - 35.0 g/dL    RDW 13.2 11.9 - 14.6 %    Platelets 707 543 - 692 K/uL    MPV 10.0 9.4 - 12.3 FL    nRBC 0.00 0.0 - 0.2 K/uL    Differential Type AUTOMATED      Seg Neutrophils 68 43 - 78 %    Lymphocytes 26 13 - 44 %    Monocytes 5 4.0 - 12.0 %    Eosinophils % 0 (L) 0.5 - 7.8 %    Basophils 1 0.0 - 2.0 %    Immature Granulocytes 0 0.0 - 5.0 %    Segs Absolute 9.0 (H) 1.7 - 8.2 K/UL    Absolute Lymph # 3.4 0.5 - 4.6 K/UL    Absolute Mono # 0.7 0.1 - 1.3 K/UL    Absolute Eos # 0.1 0.0 - 0.8 K/UL    Basophils Absolute 0.1 0.0 - 0.2 K/UL    Absolute Immature Granulocyte 0.0 0.0 - 0.5 K/UL   Basic Metabolic Panel w/ Reflex to MG    Collection Time: 09/14/22  9:10 AM   Result Value Ref Range    Sodium 139 136 - 145 mmol/L    Potassium 3.8 3.5 - 5.1 mmol/L    Chloride 109 101 - 110 mmol/L    CO2 27 21 - 32 mmol/L    Anion Gap 3 (L) 4 - 13 mmol/L    Glucose 200 (H) 65 - 100 mg/dL    BUN 17 6 - 23 MG/DL    Creatinine 0.80 0.6 - 1.0 MG/DL    GFR African American >60 >60 ml/min/1.73m2    GFR Non- >60 >60 ml/min/1.73m2    Calcium 8.6 8.3 - 10.4 MG/DL   POCT Glucose    Collection Time: 09/14/22 11:34 AM   Result Value Ref Range    POC Glucose 167 (H) 65 - 100 mg/dL    Performed by: Mar[facility]        Allergies   Allergen Reactions    Metformin And Related Nausea Only     Immunization History   Administered Date(s) Administered    COVID-19, MODERNA BLUE border, Primary or Immunocompromised, (age 12y+), IM, 100 mcg/0.5mL 08/08/2021, 09/28/2021       Recent Vital Data:  Patient Vitals for the past 24 hrs:   Temp Pulse Resp BP SpO2   09/14/22 1132 98.8 °F (37.1 °C) 77 18 123/63 97 %   09/14/22 0726 98.2 °F (36.8 °C) 83 16 (!) 143/82 98 %   09/14/22 0420 98 °F (36.7 °C) 80 15 123/76 93 %   09/13/22 2328 98.7 °F (37.1 °C) 80 16 129/74 94 %   09/13/22 1954 98.7 °F (37.1 °C) 87 16 115/65 93 %   09/13/22 1548 98.4 °F (36.9 °C) 87 17 118/70 98 %       Oxygen Therapy  SpO2: 97 %  Pulse via Oximetry: 108 beats per minute  O2 Device: None (Room air)    Estimated body mass index is 29.58 kg/m² as calculated from the following:    Height as of this encounter: 5' 3\" (1.6 m). Weight as of this encounter: 167 lb (75.8 kg). No intake or output data in the 24 hours ending 09/14/22 1216      Physical Exam:  General:    No overt distress  Head:  Normocephalic, atraumatic  Eyes:  Sclerae appear normal.  Pupils equally round. HENT:  Nares appear normal, no drainage. Moist mucous membranes  Neck:  No restricted ROM. Trachea midline  CV:   RRR. No m/r/g. No JVD  Lungs:   CTAB. No wheezing, rhonchi, or rales. Respirations even, unlabored  Abdomen:   Soft, nontender, nondistended. Extremities: Warm and dry. No cyanosis or clubbing. No edema. Skin:     No rashes. Normal coloration  Neuro:  CN II-XII grossly intact. Psych:  Normal mood and affect. Signed:  Ariel Hernandez MD    Part of this note may have been written by using a voice dictation software. The note has been proof read but may still contain some grammatical/other typographical errors.

## 2022-09-14 NOTE — CARE COORDINATION
members/significant others, and if so, who? Yes   Financial Resources     Community Resources     CM/SW Referral       Social/Functional History  Lives With Family   Type of [address] [facility] [address] [facility] One level   Home Access Stairs to enter with rails   Entrance Stairs - Number of Steps 5   Entrance Stairs - Rails     Bathroom Shower/Tub     Bathroom Toilet     Bathroom Equipment     Bathroom Accessibility     Home Equipment     Receives Help From [address] Work     Driving     Shopping          Other (Comment)     Homemaking Responsibilities     Meal Prep Responsibility     Laundry Responsibility     Cleaning Responsibility     Bill Paying/Finance [address] [facility] Management     Other (Comment)     Ambulation Assistance Independent   Transfer Assistance Independent   Active  Yes   Patient's  Info     Mode of Transportation     Education     Occupation     Type of Occupation       Discharge Planning   Type of [address] [facility] Prior To Admission     Potential Assistance Needed     DME     DME     DME Ordered? No   Potential Assistance Purchasing Medications     Meds-to-Beds: Does the patient want to have any new prescriptions delivered to bedside prior to discharge? Type of Home Care Services None   Patient expects to be discharged to: House   Follow Up Appointment: Best Day/Time     One/Two Story Residence:     # of Interior Steps     Height of Each Step (in)     [employer] Available     History of Falls? Services At/After Discharge  Transition of Care Consult (CM Consult):      Internal Home Health     Internal Hospice     Reason Outside [address]     Partner SNF     Reason Why Partner SNF Not Chosen     Internal Comfort Care     Reason Outside [address] Discharge None   [facility] Resource Information Provided? No   Mode of Transport at Discharge [address] [facility] Time of Discharge     Confirm Follow Up Transport Family     Condition of Participation: Discharge Planning  The plan for Transition of Care is related to the following treatment goals: no further needs   The Patient and/or Patient Representative was provided with a Choice of Provider? Patient   Name of the Patient Representative who was provided with the Choice of Provider and agrees with the Discharge Plan? The Patient and/or Patient Representative Agree with the Discharge Plan? Yes   Freedom of Choice list was provided with basic dialogue that supports the individualized plan of care/goals, treatment preferences, and shares the quality data associated with the providers?  Yes     Documentation for Discharge Appeal  Discharge Appealed by     Date notified by QIO of appeal request:     Time notified by QIO of appeal request:     Detailed Notice of Discharge given to:     Date Notice of Discharge given:     Time Notice of Discharge given:     [facility]     [facility]     Date Notified of Outcome     Time Notified of Outcome     Outcome of appeal           Loyda Ling RN 09/14/22 2:52 PM

## 2022-09-14 NOTE — PROGRESS NOTES
[facility] CARDIOLOGY Follow Up                 Reason for Visit: Stable ischemic heart disease    Subjective:     Patient is a 64 y.o. female with a PMH of CAD status post CABG, bilateral carotid atherosclerosis, hypertension, hyperlipidemia, and diabetes who presents for follow-up. The patient was last seen in June 2022. She had an echocardiogram in May 2022 that was noted to demonstrate a normal EF. The patient denies angina and dyspnea. The patient was recently hospitalized for \"severe sepsis secondary to viral gastroenteritis\". It was documented that she had an TERA and received IVF's. Past Medical History:   Diagnosis Date    Anxiety 11/28/2012    CAD (coronary artery disease)     No MI; no stents    Depression     managed with med    DM (diabetes mellitus) ([facility] [address] 11/28/2012    insulin reliant; AVG ; pt denies s.s. of hypoglycemia; last A1C    HLD (hyperlipidemia)     managed with med    Hypertension     managed with med    Ulcerative esophagitis       Past Surgical History:   Procedure Laterality Date    CARDIAC CATHETERIZATION Left     CORONARY ARTERY BYPASS GRAFT N/A 5/25/2022    CORONARY ARTERY BYPASS GRAFT (CABG X 2), [place]; ENDOSCOPIC VEIN HARVEST LEFT GREATER SAPHENOUS performed by Darion Rivera MD at [facility] MAIN OR    TRANSESOPHAGEAL ECHOCARDIOGRAM N/A 5/25/2022    TRANSESOPHAGEAL ECHOCARDIOGRAM performed by Darion Rivera MD at [facility] MAIN OR    UPPER GASTROINTESTINAL ENDOSCOPY      ulcerative espophagitis      Family History   Problem Relation Age of Onset    Heart Attack Father     Diabetes Father       Social History     Tobacco Use    Smoking status: Never    Smokeless tobacco: Never   Substance Use Topics    Alcohol use: Not Currently     Comment: rarely      Allergies   Allergen Reactions    Metformin And Related Nausea Only         ROS:  No obvious pertinent positives on review of systems except for what was outlined above.        Objective:       /72   Pulse 76   Ht 5' 3\" (1.6 m) Wt 166 lb (75.3 kg)   BMI 29.41 kg/m²     BP Readings from Last 3 Encounters:   09/15/22 116/72   09/14/22 123/63   07/28/22 110/70       Wt Readings from Last 3 Encounters:   09/15/22 166 lb (75.3 kg)   09/12/22 167 lb (75.8 kg)   09/07/22 167 lb 6.4 oz (75.9 kg)       General/Constitutional:   Alert and oriented x 3, no acute distress  HEENT:   normocephalic, atraumatic, moist mucous membranes  Neck:   No JVD or carotid bruits bilaterally  Cardiovascular:   regular rate and rhythm, no rub/gallop appreciated  Pulmonary:   clear to auscultation bilaterally, no respiratory distress  Abdomen:   soft, non-tender, non-distended  Ext:   No sig LE edema bilaterally  Skin:  warm and dry, no obvious rashes seen  Neuro:   no obvious sensory or motor deficits  Psychiatric:   normal mood and affect    Data Review:   Lab Results   Component Value Date    CHOL 217 (H) 04/27/2022    CHOL 242 (H) 06/19/2019     Lab Results   Component Value Date    TRIG 259 (H) 04/27/2022    TRIG 171 (H) 06/19/2019     Lab Results   Component Value Date    HDL 40 04/27/2022    HDL 44 06/19/2019     Lab Results   Component Value Date    LDLCALC 131 (H) 04/27/2022    LDLCALC 164 (H) 06/19/2019     Lab Results   Component Value Date    LABVLDL 34 06/19/2019    VLDL 46 (H) 04/27/2022     No results found for: [facility]     Lab Results   Component Value Date/Time     09/13/2022 03:37 PM     09/13/2022 06:23 AM     09/12/2022 10:41 PM    K 4.3 09/13/2022 03:37 PM    K 5.2 09/13/2022 09:26 AM    K 5.5 09/13/2022 06:23 AM     09/13/2022 03:37 PM     09/13/2022 06:23 AM     09/12/2022 10:41 PM    CO2 22 09/13/2022 03:37 PM    CO2 22 09/13/2022 06:23 AM    CO2 19 09/12/2022 10:41 PM    BUN 33 09/13/2022 03:37 PM    BUN 39 09/13/2022 06:23 AM    BUN 33 09/12/2022 10:41 PM    CREATININE 1.30 09/13/2022 03:37 PM    CREATININE 1.60 09/13/2022 06:23 AM    CREATININE 1.50 09/12/2022 10:41 PM    GLUCOSE 278 09/13/2022 03:37 PM    GLUCOSE 407 09/13/2022 06:23 AM    GLUCOSE 339 09/12/2022 10:41 PM    CALCIUM 8.6 09/13/2022 03:37 PM    CALCIUM 10.0 09/13/2022 06:23 AM    CALCIUM 9.9 09/12/2022 10:41 PM         Lab Results   Component Value Date    ALT 27 09/12/2022    ALT 23 07/23/2022    ALT 19 06/29/2022    AST 18 09/12/2022    AST 18 07/23/2022    AST 13 (L) 06/29/2022        Assessment/Plan:   1. Hx of CABG  - Continue with atorvastatin, Lopressor, and baby aspirin daily    2. Carotid atherosclerosis, bilateral  - Continue with atorvastatin    3. Hypertension, unspecified type  - Well-controlled  - Continue with Lopressor  - Currently on lisinopril    4.  Hyperlipidemia, unspecified hyperlipidemia type  - Continue with atorvastatin    F/U: 6 months    Tamiko Moreno MD

## 2022-09-15 ENCOUNTER — OFFICE VISIT (OUTPATIENT)
Dept: CARDIOLOGY CLINIC | Age: 57
End: 2022-09-15
Payer: COMMERCIAL

## 2022-09-15 VITALS
BODY MASS INDEX: 29.41 KG/M2 | HEIGHT: 63 IN | SYSTOLIC BLOOD PRESSURE: 116 MMHG | HEART RATE: 76 BPM | DIASTOLIC BLOOD PRESSURE: 72 MMHG | WEIGHT: 166 LBS

## 2022-09-15 DIAGNOSIS — E78.5 HYPERLIPIDEMIA, UNSPECIFIED HYPERLIPIDEMIA TYPE: ICD-10-CM

## 2022-09-15 DIAGNOSIS — I10 HYPERTENSION, UNSPECIFIED TYPE: ICD-10-CM

## 2022-09-15 DIAGNOSIS — Z95.1 HX OF CABG: Primary | ICD-10-CM

## 2022-09-15 DIAGNOSIS — I65.23 CAROTID ATHEROSCLEROSIS, BILATERAL: ICD-10-CM

## 2022-09-15 PROCEDURE — 99214 OFFICE O/P EST MOD 30 MIN: CPT | Performed by: INTERNAL MEDICINE

## 2022-09-16 ENCOUNTER — HOSPITAL ENCOUNTER (OUTPATIENT)
Dept: CARDIAC REHAB | Age: 57
Setting detail: RECURRING SERIES
End: 2022-09-16
Payer: COMMERCIAL

## 2022-09-18 LAB
BACTERIA SPEC CULT: NORMAL
SERVICE CMNT-IMP: NORMAL

## 2022-09-19 ENCOUNTER — HOSPITAL ENCOUNTER (OUTPATIENT)
Dept: CARDIAC REHAB | Age: 57
Setting detail: RECURRING SERIES
End: 2022-09-19
Payer: COMMERCIAL

## 2022-09-21 ENCOUNTER — HOSPITAL ENCOUNTER (OUTPATIENT)
Dept: CARDIAC REHAB | Age: 57
Setting detail: RECURRING SERIES
End: 2022-09-21
Payer: COMMERCIAL

## 2022-09-23 ENCOUNTER — APPOINTMENT (OUTPATIENT)
Dept: CARDIAC REHAB | Age: 57
End: 2022-09-23
Payer: COMMERCIAL

## 2022-09-26 ENCOUNTER — HOSPITAL ENCOUNTER (OUTPATIENT)
Dept: CARDIAC REHAB | Age: 57
Setting detail: RECURRING SERIES
End: 2022-09-26
Payer: COMMERCIAL

## 2022-10-10 ENCOUNTER — HOSPITAL ENCOUNTER (OUTPATIENT)
Dept: CARDIAC REHAB | Age: 57
Setting detail: RECURRING SERIES
End: 2022-10-10
Payer: COMMERCIAL

## 2022-10-12 ENCOUNTER — APPOINTMENT (OUTPATIENT)
Dept: CARDIAC REHAB | Age: 57
End: 2022-10-12
Payer: COMMERCIAL

## 2022-10-14 ENCOUNTER — APPOINTMENT (OUTPATIENT)
Dept: CARDIAC REHAB | Age: 57
End: 2022-10-14
Payer: COMMERCIAL

## 2022-10-14 ASSESSMENT — LIFESTYLE VARIABLES: SMOKELESS_TOBACCO: NO

## 2022-10-14 ASSESSMENT — EJECTION FRACTION: EF_VALUE: 55

## 2022-10-17 ENCOUNTER — HOSPITAL ENCOUNTER (OUTPATIENT)
Dept: CARDIAC REHAB | Age: 57
Setting detail: RECURRING SERIES
Discharge: HOME | End: 2022-10-20
Payer: COMMERCIAL

## 2022-10-17 VITALS — WEIGHT: 162.2 LBS | BODY MASS INDEX: 28.73 KG/M2

## 2022-10-17 PROCEDURE — 93798 PHYS/QHP OP CAR RHAB W/ECG: CPT

## 2022-10-17 ASSESSMENT — EXERCISE STRESS TEST
PEAK_HR: 106
PEAK_BP: 98/58
PEAK_METS: 3.3

## 2022-10-19 ENCOUNTER — HOSPITAL ENCOUNTER (OUTPATIENT)
Dept: CARDIAC REHAB | Age: 57
Setting detail: RECURRING SERIES
Discharge: HOME | End: 2022-10-22
Payer: COMMERCIAL

## 2022-10-19 VITALS — WEIGHT: 160 LBS | BODY MASS INDEX: 28.34 KG/M2

## 2022-10-19 PROCEDURE — 93798 PHYS/QHP OP CAR RHAB W/ECG: CPT

## 2022-10-19 ASSESSMENT — EXERCISE STRESS TEST
PEAK_HR: 116
PEAK_METS: 3.3

## 2022-10-24 ENCOUNTER — HOSPITAL ENCOUNTER (OUTPATIENT)
Dept: CARDIAC REHAB | Age: 57
Setting detail: RECURRING SERIES
Discharge: HOME | End: 2022-10-27
Payer: COMMERCIAL

## 2022-10-24 PROCEDURE — 93798 PHYS/QHP OP CAR RHAB W/ECG: CPT

## 2022-10-24 ASSESSMENT — EXERCISE STRESS TEST
PEAK_BP: 100/68
PEAK_HR: 114
PEAK_METS: 3.3

## 2022-10-26 ENCOUNTER — TELEPHONE (OUTPATIENT)
Dept: CARDIOLOGY CLINIC | Age: 57
End: 2022-10-26

## 2022-10-26 ENCOUNTER — HOSPITAL ENCOUNTER (OUTPATIENT)
Dept: CARDIAC REHAB | Age: 57
Setting detail: RECURRING SERIES
End: 2022-10-26
Payer: COMMERCIAL

## 2022-10-26 NOTE — TELEPHONE ENCOUNTER
Elyo Schwarz from Cardiac Rehab [phone] downstairs called and said patient's Blood Pressure is running consistently low. Eg. 84/52. Patient has really been fatigued. Please call patient at [phone].

## 2022-10-27 ENCOUNTER — OFFICE VISIT (OUTPATIENT)
Dept: INTERNAL MEDICINE CLINIC | Facility: CLINIC | Age: 57
End: 2022-10-27
Payer: COMMERCIAL

## 2022-10-27 VITALS
WEIGHT: 167 LBS | SYSTOLIC BLOOD PRESSURE: 104 MMHG | OXYGEN SATURATION: 97 % | HEART RATE: 114 BPM | HEIGHT: 63 IN | BODY MASS INDEX: 29.59 KG/M2 | DIASTOLIC BLOOD PRESSURE: 68 MMHG

## 2022-10-27 DIAGNOSIS — Z79.4 INSULIN-REQUIRING OR DEPENDENT TYPE II DIABETES MELLITUS (HCC): ICD-10-CM

## 2022-10-27 DIAGNOSIS — E11.65 UNCONTROLLED TYPE 2 DIABETES MELLITUS WITH HYPERGLYCEMIA (HCC): Primary | ICD-10-CM

## 2022-10-27 DIAGNOSIS — E78.2 MODERATE MIXED HYPERLIPIDEMIA NOT REQUIRING STATIN THERAPY: ICD-10-CM

## 2022-10-27 DIAGNOSIS — E11.9 INSULIN-REQUIRING OR DEPENDENT TYPE II DIABETES MELLITUS (HCC): ICD-10-CM

## 2022-10-27 DIAGNOSIS — E11.43 GASTROPARESIS DUE TO DM (HCC): ICD-10-CM

## 2022-10-27 DIAGNOSIS — Z79.4 TYPE 2 DIABETES MELLITUS WITH HYPERGLYCEMIA, WITH LONG-TERM CURRENT USE OF INSULIN (HCC): ICD-10-CM

## 2022-10-27 DIAGNOSIS — E11.65 TYPE 2 DIABETES MELLITUS WITH HYPERGLYCEMIA, WITH LONG-TERM CURRENT USE OF INSULIN (HCC): ICD-10-CM

## 2022-10-27 DIAGNOSIS — K31.84 GASTROPARESIS DUE TO DM (HCC): ICD-10-CM

## 2022-10-27 LAB
CREAT UR-MCNC: 62 MG/DL
ERYTHROCYTE [DISTWIDTH] IN BLOOD BY AUTOMATED COUNT: 13 % (ref 11.9–14.6)
HCT VFR BLD AUTO: 36.7 % (ref 35.8–46.3)
HGB BLD-MCNC: 12 G/DL (ref 11.7–15.4)
MCH RBC QN AUTO: 27 PG (ref 26.1–32.9)
MCHC RBC AUTO-ENTMCNC: 32.7 G/DL (ref 31.4–35)
MCV RBC AUTO: 82.5 FL (ref 82–102)
MICROALBUMIN UR-MCNC: 0.73 MG/DL
MICROALBUMIN/CREAT UR-RTO: 12 MG/G
NRBC # BLD: 0 K/UL (ref 0–0.2)
PLATELET # BLD AUTO: 304 K/UL (ref 150–450)
PMV BLD AUTO: 10.7 FL (ref 9.4–12.3)
RBC # BLD AUTO: 4.45 M/UL (ref 4.05–5.2)
WBC # BLD AUTO: 7.8 K/UL (ref 4.3–11.1)

## 2022-10-27 PROCEDURE — 99214 OFFICE O/P EST MOD 30 MIN: CPT | Performed by: INTERNAL MEDICINE

## 2022-10-27 PROCEDURE — 3078F DIAST BP <80 MM HG: CPT | Performed by: INTERNAL MEDICINE

## 2022-10-27 PROCEDURE — 3052F HG A1C>EQUAL 8.0%<EQUAL 9.0%: CPT | Performed by: INTERNAL MEDICINE

## 2022-10-27 PROCEDURE — 3074F SYST BP LT 130 MM HG: CPT | Performed by: INTERNAL MEDICINE

## 2022-10-27 RX ORDER — PANTOPRAZOLE SODIUM 40 MG/1
40 TABLET, DELAYED RELEASE ORAL
Qty: 30 TABLET | Refills: 11 | Status: ON HOLD | OUTPATIENT
Start: 2022-10-27 | End: 2022-11-04 | Stop reason: SDUPTHER

## 2022-10-27 RX ORDER — CITALOPRAM 20 MG/1
20 TABLET ORAL NIGHTLY
Qty: 90 TABLET | Refills: 3 | Status: SHIPPED | OUTPATIENT
Start: 2022-10-27

## 2022-10-27 RX ORDER — LISINOPRIL 20 MG/1
10 TABLET ORAL NIGHTLY
Qty: 15 TABLET | Refills: 11
Start: 2022-10-27

## 2022-10-27 NOTE — PROGRESS NOTES
Leighton Blancas was seen today for follow-up and medication refill. Diagnoses and all orders for this visit:    Uncontrolled type 2 diabetes mellitus with hyperglycemia ([facility]  -     Hemoglobin A1C; Future  -     CBC; Future  -     Comprehensive Metabolic Panel; Future  -     Lipid Panel; Future  -     Microalbumin / Creatinine Urine Ratio; Future    Gastroparesis due to DM (HCC)  -     Hemoglobin A1C; Future  -     CBC; Future  -     Comprehensive Metabolic Panel; Future  -     Lipid Panel; Future  -     Microalbumin / Creatinine Urine Ratio; Future    Type 2 diabetes mellitus with hyperglycemia, with long-term current use of insulin (HCC)  -     Hemoglobin A1C; Future  -     CBC; Future  -     Comprehensive Metabolic Panel; Future  -     Lipid Panel; Future  -     Microalbumin / Creatinine Urine Ratio; Future    Moderate mixed hyperlipidemia not requiring statin therapy  -     Lipid Panel; Future    Insulin-requiring or dependent type II diabetes mellitus ([facility]    Other orders  -     citalopram (CELEXA) 20 MG tablet; Take 1 tablet by mouth at bedtime  -     pantoprazole (PROTONIX) 40 MG tablet; Take 1 tablet by mouth every morning (before breakfast)        Drea Babin is a 62 y.o. female    Chief Complaint   Patient presents with    Follow-up     3 month F/U on HTN,DM,Lipids    Medication Refill     Protonix ?  Only 30 from hospital         Admission on 09/13/2022, Discharged on 09/14/2022   Component Date Value Ref Range Status    WBC 09/12/2022 17.3 (A)  4.3 - 11.1 K/uL Final    RBC 09/12/2022 5.27 (A)  4.05 - 5.2 M/uL Final    Hemoglobin 09/12/2022 14.7  11.7 - 15.4 g/dL Final    Hematocrit 09/12/2022 44.3  35.8 - 46.3 % Final    MCV 09/12/2022 84.1  79.6 - 97.8 FL Final    MCH 09/12/2022 27.9  26.1 - 32.9 PG Final    MCHC 09/12/2022 33.2  31.4 - 35.0 g/dL Final    RDW 09/12/2022 13.4  11.9 - 14.6 % Final    Platelets 42/20/0015 352  150 - 450 K/uL Final    MPV 09/12/2022 10.2  9.4 - 12.3 FL Final    nRBC 09/12/2022 0.00  0.0 - 0.2 K/uL Final    **Note: Absolute NRBC parameter is now reported with Hemogram**    Differential Type 09/12/2022 AUTOMATED    Final    Seg Neutrophils 09/12/2022 84 (A)  43 - 78 % Final    Lymphocytes 09/12/2022 13  13 - 44 % Final    Monocytes 09/12/2022 2 (A)  4.0 - 12.0 % Final    Eosinophils % 09/12/2022 0 (A)  0.5 - 7.8 % Final    Basophils 09/12/2022 0  0.0 - 2.0 % Final    Immature Granulocytes 09/12/2022 1  0.0 - 5.0 % Final    Segs Absolute 09/12/2022 14.7 (A)  1.7 - 8.2 K/UL Final    Absolute Lymph # 09/12/2022 2.2  0.5 - 4.6 K/UL Final    Absolute Mono # 09/12/2022 0.3  0.1 - 1.3 K/UL Final    Absolute Eos # 09/12/2022 0.0  0.0 - 0.8 K/UL Final    Basophils Absolute 09/12/2022 0.1  0.0 - 0.2 K/UL Final    Absolute Immature Granulocyte 09/12/2022 0.1  0.0 - 0.5 K/UL Final    Sodium 09/12/2022 134 (A)  136 - 145 mmol/L Final    Potassium 09/12/2022 4.4  3.5 - 5.1 mmol/L Final    Chloride 09/12/2022 107  101 - 110 mmol/L Final    CO2 09/12/2022 19 (A)  21 - 32 mmol/L Final    Anion Gap 09/12/2022 8  4 - 13 mmol/L Final    Glucose 09/12/2022 339 (A)  65 - 100 mg/dL Final    BUN 09/12/2022 33 (A)  6 - 23 MG/DL Final    Creatinine 09/12/2022 1.50 (A)  0.6 - 1.0 MG/DL Final    GFR  09/12/2022 46 (A)  >60 ml/min/1.73m2 Final    GFR Non- 09/12/2022 38 (A)  >60 ml/min/1.73m2 Final    Comment:      Estimated GFR is calculated using the Modification of Diet in Renal Disease (MDRD) Study equation, reported for both  Americans (GFRAA) and non- Americans (GFRNA), and normalized to 1.73m2 body surface area. The physician must decide which value applies to the patient. The MDRD study equation should only be used in individuals age 25 or older. It has not been validated for the following: pregnant women, patients with serious comorbid conditions,or on certain medications, or persons with extremes of body size, muscle mass, or nutritional status. Calcium 09/12/2022 9.9  8.3 - 10.4 MG/DL Final    Total Bilirubin 09/12/2022 0.5  0.2 - 1.1 MG/DL Final    ALT 09/12/2022 27  12 - 65 U/L Final    AST 09/12/2022 18  15 - 37 U/L Final    Alk Phosphatase 09/12/2022 110  50 - 136 U/L Final    Total Protein 09/12/2022 8.9 (A)  6.3 - 8.2 g/dL Final    Albumin 09/12/2022 3.7  3.5 - 5.0 g/dL Final    Globulin 09/12/2022 5.2 (A)  2.3 - 3.5 g/dL Final    Albumin/Globulin Ratio 09/12/2022 0.7 (A)  1.2 - 3.5   Final    Lipase 09/12/2022 96  73 - 393 U/L Final    Magnesium 09/12/2022 2.2  1.8 - 2.4 mg/dL Final    Sodium 09/13/2022 134 (A)  136 - 145 mmol/L Final    Potassium 09/13/2022 5.5 (A)  3.5 - 5.1 mmol/L Final    Chloride 09/13/2022 105  101 - 110 mmol/L Final    CO2 09/13/2022 22  21 - 32 mmol/L Final    Anion Gap 09/13/2022 7  4 - 13 mmol/L Final    Glucose 09/13/2022 407 (A)  65 - 100 mg/dL Final    BUN 09/13/2022 39 (A)  6 - 23 MG/DL Final    Creatinine 09/13/2022 1.60 (A)  0.6 - 1.0 MG/DL Final    GFR  09/13/2022 43 (A)  >60 ml/min/1.73m2 Final    GFR Non- 09/13/2022 35 (A)  >60 ml/min/1.73m2 Final    Comment:      Estimated GFR is calculated using the Modification of Diet in Renal Disease (MDRD) Study equation, reported for both  Americans (GFRAA) and non- Americans (GFRNA), and normalized to 1.73m2 body surface area. The physician must decide which value applies to the patient. The MDRD study equation should only be used in individuals age 25 or older. It has not been validated for the following: pregnant women, patients with serious comorbid conditions,or on certain medications, or persons with extremes of body size, muscle mass, or nutritional status.       Calcium 09/13/2022 10.0  8.3 - 10.4 MG/DL Final    Magnesium 09/13/2022 2.4  1.8 - 2.4 mg/dL Final    Potassium 09/13/2022 5.2 (A)  3.5 - 5.1 mmol/L Final    Color, UA 09/13/2022 YELLOW/STRAW    Final    Color Reference Range: Straw, Yellow or Dark Yellow Appearance 09/13/2022 CLEAR    Final    Specific Gravity, UA 09/13/2022 1.032 (A)  1.001 - 1.023   Final    pH, Urine 09/13/2022 5.0  5.0 - 9.0   Final    Protein, UA 09/13/2022 Negative  NEG mg/dL Final    Glucose, UA 09/13/2022 >1000  mg/dL Final    Ketones, Urine 09/13/2022 Negative  NEG mg/dL Final    Bilirubin Urine 09/13/2022 Negative  NEG   Final    Blood, Urine 09/13/2022 Negative  NEG   Final    Urobilinogen, Urine 09/13/2022 0.2  0.2 - 1.0 EU/dL Final    Nitrite, Urine 09/13/2022 Negative  NEG   Final    Leukocyte Esterase, Urine 09/13/2022 Negative  NEG   Final    Urine Culture if Indicated 09/13/2022 CULTURE NOT INDICATED BY UA RESULT    Final    WBC, UA 09/13/2022 0-4 (A)  NORM /hpf Final    RBC, UA 09/13/2022 0-5 (A)  NORM /hpf Final    BACTERIA, URINE 09/13/2022 Negative (A)  NORM /hpf Final    Epithelial Cells UA 09/13/2022 0-5 (A)  NORM /hpf Final    Casts 09/13/2022 2-5 (A)  NORM /lpf Final    Mucus, UA 09/13/2022 0  0 /lpf Final    Special Requests 09/13/2022     Final                    Value:RIGHT  Antecubital      Culture 09/13/2022 NO GROWTH 5 DAYS    Final    Lactic Acid, Plasma 09/13/2022 3.1 (A)  0.4 - 2.0 MMOL/L Final    Procalcitonin 09/13/2022 0.17  0.00 - 0.49 ng/mL Final    Comment:      Suspected Sepsis:  <0.50 ng/mL     Low likelihood of sepsis. 0.50-2.00 ng/mL    Increased likelihood of sepsis. Antibiotics encouraged. >2.00 ng/mL  High risk of sepsis/shock. Antibiotics strongly encouraged. Suspected Lower Resp Tract Infections:  <0.24 ng/mL    Low likelihood of bacterial infection. >0.24 ng/mL    Increased likelihood of bacterial infection. Antibiotics encouraged. With successful antibiotic therapy, PCT levels should decrease rapidly. (Half-life of 24 to 36 hours)       Procalcitonin values from samples collected within the first 6 hours of systemic infection may still be low. Retesting may be indicated.   Values from day 1 and day 4 can be entered into the Change in Procalcitonin Calculator ([facility]) to determine the patient's Mortality Risk Prognosis. In healthy neonates, plasma Procalcitonin (PCT) concentrations increase gradually after birth, reaching peak values at about 24 hours of age then decrease to normal valu                           es below 0.5 ng/mL by 48-72 hours of age. POC Glucose 09/13/2022 300 (A)  65 - 100 mg/dL Final    Comment: 47 - 60 mg/dl Consistent with, but not fully diagnostic of hypoglycemia. 101 - 125 mg/dl Impaired fasting glucose/consistent with pre-diabetes mellitus  > 126 mg/dl Fasting glucose consistent with overt diabetes mellitus      Performed by: 09/13/2022 GilstrapTylerMSN   Final    Hemoglobin A1C 09/12/2022 8.9 (A)  4.8 - 5.6 % Final    eAG 09/12/2022 209  mg/dL Final    Comment: Reference Range  Normal: 4.8-5.6  Diabetic >=6.5%  Normal       <5.7%      POC Glucose 09/13/2022 293 (A)  65 - 100 mg/dL Final    Comment: 47 - 60 mg/dl Consistent with, but not fully diagnostic of hypoglycemia.   101 - 125 mg/dl Impaired fasting glucose/consistent with pre-diabetes mellitus  > 126 mg/dl Fasting glucose consistent with overt diabetes mellitus      Performed by: 09/13/2022 LopezSophiaMSN   Final    Sodium 09/13/2022 141  136 - 145 mmol/L Final    Potassium 09/13/2022 4.3  3.5 - 5.1 mmol/L Final    Chloride 09/13/2022 114 (A)  101 - 110 mmol/L Final    CO2 09/13/2022 22  21 - 32 mmol/L Final    Anion Gap 09/13/2022 5  4 - 13 mmol/L Final    Glucose 09/13/2022 278 (A)  65 - 100 mg/dL Final    BUN 09/13/2022 33 (A)  6 - 23 MG/DL Final    Creatinine 09/13/2022 1.30 (A)  0.6 - 1.0 MG/DL Final    GFR  09/13/2022 54 (A)  >60 ml/min/1.73m2 Final    GFR Non- 09/13/2022 45 (A)  >60 ml/min/1.73m2 Final    Calcium 09/13/2022 8.6  8.3 - 10.4 MG/DL Final    Lactic Acid, Plasma 09/13/2022 4.3 (A)  0.4 - 2.0 MMOL/L Final    Comment: RESULTS VERIFIED, PHONED TO AND READ BACK BY  Arlette Mariscal RN @[id number] ON [id number] DLF      POC Glucose 09/13/2022 295 (A)  65 - 100 mg/dL Final    Comment: 47 - 60 mg/dl Consistent with, but not fully diagnostic of hypoglycemia. 101 - 125 mg/dl Impaired fasting glucose/consistent with pre-diabetes mellitus  > 126 mg/dl Fasting glucose consistent with overt diabetes mellitus      Performed by: 09/13/2022 ErvinCarterCareCompanion   Final    POC Glucose 09/13/2022 130 (A)  65 - 100 mg/dL Final    Comment: 47 - 60 mg/dl Consistent with, but not fully diagnostic of hypoglycemia. 101 - 125 mg/dl Impaired fasting glucose/consistent with pre-diabetes mellitus  > 126 mg/dl Fasting glucose consistent with overt diabetes mellitus      Performed by: 09/13/2022 Sofia   Final    Lactic Acid, Plasma 09/14/2022 0.7  0.4 - 2.0 MMOL/L Final    POC Glucose 09/14/2022 112 (A)  65 - 100 mg/dL Final    Comment: 47 - 60 mg/dl Consistent with, but not fully diagnostic of hypoglycemia.   101 - 125 mg/dl Impaired fasting glucose/consistent with pre-diabetes mellitus  > 126 mg/dl Fasting glucose consistent with overt diabetes mellitus      Performed by: 09/14/2022 Tonna Nailer   Final    WBC 09/14/2022 13.1 (A)  4.3 - 11.1 K/uL Final    RBC 09/14/2022 4.07  4.05 - 5.2 M/uL Final    Hemoglobin 09/14/2022 11.3 (A)  11.7 - 15.4 g/dL Final    Hematocrit 09/14/2022 35.0 (A)  35.8 - 46.3 % Final    MCV 09/14/2022 86.0  79.6 - 97.8 FL Final    MCH 09/14/2022 27.8  26.1 - 32.9 PG Final    MCHC 09/14/2022 32.3  31.4 - 35.0 g/dL Final    RDW 09/14/2022 13.2  11.9 - 14.6 % Final    Platelets 49/14/0000 233  150 - 450 K/uL Final    MPV 09/14/2022 10.0  9.4 - 12.3 FL Final    nRBC 09/14/2022 0.00  0.0 - 0.2 K/uL Final    **Note: Absolute NRBC parameter is now reported with Hemogram**    Differential Type 09/14/2022 AUTOMATED    Final    Seg Neutrophils 09/14/2022 68  43 - 78 % Final    Lymphocytes 09/14/2022 26  13 - 44 % Final    Monocytes 09/14/2022 5  4.0 - 12.0 % Final    Eosinophils % 09/14/2022 0 (A)  0.5 - 7.8 % Final    Basophils 09/14/2022 1  0.0 - 2.0 % Final    Immature Granulocytes 09/14/2022 0  0.0 - 5.0 % Final    Segs Absolute 09/14/2022 9.0 (A)  1.7 - 8.2 K/UL Final    Absolute Lymph # 09/14/2022 3.4  0.5 - 4.6 K/UL Final    Absolute Mono # 09/14/2022 0.7  0.1 - 1.3 K/UL Final    Absolute Eos # 09/14/2022 0.1  0.0 - 0.8 K/UL Final    Basophils Absolute 09/14/2022 0.1  0.0 - 0.2 K/UL Final    Absolute Immature Granulocyte 09/14/2022 0.0  0.0 - 0.5 K/UL Final    Sodium 09/14/2022 139  136 - 145 mmol/L Final    Potassium 09/14/2022 3.8  3.5 - 5.1 mmol/L Final    Chloride 09/14/2022 109  101 - 110 mmol/L Final    CO2 09/14/2022 27  21 - 32 mmol/L Final    Anion Gap 09/14/2022 3 (A)  4 - 13 mmol/L Final    Glucose 09/14/2022 200 (A)  65 - 100 mg/dL Final    BUN 09/14/2022 17  6 - 23 MG/DL Final    Creatinine 09/14/2022 0.80  0.6 - 1.0 MG/DL Final    GFR  09/14/2022 >60  >60 ml/min/1.73m2 Final    GFR Non- 09/14/2022 >60  >60 ml/min/1.73m2 Final    Comment:      Estimated GFR is calculated using the Modification of Diet in Renal Disease (MDRD) Study equation, reported for both  Americans (GFRAA) and non- Americans (GFRNA), and normalized to 1.73m2 body surface area. The physician must decide which value applies to the patient. The MDRD study equation should only be used in individuals age 25 or older. It has not been validated for the following: pregnant women, patients with serious comorbid conditions,or on certain medications, or persons with extremes of body size, muscle mass, or nutritional status. Calcium 09/14/2022 8.6  8.3 - 10.4 MG/DL Final    POC Glucose 09/14/2022 167 (A)  65 - 100 mg/dL Final    Comment: 47 - 60 mg/dl Consistent with, but not fully diagnostic of hypoglycemia.   101 - 125 mg/dl Impaired fasting glucose/consistent with pre-diabetes mellitus  > 126 mg/dl Fasting glucose consistent with overt diabetes mellitus      Performed by: 09/14/2022 [facility]   Final        Past Medical History:   Diagnosis Date    Anxiety 11/28/2012    CAD (coronary artery disease)     No MI; no stents    Depression     managed with med    [facility] 11/28/2012    insulin reliant; AVG ; pt denies s.s. of hypoglycemia; last A1C    HLD (hyperlipidemia)     managed with med    Hypertension     managed with med    Ulcerative esophagitis        Family History   Problem Relation Age of Onset    Heart Attack Father     Diabetes Father         Social History     Socioeconomic History    Marital status:      Spouse name: Not on file    Number of children: Not on file    Years of education: Not on file    Highest education level: Not on file   Occupational History    Not on file   Tobacco Use    Smoking status: Never    Smokeless tobacco: Never   Vaping Use    Vaping Use: Never used   Substance and Sexual Activity    Alcohol use: Not Currently     Comment: rarely    Drug use: No    Sexual activity: Not on file   Other Topics Concern    Not on file   Social History Narrative    Not on file     Social Determinants of Health     Financial Resource Strain: Not on file   Food Insecurity: Not on file   Transportation Needs: Not on file   Physical Activity: Not on file   Stress: Not on file   Social Connections: Not on file   Intimate Partner Violence: Not on file   Housing Stability: Not on file         Current Outpatient Medications:     lisinopril (PRINIVIL;ZESTRIL) 20 MG tablet, Take 0.5 tablets by mouth at bedtime TAKE 1 TABLET BY MOUTH EVERY DAY, Disp: 15 tablet, Rfl: 11    citalopram (CELEXA) 20 MG tablet, Take 1 tablet by mouth at bedtime, Disp: 90 tablet, Rfl: 3    pantoprazole (PROTONIX) 40 MG tablet, Take 1 tablet by mouth every morning (before breakfast), Disp: 30 tablet, Rfl: 11    metoprolol tartrate (LOPRESSOR) 25 MG tablet, TAKE ONE TABLET BY MOUTH TWICE A DAY, Disp: 60 tablet, Rfl: 3    empagliflozin (JARDIANCE) 10 MG tablet, Take 1 tablet by mouth in the morning., Disp: 30 tablet, Rfl: 5    gabapentin (NEURONTIN) 100 MG capsule, 1 at night prn foot pain neuropathy, Disp: 30 capsule, Rfl: 5    atorvastatin (LIPITOR) 80 MG tablet, Take 1 tablet by mouth at bedtime, Disp: 90 tablet, Rfl: 3    insulin NPH (HUMULIN N;NOVOLIN N) 100 UNIT/ML injection vial, 18 units in the morning and 7 units at night (Patient taking differently: 20 units in the morning and 10 units at night), Disp: 100 mL, Rfl: 3    aspirin 81 MG EC tablet, Take 81 mg by mouth at bedtime, Disp: , Rfl:     Allergies   Allergen Reactions    Metformin And Related Nausea Only         Review of Systems      Vitals:    10/27/22 1520   BP: 104/68   Pulse: (!) 114   SpO2: 97%   Weight: 167 lb (75.8 kg)   Height: 5' 3\" (1.6 m)           Physical Exam       Gema Sliver was seen today for follow-up and medication refill. Diagnoses and all orders for this visit:    Uncontrolled type 2 diabetes mellitus with hyperglycemia ([facility]  -     Hemoglobin A1C; Future  -     CBC; Future  -     Comprehensive Metabolic Panel; Future  -     Lipid Panel; Future  -     Microalbumin / Creatinine Urine Ratio; Future    Gastroparesis due to DM (HCC)  -     Hemoglobin A1C; Future  -     CBC; Future  -     Comprehensive Metabolic Panel; Future  -     Lipid Panel; Future  -     Microalbumin / Creatinine Urine Ratio; Future    Type 2 diabetes mellitus with hyperglycemia, with long-term current use of insulin (HCC)  -     Hemoglobin A1C; Future  -     CBC; Future  -     Comprehensive Metabolic Panel; Future  -     Lipid Panel; Future  -     Microalbumin / Creatinine Urine Ratio; Future    Moderate mixed hyperlipidemia not requiring statin therapy  -     Lipid Panel; Future    Insulin-requiring or dependent type II diabetes mellitus ([facility]    Other orders  -     citalopram (CELEXA) 20 MG tablet; Take 1 tablet by mouth at bedtime  -     pantoprazole (PROTONIX) 40 MG tablet;  Take 1 tablet by mouth every morning (before breakfast)               Patrizia Devries, DO

## 2022-10-27 NOTE — TELEPHONE ENCOUNTER
Per [facility] nurse, Brandon Mack RN, 10/24/22 BP-84/52. Neil complained that she was very fatigued and tired, but denied dizziness, light headedness, or faint feeling. During 2 months of cardiac rehab, systolic KK-. Taking metoprolol tartrate 25 mg BID and lisinopril 20 mg qd. Eloy Schwarz asks for any recommendations on low BP from Dr. Benson Farias.

## 2022-10-27 NOTE — TELEPHONE ENCOUNTER
MD Jermaine Rm, RN  Caller: Unspecified (Yesterday,  1:34 PM)  Please decrease lisinopril to 10 mg daily.

## 2022-10-27 NOTE — TELEPHONE ENCOUNTER
Jam Brandon of Dr. Oleg Davis response. She verbalized understanding, and states she will notify patient.    Requested Prescriptions     Signed Prescriptions Disp Refills    lisinopril (PRINIVIL;ZESTRIL) 20 MG tablet 15 tablet 11     Sig: Take 0.5 tablets by mouth at bedtime TAKE 1 TABLET BY MOUTH EVERY DAY     Authorizing Provider: Burt Ramires     Ordering User: Syed Xie

## 2022-10-28 ENCOUNTER — TELEPHONE (OUTPATIENT)
Dept: CARDIAC REHAB | Age: 57
End: 2022-10-28

## 2022-10-28 LAB
ALBUMIN SERPL-MCNC: 3.5 G/DL (ref 3.5–5)
ALBUMIN/GLOB SERPL: 0.9 {RATIO} (ref 0.4–1.6)
ALP SERPL-CCNC: 136 U/L (ref 50–136)
ALT SERPL-CCNC: 28 U/L (ref 12–65)
ANION GAP SERPL CALC-SCNC: 4 MMOL/L (ref 2–11)
AST SERPL-CCNC: 14 U/L (ref 15–37)
BILIRUB SERPL-MCNC: 0.3 MG/DL (ref 0.2–1.1)
BUN SERPL-MCNC: 13 MG/DL (ref 6–23)
CALCIUM SERPL-MCNC: 9.2 MG/DL (ref 8.3–10.4)
CHLORIDE SERPL-SCNC: 105 MMOL/L (ref 101–110)
CHOLEST SERPL-MCNC: 134 MG/DL
CO2 SERPL-SCNC: 29 MMOL/L (ref 21–32)
CREAT SERPL-MCNC: 0.9 MG/DL (ref 0.6–1)
EST. AVERAGE GLUCOSE BLD GHB EST-MCNC: 186 MG/DL
GLOBULIN SER CALC-MCNC: 3.7 G/DL (ref 2.8–4.5)
GLUCOSE SERPL-MCNC: 200 MG/DL (ref 65–100)
HBA1C MFR BLD: 8.1 % (ref 4.8–5.6)
HDLC SERPL-MCNC: 45 MG/DL (ref 40–60)
HDLC SERPL: 3 {RATIO}
LDLC SERPL CALC-MCNC: 28 MG/DL
POTASSIUM SERPL-SCNC: 4 MMOL/L (ref 3.5–5.1)
PROT SERPL-MCNC: 7.2 G/DL (ref 6.3–8.2)
SODIUM SERPL-SCNC: 138 MMOL/L (ref 133–143)
TRIGL SERPL-MCNC: 305 MG/DL (ref 35–150)
VLDLC SERPL CALC-MCNC: 61 MG/DL (ref 6–23)

## 2022-10-28 NOTE — TELEPHONE ENCOUNTER
LVM for patient yesterday (10/27/22) advising her that Dr. Alok Hightower wants her to cut lisinopril in half (decrease dose from 20mg daily to 10mg daily). Asked her to call me back when possible.

## 2022-10-31 ENCOUNTER — HOSPITAL ENCOUNTER (OUTPATIENT)
Dept: CARDIAC REHAB | Age: 57
Setting detail: RECURRING SERIES
End: 2022-10-31
Payer: COMMERCIAL

## 2022-11-02 ENCOUNTER — APPOINTMENT (OUTPATIENT)
Dept: CT IMAGING | Age: 57
End: 2022-11-02
Payer: COMMERCIAL

## 2022-11-02 ENCOUNTER — HOSPITAL ENCOUNTER (OUTPATIENT)
Age: 57
Setting detail: OBSERVATION
Discharge: HOME | End: 2022-11-04
Attending: EMERGENCY MEDICINE | Admitting: EMERGENCY MEDICINE
Payer: COMMERCIAL

## 2022-11-02 ENCOUNTER — TELEPHONE (OUTPATIENT)
Dept: CARDIAC REHAB | Age: 57
End: 2022-11-02

## 2022-11-02 DIAGNOSIS — R11.10 INTRACTABLE VOMITING: Primary | ICD-10-CM

## 2022-11-02 PROBLEM — E87.20 LACTIC ACIDOSIS: Status: RESOLVED | Noted: 2022-09-13 | Resolved: 2022-11-02

## 2022-11-02 PROBLEM — E87.5 HYPERKALEMIA: Status: RESOLVED | Noted: 2022-09-13 | Resolved: 2022-11-02

## 2022-11-02 PROBLEM — A41.9 SEVERE SEPSIS (HCC): Status: RESOLVED | Noted: 2022-09-13 | Resolved: 2022-11-02

## 2022-11-02 PROBLEM — R65.20 SEVERE SEPSIS (HCC): Status: RESOLVED | Noted: 2022-09-13 | Resolved: 2022-11-02

## 2022-11-02 PROBLEM — Z99.11 ENCOUNTER FOR WEANING FROM VENTILATOR (HCC): Status: RESOLVED | Noted: 2022-05-25 | Resolved: 2022-11-02

## 2022-11-02 PROBLEM — K85.00 IDIOPATHIC ACUTE PANCREATITIS: Status: RESOLVED | Noted: 2022-06-28 | Resolved: 2022-11-02

## 2022-11-02 PROBLEM — K21.00 GERD WITH ESOPHAGITIS: Chronic | Status: ACTIVE | Noted: 2022-11-02

## 2022-11-02 PROBLEM — A08.4 VIRAL GASTROENTERITIS: Status: RESOLVED | Noted: 2022-09-13 | Resolved: 2022-11-02

## 2022-11-02 LAB
ALBUMIN SERPL-MCNC: 3.8 G/DL (ref 3.5–5)
ALBUMIN/GLOB SERPL: 0.8 {RATIO} (ref 0.4–1.6)
ALP SERPL-CCNC: 91 U/L (ref 50–136)
ALT SERPL-CCNC: 26 U/L (ref 12–65)
ANION GAP SERPL CALC-SCNC: 9 MMOL/L (ref 2–11)
AST SERPL-CCNC: 20 U/L (ref 15–37)
BASOPHILS # BLD: 0 K/UL (ref 0–0.2)
BASOPHILS NFR BLD: 0 % (ref 0–2)
BILIRUB SERPL-MCNC: 0.5 MG/DL (ref 0.2–1.1)
BILIRUB UR QL: NEGATIVE
BUN SERPL-MCNC: 17 MG/DL (ref 6–23)
CALCIUM SERPL-MCNC: 9.6 MG/DL (ref 8.3–10.4)
CHLORIDE SERPL-SCNC: 99 MMOL/L (ref 101–110)
CO2 SERPL-SCNC: 28 MMOL/L (ref 21–32)
CREAT SERPL-MCNC: 0.9 MG/DL (ref 0.6–1)
DIFFERENTIAL METHOD BLD: ABNORMAL
EKG ATRIAL RATE: 113 BPM
EKG DIAGNOSIS: NORMAL
EKG P AXIS: 62 DEGREES
EKG P-R INTERVAL: 132 MS
EKG Q-T INTERVAL: 346 MS
EKG QRS DURATION: 70 MS
EKG QTC CALCULATION (BAZETT): 474 MS
EKG R AXIS: 65 DEGREES
EKG T AXIS: 71 DEGREES
EKG VENTRICULAR RATE: 113 BPM
EOSINOPHIL # BLD: 0 K/UL (ref 0–0.8)
EOSINOPHIL NFR BLD: 0 % (ref 0.5–7.8)
ERYTHROCYTE [DISTWIDTH] IN BLOOD BY AUTOMATED COUNT: 13.2 % (ref 11.9–14.6)
GLOBULIN SER CALC-MCNC: 4.6 G/DL (ref 2.8–4.5)
GLUCOSE BLD STRIP.AUTO-MCNC: 86 MG/DL (ref 65–100)
GLUCOSE BLD STRIP.AUTO-MCNC: 86 MG/DL (ref 65–100)
GLUCOSE SERPL-MCNC: 120 MG/DL (ref 65–100)
GLUCOSE UR QL STRIP.AUTO: 500 MG/DL
HCT VFR BLD AUTO: 41 % (ref 35.8–46.3)
HGB BLD-MCNC: 13.6 G/DL (ref 11.7–15.4)
IMM GRANULOCYTES # BLD AUTO: 0 K/UL (ref 0–0.5)
IMM GRANULOCYTES NFR BLD AUTO: 0 % (ref 0–5)
KETONES UR-MCNC: 80 MG/DL
LEUKOCYTE ESTERASE UR QL STRIP: NEGATIVE
LIPASE SERPL-CCNC: 72 U/L (ref 73–393)
LYMPHOCYTES # BLD: 1.7 K/UL (ref 0.5–4.6)
LYMPHOCYTES NFR BLD: 13 % (ref 13–44)
MAGNESIUM SERPL-MCNC: 2.1 MG/DL (ref 1.8–2.4)
MCH RBC QN AUTO: 27.2 PG (ref 26.1–32.9)
MCHC RBC AUTO-ENTMCNC: 33.2 G/DL (ref 31.4–35)
MCV RBC AUTO: 82 FL (ref 82–102)
MONOCYTES # BLD: 0.2 K/UL (ref 0.1–1.3)
MONOCYTES NFR BLD: 2 % (ref 4–12)
NEUTS SEG # BLD: 10.8 K/UL (ref 1.7–8.2)
NEUTS SEG NFR BLD: 85 % (ref 43–78)
NITRITE UR QL: NEGATIVE
NRBC # BLD: 0 K/UL (ref 0–0.2)
PH UR: 6 [PH] (ref 5–9)
PLATELET # BLD AUTO: 290 K/UL (ref 150–450)
PMV BLD AUTO: 9.4 FL (ref 9.4–12.3)
POTASSIUM SERPL-SCNC: 3.7 MMOL/L (ref 3.5–5.1)
PROT SERPL-MCNC: 8.4 G/DL (ref 6.3–8.2)
PROT UR QL: 30 MG/DL
RBC # BLD AUTO: 5 M/UL (ref 4.05–5.2)
RBC # UR STRIP: NEGATIVE /UL
SERVICE CMNT-IMP: ABNORMAL
SERVICE CMNT-IMP: NORMAL
SERVICE CMNT-IMP: NORMAL
SODIUM SERPL-SCNC: 136 MMOL/L (ref 133–143)
SP GR UR: >1.03 (ref 1–1.02)
TROPONIN I SERPL HS-MCNC: 12 PG/ML (ref 0–14)
UROBILINOGEN UR QL: 0.2 EU/DL (ref 0.2–1)
WBC # BLD AUTO: 12.8 K/UL (ref 4.3–11.1)

## 2022-11-02 PROCEDURE — 6370000000 HC RX 637 (ALT 250 FOR IP): Performed by: FAMILY MEDICINE

## 2022-11-02 PROCEDURE — 96375 TX/PRO/DX INJ NEW DRUG ADDON: CPT

## 2022-11-02 PROCEDURE — 96374 THER/PROPH/DIAG INJ IV PUSH: CPT

## 2022-11-02 PROCEDURE — G0378 HOSPITAL OBSERVATION PER HR: HCPCS

## 2022-11-02 PROCEDURE — 81003 URINALYSIS AUTO W/O SCOPE: CPT

## 2022-11-02 PROCEDURE — 85025 COMPLETE CBC W/AUTO DIFF WBC: CPT

## 2022-11-02 PROCEDURE — 36415 COLL VENOUS BLD VENIPUNCTURE: CPT

## 2022-11-02 PROCEDURE — 83690 ASSAY OF LIPASE: CPT

## 2022-11-02 PROCEDURE — 96366 THER/PROPH/DIAG IV INF ADDON: CPT

## 2022-11-02 PROCEDURE — 6360000004 HC RX CONTRAST MEDICATION: Performed by: NURSE PRACTITIONER

## 2022-11-02 PROCEDURE — 96365 THER/PROPH/DIAG IV INF INIT: CPT

## 2022-11-02 PROCEDURE — 2580000003 HC RX 258: Performed by: FAMILY MEDICINE

## 2022-11-02 PROCEDURE — 6360000002 HC RX W HCPCS: Performed by: NURSE PRACTITIONER

## 2022-11-02 PROCEDURE — 84484 ASSAY OF TROPONIN QUANT: CPT

## 2022-11-02 PROCEDURE — 80053 COMPREHEN METABOLIC PANEL: CPT

## 2022-11-02 PROCEDURE — 74177 CT ABD & PELVIS W/CONTRAST: CPT

## 2022-11-02 PROCEDURE — 96376 TX/PRO/DX INJ SAME DRUG ADON: CPT

## 2022-11-02 PROCEDURE — 6360000002 HC RX W HCPCS: Performed by: FAMILY MEDICINE

## 2022-11-02 PROCEDURE — 83735 ASSAY OF MAGNESIUM: CPT

## 2022-11-02 PROCEDURE — 2580000003 HC RX 258: Performed by: NURSE PRACTITIONER

## 2022-11-02 PROCEDURE — C9113 INJ PANTOPRAZOLE SODIUM, VIA: HCPCS | Performed by: FAMILY MEDICINE

## 2022-11-02 PROCEDURE — 99285 EMERGENCY DEPT VISIT HI MDM: CPT

## 2022-11-02 PROCEDURE — 82962 GLUCOSE BLOOD TEST: CPT

## 2022-11-02 PROCEDURE — A4216 STERILE WATER/SALINE, 10 ML: HCPCS | Performed by: FAMILY MEDICINE

## 2022-11-02 PROCEDURE — 93005 ELECTROCARDIOGRAM TRACING: CPT | Performed by: EMERGENCY MEDICINE

## 2022-11-02 RX ORDER — MAGNESIUM HYDROXIDE/ALUMINUM HYDROXICE/SIMETHICONE 120; 1200; 1200 MG/30ML; MG/30ML; MG/30ML
30 SUSPENSION ORAL EVERY 6 HOURS PRN
Status: DISCONTINUED | OUTPATIENT
Start: 2022-11-02 | End: 2022-11-04 | Stop reason: HOSPADM

## 2022-11-02 RX ORDER — ACETAMINOPHEN 650 MG/1
650 SUPPOSITORY RECTAL EVERY 6 HOURS PRN
Status: DISCONTINUED | OUTPATIENT
Start: 2022-11-02 | End: 2022-11-04 | Stop reason: HOSPADM

## 2022-11-02 RX ORDER — PROMETHAZINE HYDROCHLORIDE 25 MG/1
12.5 TABLET ORAL EVERY 6 HOURS PRN
Status: DISCONTINUED | OUTPATIENT
Start: 2022-11-02 | End: 2022-11-04 | Stop reason: HOSPADM

## 2022-11-02 RX ORDER — DEXTROSE MONOHYDRATE 100 MG/ML
INJECTION, SOLUTION INTRAVENOUS CONTINUOUS PRN
Status: DISCONTINUED | OUTPATIENT
Start: 2022-11-02 | End: 2022-11-04 | Stop reason: HOSPADM

## 2022-11-02 RX ORDER — INSULIN LISPRO 100 [IU]/ML
0-4 INJECTION, SOLUTION INTRAVENOUS; SUBCUTANEOUS NIGHTLY
Status: DISCONTINUED | OUTPATIENT
Start: 2022-11-02 | End: 2022-11-04 | Stop reason: HOSPADM

## 2022-11-02 RX ORDER — SODIUM CHLORIDE AND POTASSIUM CHLORIDE .9; .15 G/100ML; G/100ML
SOLUTION INTRAVENOUS CONTINUOUS
Status: DISCONTINUED | OUTPATIENT
Start: 2022-11-02 | End: 2022-11-03

## 2022-11-02 RX ORDER — INSULIN LISPRO 100 [IU]/ML
0-8 INJECTION, SOLUTION INTRAVENOUS; SUBCUTANEOUS
Status: DISCONTINUED | OUTPATIENT
Start: 2022-11-02 | End: 2022-11-04 | Stop reason: HOSPADM

## 2022-11-02 RX ORDER — SODIUM CHLORIDE 0.9 % (FLUSH) 0.9 %
5-40 SYRINGE (ML) INJECTION PRN
Status: DISCONTINUED | OUTPATIENT
Start: 2022-11-02 | End: 2022-11-04 | Stop reason: HOSPADM

## 2022-11-02 RX ORDER — POLYETHYLENE GLYCOL 3350 17 G/17G
17 POWDER, FOR SOLUTION ORAL DAILY PRN
Status: DISCONTINUED | OUTPATIENT
Start: 2022-11-02 | End: 2022-11-04 | Stop reason: HOSPADM

## 2022-11-02 RX ORDER — METOCLOPRAMIDE HYDROCHLORIDE 5 MG/ML
10 INJECTION INTRAMUSCULAR; INTRAVENOUS EVERY 6 HOURS PRN
Status: DISCONTINUED | OUTPATIENT
Start: 2022-11-02 | End: 2022-11-03

## 2022-11-02 RX ORDER — ENOXAPARIN SODIUM 100 MG/ML
40 INJECTION SUBCUTANEOUS EVERY 24 HOURS
Status: DISCONTINUED | OUTPATIENT
Start: 2022-11-02 | End: 2022-11-04 | Stop reason: HOSPADM

## 2022-11-02 RX ORDER — 0.9 % SODIUM CHLORIDE 0.9 %
1000 INTRAVENOUS SOLUTION INTRAVENOUS ONCE
Status: COMPLETED | OUTPATIENT
Start: 2022-11-02 | End: 2022-11-02

## 2022-11-02 RX ORDER — 0.9 % SODIUM CHLORIDE 0.9 %
1000 INTRAVENOUS SOLUTION INTRAVENOUS ONCE
Status: DISCONTINUED | OUTPATIENT
Start: 2022-11-02 | End: 2022-11-02

## 2022-11-02 RX ORDER — GABAPENTIN 100 MG/1
100 CAPSULE ORAL 3 TIMES DAILY PRN
Status: DISCONTINUED | OUTPATIENT
Start: 2022-11-02 | End: 2022-11-04 | Stop reason: HOSPADM

## 2022-11-02 RX ORDER — SODIUM CHLORIDE 0.9 % (FLUSH) 0.9 %
5-40 SYRINGE (ML) INJECTION EVERY 12 HOURS SCHEDULED
Status: DISCONTINUED | OUTPATIENT
Start: 2022-11-02 | End: 2022-11-04 | Stop reason: HOSPADM

## 2022-11-02 RX ORDER — CITALOPRAM 20 MG/1
20 TABLET ORAL NIGHTLY
Status: DISCONTINUED | OUTPATIENT
Start: 2022-11-02 | End: 2022-11-04 | Stop reason: HOSPADM

## 2022-11-02 RX ORDER — ACETAMINOPHEN 325 MG/1
650 TABLET ORAL EVERY 6 HOURS PRN
Status: DISCONTINUED | OUTPATIENT
Start: 2022-11-02 | End: 2022-11-04 | Stop reason: HOSPADM

## 2022-11-02 RX ORDER — 0.9 % SODIUM CHLORIDE 0.9 %
100 INTRAVENOUS SOLUTION INTRAVENOUS ONCE
Status: DISCONTINUED | OUTPATIENT
Start: 2022-11-02 | End: 2022-11-02

## 2022-11-02 RX ORDER — PROCHLORPERAZINE EDISYLATE 5 MG/ML
5 INJECTION INTRAMUSCULAR; INTRAVENOUS
Status: COMPLETED | OUTPATIENT
Start: 2022-11-02 | End: 2022-11-02

## 2022-11-02 RX ORDER — ONDANSETRON 2 MG/ML
4 INJECTION INTRAMUSCULAR; INTRAVENOUS
Status: COMPLETED | OUTPATIENT
Start: 2022-11-02 | End: 2022-11-02

## 2022-11-02 RX ORDER — SODIUM CHLORIDE 9 MG/ML
INJECTION, SOLUTION INTRAVENOUS PRN
Status: DISCONTINUED | OUTPATIENT
Start: 2022-11-02 | End: 2022-11-04 | Stop reason: HOSPADM

## 2022-11-02 RX ORDER — ONDANSETRON 2 MG/ML
4 INJECTION INTRAMUSCULAR; INTRAVENOUS EVERY 6 HOURS PRN
Status: DISCONTINUED | OUTPATIENT
Start: 2022-11-02 | End: 2022-11-04 | Stop reason: SDUPTHER

## 2022-11-02 RX ORDER — ASPIRIN 81 MG/1
81 TABLET ORAL DAILY
Status: DISCONTINUED | OUTPATIENT
Start: 2022-11-03 | End: 2022-11-04 | Stop reason: HOSPADM

## 2022-11-02 RX ORDER — SODIUM CHLORIDE 0.9 % (FLUSH) 0.9 %
10 SYRINGE (ML) INJECTION
Status: COMPLETED | OUTPATIENT
Start: 2022-11-02 | End: 2022-11-02

## 2022-11-02 RX ORDER — ATORVASTATIN CALCIUM 80 MG/1
80 TABLET, FILM COATED ORAL NIGHTLY
Status: DISCONTINUED | OUTPATIENT
Start: 2022-11-02 | End: 2022-11-04 | Stop reason: HOSPADM

## 2022-11-02 RX ADMIN — IOPAMIDOL 100 ML: 755 INJECTION, SOLUTION INTRAVENOUS at 15:21

## 2022-11-02 RX ADMIN — SODIUM CHLORIDE 1000 ML: 9 INJECTION, SOLUTION INTRAVENOUS at 15:14

## 2022-11-02 RX ADMIN — PROCHLORPERAZINE EDISYLATE 5 MG: 5 INJECTION INTRAMUSCULAR; INTRAVENOUS at 15:54

## 2022-11-02 RX ADMIN — POTASSIUM CHLORIDE AND SODIUM CHLORIDE: 900; 150 INJECTION, SOLUTION INTRAVENOUS at 18:59

## 2022-11-02 RX ADMIN — METOPROLOL TARTRATE 25 MG: 25 TABLET, FILM COATED ORAL at 20:22

## 2022-11-02 RX ADMIN — CITALOPRAM HYDROBROMIDE 20 MG: 20 TABLET ORAL at 20:22

## 2022-11-02 RX ADMIN — SODIUM CHLORIDE, PRESERVATIVE FREE 10 ML: 5 INJECTION INTRAVENOUS at 15:20

## 2022-11-02 RX ADMIN — ONDANSETRON 4 MG: 2 INJECTION INTRAMUSCULAR; INTRAVENOUS at 13:47

## 2022-11-02 RX ADMIN — ATORVASTATIN CALCIUM 80 MG: 80 TABLET, FILM COATED ORAL at 20:21

## 2022-11-02 RX ADMIN — SODIUM CHLORIDE, PRESERVATIVE FREE 10 ML: 5 INJECTION INTRAVENOUS at 20:28

## 2022-11-02 RX ADMIN — SODIUM CHLORIDE, PRESERVATIVE FREE 40 MG: 5 INJECTION INTRAVENOUS at 18:31

## 2022-11-02 RX ADMIN — PROMETHAZINE HYDROCHLORIDE 12.5 MG: 25 TABLET ORAL at 18:45

## 2022-11-02 RX ADMIN — ONDANSETRON 4 MG: 2 INJECTION INTRAMUSCULAR; INTRAVENOUS at 13:27

## 2022-11-02 ASSESSMENT — PAIN - FUNCTIONAL ASSESSMENT: PAIN_FUNCTIONAL_ASSESSMENT: 0-10

## 2022-11-02 ASSESSMENT — PAIN SCALES - GENERAL: PAINLEVEL_OUTOF10: 0

## 2022-11-02 NOTE — H&P
Hospitalist History and Physical   Admit Date:  2022 12:42 PM   Name:  Kelvin Bolton   Age:  62 y.o. Sex:  female  :  1965   MRN:  [id number]   Room:  [facility] [place]    Presenting Complaint: Emesis     Reason(s) for Admission: Intractable vomiting [R11.10]  Intractable nausea and vomiting [R11.2]     History of Present Illness:   Kelvin Bolton is a 62 y.o. female with medical history of T2DM, Gastroparesis, HLD, CAD s.p CABG May 2022, BL carotid atherosclerosis who presented with intractable nausea and vomiting since yesterday. Hospitalized 22 for intractable n/v. Treated for severe sepsis with no source found. Thought to be d/t gastroparesis. She reports h/o esophageal rupture in [id number] 2/2 intractable vomiting. Having uncontrolled indigestion, bleching. Intractable n/v about 1x per month. This episode started on Monday while at work with multiple episodes of vomiting requiring frequent stops while driving home d/t symptoms. Took anti emetic at home and started feeling better yesterday. Had some soup and then it restarted associated with a lot of burning chest pain. Unsure if there was blood in her emesis, says it was dark. No tarry or black stools. Has been unable to establish care with GI outpatient due to scheduling conflicts. Had a bout of diarrhea last week. Wants to see GI.  /94     In ED, she rec'd 1 L NS, Zofran 4 mg IV x 2, and compazine 5 mg IV x 1 w/o relief. Per chart review, 2019 admitted @ [facility] s/p EGD  19, for coffee ground emesis and intractable n/v,  found with ulcerative esophagitis, ayden kerr tear,and gastric ulcerations/gastitis. Per GI note at that time -   Moderate to severe distal ulcerative esophagitis with healing ayden kerr tear. Throughout the proximal, mid and distal esophaguls, subtle suggestion of concentric rings, biopsies not obtained however cannot exclude EoE.  Bilious liquid obscuring complete visualtion but cardia and fundus grossly normal. Moderate gastric body inflammation, moderate to severe antral inflammation/ heaped up folds, at least 2 antral ulcerations, however likely other ulcerations beneath the folds. Mild diffuse duodenitis. The ulcers within the antrum were clean based, no stigmata of recent bleed. Suspected NSAID induced gastritis/gastric ulcerations. Cannot exclude H pylori recommending stool antigen. Recommending repeat egd in 6-8 weeksk to document healing of these gastric antral ulcerations as well as to reevaluate the esophagus/ulcerative esophagitis/possible esinophilic esophagitis. Review of Systems:  10 systems reviewed and negative except as noted in HPI. Assessment & Plan:     Intractable n/v - esophageal thickening on CT concerning for esophagitis. Start IV PPI BID. Prn reglan. IVF. Anti-emetics. Levsin  prn. CLD, NPO @ [facility]. Consult GI for evaluation and possible EGD if felt warranted. History of ulcerative esophagitis/gastritis, gastric ulcerations, ayden-cezar tear in 2019 - hold DVT ppx. Cont. Parag Feeling due to recent CABG unless overt bleeding. Unclear if had repeat EGD as recommended at that time. CAD s/p 2vCABG - f.b Dr Rumaldo Duane. Cont ASA statin and metoprolol. Hold ACEI d/t risk of TERA from hypovolemia. Trop low. Repeat troponin given c/o chest pain which most likely is related to esophageal reflux. HTN - cont BB. Hold lisinopril d/t risk of TERA with hypovolemia     HLD - statin     T2DM - hold basal insulin due to risk of hypoglycemia with plans for NPO state @[facility]. When tolerating PO, restart basal but reduce home dose to NPH 12 am / 6 pm plus SSI pending glycemic needs. Hold SLGT2i. Mood d/o - celexa         Anticipated discharge needs:         Diet: ADULT DIET; Clear Liquid; 4 carb choices (60 gm/meal); Low Fat/Low Chol/High Fiber/JUDITH; GI [place] (GERD/Peptic Ulcer);  No red dye  Diet NPO  VTE ppx: SCDs  Code status: Full Code    Hospital Problems:  Principal Problem:    Intractable nausea and vomiting  Active Problems:    S/P CABG x 2    CAD (coronary artery disease)    GERD with esophagitis    Hyperlipidemia associated with type 2 diabetes mellitus ([facility]  Resolved Problems:    * No resolved hospital problems.  *       Past History:     Past Medical History:   Diagnosis Date    Anxiety 11/28/2012    CAD (coronary artery disease)     No MI; no stents    Depression     managed with med    DM (diabetes mellitus) ([facility] 11/28/2012    insulin reliant; AVG ; pt denies s.s. of hypoglycemia; last A1C    Encounter for weaning from ventilator ([facility] 05/25/2022    Gastric ulcer due to nonsteroidal anti-inflammatory drug (NSAID) 08/11/2019    Gastroesophageal reflux disease with esophagitis     HLD (hyperlipidemia)     managed with med    Hyperkalemia 09/13/2022    Hypertension     managed with med    Idiopathic acute pancreatitis 06/28/2022    Lactic acidosis 09/13/2022    Ivelisse-Pena tear 08/11/2019    Severe sepsis ([facility] 09/13/2022    Ulcerative esophagitis 08/11/2019    Viral gastroenteritis 09/13/2022       Past Surgical History:   Procedure Laterality Date    CARDIAC CATHETERIZATION Left     CORONARY ARTERY BYPASS GRAFT N/A 05/25/2022    CORONARY ARTERY BYPASS GRAFT (CABG X 2), [place]; ENDOSCOPIC VEIN HARVEST LEFT GREATER SAPHENOUS performed by Clarence Mackey MD at [facility] MAIN OR    TRANSESOPHAGEAL ECHOCARDIOGRAM N/A 05/25/2022    TRANSESOPHAGEAL ECHOCARDIOGRAM performed by Clarence Mackey MD at [place] [address]  08/12/2019    performed at [address] [place] [address] for coffee ground emesis, ulcerative espophagitis, gastric ulcers        Social History     Tobacco Use    Smoking status: Never    Smokeless tobacco: Never   Substance Use Topics    Alcohol use: Not Currently     Comment: rarely      Social History     Substance and Sexual Activity   Drug Use No       Family History   Problem Relation Age of Onset    Heart Attack Father     Diabetes Father         Immunization History   Administered Date(s) Administered    COVID-19, MODERNA BLUE border, Primary or Immunocompromised, (age 12y+), IM, 100 mcg/0.5mL 08/08/2021, 09/28/2021     Allergies   Allergen Reactions    Metformin And Related Nausea Only     Prior to Admit Medications:  Current Outpatient Medications   Medication Instructions    aspirin 81 mg, Oral, Nightly    atorvastatin (LIPITOR) 80 mg, Oral, Nightly    citalopram (CELEXA) 20 mg, Oral, Nightly    empagliflozin (JARDIANCE) 10 mg, Oral, DAILY    gabapentin (NEURONTIN) 100 MG capsule 1 at night prn foot pain neuropathy    insulin NPH (HUMULIN N;NOVOLIN N) 100 UNIT/ML injection vial 18 units in the morning and 7 units at night    lisinopril (PRINIVIL;ZESTRIL) 10 mg, Oral, Nightly, TAKE 1 TABLET BY MOUTH EVERY DAY    metoprolol tartrate (LOPRESSOR) 25 MG tablet TAKE ONE TABLET BY MOUTH TWICE A DAY    pantoprazole (PROTONIX) 40 mg, Oral, DAILY BEFORE BREAKFAST         Objective:   Patient Vitals for the past 24 hrs:   Temp Pulse Resp BP SpO2   11/02/22 1828 98.3 °F (36.8 °C) (!) 127 18 (!) 165/90 96 %   11/02/22 1502 -- (!) 111 -- (!) 152/100 96 %   11/02/22 1447 -- (!) 104 -- (!) 161/92 95 %   11/02/22 1432 -- (!) 113 -- (!) 151/79 96 %   11/02/22 1330 -- -- -- (!) 146/104 98 %   11/02/22 1026 98.3 °F (36.8 °C) (!) 124 18 (!) 142/94 100 %       Oxygen Therapy  SpO2: 96 %  Pulse Oximeter Device Mode: Intermittent  O2 Device: None (Room air)    Estimated body mass index is 27.53 kg/m² as calculated from the following:    Height as of this encounter: 5' 4\" (1.626 m). Weight as of this encounter: 160 lb 6.4 oz (72.8 kg). Intake/Output Summary (Last 24 hours) at 11/2/2022 1959  Last data filed at 11/2/2022 1857  Gross per 24 hour   Intake --   Output 600 ml   Net -600 ml         Physical Exam:    Blood pressure (!) 165/90, pulse (!) 127, temperature 98.3 °F (36.8 °C), temperature source Oral, resp.  rate 18, height 5' 4\" (1.626 m), weight 160 lb 6.4 oz (72.8 kg), SpO2 96 %. General:    Acutely ill appearing, NAD   Head:  Normocephalic, atraumatic  Eyes:  Sclerae appear normal.  Pupils equally round. ENT:  Nares appear normal, no drainage. dry oral mucosa  Neck:  Trachea midline   CV:   Tachycardic rate, regular rhythm. No m/r/g. No jugular venous distension. Lungs:   CTAB. No wheezing, rhonchi, or rales. Symmetric expansion. Abdomen: Bowel sounds present. Soft, epigstric tender, nondistended. Extremities: No cyanosis or clubbing. No edema  Skin:     No rashes and normal coloration. Warm and dry. Neuro:  CN II-XII grossly intact. Sensation intact. A&Ox3  Psych:  Normal mood and affect.       I have personally reviewed labs and tests showing:  Recent Labs:  Recent Results (from the past 24 hour(s))   EKG 12 Lead (Select if upper abdominal pain or symptoms of SOB,Diaphoresis, or Tachycardia)    Collection Time: 11/02/22 10:29 AM   Result Value Ref Range    Ventricular Rate 113 BPM    Atrial Rate 113 BPM    P-R Interval 132 ms    QRS Duration 70 ms    Q-T Interval 346 ms    QTc Calculation (Bazett) 474 ms    P Axis 62 degrees    R Axis 65 degrees    T Axis 71 degrees    Diagnosis Sinus tachycardia    CBC with Auto Differential    Collection Time: 11/02/22 10:31 AM   Result Value Ref Range    WBC 12.8 (H) 4.3 - 11.1 K/uL    RBC 5.00 4.05 - 5.2 M/uL    Hemoglobin 13.6 11.7 - 15.4 g/dL    Hematocrit 41.0 35.8 - 46.3 %    MCV 82.0 82 - 102 FL    MCH 27.2 26.1 - 32.9 PG    MCHC 33.2 31.4 - 35.0 g/dL    RDW 13.2 11.9 - 14.6 %    Platelets 058 741 - 433 K/uL    MPV 9.4 9.4 - 12.3 FL    nRBC 0.00 0.0 - 0.2 K/uL    Differential Type AUTOMATED      Seg Neutrophils 85 (H) 43 - 78 %    Lymphocytes 13 13 - 44 %    Monocytes 2 (L) 4.0 - 12.0 %    Eosinophils % 0 (L) 0.5 - 7.8 %    Basophils 0 0.0 - 2.0 %    Immature Granulocytes 0 0.0 - 5.0 %    Segs Absolute 10.8 (H) 1.7 - 8.2 K/UL    Absolute Lymph # 1.7 0.5 - 4.6 K/UL    Absolute Mono # 0.2 0.1 - 1.3 K/UL    Absolute Eos # 0.0 0.0 - 0.8 K/UL    Basophils Absolute 0.0 0.0 - 0.2 K/UL    Absolute Immature Granulocyte 0.0 0.0 - 0.5 K/UL   Comprehensive Metabolic Panel    Collection Time: 11/02/22 10:31 AM   Result Value Ref Range    Sodium 136 133 - 143 mmol/L    Potassium 3.7 3.5 - 5.1 mmol/L    Chloride 99 (L) 101 - 110 mmol/L    CO2 28 21 - 32 mmol/L    Anion Gap 9 2 - 11 mmol/L    Glucose 120 (H) 65 - 100 mg/dL    BUN 17 6 - 23 MG/DL    Creatinine 0.90 0.6 - 1.0 MG/DL    Est, Glom Filt Rate >60 >60 ml/min/1.73m2    Calcium 9.6 8.3 - 10.4 MG/DL    Total Bilirubin 0.5 0.2 - 1.1 MG/DL    ALT 26 12 - 65 U/L    AST 20 15 - 37 U/L    Alk Phosphatase 91 50 - 136 U/L    Total Protein 8.4 (H) 6.3 - 8.2 g/dL    Albumin 3.8 3.5 - 5.0 g/dL    Globulin 4.6 (H) 2.8 - 4.5 g/dL    Albumin/Globulin Ratio 0.8 0.4 - 1.6     Lipase    Collection Time: 11/02/22 10:31 AM   Result Value Ref Range    Lipase 72 (L) 73 - 393 U/L   Magnesium    Collection Time: 11/02/22 10:31 AM   Result Value Ref Range    Magnesium 2.1 1.8 - 2.4 mg/dL   POCT Urinalysis no Micro    Collection Time: 11/02/22  1:30 PM   Result Value Ref Range    Specific Gravity, Urine, POC >1.030 (H) 1.001 - 1.023    pH, Urine, POC 6.0 5.0 - 9.0      Protein, Urine, POC 30 (A) NEG mg/dL    Glucose, UA  (A) NEG mg/dL    Ketones, Urine, POC 80 (A) NEG mg/dL    Bilirubin, Urine, POC Negative NEG      Blood, UA POC Negative NEG      URINE UROBILINOGEN POC 0.2 0.2 - 1.0 EU/dL    Nitrate, Urine, POC Negative NEG      Leukocyte Est, UA POC Negative NEG      Performed by: Clarice Nicole    POCT Glucose    Collection Time: 11/02/22  6:28 PM   Result Value Ref Range    POC Glucose 86 65 - 100 mg/dL    Performed by: Graciela Cheadle I have personally reviewed imaging studies showing:  CT ABDOMEN PELVIS W IV CONTRAST Additional Contrast? None    Result Date: 11/2/2022  EXAMINATION: CT ABDOMEN PELVIS W IV CONTRAST 11/2/2022 3:16 PM ACCESSION NUMBER: [id number] COMPARISON: CT abdomen pelvis dated 6/20/2022 INDICATION: Upper abdominal pain, nausea and vomiting. TECHNIQUE: Contiguous axial computed tomographic images were obtained from the domes of the diaphragm to the symphysis pubis following administration 100mL Iso-denise 370. Coronal reconstructions were also performed. Radiation dose reduction techniques were used for this study. Our CT scanners use one or all of the following: Automated exposure control, adjustment of the mA and/or kV according to patient size, iterative reconstruction. FINDINGS: LUNG BASES: No airspace consolidation within the lung bases. No sizable pleural effusion. The heart is not enlarged. LIVER: The liver contour is normal. No suspicious liver lesion. BILIARY TREE: The gallbladder is within normal limits. No biliary dilation. SPLEEN: Normal. PANCREAS: No pancreatic mass or ductal dilation. ADRENALS: Normal. KIDNEYS/BLADDER: The kidneys are symmetric in size. No renal calculus or hydronephrosis. No renal mass. The urinary bladder is unremarkable. BOWEL: Distal esophageal wall thickening. Colonic diverticulosis without evidence of acute diverticulitis. The small bowel is normal in caliber. No bowel wall thickening. APPENDIX: The appendix is normal. PERITONEUM/RETROPERITONEUM: No ascites or free air. No pelvic or retroperitoneal lymphadenopathy. VESSELS: No abdominal aortic aneurysm. ABDOMINAL WALL: No hernia or mass. REPRODUCTIVE: Uterus is unremarkable. No adnexal mass. BONES: No suspicious osseous lesion. Degenerative changes at L4-L5 with endplate sclerosis. 1.  Diffuse distal esophageal wall thickening, may represent sequela of esophagitis. 2.  No other acute findings within the abdomen and pelvis. Echocardiogram:  No results found for this or any previous visit.         Orders Placed This Encounter   Medications    ondansetron (ZOFRAN) injection 4 mg    ondansetron (ZOFRAN) injection 4 mg    DISCONTD: 0.9 % sodium chloride bolus    sodium chloride flush 0.9 % injection 10 mL    iopamidol (ISOVUE-370) 76 % injection 100 mL    0.9 % sodium chloride bolus    DISCONTD: 0.9 % sodium chloride bolus    DISCONTD: promethazine (PHENERGAN) 12.5 mg in sodium chloride 0.9 % 50 mL IVPB    prochlorperazine (COMPAZINE) injection 5 mg    pantoprazole (PROTONIX) 40 mg in sodium chloride (PF) 0.9 % 10 mL injection    sodium chloride flush 0.9 % injection 5-40 mL    sodium chloride flush 0.9 % injection 5-40 mL    0.9 % sodium chloride infusion    enoxaparin (LOVENOX) injection 40 mg     Order Specific Question:   Indication of Use     Answer:   Prophylaxis-DVT/PE    OR Linked Order Group     acetaminophen (TYLENOL) tablet 650 mg     acetaminophen (TYLENOL) suppository 650 mg    polyethylene glycol (GLYCOLAX) packet 17 g    0.9% NaCl with KCl 20 mEq infusion    OR Linked Order Group     promethazine (PHENERGAN) tablet 12.5 mg     ondansetron (ZOFRAN) injection 4 mg    hyoscyamine (LEVSIN/SL) sublingual tablet 125 mcg    glucose chewable tablet 16 g    OR Linked Order Group     dextrose bolus 10% 125 mL     dextrose bolus 10% 250 mL    glucagon (rDNA) injection 1 mg    dextrose 10 % infusion    aluminum & magnesium hydroxide-simethicone (MAALOX) 200-200-20 MG/5ML suspension 30 mL    AND Linked Order Group     insulin NPH (HUMULIN N;NOVOLIN N) injection vial 12 Units     insulin NPH (HUMULIN N;NOVOLIN N) injection vial 6 Units    insulin lispro (HUMALOG) injection vial 0-8 Units    insulin lispro (HUMALOG) injection vial 0-4 Units    atorvastatin (LIPITOR) tablet 80 mg    gabapentin (NEURONTIN) capsule 100 mg    citalopram (CELEXA) tablet 20 mg    metoclopramide (REGLAN) injection 10 mg    metoprolol tartrate (LOPRESSOR) tablet 25 mg    aspirin EC tablet 81 mg         Signed:  Viviane Daly, , DO    Part of this note may have been written by using a voice dictation software. The note has been proof read but may still contain some grammatical/other typographical errors.

## 2022-11-02 NOTE — ED NOTES
TRANSFER - OUT REPORT:    Verbal report given to Staci Faustin RN on [employer]  being transferred to [facility] for routine progression of patient care       Report consisted of patient's Situation, Background, Assessment and   Recommendations(SBAR). Information from the following report(s) ED Encounter Summary, ED SBAR and [facility] was reviewed with the receiving nurse. Lines:   Peripheral IV 11/02/22 Left Antecubital (Active)   Site Assessment Clean, dry & intact 11/02/22 1246   Line Status Blood return noted 11/02/22 1246   Phlebitis Assessment No symptoms 11/02/22 1246   Infiltration Assessment 0 11/02/22 1246        Opportunity for questions and clarification was provided.       Patient transported with:  Chiquis Moser RN  11/02/22 0577

## 2022-11-02 NOTE — TELEPHONE ENCOUNTER
Spoke to pt yesterday (11/1/22). She did receive my message regarding medication change (cut lisinopril from 20mg to 10mg daily per Dr. Bianca Hedrick). She has been busy at work and also had GI problems this week. She hopes to return to cardiac rehab soon.

## 2022-11-02 NOTE — ED PROVIDER NOTES
[employer] Emergency Department Provider Note                   PCP:                Dickson Canavan, DO               Age: 62 y.o. Sex: female       ICD-10-CM    1. Intractable vomiting  R11.10           DISPOSITION    Planned admission    [facility] as charted. Pt neck is supple, no meningeal signs. Lungs are clear to auscultation, no diminished sounds. Abdomen is soft and not tender. Reports diffuse pain, mild, uncertain if it is associated with the vomiting. Nothing known to make worse or better. Denies alcohol use, recent sick contacts, fever chills, URI. Denies urinary complaint. Reports multiple similar episodes in the past, states she has not been able to follow-up with GI. Recent admission for similar. Arrives somewhat hypertensive but quite tachycardic. Labs are largely reassuring. Noted to feel no additional troponin is indicated at this time. EKG with no obvious ischemic change. Patient actively vomiting on multiple occasions throughout evaluation. The scan was obtained to rule out SBO or other emergent process. Findings concerning for esophagitis noted. Given IV fluids and antiemetics x3 without improvement. I discussed with ED attending. Feel patient's best served by admission to hospitalist for intractable vomiting. Discussed findings and plan with patient and she is agreeable. Answered all questions. Orders Placed This Encounter   Procedures    CBC with Auto Differential    Comprehensive Metabolic Panel    Lipase    Magnesium    Troponin    Continuous Pulse Oximetry    POCT Urine Dipstick    POCT Urinalysis no Micro    EKG 12 Lead (Select if upper abdominal pain or symptoms of SOB,Diaphoresis, or Tachycardia)        Modesto Mckinney is a 62 y.o. female who presents to the Emergency Department with chief complaint of    Chief Complaint   Patient presents with    Emesis      HPI  71-year-old female presents to the ED with complaint of nausea and vomiting x3 days.   States symptoms began Monday and she has had 20+ episodes of vomiting per day since that time. States the symptoms are constant and knows of nothing to make worse or better. States she is unable to tolerate any oral intake. Endorses some diffuse abdominal pain, she has no tenderness. Denies black/places bilious emesis denies black/bloody stools, constipation or diarrhea. Denies chest pain or tightness, difficulty breathing, cough or hemoptysis, CASTRO, urinary complaints. Denies fever/chills, change in appetite, weight loss, decreased oral intake, blurred vision, headaches, neck pain/stiffness. Patient with extensive medical history reviewed as below. States that she has had similar symptoms requiring admission, most recently in the last few weeks. Alert & oriented x 3, afebrile, hemodynamically stable, non-toxic appearing, appears in no distress. Medical/surgical/social history reviewed with the patient. Review of Systems  Constitutional: Negative for fever. Negative for appetite change, chills, diaphoresis and unexpected weight change. HENT: As in HPI     Eyes: Negative. Respiratory: As in HPI   Cardiovascular: Negative. GI/: As in HPI      Musculoskeletal: As in HPI   Skin: Negative. Allergic/Immunologic: Negative. Neurological: Negative.       Past Medical History:   Diagnosis Date    Anxiety 11/28/2012    CAD (coronary artery disease)     No MI; no stents    Depression     managed with med    DM (diabetes mellitus) ([facility] 11/28/2012    insulin reliant; AVG ; pt denies s.s. of hypoglycemia; last A1C    HLD (hyperlipidemia)     managed with med    Hypertension     managed with med    Ulcerative esophagitis         Past Surgical History:   Procedure Laterality Date    CARDIAC CATHETERIZATION Left     CORONARY ARTERY BYPASS GRAFT N/A 5/25/2022    CORONARY ARTERY BYPASS GRAFT (CABG X 2), [place]; ENDOSCOPIC VEIN HARVEST LEFT GREATER SAPHENOUS performed by Jake Cameron MD at [facility] TRANSESOPHAGEAL ECHOCARDIOGRAM N/A 5/25/2022    TRANSESOPHAGEAL ECHOCARDIOGRAM performed by Reyes Bergeron, MD at [facility] MAIN OR    UPPER GASTROINTESTINAL ENDOSCOPY      ulcerative espophagitis        Family History   Problem Relation Age of Onset    Heart Attack Father     Diabetes Father         Social History     Socioeconomic History    Marital status:    Tobacco Use    Smoking status: Never    Smokeless tobacco: Never   Vaping Use    Vaping Use: Never used   Substance and Sexual Activity    Alcohol use: Not Currently     Comment: rarely    Drug use: No         Metformin and related     Previous Medications    ASPIRIN 81 MG EC TABLET    Take 81 mg by mouth at bedtime    ATORVASTATIN (LIPITOR) 80 MG TABLET    Take 1 tablet by mouth at bedtime    CITALOPRAM (CELEXA) 20 MG TABLET    Take 1 tablet by mouth at bedtime    EMPAGLIFLOZIN (JARDIANCE) 10 MG TABLET    Take 1 tablet by mouth in the morning. GABAPENTIN (NEURONTIN) 100 MG CAPSULE    1 at night prn foot pain neuropathy    INSULIN NPH (HUMULIN N;NOVOLIN N) 100 UNIT/ML INJECTION VIAL    18 units in the morning and 7 units at night    LISINOPRIL (PRINIVIL;ZESTRIL) 20 MG TABLET    Take 0.5 tablets by mouth at bedtime TAKE 1 TABLET BY MOUTH EVERY DAY    METOPROLOL TARTRATE (LOPRESSOR) 25 MG TABLET    TAKE ONE TABLET BY MOUTH TWICE A DAY    PANTOPRAZOLE (PROTONIX) 40 MG TABLET    Take 1 tablet by mouth every morning (before breakfast)        Vitals signs and nursing note reviewed. Patient Vitals for the past 4 hrs:   Temp Pulse Resp BP SpO2   11/02/22 1330 -- -- -- (!) 146/104 98 %   11/02/22 1026 98.3 °F (36.8 °C) (!) 124 18 (!) 142/94 100 %          Physical Exam   Constitutional: Oriented to person, place, and time. Appears well-developed and well-nourished. No distress. HENT:    Head: Normocephalic and atraumatic.    Right Ear: External ear normal.    Left Ear: External ear normal.    Nose: Nose normal.    Mouth/Throat: Mouth normal. Uvula midline, no erythema/exudates/edema. No drooling, no voice change. No pain with ROM of neck. Eyes: Conjunctivae are normal.   Neck: Supple. No tracheal deviation. Not tender, not rigid, no menningeal signs. Cardiovascular: Elevated rate, intact distal pulses. Pulmonary/Chest: No respiratory distress. Abdominal: Soft. No tenderness guarding rebound or rigidity. Normoactive bowel sounds. Musculoskeletal: No obvious deformity, erythema, edema. Neurological: Alert and oriented to person, place, and time. Skin:  No abrasion, no lesion, no petechiae and no rash noted. Not diaphoretic. No cyanosis, erythema, or pallor. Psychiatric: Normal mood and affect. Behavior is normal.    Nursing note and vitals reviewed. Procedures  EKG: Interpreted by ED attending in absence of cardiologist.  Sinus rhythm. Tachycardic rate. No STEMI. Labs Reviewed   CBC WITH AUTO DIFFERENTIAL - Abnormal; Notable for the following components:       Result Value    WBC 12.8 (*)     Seg Neutrophils 85 (*)     Monocytes 2 (*)     Eosinophils % 0 (*)     Segs Absolute 10.8 (*)     All other components within normal limits   COMPREHENSIVE METABOLIC PANEL - Abnormal; Notable for the following components:    Chloride 99 (*)     Glucose 120 (*)     Total Protein 8.4 (*)     Globulin 4.6 (*)     All other components within normal limits   LIPASE - Abnormal; Notable for the following components:    Lipase 72 (*)     All other components within normal limits   POCT URINALYSIS DIPSTICK - Abnormal; Notable for the following components:    Specific Gravity, Urine, POC >1.030 (*)     Protein, Urine, POC 30 (*)     Glucose, UA  (*)     Ketones, Urine, POC 80 (*)     All other components within normal limits   MAGNESIUM   TROPONIN        CT ABDOMEN PELVIS W IV CONTRAST Additional Contrast? None   Final Result   1. Diffuse distal esophageal wall thickening, may represent sequela of   esophagitis.    2.  No other acute findings within the abdomen and pelvis. ED Course as of 11/02/22 1339   Wed Nov 02, 2022   1036 EKG interpretation: Sinus tachycardia, rate of 113, normal axis, no acute ischemic change. [BR]      ED Course User Index  [BR] Aguilar Hankins DO        Voice dictation software was used during the making of this note. This software is not perfect and grammatical and other typographical errors may be present. This note has not been completely proofread for errors.          FIONA Farmer CNP  11/02/22 1630       FIONA Farmer CNP  11/02/22 1631       FIONA Farmer CNP  11/02/22 1631

## 2022-11-03 ENCOUNTER — ANESTHESIA EVENT (OUTPATIENT)
Dept: ENDOSCOPY | Age: 57
End: 2022-11-03
Payer: COMMERCIAL

## 2022-11-03 ENCOUNTER — ANESTHESIA (OUTPATIENT)
Dept: ENDOSCOPY | Age: 57
End: 2022-11-03
Payer: COMMERCIAL

## 2022-11-03 LAB
ALBUMIN SERPL-MCNC: 3.2 G/DL (ref 3.5–5)
ALBUMIN/GLOB SERPL: 0.8 {RATIO} (ref 0.4–1.6)
ALP SERPL-CCNC: 83 U/L (ref 50–136)
ALT SERPL-CCNC: 21 U/L (ref 12–65)
ANION GAP SERPL CALC-SCNC: 9 MMOL/L (ref 2–11)
AST SERPL-CCNC: 18 U/L (ref 15–37)
BASOPHILS # BLD: 0.1 K/UL (ref 0–0.2)
BASOPHILS NFR BLD: 1 % (ref 0–2)
BILIRUB SERPL-MCNC: 0.7 MG/DL (ref 0.2–1.1)
BUN SERPL-MCNC: 13 MG/DL (ref 6–23)
CALCIUM SERPL-MCNC: 8.7 MG/DL (ref 8.3–10.4)
CHLORIDE SERPL-SCNC: 104 MMOL/L (ref 101–110)
CO2 SERPL-SCNC: 23 MMOL/L (ref 21–32)
CREAT SERPL-MCNC: 0.6 MG/DL (ref 0.6–1)
DIFFERENTIAL METHOD BLD: ABNORMAL
EOSINOPHIL # BLD: 0 K/UL (ref 0–0.8)
EOSINOPHIL NFR BLD: 0 % (ref 0.5–7.8)
ERYTHROCYTE [DISTWIDTH] IN BLOOD BY AUTOMATED COUNT: 13.2 % (ref 11.9–14.6)
GLOBULIN SER CALC-MCNC: 4.1 G/DL (ref 2.8–4.5)
GLUCOSE BLD STRIP.AUTO-MCNC: 101 MG/DL (ref 65–100)
GLUCOSE BLD STRIP.AUTO-MCNC: 119 MG/DL (ref 65–100)
GLUCOSE BLD STRIP.AUTO-MCNC: 79 MG/DL (ref 65–100)
GLUCOSE BLD STRIP.AUTO-MCNC: 90 MG/DL (ref 65–100)
GLUCOSE SERPL-MCNC: 75 MG/DL (ref 65–100)
HCT VFR BLD AUTO: 39.2 % (ref 35.8–46.3)
HGB BLD-MCNC: 12.8 G/DL (ref 11.7–15.4)
IMM GRANULOCYTES # BLD AUTO: 0.1 K/UL (ref 0–0.5)
IMM GRANULOCYTES NFR BLD AUTO: 0 % (ref 0–5)
LYMPHOCYTES # BLD: 2.5 K/UL (ref 0.5–4.6)
LYMPHOCYTES NFR BLD: 18 % (ref 13–44)
MAGNESIUM SERPL-MCNC: 2.2 MG/DL (ref 1.8–2.4)
MCH RBC QN AUTO: 27.1 PG (ref 26.1–32.9)
MCHC RBC AUTO-ENTMCNC: 32.7 G/DL (ref 31.4–35)
MCV RBC AUTO: 82.9 FL (ref 82–102)
MONOCYTES # BLD: 0.9 K/UL (ref 0.1–1.3)
MONOCYTES NFR BLD: 6 % (ref 4–12)
NEUTS SEG # BLD: 10 K/UL (ref 1.7–8.2)
NEUTS SEG NFR BLD: 75 % (ref 43–78)
NRBC # BLD: 0 K/UL (ref 0–0.2)
PLATELET # BLD AUTO: 266 K/UL (ref 150–450)
PMV BLD AUTO: 9.8 FL (ref 9.4–12.3)
POTASSIUM SERPL-SCNC: 3.7 MMOL/L (ref 3.5–5.1)
PROT SERPL-MCNC: 7.3 G/DL (ref 6.3–8.2)
RBC # BLD AUTO: 4.73 M/UL (ref 4.05–5.2)
SERVICE CMNT-IMP: ABNORMAL
SERVICE CMNT-IMP: ABNORMAL
SERVICE CMNT-IMP: NORMAL
SERVICE CMNT-IMP: NORMAL
SODIUM SERPL-SCNC: 136 MMOL/L (ref 133–143)
TROPONIN I SERPL HS-MCNC: 6.9 PG/ML (ref 0–14)
WBC # BLD AUTO: 13.4 K/UL (ref 4.3–11.1)

## 2022-11-03 PROCEDURE — 3700000000 HC ANESTHESIA ATTENDED CARE: Performed by: STUDENT IN AN ORGANIZED HEALTH CARE EDUCATION/TRAINING PROGRAM

## 2022-11-03 PROCEDURE — 6360000002 HC RX W HCPCS: Performed by: INTERNAL MEDICINE

## 2022-11-03 PROCEDURE — 2500000003 HC RX 250 WO HCPCS

## 2022-11-03 PROCEDURE — A4216 STERILE WATER/SALINE, 10 ML: HCPCS | Performed by: FAMILY MEDICINE

## 2022-11-03 PROCEDURE — 6360000002 HC RX W HCPCS: Performed by: FAMILY MEDICINE

## 2022-11-03 PROCEDURE — 3700000001 HC ADD 15 MINUTES (ANESTHESIA): Performed by: STUDENT IN AN ORGANIZED HEALTH CARE EDUCATION/TRAINING PROGRAM

## 2022-11-03 PROCEDURE — 3609012400 HC EGD TRANSORAL BIOPSY SINGLE/MULTIPLE: Performed by: STUDENT IN AN ORGANIZED HEALTH CARE EDUCATION/TRAINING PROGRAM

## 2022-11-03 PROCEDURE — 2580000003 HC RX 258: Performed by: STUDENT IN AN ORGANIZED HEALTH CARE EDUCATION/TRAINING PROGRAM

## 2022-11-03 PROCEDURE — G0378 HOSPITAL OBSERVATION PER HR: HCPCS

## 2022-11-03 PROCEDURE — 96372 THER/PROPH/DIAG INJ SC/IM: CPT

## 2022-11-03 PROCEDURE — 36415 COLL VENOUS BLD VENIPUNCTURE: CPT

## 2022-11-03 PROCEDURE — 83735 ASSAY OF MAGNESIUM: CPT

## 2022-11-03 PROCEDURE — 7100000010 HC PHASE II RECOVERY - FIRST 15 MIN: Performed by: STUDENT IN AN ORGANIZED HEALTH CARE EDUCATION/TRAINING PROGRAM

## 2022-11-03 PROCEDURE — 88312 SPECIAL STAINS GROUP 1: CPT

## 2022-11-03 PROCEDURE — 6370000000 HC RX 637 (ALT 250 FOR IP): Performed by: FAMILY MEDICINE

## 2022-11-03 PROCEDURE — 6370000000 HC RX 637 (ALT 250 FOR IP): Performed by: STUDENT IN AN ORGANIZED HEALTH CARE EDUCATION/TRAINING PROGRAM

## 2022-11-03 PROCEDURE — 82962 GLUCOSE BLOOD TEST: CPT

## 2022-11-03 PROCEDURE — 7100000011 HC PHASE II RECOVERY - ADDTL 15 MIN: Performed by: STUDENT IN AN ORGANIZED HEALTH CARE EDUCATION/TRAINING PROGRAM

## 2022-11-03 PROCEDURE — 2709999900 HC NON-CHARGEABLE SUPPLY: Performed by: STUDENT IN AN ORGANIZED HEALTH CARE EDUCATION/TRAINING PROGRAM

## 2022-11-03 PROCEDURE — 80053 COMPREHEN METABOLIC PANEL: CPT

## 2022-11-03 PROCEDURE — 85025 COMPLETE CBC W/AUTO DIFF WBC: CPT

## 2022-11-03 PROCEDURE — C9113 INJ PANTOPRAZOLE SODIUM, VIA: HCPCS | Performed by: FAMILY MEDICINE

## 2022-11-03 PROCEDURE — 88305 TISSUE EXAM BY PATHOLOGIST: CPT

## 2022-11-03 PROCEDURE — 2580000003 HC RX 258: Performed by: ANESTHESIOLOGY

## 2022-11-03 PROCEDURE — 2580000003 HC RX 258: Performed by: FAMILY MEDICINE

## 2022-11-03 PROCEDURE — 6360000002 HC RX W HCPCS

## 2022-11-03 RX ORDER — PROPOFOL 10 MG/ML
INJECTION, EMULSION INTRAVENOUS PRN
Status: DISCONTINUED | OUTPATIENT
Start: 2022-11-03 | End: 2022-11-03 | Stop reason: SDUPTHER

## 2022-11-03 RX ORDER — GLYCOPYRROLATE 0.2 MG/ML
INJECTION INTRAMUSCULAR; INTRAVENOUS PRN
Status: DISCONTINUED | OUTPATIENT
Start: 2022-11-03 | End: 2022-11-03 | Stop reason: SDUPTHER

## 2022-11-03 RX ORDER — SUCRALFATE 1 G/1
1 TABLET ORAL EVERY 6 HOURS SCHEDULED
Status: DISCONTINUED | OUTPATIENT
Start: 2022-11-03 | End: 2022-11-03

## 2022-11-03 RX ORDER — SODIUM CHLORIDE, SODIUM LACTATE, POTASSIUM CHLORIDE, CALCIUM CHLORIDE 600; 310; 30; 20 MG/100ML; MG/100ML; MG/100ML; MG/100ML
INJECTION, SOLUTION INTRAVENOUS CONTINUOUS
Status: DISCONTINUED | OUTPATIENT
Start: 2022-11-03 | End: 2022-11-04

## 2022-11-03 RX ORDER — METOPROLOL TARTRATE 50 MG/1
50 TABLET, FILM COATED ORAL 2 TIMES DAILY
Status: DISCONTINUED | OUTPATIENT
Start: 2022-11-03 | End: 2022-11-04 | Stop reason: HOSPADM

## 2022-11-03 RX ORDER — DEXTROSE AND SODIUM CHLORIDE 5; .45 G/100ML; G/100ML
INJECTION, SOLUTION INTRAVENOUS CONTINUOUS
Status: ACTIVE | OUTPATIENT
Start: 2022-11-03 | End: 2022-11-04

## 2022-11-03 RX ORDER — LIDOCAINE HYDROCHLORIDE 20 MG/ML
INJECTION, SOLUTION EPIDURAL; INFILTRATION; INTRACAUDAL; PERINEURAL PRN
Status: DISCONTINUED | OUTPATIENT
Start: 2022-11-03 | End: 2022-11-03 | Stop reason: SDUPTHER

## 2022-11-03 RX ORDER — ONDANSETRON 2 MG/ML
INJECTION INTRAMUSCULAR; INTRAVENOUS PRN
Status: DISCONTINUED | OUTPATIENT
Start: 2022-11-03 | End: 2022-11-03 | Stop reason: SDUPTHER

## 2022-11-03 RX ORDER — METOCLOPRAMIDE HYDROCHLORIDE 5 MG/ML
5 INJECTION INTRAMUSCULAR; INTRAVENOUS
Status: DISCONTINUED | OUTPATIENT
Start: 2022-11-03 | End: 2022-11-04 | Stop reason: HOSPADM

## 2022-11-03 RX ORDER — SUCRALFATE 1 G/1
1 TABLET ORAL EVERY 6 HOURS SCHEDULED
Status: DISCONTINUED | OUTPATIENT
Start: 2022-11-03 | End: 2022-11-04 | Stop reason: HOSPADM

## 2022-11-03 RX ADMIN — POTASSIUM CHLORIDE AND SODIUM CHLORIDE: 900; 150 INJECTION, SOLUTION INTRAVENOUS at 04:49

## 2022-11-03 RX ADMIN — ONDANSETRON 4 MG: 2 INJECTION INTRAMUSCULAR; INTRAVENOUS at 12:38

## 2022-11-03 RX ADMIN — CITALOPRAM HYDROBROMIDE 20 MG: 20 TABLET ORAL at 21:56

## 2022-11-03 RX ADMIN — SODIUM CHLORIDE, SODIUM LACTATE, POTASSIUM CHLORIDE, AND CALCIUM CHLORIDE: 600; 310; 30; 20 INJECTION, SOLUTION INTRAVENOUS at 12:36

## 2022-11-03 RX ADMIN — ASPIRIN 81 MG: 81 TABLET ORAL at 09:23

## 2022-11-03 RX ADMIN — SUCRALFATE 1 G: 1 TABLET ORAL at 14:35

## 2022-11-03 RX ADMIN — ATORVASTATIN CALCIUM 80 MG: 80 TABLET, FILM COATED ORAL at 21:56

## 2022-11-03 RX ADMIN — METOCLOPRAMIDE 5 MG: 5 INJECTION, SOLUTION INTRAMUSCULAR; INTRAVENOUS at 17:43

## 2022-11-03 RX ADMIN — METOCLOPRAMIDE 5 MG: 5 INJECTION, SOLUTION INTRAMUSCULAR; INTRAVENOUS at 21:55

## 2022-11-03 RX ADMIN — PROPOFOL 60 MG: 10 INJECTION, EMULSION INTRAVENOUS at 12:43

## 2022-11-03 RX ADMIN — SUCRALFATE 1 G: 1 TABLET ORAL at 21:56

## 2022-11-03 RX ADMIN — PROPOFOL 40 MG: 10 INJECTION, EMULSION INTRAVENOUS at 12:44

## 2022-11-03 RX ADMIN — SODIUM CHLORIDE, PRESERVATIVE FREE 10 ML: 5 INJECTION INTRAVENOUS at 21:57

## 2022-11-03 RX ADMIN — DEXTROSE AND SODIUM CHLORIDE: 5; 450 INJECTION, SOLUTION INTRAVENOUS at 09:29

## 2022-11-03 RX ADMIN — SODIUM CHLORIDE, PRESERVATIVE FREE 40 MG: 5 INJECTION INTRAVENOUS at 04:28

## 2022-11-03 RX ADMIN — PROMETHAZINE HYDROCHLORIDE 12.5 MG: 25 TABLET ORAL at 04:47

## 2022-11-03 RX ADMIN — ONDANSETRON 4 MG: 2 INJECTION INTRAMUSCULAR; INTRAVENOUS at 18:54

## 2022-11-03 RX ADMIN — PROMETHAZINE HYDROCHLORIDE 12.5 MG: 25 TABLET ORAL at 22:08

## 2022-11-03 RX ADMIN — METOPROLOL TARTRATE 50 MG: 50 TABLET, FILM COATED ORAL at 09:23

## 2022-11-03 RX ADMIN — LIDOCAINE HYDROCHLORIDE 100 MG: 20 INJECTION, SOLUTION EPIDURAL; INFILTRATION; INTRACAUDAL; PERINEURAL at 12:43

## 2022-11-03 RX ADMIN — ENOXAPARIN SODIUM 40 MG: 100 INJECTION SUBCUTANEOUS at 17:44

## 2022-11-03 RX ADMIN — METOCLOPRAMIDE 5 MG: 5 INJECTION, SOLUTION INTRAMUSCULAR; INTRAVENOUS at 14:35

## 2022-11-03 RX ADMIN — PROPOFOL 180 MCG/KG/MIN: 10 INJECTION, EMULSION INTRAVENOUS at 12:45

## 2022-11-03 RX ADMIN — METOPROLOL TARTRATE 50 MG: 50 TABLET, FILM COATED ORAL at 21:56

## 2022-11-03 RX ADMIN — GLYCOPYRROLATE 0.1 MG: 0.2 INJECTION, SOLUTION INTRAMUSCULAR; INTRAVENOUS at 12:38

## 2022-11-03 RX ADMIN — PROPOFOL 20 MG: 10 INJECTION, EMULSION INTRAVENOUS at 12:46

## 2022-11-03 ASSESSMENT — PAIN - FUNCTIONAL ASSESSMENT
PAIN_FUNCTIONAL_ASSESSMENT: 0-10
PAIN_FUNCTIONAL_ASSESSMENT: 0-10
PAIN_FUNCTIONAL_ASSESSMENT: FACE, LEGS, ACTIVITY, CRY, AND CONSOLABILITY (FLACC)

## 2022-11-03 ASSESSMENT — PAIN SCALES - GENERAL: PAINLEVEL_OUTOF10: 0

## 2022-11-03 NOTE — INTERVAL H&P NOTE
Update History & Physical    The patient's History and Physical of November 2, 2022 was reviewed with the patient and I examined the patient. There was no change. The surgical site was confirmed by the patient and me. Plan: The risks, benefits, expected outcome, and alternative to the recommended procedure have been discussed with the patient. Patient understands and wants to proceed with the procedure.      Electronically signed by Zay Casey MD on 11/3/2022 at 11:52 AM

## 2022-11-03 NOTE — OP NOTE
ESOPHAGOGASTRODUODENOSCOPY    DATE of PROCEDURE:   11/3/2022    PRE-OP DIAGNOSIS:  Hematemesis  Intractable nausea/vomiting  History of gastroparesis  History of esophagitis/MWT     POST-OP DIAGNOSIS:  Severe erosive esophagitis  Hiatal hernia  Erosive gastritis    MEDICATIONS ADMINISTERED:   MAC per anesthesia    INSTRUMENT:   [id number]    PROCEDURE:    After obtaining informed consent, the patient was placed in the left lateral position and sedated. The endoscope was advanced to the 2nd portion of the duodenum under direct vision without difficulty. The esophagus, stomach (including retroflexed views) and duodenum were evaluated. The patient was taken to the recovery area in stable condition. FINDINGS:  ESOPHAGUS: Normal proximal esophagus. Severe, LA grade D erosive esophagitis was noted beginning approximately 28 cm from the incisors and extending to the Z-line, which was located at 36 cm from the incisors. Biopsies were obtained. STOMACH:  A 2 cm sliding-type hiatal hernia without Florentino ulcers was noted with Z line located at 36 cm and diaphragmatic hiatus 38 cm from the incisors. The flap valve is considered Hill grade III on retroflexed view. Erosive gastritis was noted in the antrum. Otherwise normal gastric mucosa throughout. Random gastric biopsies were obtained. DUODENUM: Normal bulb and 2nd portion.     ESTIMATED BLOOD LOSS:  Minimal.    PLAN:  - F/u pathology  - Continue PPI BID, can transition to PO once able to tolerate reliably  - Continue Reglan 5 mg TID AC/HS, can transition to PO once able to tolerate reliably (aware of BB warning of tardive dyskinesia)  - Carafate slurry 1 g QID x 14 days  - Antiemetics PRN, may need to schedule in future  - Resume CLD, advance to gastroparesis diet (6 small, low-residue, low-fat meals/day) as tolerated  - Avoid NSAIDs  - Antireflux measures  - Repeat exam in 8-12 weeks to document healing      Merissa Todd MD  [facility]

## 2022-11-03 NOTE — ANESTHESIA PRE PROCEDURE
Department of Anesthesiology  Preprocedure Note       Name:  Chago Funk   Age:  62 y.o.  :  1965                                          MRN:  [id number]         Date:  11/3/2022      Surgeon: King Jo):  Zya Casey MD    Procedure: Procedure(s):  EGD ESOPHAGOGASTRODUODENOSCOPY     Medications prior to admission:   Prior to Admission medications    Medication Sig Start Date End Date Taking? Authorizing Provider   lisinopril (PRINIVIL;ZESTRIL) 20 MG tablet Take 0.5 tablets by mouth at bedtime TAKE 1 TABLET BY MOUTH EVERY DAY 10/27/22   Panda Vigil MD   citalopram (CELEXA) 20 MG tablet Take 1 tablet by mouth at bedtime 10/27/22   David Tyson DO   pantoprazole (PROTONIX) 40 MG tablet Take 1 tablet by mouth every morning (before breakfast) 10/27/22 11/26/22  David Tyson DO   metoprolol tartrate (LOPRESSOR) 25 MG tablet TAKE ONE TABLET BY MOUTH TWICE A DAY 22   Panda Vigil MD   empagliflozin (JARDIANCE) 10 MG tablet Take 1 tablet by mouth in the morning. 22   David Tyson DO   gabapentin (NEURONTIN) 100 MG capsule 1 at night prn foot pain neuropathy  Patient not taking: Reported on 2022  David Tyson DO   Hyoscyamine Sulfate SL (LEVSIN/SL) 0.125 MG SUBL Place 1 tablet under the tongue every 6 hours as needed (diarrhea) 22  Beryl eKnnedy DO   atorvastatin (LIPITOR) 80 MG tablet Take 1 tablet by mouth at bedtime 22   CLAUDIA Ridley   insulin NPH (HUMULIN N;NOVOLIN N) 100 UNIT/ML injection vial 18 units in the morning and 7 units at night  Patient taking differently: 20 units in the morning and 10 units at night 22   CLAUDIA Ridley   aspirin 81 MG EC tablet Take 81 mg by mouth at bedtime    Historical Provider, MD       Current medications:    No current facility-administered medications for this visit. No current outpatient medications on file.      Facility-Administered Medications Ordered in Other Visits   Medication Dose Route Frequency Provider Last Rate Last Admin    metoclopramide (REGLAN) injection 5 mg  5 mg IntraVENous 4x Daily AC & HS Idalmis Sutherland MD        metoprolol tartrate (LOPRESSOR) tablet 50 mg  50 mg Oral BID Guerrero Benitez MD   50 mg at 11/03/22 0923    dextrose 5 % and 0.45 % sodium chloride infusion   IntraVENous Continuous Guerrero Benitez MD 75 mL/hr at 11/03/22 0929 New Bag at 11/03/22 0929    pantoprazole (PROTONIX) 40 mg in sodium chloride (PF) 0.9 % 10 mL injection  40 mg IntraVENous Q12H Teri Byrnesin, DO   40 mg at 11/03/22 0428    sodium chloride flush 0.9 % injection 5-40 mL  5-40 mL IntraVENous 2 times per day Teri Byrnesin, DO   10 mL at 11/02/22 2028    sodium chloride flush 0.9 % injection 5-40 mL  5-40 mL IntraVENous PRN Teri Marquezdain, DO        0.9 % sodium chloride infusion   IntraVENous PRN Teri Byrnesin, DO        [Held by provider] enoxaparin (LOVENOX) injection 40 mg  40 mg SubCUTAneous Q24H Teri Marquezdain, DO        acetaminophen (TYLENOL) tablet 650 mg  650 mg Oral Q6H PRN Teri Marquezdain, DO        Or    acetaminophen (TYLENOL) suppository 650 mg  650 mg Rectal Q6H PRN Teri Marquezdain, DO        polyethylene glycol (GLYCOLAX) packet 17 g  17 g Oral Daily PRN Teri Marquezdain, DO        promethazine (PHENERGAN) tablet 12.5 mg  12.5 mg Oral Q6H PRN Teri Marquezdain, DO   12.5 mg at 11/03/22 0447    Or    ondansetron (ZOFRAN) injection 4 mg  4 mg IntraVENous Q6H PRN Teri Marquezdain, DO        hyoscyamine (LEVSIN/SL) sublingual tablet 125 mcg  125 mcg SubLINGual Q4H PRN Teri Marquezdain, DO        glucose chewable tablet 16 g  4 tablet Oral PRN Teri Marquezdain, DO        dextrose bolus 10% 125 mL  125 mL IntraVENous PRN Teri Marquezdain, DO        Or    dextrose bolus 10% 250 mL  250 mL IntraVENous PRN Teri Marquezdain, DO        glucagon (rDNA) injection 1 mg  1 mg SubCUTAneous PRN Teri Delgadillo, DO        dextrose 10 % infusion   IntraVENous Continuous PRN Marina Velasco Bernie Cunningham,         aluminum & magnesium hydroxide-simethicone (MAALOX) 200-200-20 MG/5ML suspension 30 mL  30 mL Oral Q6H PRN Julio Petersonman,         [Held by provider] insulin NPH (HUMULIN N;NOVOLIN N) injection vial 12 Units  12 Units SubCUTAneous QAM AC Julio Saenz, DO        And    [Held by provider] insulin NPH (HUMULIN N;NOVOLIN N) injection vial 6 Units  6 Units SubCUTAneous Nightly Julio , DO        insulin lispro (HUMALOG) injection vial 0-8 Units  0-8 Units SubCUTAneous TID WC [facility]        insulin lispro (HUMALOG) injection vial 0-4 Units  0-4 Units SubCUTAneous Nightly Julio , DO        atorvastatin (LIPITOR) tablet 80 mg  80 mg Oral Nightly Julio , DO   80 mg at 11/02/22 2021    gabapentin (NEURONTIN) capsule 100 mg  100 mg Oral TID PRN Julio , DO        citalopram (CELEXA) tablet 20 mg  20 mg Oral Nightly Julio , DO   20 mg at 11/02/22 2022    aspirin EC tablet 81 mg  81 mg Oral Daily Julio , DO   81 mg at 11/03/22 3135       Allergies: Allergies   Allergen Reactions    Metformin And Related Nausea Only       Problem List:    Patient Active Problem List   Diagnosis Code    Osteoarthritis of lumbosacral spine M47.817    Elevated BP without diagnosis of hypertension R03.0    DM (diabetes mellitus) ([facility] [address] E11.9    Hyperlipidemia associated with type 2 diabetes mellitus ([facility] [address] E11.69, E78.5    Anxiety F41.9    Dysthymic disorder F34.1    Class 1 obesity with serious comorbidity and body mass index (BMI) of 30.0 to 30.9 in adult E66.9, Z68.30    Uncontrolled type 2 diabetes mellitus with hyperglycemia (HCC) E11.65    Plantar wart of right foot B07.0    Menopausal syndrome N95.1    Overweight (BMI 25.0-29. 9) E66.3    Chest pain R07.9    Hypertension I10    Hx of CABG Z95.1    Hypoxia R09.02    Hyperlipidemia E78.5    S/P CABG x 2 Z95.1    CAD (coronary artery disease) I25.10    Gastroparesis due to DM (HCC) E11.43, K31.84    Intractable nausea and vomiting R11.2    TERA (acute kidney injury) ([facility] N17.9    Carotid atherosclerosis, bilateral I65.23    GERD with esophagitis K21.00       Past Medical History:        Diagnosis Date    Anxiety 11/28/2012    CAD (coronary artery disease)     No MI; no stents    Depression     managed with med    DM (diabetes mellitus) ([facility] 11/28/2012    insulin reliant; AVG ; pt denies s.s. of hypoglycemia; last A1C    Encounter for weaning from ventilator ([facility] 05/25/2022    Gastric ulcer due to nonsteroidal anti-inflammatory drug (NSAID) 08/11/2019    Gastroesophageal reflux disease with esophagitis     HLD (hyperlipidemia)     managed with med    Hyperkalemia 09/13/2022    Hypertension     managed with med    Idiopathic acute pancreatitis 06/28/2022    Lactic acidosis 09/13/2022    Ivelisse-Pena tear 08/11/2019    Severe sepsis ([facility] 09/13/2022    Ulcerative esophagitis 08/11/2019    Viral gastroenteritis 09/13/2022       Past Surgical History:        Procedure Laterality Date    CARDIAC CATHETERIZATION Left     CORONARY ARTERY BYPASS GRAFT N/A 05/25/2022    CORONARY ARTERY BYPASS GRAFT (CABG X 2), [place]; ENDOSCOPIC VEIN HARVEST LEFT GREATER SAPHENOUS performed by Azalea Casarez MD at [address] [facility] [address] TRANSESOPHAGEAL ECHOCARDIOGRAM N/A 05/25/2022    TRANSESOPHAGEAL ECHOCARDIOGRAM performed by Azalea Casarez MD at [address] [facility]  08/12/2019    performed at [facility] for coffee ground emesis, ulcerative espophagitis, gastric ulcers       Social History:    Social History     Tobacco Use    Smoking status: Never    Smokeless tobacco: Never   Substance Use Topics    Alcohol use: Not Currently     Comment: rarely                                Counseling given: Not Answered      Vital Signs (Current): There were no vitals filed for this visit.                                            BP Readings from Last 3 Encounters:   11/03/22 (!) 158/109 10/27/22 104/68   09/15/22 116/72       NPO Status:                                                                                 BMI:   Wt Readings from Last 3 Encounters:   11/02/22 160 lb 6.4 oz (72.8 kg)   10/27/22 167 lb (75.8 kg)   10/19/22 160 lb (72.6 kg)     There is no height or weight on file to calculate BMI.    CBC:   Lab Results   Component Value Date/Time    WBC 13.4 11/03/2022 06:04 AM    RBC 4.73 11/03/2022 06:04 AM    HGB 12.8 11/03/2022 06:04 AM    HCT 39.2 11/03/2022 06:04 AM    MCV 82.9 11/03/2022 06:04 AM    RDW 13.2 11/03/2022 06:04 AM     11/03/2022 06:04 AM       CMP:   Lab Results   Component Value Date/Time     11/03/2022 06:04 AM    K 3.7 11/03/2022 06:04 AM     11/03/2022 06:04 AM    CO2 23 11/03/2022 06:04 AM    BUN 13 11/03/2022 06:04 AM    CREATININE 0.60 11/03/2022 06:04 AM    GFRAA >60 09/14/2022 09:10 AM    AGRATIO 1.3 04/27/2022 03:48 PM    LABGLOM >60 11/03/2022 06:04 AM    GLUCOSE 75 11/03/2022 06:04 AM    PROT 7.3 11/03/2022 06:04 AM    CALCIUM 8.7 11/03/2022 06:04 AM    BILITOT 0.7 11/03/2022 06:04 AM    ALKPHOS 83 11/03/2022 06:04 AM    ALKPHOS 176 04/27/2022 03:48 PM    AST 18 11/03/2022 06:04 AM    ALT 21 11/03/2022 06:04 AM       POC Tests:   Recent Labs     11/03/22  0814   POCGLU 79       Coags:   Lab Results   Component Value Date/Time    PROTIME 17.3 05/25/2022 03:39 PM    INR 1.4 05/25/2022 03:39 PM    APTT 32.5 05/25/2022 03:39 PM       HCG (If Applicable): No results found for: PREGTESTUR, PREGSERUM, HCG, HCGQUANT     ABGs: No results found for: PHART, PO2ART, QFF2DKB, OGH4TIG, BEART, X3OJXYVF     Type & Screen (If Applicable):  No results found for: LABABO, LABRH    Drug/Infectious Status (If Applicable):  No results found for: HIV, HEPCAB    COVID-19 Screening (If Applicable):   Lab Results   Component Value Date/Time    COVID19 Not detected 06/28/2022 10:05 AM           Anesthesia Evaluation  Patient summary reviewed and Nursing notes reviewed  Airway: Mallampati: II  TM distance: >3 FB   Neck ROM: full  Mouth opening: > = 3 FB   Dental: normal exam         Pulmonary:Negative Pulmonary ROS and normal exam  breath sounds clear to auscultation                             Cardiovascular:Negative CV ROS  Exercise tolerance: good (>4 METS),   (+) hypertension:, CAD: obstructive, CABG/stent (5/2022 CABG x2):, CASTRO:,     (-) murmur      Rhythm: regular  Rate: normal                 ROS comment: Pt denies recent CP, SOB or changes in functional status    Echo 5/12/22: Normal Valves and LVEF 55-60%    Cath 5/12/22: Severe multivessel CAD with occluded LAD and subtotal occluded LCx     Neuro/Psych:   Negative Neuro/Psych ROS  (+) depression/anxiety             GI/Hepatic/Renal: Neg GI/Hepatic/Renal ROS  (+) GERD:, PUD,          ROS comment: EGD 2019 - \"Severe esophagitis, healing MW tear, esophageal rings, bile reflux, moderate to severe gastritis with 2 antral ulcers, mild duodenitis\"    Gastroparesis due to DM   Intractable nausea and vomiting    Ulcerative esophagitis. Endo/Other: Negative Endo/Other ROS   (+) Diabetes (DM (diabetes mellitus) (HCC) insulin reliant; AVG ; pt denies s.s. of hypoglycemia; last A1C )Type II DM, poorly controlled, using insulin, : arthritis (OA):., .                 Abdominal:              PE comment: Deferred   Vascular:   + PVD, aortic or cerebral (Carotid atherosclerosis, bilateral), . Other Findings:             Anesthesia Plan      TIVA     ASA 3       Induction: intravenous. Anesthetic plan and risks discussed with patient. Plan discussed with CRNA.                     Juan Cordero MD   11/3/2022

## 2022-11-03 NOTE — CONSULTS
[facility] Consult Note       Referring Physician:  Dr. Bernie Cunningham    Consult Date:  11/2/2022    Admit Date:  11/2/2022    Chief Complaint:  N/V, abnormal CT abd/pelvis    Subjective:     History of Present Illness:  Patient is a 62 y.o. female with h/o CAD, depression, DM, HTN, HLD, and GERD  who is seen in consultation at the request of Dr. Bernie Cunningham for refractory nausea/emesis. GES on 7/1/22 consistent with gastroparesis with 77% of tracer remaining in gastric lumen at 4 hours. Hgba1c on 10/27/22 was 8.1. RUQ US 6/2022 was unremarkable. Recent LFTs and lipase were unremarkable. Troponin negative. Patient reports recurrent episodes of N/V requiring ER visits and admissions. Current episode of N/V began 3 days ago. +Coffee-ground emesis. Negative for red blood in emesis. Mild epigastric pain. Reflux is not controlled. Patient reports running out of pantoprazole 5 days ago. Although she has modified diet for DM control, she has not made changes in diet to address gastroparesis. She denies use of a promotility agent as an outpatient. Negative for melena and rectal bleeding. Although she denies significant improvement in GI symptoms since admission, she is vomiting less. CT abd/pelvis with contrast on 11/2/22 with distal esophageal wall thickening. EGD (Dr. Macy Nicolas at [facility] 8/2019): Severe esophagitis, healing MW tear, esophageal rings, bile reflux, moderate to severe gastritis with 2 antral ulcers, mild duodenitis.      PMH:  Past Medical History:   Diagnosis Date    Anxiety 11/28/2012    CAD (coronary artery disease)     No MI; no stents    Depression     managed with med    DM (diabetes mellitus) ([facility] 11/28/2012    insulin reliant; AVG ; pt denies s.s. of hypoglycemia; last A1C    [facility] 05/25/2022    Gastric ulcer due to nonsteroidal anti-inflammatory drug (NSAID) 08/11/2019    Gastroesophageal reflux disease with esophagitis     HLD (hyperlipidemia)     managed with med    Hyperkalemia 09/13/2022    Hypertension     managed with med    Idiopathic acute pancreatitis 06/28/2022    Lactic acidosis 09/13/2022    Ivelisse-Pena tear 08/11/2019    Severe sepsis ([facility] 09/13/2022    Ulcerative esophagitis 08/11/2019    Viral gastroenteritis 09/13/2022       PSH:  Past Surgical History:   Procedure Laterality Date    CARDIAC CATHETERIZATION Left     CORONARY ARTERY BYPASS GRAFT N/A 05/25/2022    CORONARY ARTERY BYPASS GRAFT (CABG X 2), [place]; ENDOSCOPIC VEIN HARVEST LEFT GREATER SAPHENOUS performed by Samuel Rodriguez MD at [facility]    TRANSESOPHAGEAL ECHOCARDIOGRAM N/A 05/25/2022    TRANSESOPHAGEAL ECHOCARDIOGRAM performed by Samuel Rodriguez MD at [place] [address]  08/12/2019    performed at [facility] for coffee ground emesis, ulcerative espophagitis, gastric ulcers       Allergies: Allergies   Allergen Reactions    Metformin And Related Nausea Only       Home Medications:  Prior to Admission medications    Medication Sig Start Date End Date Taking?  Authorizing Provider   lisinopril (PRINIVIL;ZESTRIL) 20 MG tablet Take 0.5 tablets by mouth at bedtime TAKE 1 TABLET BY MOUTH EVERY DAY 10/27/22   Deandra Sevilla MD   citalopram (CELEXA) 20 MG tablet Take 1 tablet by mouth at bedtime 10/27/22   Madonna Lange DO   pantoprazole (PROTONIX) 40 MG tablet Take 1 tablet by mouth every morning (before breakfast) 10/27/22 11/26/22  Madonna Lange DO   metoprolol tartrate (LOPRESSOR) 25 MG tablet TAKE ONE TABLET BY MOUTH TWICE A DAY 9/26/22   Deandra Sevilla MD   empagliflozin (JARDIANCE) 10 MG tablet Take 1 tablet by mouth in the morning. 7/28/22   Madonna Lange DO   gabapentin (NEURONTIN) 100 MG capsule 1 at night prn foot pain neuropathy  Patient not taking: Reported on 11/2/2022 7/28/22 1/27/23  Madonna Lange DO   Hyoscyamine Sulfate SL (LEVSIN/SL) 0.125 MG SUBL Place 1 tablet under the tongue every 6 hours as needed (diarrhea) 7/24/22 9/14/22  Clement Elsie, DO   atorvastatin (LIPITOR) 80 MG tablet Take 1 tablet by mouth at bedtime 6/1/22   CLAUDIA Cody   insulin NPH (HUMULIN N;NOVOLIN N) 100 UNIT/ML injection vial 18 units in the morning and 7 units at night  Patient taking differently: 20 units in the morning and 10 units at night 6/1/22   CLAUDIA Cody   aspirin 81 MG EC tablet Take 81 mg by mouth at bedtime    Historical Provider, MD       [facility] Medications:  Current Facility-Administered Medications   Medication Dose Route Frequency    pantoprazole (PROTONIX) 40 mg in sodium chloride (PF) 0.9 % 10 mL injection  40 mg IntraVENous Q12H    sodium chloride flush 0.9 % injection 5-40 mL  5-40 mL IntraVENous 2 times per day    sodium chloride flush 0.9 % injection 5-40 mL  5-40 mL IntraVENous PRN    0.9 % sodium chloride infusion   IntraVENous PRN    [Held by provider] enoxaparin (LOVENOX) injection 40 mg  40 mg SubCUTAneous Q24H    acetaminophen (TYLENOL) tablet 650 mg  650 mg Oral Q6H PRN    Or    acetaminophen (TYLENOL) suppository 650 mg  650 mg Rectal Q6H PRN    polyethylene glycol (GLYCOLAX) packet 17 g  17 g Oral Daily PRN    0.9% NaCl with KCl 20 mEq infusion   IntraVENous Continuous    promethazine (PHENERGAN) tablet 12.5 mg  12.5 mg Oral Q6H PRN    Or    ondansetron (ZOFRAN) injection 4 mg  4 mg IntraVENous Q6H PRN    hyoscyamine (LEVSIN/SL) sublingual tablet 125 mcg  125 mcg SubLINGual Q4H PRN    glucose chewable tablet 16 g  4 tablet Oral PRN    dextrose bolus 10% 125 mL  125 mL IntraVENous PRN    Or    dextrose bolus 10% 250 mL  250 mL IntraVENous PRN    glucagon (rDNA) injection 1 mg  1 mg SubCUTAneous PRN    dextrose 10 % infusion   IntraVENous Continuous PRN    aluminum & magnesium hydroxide-simethicone (MAALOX) 200-200-20 MG/5ML suspension 30 mL  30 mL Oral Q6H PRN    [Held by provider] insulin NPH (HUMULIN N;NOVOLIN N) injection vial 12 Units  12 Units SubCUTAneous QAM AC    And [Held by provider] insulin NPH (HUMULIN N;NOVOLIN N) injection vial 6 Units  6 Units SubCUTAneous Nightly    insulin lispro (HUMALOG) injection vial 0-8 Units  0-8 Units SubCUTAneous TID WC    insulin lispro (HUMALOG) injection vial 0-4 Units  0-4 Units SubCUTAneous Nightly    atorvastatin (LIPITOR) tablet 80 mg  80 mg Oral Nightly    gabapentin (NEURONTIN) capsule 100 mg  100 mg Oral TID PRN    citalopram (CELEXA) tablet 20 mg  20 mg Oral Nightly    metoclopramide (REGLAN) injection 10 mg  10 mg IntraVENous Q6H PRN    metoprolol tartrate (LOPRESSOR) tablet 25 mg  25 mg Oral BID    [START ON 11/3/2022] aspirin EC tablet 81 mg  81 mg Oral Daily       Social History:  Social History     Tobacco Use    Smoking status: Never    Smokeless tobacco: Never   Substance Use Topics    Alcohol use: Not Currently     Comment: rarely         Family History:  Family History   Problem Relation Age of Onset    Heart Attack Father     Diabetes Father        Review of Systems:  A detailed 10 system ROS is obtained, with pertinent positives as listed above. All others are negative. Diet:  NPO    Objective:     Physical Exam:  Vitals:  /70   Pulse 86   Temp 98.5 °F (36.9 °C) (Oral)   Resp 18   Ht 5' 4\" (1.626 m)   Wt 160 lb 6.4 oz (72.8 kg)   SpO2 96%   BMI 27.53 kg/m²   Gen:  No acute distress  HEENT: Sclerae anicteric. MMM. Negative LAD. Cardiovascular: Regular rate and rhythm.    Respiratory:  CTA bilaterally  GI:  Soft, mild epigastric tenderness, neg rebound, ND, +BS  Rectal:  Deferred  Neurological:  AAOx3  Psychiatric:  Full affect  Skin: negative for jaundice    Laboratory:    Recent Labs     11/02/22  1031   WBC 12.8*   HGB 13.6   HCT 41.0      MCV 82.0      K 3.7   CL 99*   CO2 28   BUN 17   MG 2.1          Assessment:       Principal Problem:    Intractable nausea and vomiting  Active Problems:    S/P CABG x 2    CAD (coronary artery disease)    GERD with esophagitis    Hyperlipidemia associated with type 2 diabetes mellitus ([facility] [address]  Resolved Problems:    * No resolved hospital problems.  *      Plan:       NPO  EGD today  Tight control of DM  Pantoprazole 40 mg BID  Start trial of scheduled metoclopramide  Will need low fat, low residue diet with smaller, more frequent meals as outpatient    Will follow    Oj Lucero MD

## 2022-11-03 NOTE — H&P (VIEW-ONLY)
[facility] Consult Note       Referring Physician:  Dr. Dakota Gates    Consult Date:  11/2/2022    Admit Date:  11/2/2022    Chief Complaint:  N/V, abnormal CT abd/pelvis    Subjective:     History of Present Illness:  Patient is a 62 y.o. female with h/o CAD, depression, DM, HTN, HLD, and GERD  who is seen in consultation at the request of Dr. Dakota Gates for refractory nausea/emesis. GES on 7/1/22 consistent with gastroparesis with 77% of tracer remaining in gastric lumen at 4 hours. Hgba1c on 10/27/22 was 8.1. RUQ US 6/2022 was unremarkable. Recent LFTs and lipase were unremarkable. Troponin negative. Patient reports recurrent episodes of N/V requiring ER visits and admissions. Current episode of N/V began 3 days ago. +Coffee-ground emesis. Negative for red blood in emesis. Mild epigastric pain. Reflux is not controlled. Patient reports running out of pantoprazole 5 days ago. Although she has modified diet for DM control, she has not made changes in diet to address gastroparesis. She denies use of a promotility agent as an outpatient. Negative for melena and rectal bleeding. Although she denies significant improvement in GI symptoms since admission, she is vomiting less. CT abd/pelvis with contrast on 11/2/22 with distal esophageal wall thickening. EGD (Dr. Omar Winters at [facility] 8/2019): Severe esophagitis, healing MW tear, esophageal rings, bile reflux, moderate to severe gastritis with 2 antral ulcers, mild duodenitis.      PMH:  Past Medical History:   Diagnosis Date    Anxiety 11/28/2012    CAD (coronary artery disease)     No MI; no stents    Depression     managed with med    DM (diabetes mellitus) ([facility] 11/28/2012    insulin reliant; AVG ; pt denies s.s. of hypoglycemia; last A1C    [facility] 05/25/2022    Gastric ulcer due to nonsteroidal anti-inflammatory drug (NSAID) 08/11/2019    Gastroesophageal reflux disease with esophagitis     HLD (hyperlipidemia)     managed with med    Hyperkalemia 09/13/2022    Hypertension     managed with med    Idiopathic acute pancreatitis 06/28/2022    Lactic acidosis 09/13/2022    Ivelisse-Pena tear 08/11/2019    Severe sepsis ([facility] [address] 09/13/2022    Ulcerative esophagitis 08/11/2019    Viral gastroenteritis 09/13/2022       PSH:  Past Surgical History:   Procedure Laterality Date    CARDIAC CATHETERIZATION Left     CORONARY ARTERY BYPASS GRAFT N/A 05/25/2022    CORONARY ARTERY BYPASS GRAFT (CABG X 2), LIM[place]; ENDOSCOPIC VEIN HARVEST LEFT GREATER SAPHENOUS performed by Navya Mccain MD at [address] [facility]    TRANSESOPHAGEAL ECHOCARDIOGRAM N/A 05/25/2022    TRANSESOPHAGEAL ECHOCARDIOGRAM performed by Navya Mccain MD at [place] [address]  08/12/2019    performed at [facility] for coffee ground emesis, ulcerative espophagitis, gastric ulcers       Allergies: Allergies   Allergen Reactions    Metformin And Related Nausea Only       Home Medications:  Prior to Admission medications    Medication Sig Start Date End Date Taking?  Authorizing Provider   lisinopril (PRINIVIL;ZESTRIL) 20 MG tablet Take 0.5 tablets by mouth at bedtime TAKE 1 TABLET BY MOUTH EVERY DAY 10/27/22   Verito Bhandari MD   citalopram (CELEXA) 20 MG tablet Take 1 tablet by mouth at bedtime 10/27/22   Lurdes Reyna DO   pantoprazole (PROTONIX) 40 MG tablet Take 1 tablet by mouth every morning (before breakfast) 10/27/22 11/26/22  Lurdes Reyna DO   metoprolol tartrate (LOPRESSOR) 25 MG tablet TAKE ONE TABLET BY MOUTH TWICE A DAY 9/26/22   Verito Bhandari MD   empagliflozin (JARDIANCE) 10 MG tablet Take 1 tablet by mouth in the morning. 7/28/22   Lurdes Reyna DO   gabapentin (NEURONTIN) 100 MG capsule 1 at night prn foot pain neuropathy  Patient not taking: Reported on 11/2/2022 7/28/22 1/27/23  Lurdes Reyna DO   Hyoscyamine Sulfate SL (LEVSIN/SL) 0.125 MG SUBL Place 1 tablet under the tongue every 6 hours as needed (diarrhea) 7/24/22 9/14/22  Chon Sifuentes DO   atorvastatin (LIPITOR) 80 MG tablet Take 1 tablet by mouth at bedtime 6/1/22   CLAUDIA Reinoso   insulin NPH (HUMULIN N;NOVOLIN N) 100 UNIT/ML injection vial 18 units in the morning and 7 units at night  Patient taking differently: 20 units in the morning and 10 units at night 6/1/22   CLAUDIA Reinoso   aspirin 81 MG EC tablet Take 81 mg by mouth at bedtime    Historical Provider, MD       [facility] Medications:  Current Facility-Administered Medications   Medication Dose Route Frequency    pantoprazole (PROTONIX) 40 mg in sodium chloride (PF) 0.9 % 10 mL injection  40 mg IntraVENous Q12H    sodium chloride flush 0.9 % injection 5-40 mL  5-40 mL IntraVENous 2 times per day    sodium chloride flush 0.9 % injection 5-40 mL  5-40 mL IntraVENous PRN    0.9 % sodium chloride infusion   IntraVENous PRN    [Held by provider] enoxaparin (LOVENOX) injection 40 mg  40 mg SubCUTAneous Q24H    acetaminophen (TYLENOL) tablet 650 mg  650 mg Oral Q6H PRN    Or    acetaminophen (TYLENOL) suppository 650 mg  650 mg Rectal Q6H PRN    polyethylene glycol (GLYCOLAX) packet 17 g  17 g Oral Daily PRN    0.9% NaCl with KCl 20 mEq infusion   IntraVENous Continuous    promethazine (PHENERGAN) tablet 12.5 mg  12.5 mg Oral Q6H PRN    Or    ondansetron (ZOFRAN) injection 4 mg  4 mg IntraVENous Q6H PRN    hyoscyamine (LEVSIN/SL) sublingual tablet 125 mcg  125 mcg SubLINGual Q4H PRN    glucose chewable tablet 16 g  4 tablet Oral PRN    dextrose bolus 10% 125 mL  125 mL IntraVENous PRN    Or    dextrose bolus 10% 250 mL  250 mL IntraVENous PRN    glucagon (rDNA) injection 1 mg  1 mg SubCUTAneous PRN    dextrose 10 % infusion   IntraVENous Continuous PRN    aluminum & magnesium hydroxide-simethicone (MAALOX) 200-200-20 MG/5ML suspension 30 mL  30 mL Oral Q6H PRN    [Held by provider] insulin NPH (HUMULIN N;NOVOLIN N) injection vial 12 Units  12 Units SubCUTAneous QAM AC    And [Held by provider] insulin NPH (HUMULIN N;NOVOLIN N) injection vial 6 Units  6 Units SubCUTAneous Nightly    insulin lispro (HUMALOG) injection vial 0-8 Units  0-8 Units SubCUTAneous TID WC    insulin lispro (HUMALOG) injection vial 0-4 Units  0-4 Units SubCUTAneous Nightly    atorvastatin (LIPITOR) tablet 80 mg  80 mg Oral Nightly    gabapentin (NEURONTIN) capsule 100 mg  100 mg Oral TID PRN    citalopram (CELEXA) tablet 20 mg  20 mg Oral Nightly    metoclopramide (REGLAN) injection 10 mg  10 mg IntraVENous Q6H PRN    metoprolol tartrate (LOPRESSOR) tablet 25 mg  25 mg Oral BID    [START ON 11/3/2022] aspirin EC tablet 81 mg  81 mg Oral Daily       Social History:  Social History     Tobacco Use    Smoking status: Never    Smokeless tobacco: Never   Substance Use Topics    Alcohol use: Not Currently     Comment: rarely         Family History:  Family History   Problem Relation Age of Onset    Heart Attack Father     Diabetes Father        Review of Systems:  A detailed 10 system ROS is obtained, with pertinent positives as listed above. All others are negative. Diet:  NPO    Objective:     Physical Exam:  Vitals:  /70   Pulse 86   Temp 98.5 °F (36.9 °C) (Oral)   Resp 18   Ht 5' 4\" (1.626 m)   Wt 160 lb 6.4 oz (72.8 kg)   SpO2 96%   BMI 27.53 kg/m²   Gen:  No acute distress  HEENT: Sclerae anicteric. MMM. Negative LAD. Cardiovascular: Regular rate and rhythm.    Respiratory:  CTA bilaterally  GI:  Soft, mild epigastric tenderness, neg rebound, ND, +BS  Rectal:  Deferred  Neurological:  AAOx3  Psychiatric:  Full affect  Skin: negative for jaundice    Laboratory:    Recent Labs     11/02/22  1031   WBC 12.8*   HGB 13.6   HCT 41.0      MCV 82.0      K 3.7   CL 99*   CO2 28   BUN 17   MG 2.1          Assessment:       Principal Problem:    Intractable nausea and vomiting  Active Problems:    S/P CABG x 2    CAD (coronary artery disease)    GERD with esophagitis    Hyperlipidemia associated with type 2 diabetes mellitus ([facility] [address] [facility]  Resolved Problems:    * No resolved hospital problems.  *      Plan:       NPO  EGD today  Tight control of DM  Pantoprazole 40 mg BID  Start trial of scheduled metoclopramide  Will need low fat, low residue diet with smaller, more frequent meals as outpatient    Will follow    Holly Coleman MD

## 2022-11-03 NOTE — PLAN OF CARE
Problem: Pain  Goal: Verbalizes/displays adequate comfort level or baseline comfort level  Outcome: Progressing     Problem: Safety - Adult  Goal: Free from fall injury  Outcome: Progressing  Flowsheets  Taken 11/3/2022 0310  Free From Fall Injury: Instruct family/caregiver on patient safety  Taken 11/2/2022 2022  Free From Fall Injury: Instruct family/caregiver on patient safety     Problem: ABCDS Injury Assessment  Goal: Absence of physical injury  Outcome: Progressing  Flowsheets  Taken 11/3/2022 0310  Absence of Physical Injury: Implement safety measures based on patient assessment  Taken 11/2/2022 2022  Absence of Physical Injury: Implement safety measures based on patient assessment

## 2022-11-03 NOTE — ANESTHESIA POSTPROCEDURE EVALUATION
Department of Anesthesiology  Postprocedure Note    Patient: Gee Reddy  MRN: [id number]  YOB: 1965  Date of evaluation: 11/3/2022      Procedure Summary     Date: 11/03/22 Room / Location: [facility] ENDO 03 / [facility] ENDOSCOPY    Anesthesia Start: 1236 Anesthesia Stop: 1301    Procedure: EGD BIOPSY (Upper GI Region) Diagnosis:       Nausea and vomiting, unspecified vomiting type      (Nausea and vomiting, unspecified vomiting type [R11.2])    Surgeons: Chelsea Evangelista MD Responsible Provider: Caryl Noriega MD    Anesthesia Type: MAC, TIVA ASA Status: 3          Anesthesia Type: MAC, TIVA    Jhonatan Phase I: Jhonatan Score: 10    Jhonatan Phase II: Jhonatan Score: 10      Anesthesia Post Evaluation    Patient location during evaluation: PACU  Patient participation: complete - patient participated  Level of consciousness: awake and alert  Pain score: 0  Airway patency: patent  Nausea & Vomiting: no nausea and no vomiting  Complications: no  Cardiovascular status: hemodynamically stable  Respiratory status: acceptable, nonlabored ventilation and spontaneous ventilation  Hydration status: euvolemic  Comments: BP (!) 168/88   Pulse 96   Temp 98 °F (36.7 °C)   Resp 12   Ht 5' 4\" (1.626 m)   Wt 160 lb 6.4 oz (72.8 kg)   SpO2 99%   BMI 27.53 kg/m²     Multimodal analgesia pain management approach

## 2022-11-03 NOTE — PROGRESS NOTES
TRANSFER - OUT REPORT:    Verbal report given to [address] on [employer]  being transferred to [phone] for routine progression of patient care       Report consisted of patient's Situation, Background, Assessment and   Recommendations(SBAR). Information from the following report(s) Nurse Handoff Report, Surgery Report, Intake/Output, and MAR was reviewed with the receiving nurse. Lines:   Peripheral IV 11/02/22 Left Antecubital (Active)   Site Assessment Clean, dry & intact 11/03/22 0310   Line Status Infusing 11/03/22 0310   Line Care Connections checked and tightened 11/03/22 0310   Phlebitis Assessment No symptoms 11/03/22 0310   Infiltration Assessment 0 11/03/22 0310   Alcohol Cap Used Yes 11/03/22 0310   Dressing Status Clean, dry & intact 11/03/22 0310   Dressing Type Transparent 11/03/22 0310        Opportunity for questions and clarification was provided.       Patient transported with:  [employer]

## 2022-11-03 NOTE — CARE COORDINATION
Case Management Assessment  Initial Evaluation    Date/Time of Evaluation: 11/3/2022 10:12 AM  Assessment Completed by: TRINH Gar    If patient is discharged prior to next notation, then this note serves as note for discharge by case management. Patient Name: Princess Meyer                   YOB: 1965  Diagnosis: Intractable vomiting [R11.10]  Intractable nausea and vomiting [R11.2]                   Date / Time: 11/2/2022 12:42 PM    Patient Admission Status: Observation   Readmission Risk (Low < 19, Mod (19-27), High > 27): Readmission Risk Score: 20.1    Current PCP: Анна Galindo DO  PCP verified by CM? Yes (Monty Maldonado DO)    Chart Reviewed: Yes      History Provided by: Patient, Medical Record  Patient Orientation: Alert and Oriented, Person, Place, Self    Patient Cognition: Alert    Hospitalization in the last 30 days (Readmission):  No    If yes, Readmission Assessment in CM Navigator will be completed.     Advance Directives:      Code Status: Full Code   Patient's Primary Decision Maker is: Legal Next of Kin    Primary Decision Maker: Stef Mcbride - Parent - [phone]    Secondary Decision Maker: Fortino Jacksoncayla  Child - [phone]    Discharge Planning:    Patient lives with: Parent Type of Home: House  Primary Care Giver: Self  Patient Support Systems include: Parent, Children   Current Financial resources: (P) Other (Comment) (BCBS)  Current community resources:    Current services prior to admission: None            Current DME:              Type of Home Care services:  None    ADLS  Prior functional level: (P) Independent in ADLs/IADLs  Current functional level: (P) Independent in ADLs/IADLs    PT AM-PAC:   /24  OT AM-PAC:   /24    Family can provide assistance at DC: (P) Yes  Would you like Case Management to discuss the discharge plan with any other family members/significant others, and if so, who? (P) No  Plans to Return to Present Housing: (P) Yes  Other Identified Issues/Barriers to RETURNING to current housing: None  Potential Assistance needed at discharge: N/A            Potential DME:  None identified  Patient expects to discharge to: [address] [place] [address] for transportation at discharge: (P) Family    Financial    Payor: Sigrid Peacock / Plan: Olga Lidia Marie / Product Type: *No Product type* /     Does insurance require precert for SNF: Yes    Potential assistance Purchasing Medications: (P) No      Tristan Fernandez [address] [place] [address],[address] [phone] - [phone]  [facility] [address] [place] [address] [address] [address]  Phone: [phone] Fax: [phone]      Notes:    Factors facilitating achievement of predicted outcomes: Family support, Motivated, and Pleasant    Barriers to discharge: Decreased endurance    Additional Case Management Notes: Pleasant 63 y/o female pt who resides with her mother Roldan Coelho [phone]. Pt is employed, has insurance with pharmacy benefits, and a PCP. Pt is independent with ADLs at baseline and does not have any home DME or [facility] services. Pt is scheduled to undergo an EGD today. No d/c needs identified at the present time. CM will continue to follow and remain available if any needs arise. The Plan for Transition of Care is related to the following treatment goals of Intractable vomiting [R11.10]  Intractable nausea and vomiting [W12.5]    IF APPLICABLE: The Patient and/or patient representative Brandon Rai and her family were provided with a choice of provider and agrees with the discharge plan. Freedom of choice list with basic dialogue that supports the patient's individualized plan of care/goals and shares the quality data associated with the providers was provided to: (P) Patient   Patient Representative Name:       The Patient and/or Patient Representative Agree with the Discharge Plan?       TRINH Gar  Case Management Department

## 2022-11-04 VITALS
HEART RATE: 72 BPM | TEMPERATURE: 98.4 F | DIASTOLIC BLOOD PRESSURE: 84 MMHG | OXYGEN SATURATION: 98 % | BODY MASS INDEX: 27.39 KG/M2 | RESPIRATION RATE: 18 BRPM | WEIGHT: 160.4 LBS | HEIGHT: 64 IN | SYSTOLIC BLOOD PRESSURE: 152 MMHG

## 2022-11-04 LAB
ALBUMIN SERPL-MCNC: 3.1 G/DL (ref 3.5–5)
ALBUMIN/GLOB SERPL: 0.8 {RATIO} (ref 0.4–1.6)
ALP SERPL-CCNC: 78 U/L (ref 50–136)
ALT SERPL-CCNC: 23 U/L (ref 12–65)
ANION GAP SERPL CALC-SCNC: 5 MMOL/L (ref 2–11)
ANION GAP SERPL CALC-SCNC: 9 MMOL/L (ref 2–11)
AST SERPL-CCNC: 9 U/L (ref 15–37)
BASOPHILS # BLD: 0.1 K/UL (ref 0–0.2)
BASOPHILS NFR BLD: 1 % (ref 0–2)
BILIRUB SERPL-MCNC: 0.6 MG/DL (ref 0.2–1.1)
BUN SERPL-MCNC: 13 MG/DL (ref 6–23)
BUN SERPL-MCNC: 19 MG/DL (ref 6–23)
CALCIUM SERPL-MCNC: 6.6 MG/DL (ref 8.3–10.4)
CALCIUM SERPL-MCNC: 8.6 MG/DL (ref 8.3–10.4)
CHLORIDE SERPL-SCNC: 97 MMOL/L (ref 101–110)
CHLORIDE SERPL-SCNC: 99 MMOL/L (ref 101–110)
CO2 SERPL-SCNC: 27 MMOL/L (ref 21–32)
CO2 SERPL-SCNC: 29 MMOL/L (ref 21–32)
CREAT SERPL-MCNC: 0.7 MG/DL (ref 0.6–1)
CREAT SERPL-MCNC: 0.8 MG/DL (ref 0.6–1)
DIFFERENTIAL METHOD BLD: ABNORMAL
EOSINOPHIL # BLD: 0.1 K/UL (ref 0–0.8)
EOSINOPHIL NFR BLD: 1 % (ref 0.5–7.8)
ERYTHROCYTE [DISTWIDTH] IN BLOOD BY AUTOMATED COUNT: 12.9 % (ref 11.9–14.6)
GLOBULIN SER CALC-MCNC: 4.1 G/DL (ref 2.8–4.5)
GLUCOSE BLD STRIP.AUTO-MCNC: 167 MG/DL (ref 65–100)
GLUCOSE BLD STRIP.AUTO-MCNC: 169 MG/DL (ref 65–100)
GLUCOSE SERPL-MCNC: 161 MG/DL (ref 65–100)
GLUCOSE SERPL-MCNC: 177 MG/DL (ref 65–100)
HCT VFR BLD AUTO: 38.5 % (ref 35.8–46.3)
HGB BLD-MCNC: 13.1 G/DL (ref 11.7–15.4)
IMM GRANULOCYTES # BLD AUTO: 0.1 K/UL (ref 0–0.5)
IMM GRANULOCYTES NFR BLD AUTO: 1 % (ref 0–5)
LYMPHOCYTES # BLD: 3.3 K/UL (ref 0.5–4.6)
LYMPHOCYTES NFR BLD: 34 % (ref 13–44)
MAGNESIUM SERPL-MCNC: 2.2 MG/DL (ref 1.8–2.4)
MCH RBC QN AUTO: 27.2 PG (ref 26.1–32.9)
MCHC RBC AUTO-ENTMCNC: 34 G/DL (ref 31.4–35)
MCV RBC AUTO: 79.9 FL (ref 82–102)
MONOCYTES # BLD: 0.8 K/UL (ref 0.1–1.3)
MONOCYTES NFR BLD: 8 % (ref 4–12)
NEUTS SEG # BLD: 5.6 K/UL (ref 1.7–8.2)
NEUTS SEG NFR BLD: 55 % (ref 43–78)
NRBC # BLD: 0 K/UL (ref 0–0.2)
PLATELET # BLD AUTO: 260 K/UL (ref 150–450)
PMV BLD AUTO: 9.9 FL (ref 9.4–12.3)
POTASSIUM SERPL-SCNC: 3.3 MMOL/L (ref 3.5–5.1)
POTASSIUM SERPL-SCNC: 3.4 MMOL/L (ref 3.5–5.1)
PROT SERPL-MCNC: 7.2 G/DL (ref 6.3–8.2)
RBC # BLD AUTO: 4.82 M/UL (ref 4.05–5.2)
SERVICE CMNT-IMP: ABNORMAL
SERVICE CMNT-IMP: ABNORMAL
SODIUM SERPL-SCNC: 133 MMOL/L (ref 133–143)
SODIUM SERPL-SCNC: 133 MMOL/L (ref 133–143)
WBC # BLD AUTO: 9.9 K/UL (ref 4.3–11.1)

## 2022-11-04 PROCEDURE — 96376 TX/PRO/DX INJ SAME DRUG ADON: CPT

## 2022-11-04 PROCEDURE — 82962 GLUCOSE BLOOD TEST: CPT

## 2022-11-04 PROCEDURE — 6360000002 HC RX W HCPCS: Performed by: STUDENT IN AN ORGANIZED HEALTH CARE EDUCATION/TRAINING PROGRAM

## 2022-11-04 PROCEDURE — 6370000000 HC RX 637 (ALT 250 FOR IP): Performed by: FAMILY MEDICINE

## 2022-11-04 PROCEDURE — 2580000003 HC RX 258: Performed by: STUDENT IN AN ORGANIZED HEALTH CARE EDUCATION/TRAINING PROGRAM

## 2022-11-04 PROCEDURE — 6360000002 HC RX W HCPCS: Performed by: FAMILY MEDICINE

## 2022-11-04 PROCEDURE — 2580000003 HC RX 258: Performed by: FAMILY MEDICINE

## 2022-11-04 PROCEDURE — 96366 THER/PROPH/DIAG IV INF ADDON: CPT

## 2022-11-04 PROCEDURE — 6370000000 HC RX 637 (ALT 250 FOR IP): Performed by: STUDENT IN AN ORGANIZED HEALTH CARE EDUCATION/TRAINING PROGRAM

## 2022-11-04 PROCEDURE — 6360000002 HC RX W HCPCS: Performed by: INTERNAL MEDICINE

## 2022-11-04 PROCEDURE — 36415 COLL VENOUS BLD VENIPUNCTURE: CPT

## 2022-11-04 PROCEDURE — 96375 TX/PRO/DX INJ NEW DRUG ADDON: CPT

## 2022-11-04 PROCEDURE — C9113 INJ PANTOPRAZOLE SODIUM, VIA: HCPCS | Performed by: FAMILY MEDICINE

## 2022-11-04 PROCEDURE — G0378 HOSPITAL OBSERVATION PER HR: HCPCS

## 2022-11-04 PROCEDURE — 85025 COMPLETE CBC W/AUTO DIFF WBC: CPT

## 2022-11-04 PROCEDURE — 80053 COMPREHEN METABOLIC PANEL: CPT

## 2022-11-04 PROCEDURE — 83735 ASSAY OF MAGNESIUM: CPT

## 2022-11-04 RX ORDER — PROMETHAZINE HYDROCHLORIDE 12.5 MG/1
12.5 TABLET ORAL EVERY 6 HOURS PRN
Qty: 30 TABLET | Refills: 0 | Status: SHIPPED | OUTPATIENT
Start: 2022-11-04 | End: 2022-12-04

## 2022-11-04 RX ORDER — POTASSIUM CHLORIDE 20 MEQ/1
40 TABLET, EXTENDED RELEASE ORAL ONCE
Status: COMPLETED | OUTPATIENT
Start: 2022-11-04 | End: 2022-11-04

## 2022-11-04 RX ORDER — SUCRALFATE 1 G/1
1 TABLET ORAL 4 TIMES DAILY
Qty: 120 TABLET | Refills: 3 | Status: SHIPPED | OUTPATIENT
Start: 2022-11-04

## 2022-11-04 RX ORDER — PANTOPRAZOLE SODIUM 40 MG/1
40 TABLET, DELAYED RELEASE ORAL 2 TIMES DAILY
Qty: 30 TABLET | Refills: 0 | Status: SHIPPED | OUTPATIENT
Start: 2022-11-04 | End: 2022-12-04

## 2022-11-04 RX ORDER — INSULIN LISPRO 100 [IU]/ML
0-8 INJECTION, SOLUTION INTRAVENOUS; SUBCUTANEOUS
Qty: 10 ML | Refills: 0 | Status: SHIPPED | OUTPATIENT
Start: 2022-11-04

## 2022-11-04 RX ORDER — ONDANSETRON 2 MG/ML
4 INJECTION INTRAMUSCULAR; INTRAVENOUS EVERY 6 HOURS
Status: DISCONTINUED | OUTPATIENT
Start: 2022-11-04 | End: 2022-11-04 | Stop reason: HOSPADM

## 2022-11-04 RX ORDER — ONDANSETRON 4 MG/1
4 TABLET, FILM COATED ORAL DAILY PRN
Qty: 30 TABLET | Refills: 0 | Status: SHIPPED | OUTPATIENT
Start: 2022-11-04 | End: 2022-12-04

## 2022-11-04 RX ORDER — INSULIN LISPRO 100 [IU]/ML
0-4 INJECTION, SOLUTION INTRAVENOUS; SUBCUTANEOUS NIGHTLY
Qty: 5 ML | Refills: 0 | Status: SHIPPED | OUTPATIENT
Start: 2022-11-04

## 2022-11-04 RX ORDER — METOCLOPRAMIDE 5 MG/1
5 TABLET ORAL 3 TIMES DAILY
Qty: 30 TABLET | Refills: 0 | Status: SHIPPED | OUTPATIENT
Start: 2022-11-04 | End: 2022-11-14

## 2022-11-04 RX ORDER — METOPROLOL TARTRATE 50 MG/1
50 TABLET, FILM COATED ORAL 2 TIMES DAILY
Qty: 60 TABLET | Refills: 0 | Status: SHIPPED | OUTPATIENT
Start: 2022-11-04

## 2022-11-04 RX ADMIN — METOPROLOL TARTRATE 50 MG: 50 TABLET, FILM COATED ORAL at 08:01

## 2022-11-04 RX ADMIN — DEXTROSE AND SODIUM CHLORIDE: 5; 450 INJECTION, SOLUTION INTRAVENOUS at 01:56

## 2022-11-04 RX ADMIN — SODIUM CHLORIDE, PRESERVATIVE FREE 40 MG: 5 INJECTION INTRAVENOUS at 05:35

## 2022-11-04 RX ADMIN — SUCRALFATE 1 G: 1 TABLET ORAL at 05:36

## 2022-11-04 RX ADMIN — ASPIRIN 81 MG: 81 TABLET ORAL at 08:00

## 2022-11-04 RX ADMIN — METOCLOPRAMIDE 5 MG: 5 INJECTION, SOLUTION INTRAMUSCULAR; INTRAVENOUS at 10:12

## 2022-11-04 RX ADMIN — POTASSIUM CHLORIDE 40 MEQ: 1500 TABLET, EXTENDED RELEASE ORAL at 10:12

## 2022-11-04 RX ADMIN — SODIUM CHLORIDE, PRESERVATIVE FREE 10 ML: 5 INJECTION INTRAVENOUS at 08:01

## 2022-11-04 RX ADMIN — PROMETHAZINE HYDROCHLORIDE 12.5 MG: 25 TABLET ORAL at 08:08

## 2022-11-04 RX ADMIN — ONDANSETRON 4 MG: 2 INJECTION INTRAMUSCULAR; INTRAVENOUS at 12:40

## 2022-11-04 RX ADMIN — METOCLOPRAMIDE 5 MG: 5 INJECTION, SOLUTION INTRAMUSCULAR; INTRAVENOUS at 05:36

## 2022-11-04 RX ADMIN — SUCRALFATE 1 G: 1 TABLET ORAL at 10:12

## 2022-11-04 NOTE — PROGRESS NOTES
Reviewed notes for new spiritual concerns. HX CABG X2    ANXIETY    DEPRESSION    MOTHER - KIMI    DAUGHTER - RJ    FULL C    NO DIRECTIVES    EXPECTED D/C SOON      GOOD VISIT WITH PATIENT    CALM AND ALERT    NO DISTRESS NOTED          Will follow as needed.

## 2022-11-04 NOTE — DISCHARGE SUMMARY
Hospitalist Discharge Summary   Admit Date:  2022 12:42 PM   DC Note date: 2022  Name:  Briana Lopez   Age:  62 y.o. Sex:  female  :  1965   MRN:  [id number]   Room:  [facility]  PCP:  Lugene Bamberger, DO    Presenting Complaint: Emesis     Initial Admission Diagnosis: Intractable vomiting [R11.10]  Intractable nausea and vomiting [R11.2]     Problem List for this Hospitalization (present on admission):    Principal Problem:    Intractable nausea and vomiting  Active Problems:    S/P CABG x 2    CAD (coronary artery disease)    GERD with esophagitis    Hyperlipidemia associated with type 2 diabetes mellitus ([address]  Resolved Problems:    * No resolved hospital problems. *      Hospital Course:  Briana Lopez is a 62 y.o. female with medical history of T2DM, Gastroparesis, HLD, CAD s.p CABG May 2022, BL carotid atherosclerosis who presented with intractable nausea and vomiting. Hospitalized 22 for intractable n/v. Treated for severe sepsis with no source found. Thought to be d/t gastroparesis. She reports h/o esophageal rupture in [id number] 2/2 intractable vomiting. Having uncontrolled indigestion, bleching. Intractable n/v about 1x per month. This episode started  while at work with multiple episodes of vomiting requiring frequent stops while driving home d/t symptoms. Had some soup and then it restarted associated with a lot of burning chest pain. Unsure if there was blood in her emesis, says it was dark. No tarry or black stools. Has been unable to establish care with GI outpatient due to scheduling conflicts. Per chart review, 2019 admitted @ [facility] s/p EGD  19, for coffee ground emesis and intractable n/v,  found with ulcerative esophagitis, ayden kerr tear,and gastric ulcerations/gastitis. Per GI note at that time -        /94    In ED, she rec'd 1 L NS, Zofran 4 mg IV x 2, and compazine 5 mg IV x 1 w/o relief. GI was consulted.   Patient had EGD which showed esophagitis and gastritis. She was treated with Protonix and sucralfate. She was getting scheduled Reglan. Patient clinically improved and her nausea and vomiting improved. She is tolerating diet. She is advised to take gastroparesis diet small low-fat meals as tolerated. And repeat EGD in 8 to 12 weeks to document healing. Her potassium is low as she is not eating much. It is repleted. Patient is hemodynamically stable for discharge      Disposition: Home  Diet: ADULT DIET; Clear Liquid; No red dye  Code Status: Full Code    Follow Ups:      Time spent in patient discharge and coordination 35 minutes. Follow up labs/diagnostics (ultimately defer to outpatient provider): Follow-up with PCP in 3 to 5 days  Follow-up with GI in 1 month    Plan was discussed with patient. All questions answered. Patient was stable at time of discharge. Instructions given to call a physician or return if any concerns. Current Discharge Medication List        CONTINUE these medications which have NOT CHANGED    Details   lisinopril (PRINIVIL;ZESTRIL) 20 MG tablet Take 0.5 tablets by mouth at bedtime TAKE 1 TABLET BY MOUTH EVERY DAY  Qty: 15 tablet, Refills: 11      citalopram (CELEXA) 20 MG tablet Take 1 tablet by mouth at bedtime  Qty: 90 tablet, Refills: 3      pantoprazole (PROTONIX) 40 MG tablet Take 1 tablet by mouth every morning (before breakfast)  Qty: 30 tablet, Refills: 11      metoprolol tartrate (LOPRESSOR) 25 MG tablet TAKE ONE TABLET BY MOUTH TWICE A DAY  Qty: 60 tablet, Refills: 3      empagliflozin (JARDIANCE) 10 MG tablet Take 1 tablet by mouth in the morning.   Qty: 30 tablet, Refills: 5      gabapentin (NEURONTIN) 100 MG capsule 1 at night prn foot pain neuropathy  Qty: 30 capsule, Refills: 5      atorvastatin (LIPITOR) 80 MG tablet Take 1 tablet by mouth at bedtime  Qty: 90 tablet, Refills: 3      insulin NPH (HUMULIN N;NOVOLIN N) 100 UNIT/ML injection vial 18 units in the morning and 7 units at night  Qty: 100 mL, Refills: 3      aspirin 81 MG EC tablet Take 81 mg by mouth at bedtime           STOP taking these medications       Hyoscyamine Sulfate SL (LEVSIN/SL) 0.125 MG SUBL Comments:   Reason for Stopping:               Procedures done this admission:  Procedure(s):  EGD BIOPSY    Consults this admission:  IP CONSULT TO GI    Echocardiogram results:  No results found for this or any previous visit. Diagnostic Imaging/Tests:   CT ABDOMEN PELVIS W IV CONTRAST Additional Contrast? None    Result Date: 11/2/2022  1. Diffuse distal esophageal wall thickening, may represent sequela of esophagitis. 2.  No other acute findings within the abdomen and pelvis.        Labs: Results:       BMP, Mg, Phos Recent Labs     11/02/22  1031 11/03/22  0604 11/04/22  0727    136 133   K 3.7 3.7 3.4*   CL 99* 104 99*   CO2 28 23 29   ANIONGAP 9 9 5   BUN 17 13 19   CREATININE 0.90 0.60 0.80   LABGLOM >60 >60 >60   CALCIUM 9.6 8.7 6.6*   GLUCOSE 120* 75 161*   MG 2.1 2.2 2.2      CBC Recent Labs     11/02/22  1031 11/03/22  0604 11/04/22  0727   WBC 12.8* 13.4* 9.9   RBC 5.00 4.73 4.82   HGB 13.6 12.8 13.1   HCT 41.0 39.2 38.5   MCV 82.0 82.9 79.9*   MCH 27.2 27.1 27.2   MCHC 33.2 32.7 34.0   RDW 13.2 13.2 12.9    266 260   MPV 9.4 9.8 9.9   NRBC 0.00 0.00 0.00   SEGS 85* 75 55   LYMPHOPCT 13 18 34   EOSRELPCT 0* 0* 1   MONOPCT 2* 6 8   BASOPCT 0 1 1   IMMGRAN 0 0 1   SEGSABS 10.8* 10.0* 5.6   LYMPHSABS 1.7 2.5 3.3   EOSABS 0.0 0.0 0.1   MONOSABS 0.2 0.9 0.8   BASOSABS 0.0 0.1 0.1   ABSIMMGRAN 0.0 0.1 0.1      LFT Recent Labs     11/02/22  1031 11/03/22  0604 11/04/22  0727   BILITOT 0.5 0.7 0.6   ALKPHOS 91 83 78   AST 20 18 9*   ALT 26 21 23   PROT 8.4* 7.3 7.2   LABALBU 3.8 3.2* 3.1*   GLOB 4.6* 4.1 4.1      Cardiac  Lab Results   Component Value Date/Time    TROPHS 12.0 11/02/2022 09:00 PM    TROPHS 6.9 11/02/2022 10:31 AM      Coags Lab Results   Component Value Date/Time    PROTIME 17.3 05/25/2022 03:39 PM    PROTIME 13.3 05/23/2022 09:27 AM    PROTIME 14.3 05/09/2022 08:51 AM    INR 1.4 05/25/2022 03:39 PM    INR 1.0 05/23/2022 09:27 AM    INR 1.1 05/09/2022 08:51 AM    APTT 32.5 05/25/2022 03:39 PM    APTT 27.3 05/23/2022 09:27 AM      A1c Lab Results   Component Value Date/Time    LABA1C 8.1 10/27/2022 04:14 PM    LABA1C 8.9 09/12/2022 10:41 PM    LABA1C 9.5 06/28/2022 08:54 AM     10/27/2022 04:14 PM     09/12/2022 10:41 PM     06/28/2022 08:54 AM      Lipids Lab Results   Component Value Date/Time    CHOL 134 10/27/2022 04:14 PM    LDLCALC 28 10/27/2022 04:14 PM    LABVLDL 61 10/27/2022 04:14 PM    HDL 45 10/27/2022 04:14 PM    CHOLHDLRATIO 3.0 10/27/2022 04:14 PM    TRIG 305 10/27/2022 04:14 PM      Thyroid  Lab Results   Component Value Date/Time    TSHELE 2.93 06/28/2022 09:21 PM        Most Recent UA Lab Results   Component Value Date/Time    COLORU YELLOW/STRAW 09/13/2022 09:26 AM    APPEARANCE CLEAR 09/13/2022 09:26 AM    SPECGRAV 1.032 09/13/2022 09:26 AM    LABPH 5.0 09/13/2022 09:26 AM    PROTEINU Negative 09/13/2022 09:26 AM    GLUCOSEU 500 11/02/2022 01:30 PM    GLUCOSEU >1000 09/13/2022 09:26 AM    KETUA Negative 09/13/2022 09:26 AM    BILIRUBINUR Negative 09/13/2022 09:26 AM    BLOODU Negative 11/02/2022 01:30 PM    BLOODU Negative 09/13/2022 09:26 AM    UROBILINOGEN 0.2 09/13/2022 09:26 AM    NITRU Negative 09/13/2022 09:26 AM    LEUKOCYTESUR Negative 09/13/2022 09:26 AM    WBCUA 0-4 09/13/2022 09:26 AM    RBCUA 0-5 09/13/2022 09:26 AM    EPITHUA 0-5 09/13/2022 09:26 AM    BACTERIA Negative 09/13/2022 09:26 AM    LABCAST 2-5 09/13/2022 09:26 AM    MUCUS 0 09/13/2022 09:26 AM        No results for input(s): CULTURE in the last 720 hours.     All Labs from Last 24 Hrs:  Recent Results (from the past 24 hour(s))   POCT Glucose    Collection Time: 11/03/22 11:07 AM   Result Value Ref Range    POC Glucose 90 65 - 100 mg/dL    Performed by: Jarrod Gil POCT Glucose    Collection Time: 11/03/22  4:36 PM   Result Value Ref Range    POC Glucose 101 (H) 65 - 100 mg/dL    Performed by: Judi    POCT Glucose    Collection Time: 11/03/22  9:25 PM   Result Value Ref Range    POC Glucose 119 (H) 65 - 100 mg/dL    Performed by: Joey    POCT Glucose    Collection Time: 11/04/22  7:22 AM   Result Value Ref Range    POC Glucose 167 (H) 65 - 100 mg/dL    Performed by: Neal    CBC with Auto Differential    Collection Time: 11/04/22  7:27 AM   Result Value Ref Range    WBC 9.9 4.3 - 11.1 K/uL    RBC 4.82 4.05 - 5.2 M/uL    Hemoglobin 13.1 11.7 - 15.4 g/dL    Hematocrit 38.5 35.8 - 46.3 %    MCV 79.9 (L) 82 - 102 FL    MCH 27.2 26.1 - 32.9 PG    MCHC 34.0 31.4 - 35.0 g/dL    RDW 12.9 11.9 - 14.6 %    Platelets 500 316 - 340 K/uL    MPV 9.9 9.4 - 12.3 FL    nRBC 0.00 0.0 - 0.2 K/uL    Differential Type AUTOMATED      Seg Neutrophils 55 43 - 78 %    Lymphocytes 34 13 - 44 %    Monocytes 8 4.0 - 12.0 %    Eosinophils % 1 0.5 - 7.8 %    Basophils 1 0.0 - 2.0 %    Immature Granulocytes 1 0.0 - 5.0 %    Segs Absolute 5.6 1.7 - 8.2 K/UL    Absolute Lymph # 3.3 0.5 - 4.6 K/UL    Absolute Mono # 0.8 0.1 - 1.3 K/UL    Absolute Eos # 0.1 0.0 - 0.8 K/UL    Basophils Absolute 0.1 0.0 - 0.2 K/UL    Absolute Immature Granulocyte 0.1 0.0 - 0.5 K/UL   Magnesium    Collection Time: 11/04/22  7:27 AM   Result Value Ref Range    Magnesium 2.2 1.8 - 2.4 mg/dL   Comprehensive Metabolic Panel    Collection Time: 11/04/22  7:27 AM   Result Value Ref Range    Sodium 133 133 - 143 mmol/L    Potassium 3.4 (L) 3.5 - 5.1 mmol/L    Chloride 99 (L) 101 - 110 mmol/L    CO2 29 21 - 32 mmol/L    Anion Gap 5 2 - 11 mmol/L    Glucose 161 (H) 65 - 100 mg/dL    BUN 19 6 - 23 MG/DL    Creatinine 0.80 0.6 - 1.0 MG/DL    Est, Glom Filt Rate >60 >60 ml/min/1.73m2    Calcium 6.6 (L) 8.3 - 10.4 MG/DL    Total Bilirubin 0.6 0.2 - 1.1 MG/DL    ALT 23 12 - 65 U/L    AST 9 (L) 15 - 37 U/L Alk Phosphatase 78 50 - 136 U/L    Total Protein 7.2 6.3 - 8.2 g/dL    Albumin 3.1 (L) 3.5 - 5.0 g/dL    Globulin 4.1 2.8 - 4.5 g/dL    Albumin/Globulin Ratio 0.8 0.4 - 1.6         Allergies   Allergen Reactions    Metformin And Related Nausea Only     Immunization History   Administered Date(s) Administered    COVID-19, MODERNA BLUE border, Primary or Immunocompromised, (age 12y+), IM, 100 mcg/0.5mL 08/08/2021, 09/28/2021       Recent Vital Data:  Patient Vitals for the past 24 hrs:   Temp Pulse Resp BP SpO2   11/04/22 0720 98 °F (36.7 °C) 95 18 (!) 138/91 96 %   11/04/22 0315 97.7 °F (36.5 °C) 79 18 (!) 90/52 96 %   11/03/22 2227 99 °F (37.2 °C) 95 17 (!) 157/81 97 %   11/03/22 1944 98.8 °F (37.1 °C) (!) 114 18 (!) 156/92 97 %   11/03/22 1510 98.6 °F (37 °C) (!) 104 16 (!) 154/90 98 %   11/03/22 1340 -- 96 12 (!) 168/88 99 %   11/03/22 1330 -- 96 13 (!) 175/84 98 %   11/03/22 1320 -- 93 14 (!) 142/71 98 %   11/03/22 1310 -- 90 12 (!) 116/56 97 %   11/03/22 1300 98 °F (36.7 °C) 93 14 (!) 107/55 99 %   11/03/22 1239 -- (!) 107 14 (!) 202/91 100 %   11/03/22 1143 98.1 °F (36.7 °C) (!) 106 23 (!) 184/94 97 %   11/03/22 1105 98.2 °F (36.8 °C) (!) 110 18 (!) 159/97 96 %       Oxygen Therapy  SpO2: 96 %  Pulse via Oximetry: 99 beats per minute  Pulse Oximeter Device Mode: Continuous  Pulse Oximeter Device Location: Right  O2 Device: None (Room air)  Skin Assessment: Clean, dry, & intact  O2 Flow Rate (L/min): 2 L/min    Estimated body mass index is 27.53 kg/m² as calculated from the following:    Height as of this encounter: 5' 4\" (1.626 m). Weight as of this encounter: 160 lb 6.4 oz (72.8 kg). Intake/Output Summary (Last 24 hours) at 11/4/2022 0936  Last data filed at 11/4/2022 0912  Gross per 24 hour   Intake 2930 ml   Output 2400 ml   Net 530 ml         Physical Exam:    General:    Well nourished. No overt distress  Head:  Normocephalic, atraumatic  Eyes:  Sclerae appear normal.  Pupils equally round. HENT:  Nares appear normal, no drainage. Moist mucous membranes  Neck:  No restricted ROM. Trachea midline  CV:   RRR. No m/r/g. No JVD  Lungs:   CTAB. No wheezing, rhonchi, or rales. Respirations even, unlabored  Abdomen:   Soft, nontender, nondistended. Extremities: Warm and dry. No cyanosis or clubbing. No edema. Skin:     No rashes. Normal coloration  Neuro:  CN II-XII grossly intact. Psych:  Normal mood and affect. Signed:  Michael Varela MD    Part of this note may have been written by using a voice dictation software. The note has been proof read but may still contain some grammatical/other typographical errors.

## 2022-11-04 NOTE — PROGRESS NOTES
Discharge instructions were reviewed and IV's removed. Prescriptions were sent to patient pharmacy and medications discussed. Follow-up appointments were reviewed. No further questions at this time patient returning home with sister's vehicle and rolled out by staff via wheelchair.

## 2022-11-04 NOTE — CARE COORDINATION
Pt to d/c home today. Family will provide transportation. Pt is independent with ADLs. There were no PT/OT/SLP consults ordered during this hospitalization. No supportive care needs identified. Pt agrees with d/c plan. Milestones met. 11/02/22 8737   Service Assessment   Patient Orientation Alert and Oriented;Person;Place; Self   Cognition Alert   History Provided By Patient;Medical Record   Primary Caregiver Self   Support Systems Parent; Luis Corona [id number] is: Legal Next of Kin   PCP Verified by CM Yes  (Monty Swain DO)   Last Visit to PCP Within last 3 months   Prior Functional Level Independent in ADLs/IADLs   Current Functional Level Independent in ADLs/IADLs   Can patient return to prior living arrangement Yes   Ability to make needs known: Good   Family able to assist with home care needs: Yes   Would you like for me to discuss the discharge plan with any other family members/significant others, and if so, who? No   Financial Resources Other (Comment)  (BCBS)   Social/Functional History   Lives With Parent   Type of [address] One [address]   [facility]   Ambulation Assistance Independent   Transfer Assistance Independent   Active  Yes   Mode of Transportation Car   Occupation Full time employment   Type of Occupation [employer]   Discharge Planning   Type of [place] Parent   Current Services Prior To Admission None   Potential Assistance Needed N/A   DME Ordered? No   Potential Assistance Purchasing Medications No   Type of Home Care Services None   Patient expects to be discharged to: House   One/Two Story Residence One story   History of falls? 0   Services At/After Discharge   Transition of Care Consult (CM Consult) Discharge [address] Discharge None   [place] Resource Information Provided?  No   Mode of Transport at Discharge Other (see comment)  (Family)   Confirm Follow Up Transport Family   Condition of Participation: Discharge Planning   The Plan for Transition of Care is related to the following treatment goals: Pt to obtain care to become medically stable and to return with a safe transition. The Patient and/or Patient Representative was provided with a Choice of Provider? Patient   The Patient and/Or Patient Representative agree with the Discharge Plan? Yes   Freedom of Choice list was provided with basic dialogue that supports the patient's individualized plan of care/goals, treatment preferences, and shares the quality data associated with the providers?   Yes

## 2022-11-04 NOTE — PROGRESS NOTES
[facility] Progress Note         Admit Date:  11/2/2022    Today's Date:  11/4/2022    CC:  Intractable n/v, abnormal CT    Subjective: Tolerated juice last night but did not feel like eating anything else. Tolerated juice this AM but then vomited after eating jello. No BM overnight, no abd pain.     Medications:   Current Facility-Administered Medications   Medication Dose Route Frequency    metoclopramide (REGLAN) injection 5 mg  5 mg IntraVENous 4x Daily AC & HS    metoprolol tartrate (LOPRESSOR) tablet 50 mg  50 mg Oral BID    lactated ringers infusion   IntraVENous Continuous    sucralfate (CARAFATE) tablet 1 g  1 g Oral 4 times per day    pantoprazole (PROTONIX) 40 mg in sodium chloride (PF) 0.9 % 10 mL injection  40 mg IntraVENous Q12H    sodium chloride flush 0.9 % injection 5-40 mL  5-40 mL IntraVENous 2 times per day    sodium chloride flush 0.9 % injection 5-40 mL  5-40 mL IntraVENous PRN    0.9 % sodium chloride infusion   IntraVENous PRN    enoxaparin (LOVENOX) injection 40 mg  40 mg SubCUTAneous Q24H    acetaminophen (TYLENOL) tablet 650 mg  650 mg Oral Q6H PRN    Or    acetaminophen (TYLENOL) suppository 650 mg  650 mg Rectal Q6H PRN    polyethylene glycol (GLYCOLAX) packet 17 g  17 g Oral Daily PRN    promethazine (PHENERGAN) tablet 12.5 mg  12.5 mg Oral Q6H PRN    Or    ondansetron (ZOFRAN) injection 4 mg  4 mg IntraVENous Q6H PRN    hyoscyamine (LEVSIN/SL) sublingual tablet 125 mcg  125 mcg SubLINGual Q4H PRN    glucose chewable tablet 16 g  4 tablet Oral PRN    dextrose bolus 10% 125 mL  125 mL IntraVENous PRN    Or    dextrose bolus 10% 250 mL  250 mL IntraVENous PRN    glucagon (rDNA) injection 1 mg  1 mg SubCUTAneous PRN    dextrose 10 % infusion   IntraVENous Continuous PRN    aluminum & magnesium hydroxide-simethicone (MAALOX) 200-200-20 MG/5ML suspension 30 mL  30 mL Oral Q6H PRN    [Held by provider] insulin NPH (HUMULIN N;NOVOLIN N) injection vial 12 Units  12 Units SubCUTAneous QAM AC    And    [Held by provider] insulin NPH (HUMULIN N;NOVOLIN N) injection vial 6 Units  6 Units SubCUTAneous Nightly    insulin lispro (HUMALOG) injection vial 0-8 Units  0-8 Units SubCUTAneous TID WC    insulin lispro (HUMALOG) injection vial 0-4 Units  0-4 Units SubCUTAneous Nightly    atorvastatin (LIPITOR) tablet 80 mg  80 mg Oral Nightly    gabapentin (NEURONTIN) capsule 100 mg  100 mg Oral TID PRN    citalopram (CELEXA) tablet 20 mg  20 mg Oral Nightly    aspirin EC tablet 81 mg  81 mg Oral Daily       Review of Systems:  ROS was obtained, with pertinent positives as listed above. No chest pain or SOB. Diet:      Objective:   Vitals:  BP (!) 138/91   Pulse 95   Temp 98 °F (36.7 °C) (Oral)   Resp 18   Ht 5' 4\" (1.626 m)   Wt 160 lb 6.4 oz (72.8 kg)   SpO2 96%   BMI 27.53 kg/m²   Intake/Output:  11/04 0701 - 11/04 1900  In: 240 [P.O.:240]  Out: 1200 [Urine:1200]  11/02 1901 - 11/04 0700  In: 4524 [P.O.:1080; I.V.:3031]  Out: 1799 [Urine:1650]    Exam:  GENERAL: Alert, cooperative, in no acute distress  CV: Regular rate and rhythm  RESP: Clear to auscultation bilaterally anteriorly  ABD: Soft, non-tender, non-distended, normoactive bowel sounds, no organomegaly appreciated  EXTREM: Extremities normal, atraumatic, no cyanosis or edema  NEURO: Awake and alert    Data Review (Labs):    Recent Labs     11/02/22  1031 11/03/22  0604 11/04/22  0727 11/04/22  1022   WBC 12.8* 13.4* 9.9  --    HGB 13.6 12.8 13.1  --    HCT 41.0 39.2 38.5  --     266 260  --    MCV 82.0 82.9 79.9*  --     136 133 133   K 3.7 3.7 3.4* 3.3*   CL 99* 104 99* 97*   CO2 28 23 29 27   BUN 17 13 19 13   MG 2.1 2.2 2.2  --    ALT 26 21 23  --      EGD 11/3/22:  PRE-OP DIAGNOSIS:  Hematemesis  Intractable nausea/vomiting  History of gastroparesis  History of esophagitis/MWT  POST-OP DIAGNOSIS:  Severe erosive esophagitis  Hiatal hernia  Erosive gastritis  FINDINGS:  ESOPHAGUS: Normal proximal esophagus. Severe, LA grade D erosive esophagitis was noted beginning approximately 28 cm from the incisors and extending to the Z-line, which was located at 36 cm from the incisors. Biopsies were obtained. STOMACH:  A 2 cm sliding-type hiatal hernia without Folrentino ulcers was noted with Z line located at 36 cm and diaphragmatic hiatus 38 cm from the incisors. The flap valve is considered Hill grade III on retroflexed view. Erosive gastritis was noted in the antrum. Otherwise normal gastric mucosa throughout. Random gastric biopsies were obtained. DUODENUM: Normal bulb and 2nd portion. Sherwin Acosta MD  [facility]    Assessment:     Principal Problem:    Intractable nausea and vomiting  Active Problems:    S/P CABG x 2    CAD (coronary artery disease)    GERD with esophagitis    Hyperlipidemia associated with type 2 diabetes mellitus ([facility] [address]  Resolved Problems:    * No resolved hospital problems. *    Patient is a 62 y.o. female with h/o CAD, depression, DM, HTN, HLD, gastroparesis with recurrent ED visits/admissions, and GERD  who is seen in consultation at the request of Dr. Mike Heredia for refractory nausea/vomiting and coffee ground emesis. Ran out of PPI 5 days PTA. CT abd/pelvis with contrast on 11/2/22 with distal esophageal wall thickening. 11/3:  EGD showed severe erosive esophagitis, HH, erosive gastritis.     Plan:     - F/u pathology  - Continue PPI BID, can transition to PO once able to tolerate reliably  - Continue Reglan 5 mg TID AC/HS, can transition to PO once able to tolerate reliably (aware of BB warning of tardive dyskinesia)  - Will schedule Zofran  - Continue CLD, advance to gastroparesis diet (6 small, low-residue, low-fat meals/day) as tolerated  - Avoid NSAIDs  - Antireflux measures  - Repeat exam in 8-12 weeks to document healing      Sherwin Acosta MD  [facility]

## 2022-11-07 RX ORDER — BLOOD-GLUCOSE SENSOR
EACH MISCELLANEOUS
Qty: 1 EACH | Refills: 0 | Status: SHIPPED | OUTPATIENT
Start: 2022-11-07

## 2022-11-07 RX ORDER — BLOOD-GLUCOSE TRANSMITTER
1 EACH MISCELLANEOUS DAILY
Qty: 1 EACH | Refills: 1 | Status: SHIPPED | OUTPATIENT
Start: 2022-11-07

## 2022-11-07 RX ORDER — BLOOD-GLUCOSE,RECEIVER,CONT
1 EACH MISCELLANEOUS
Qty: 1 EACH | Refills: 1 | Status: SHIPPED | OUTPATIENT
Start: 2022-11-07

## 2022-11-09 ENCOUNTER — OFFICE VISIT (OUTPATIENT)
Dept: FAMILY MEDICINE CLINIC | Facility: CLINIC | Age: 57
End: 2022-11-09
Payer: COMMERCIAL

## 2022-11-09 VITALS
HEART RATE: 69 BPM | DIASTOLIC BLOOD PRESSURE: 72 MMHG | WEIGHT: 164.2 LBS | HEIGHT: 63 IN | OXYGEN SATURATION: 99 % | BODY MASS INDEX: 29.09 KG/M2 | SYSTOLIC BLOOD PRESSURE: 126 MMHG

## 2022-11-09 DIAGNOSIS — K31.84 DIABETIC GASTROPARESIS (HCC): ICD-10-CM

## 2022-11-09 DIAGNOSIS — E11.43 POORLY CONTROLLED TYPE 2 DIABETES MELLITUS WITH AUTONOMIC NEUROPATHY (HCC): ICD-10-CM

## 2022-11-09 DIAGNOSIS — E11.43 DIABETIC GASTROPARESIS (HCC): ICD-10-CM

## 2022-11-09 DIAGNOSIS — I25.10 CORONARY ARTERY DISEASE INVOLVING NATIVE CORONARY ARTERY OF NATIVE HEART WITHOUT ANGINA PECTORIS: ICD-10-CM

## 2022-11-09 DIAGNOSIS — E11.65 POORLY CONTROLLED TYPE 2 DIABETES MELLITUS WITH AUTONOMIC NEUROPATHY (HCC): ICD-10-CM

## 2022-11-09 DIAGNOSIS — Z23 NEED FOR INFLUENZA VACCINATION: Primary | ICD-10-CM

## 2022-11-09 DIAGNOSIS — I11.9 HYPERTENSIVE HEART DISEASE WITHOUT HEART FAILURE: ICD-10-CM

## 2022-11-09 DIAGNOSIS — Z12.11 COLON CANCER SCREENING: ICD-10-CM

## 2022-11-09 PROBLEM — K22.6 MALLORY-WEISS TEAR: Status: ACTIVE | Noted: 2019-08-13

## 2022-11-09 PROBLEM — K92.0 COFFEE GROUND EMESIS: Status: ACTIVE | Noted: 2019-08-12

## 2022-11-09 PROBLEM — K25.9 GASTRIC ULCER: Status: ACTIVE | Noted: 2019-08-13

## 2022-11-09 PROCEDURE — 90674 CCIIV4 VAC NO PRSV 0.5 ML IM: CPT | Performed by: FAMILY MEDICINE

## 2022-11-09 PROCEDURE — 3078F DIAST BP <80 MM HG: CPT | Performed by: FAMILY MEDICINE

## 2022-11-09 PROCEDURE — 90471 IMMUNIZATION ADMIN: CPT | Performed by: FAMILY MEDICINE

## 2022-11-09 PROCEDURE — 99214 OFFICE O/P EST MOD 30 MIN: CPT | Performed by: FAMILY MEDICINE

## 2022-11-09 PROCEDURE — 3052F HG A1C>EQUAL 8.0%<EQUAL 9.0%: CPT | Performed by: FAMILY MEDICINE

## 2022-11-09 PROCEDURE — 3074F SYST BP LT 130 MM HG: CPT | Performed by: FAMILY MEDICINE

## 2022-11-09 RX ORDER — INSULIN GLARGINE 100 [IU]/ML
30 INJECTION, SOLUTION SUBCUTANEOUS NIGHTLY
Qty: 15 ADJUSTABLE DOSE PRE-FILLED PEN SYRINGE | Refills: 1 | Status: SHIPPED | OUTPATIENT
Start: 2022-11-09

## 2022-11-09 RX ORDER — PREGABALIN 50 MG/1
50 CAPSULE ORAL 2 TIMES DAILY
Qty: 60 CAPSULE | Refills: 5 | Status: SHIPPED | OUTPATIENT
Start: 2022-11-09 | End: 2023-05-08

## 2022-11-09 RX ORDER — SEMAGLUTIDE 1.34 MG/ML
INJECTION, SOLUTION SUBCUTANEOUS
Qty: 2 ADJUSTABLE DOSE PRE-FILLED PEN SYRINGE | Refills: 0 | Status: SHIPPED | OUTPATIENT
Start: 2022-11-09 | End: 2023-03-07

## 2022-11-09 SDOH — ECONOMIC STABILITY: FOOD INSECURITY: WITHIN THE PAST 12 MONTHS, THE FOOD YOU BOUGHT JUST DIDN'T LAST AND YOU DIDN'T HAVE MONEY TO GET MORE.: NEVER TRUE

## 2022-11-09 SDOH — ECONOMIC STABILITY: FOOD INSECURITY: WITHIN THE PAST 12 MONTHS, YOU WORRIED THAT YOUR FOOD WOULD RUN OUT BEFORE YOU GOT MONEY TO BUY MORE.: NEVER TRUE

## 2022-11-09 ASSESSMENT — PATIENT HEALTH QUESTIONNAIRE - PHQ9
SUM OF ALL RESPONSES TO PHQ QUESTIONS 1-9: 0
2. FEELING DOWN, DEPRESSED OR HOPELESS: 0
3. TROUBLE FALLING OR STAYING ASLEEP: 0
7. TROUBLE CONCENTRATING ON THINGS, SUCH AS READING THE NEWSPAPER OR WATCHING TELEVISION: 0
5. POOR APPETITE OR OVEREATING: 0
6. FEELING BAD ABOUT YOURSELF - OR THAT YOU ARE A FAILURE OR HAVE LET YOURSELF OR YOUR FAMILY DOWN: 0
8. MOVING OR SPEAKING SO SLOWLY THAT OTHER PEOPLE COULD HAVE NOTICED. OR THE OPPOSITE, BEING SO FIGETY OR RESTLESS THAT YOU HAVE BEEN MOVING AROUND A LOT MORE THAN USUAL: 0
SUM OF ALL RESPONSES TO PHQ QUESTIONS 1-9: 0
SUM OF ALL RESPONSES TO PHQ QUESTIONS 1-9: 0
SUM OF ALL RESPONSES TO PHQ9 QUESTIONS 1 & 2: 0
10. IF YOU CHECKED OFF ANY PROBLEMS, HOW DIFFICULT HAVE THESE PROBLEMS MADE IT FOR YOU TO DO YOUR WORK, TAKE CARE OF THINGS AT HOME, OR GET ALONG WITH OTHER PEOPLE: 0
9. THOUGHTS THAT YOU WOULD BE BETTER OFF DEAD, OR OF HURTING YOURSELF: 0
SUM OF ALL RESPONSES TO PHQ QUESTIONS 1-9: 0
1. LITTLE INTEREST OR PLEASURE IN DOING THINGS: 0
4. FEELING TIRED OR HAVING LITTLE ENERGY: 0

## 2022-11-09 ASSESSMENT — ENCOUNTER SYMPTOMS: RESPIRATORY NEGATIVE: 1

## 2022-11-09 ASSESSMENT — SOCIAL DETERMINANTS OF HEALTH (SDOH): HOW HARD IS IT FOR YOU TO PAY FOR THE VERY BASICS LIKE FOOD, HOUSING, MEDICAL CARE, AND HEATING?: NOT HARD AT ALL

## 2022-11-09 NOTE — PROGRESS NOTES
PROGRESS NOTE    SUBJECTIVE:   Nicky Lemos is a 62 y.o. female seen for a follow up visit regarding   Chief Complaint   Patient presents with    New Patient     Establish care  Was in ER 11/2/22-11/4/22 for Nausea and Vomiting; reports symptoms have gotten better since being released. Chronic issue. Has appointment scheduled with Peyman Phillips in January. Had open heart surgery in May; reports doing well    Hypertension     Reports only taking 25 mg of Metoprolol twice daily instead of 50 mg twice daily. Reports dizziness in the morning and related it to the increase in medication. Also taking 20 mg of lisinopril daily instead of 10 mg daily. HPI:  2v CABG in May. DM, historically poor control, has been on Novolin N. Was on Ozempic in the past but had to stop due to insurance issues. History of diabetic neuropathy in her feet, could not tolerate gabapentin. Hosp last week with gastroenteritis, with mostly N/V. Had GI consult, working diagnosis is gastroparesis. Discharge summary from last hospitalization reviewed. Patient has follow-up appointment with GI. Hypertension       Reviewed and updated this visit by provider:  Tobacco  Allergies  Meds  Problems  Med Hx  Surg Hx  Fam Hx           Review of Systems   Constitutional: Negative. HENT: Negative. Respiratory: Negative. Cardiovascular: Negative. Neurological:  Positive for numbness.         OBJECTIVE:  Vitals:    11/09/22 0822   BP: 126/72   Site: Left Upper Arm   Cuff Size: Medium Adult   Pulse: 69   SpO2: 99%   Weight: 164 lb 3.2 oz (74.5 kg)   Height: 5' 3\" (1.6 m)        Physical Exam   General: Alert and oriented x3, well-appearing  HEENT: Normocephalic; external ears, ear canals, and  Tympanic membranes normal; PERRLA, EOMI, normal conjunctiva; normal nasal mucosa without drainage; oropharynx is moist without mucosal lesion  Neck: No adenopathy, thyromegaly or thyroid nodules  Pulmonary: Normal effort, good airflow, no rales or rhonchi  CVS: Regular rate and rhythm, normal S1, S2, no S3 or S4, no murmurs; no carotid bruits, 2+ pedal pulses  Extremities: No cyanosis/clubbing/edema    Medical problems and test results were reviewed with the patient today.      Recent Results (from the past 672 hour(s))   Microalbumin / Creatinine Urine Ratio    Collection Time: 10/27/22  4:14 PM   Result Value Ref Range    Microalbumin, Random Urine 0.73 <2.0 MG/DL    Creatinine, Ur 62.00 mg/dL    Microalbumin Creatinine Ratio 12 mg/g   Lipid Panel    Collection Time: 10/27/22  4:14 PM   Result Value Ref Range    Cholesterol, Total 134 <200 MG/DL    Triglycerides 305 (H) 35 - 150 MG/DL    HDL 45 40 - 60 MG/DL    LDL Calculated 28 <100 MG/DL    VLDL Cholesterol Calculated 61 (H) 6.0 - 23.0 MG/DL    Chol/HDL Ratio 3.0     Comprehensive Metabolic Panel    Collection Time: 10/27/22  4:14 PM   Result Value Ref Range    Sodium 138 133 - 143 mmol/L    Potassium 4.0 3.5 - 5.1 mmol/L    Chloride 105 101 - 110 mmol/L    CO2 29 21 - 32 mmol/L    Anion Gap 4 2 - 11 mmol/L    Glucose 200 (H) 65 - 100 mg/dL    BUN 13 6 - 23 MG/DL    Creatinine 0.90 0.6 - 1.0 MG/DL    Est, Glom Filt Rate >60 >60 ml/min/1.73m2    Calcium 9.2 8.3 - 10.4 MG/DL    Total Bilirubin 0.3 0.2 - 1.1 MG/DL    ALT 28 12 - 65 U/L    AST 14 (L) 15 - 37 U/L    Alk Phosphatase 136 50 - 136 U/L    Total Protein 7.2 6.3 - 8.2 g/dL    Albumin 3.5 3.5 - 5.0 g/dL    Globulin 3.7 2.8 - 4.5 g/dL    Albumin/Globulin Ratio 0.9 0.4 - 1.6     CBC    Collection Time: 10/27/22  4:14 PM   Result Value Ref Range    WBC 7.8 4.3 - 11.1 K/uL    RBC 4.45 4.05 - 5.2 M/uL    Hemoglobin 12.0 11.7 - 15.4 g/dL    Hematocrit 36.7 35.8 - 46.3 %    MCV 82.5 82 - 102 FL    MCH 27.0 26.1 - 32.9 PG    MCHC 32.7 31.4 - 35.0 g/dL    RDW 13.0 11.9 - 14.6 %    Platelets 721 972 - 739 K/uL    MPV 10.7 9.4 - 12.3 FL    nRBC 0.00 0.0 - 0.2 K/uL   Hemoglobin A1C    Collection Time: 10/27/22  4:14 PM   Result Value Ref Range Hemoglobin A1C 8.1 (H) 4.8 - 5.6 %    eAG 186 mg/dL   EKG 12 Lead (Select if upper abdominal pain or symptoms of SOB,Diaphoresis, or Tachycardia)    Collection Time: 11/02/22 10:29 AM   Result Value Ref Range    Ventricular Rate 113 BPM    Atrial Rate 113 BPM    P-R Interval 132 ms    QRS Duration 70 ms    Q-T Interval 346 ms    QTc Calculation (Bazett) 474 ms    P Axis 62 degrees    R Axis 65 degrees    T Axis 71 degrees    Diagnosis Sinus tachycardia    CBC with Auto Differential    Collection Time: 11/02/22 10:31 AM   Result Value Ref Range    WBC 12.8 (H) 4.3 - 11.1 K/uL    RBC 5.00 4.05 - 5.2 M/uL    Hemoglobin 13.6 11.7 - 15.4 g/dL    Hematocrit 41.0 35.8 - 46.3 %    MCV 82.0 82 - 102 FL    MCH 27.2 26.1 - 32.9 PG    MCHC 33.2 31.4 - 35.0 g/dL    RDW 13.2 11.9 - 14.6 %    Platelets 210 588 - 280 K/uL    MPV 9.4 9.4 - 12.3 FL    nRBC 0.00 0.0 - 0.2 K/uL    Differential Type AUTOMATED      Seg Neutrophils 85 (H) 43 - 78 %    Lymphocytes 13 13 - 44 %    Monocytes 2 (L) 4.0 - 12.0 %    Eosinophils % 0 (L) 0.5 - 7.8 %    Basophils 0 0.0 - 2.0 %    Immature Granulocytes 0 0.0 - 5.0 %    Segs Absolute 10.8 (H) 1.7 - 8.2 K/UL    Absolute Lymph # 1.7 0.5 - 4.6 K/UL    Absolute Mono # 0.2 0.1 - 1.3 K/UL    Absolute Eos # 0.0 0.0 - 0.8 K/UL    Basophils Absolute 0.0 0.0 - 0.2 K/UL    Absolute Immature Granulocyte 0.0 0.0 - 0.5 K/UL   Comprehensive Metabolic Panel    Collection Time: 11/02/22 10:31 AM   Result Value Ref Range    Sodium 136 133 - 143 mmol/L    Potassium 3.7 3.5 - 5.1 mmol/L    Chloride 99 (L) 101 - 110 mmol/L    CO2 28 21 - 32 mmol/L    Anion Gap 9 2 - 11 mmol/L    Glucose 120 (H) 65 - 100 mg/dL    BUN 17 6 - 23 MG/DL    Creatinine 0.90 0.6 - 1.0 MG/DL    Est, Glom Filt Rate >60 >60 ml/min/1.73m2    Calcium 9.6 8.3 - 10.4 MG/DL    Total Bilirubin 0.5 0.2 - 1.1 MG/DL    ALT 26 12 - 65 U/L    AST 20 15 - 37 U/L    Alk Phosphatase 91 50 - 136 U/L    Total Protein 8.4 (H) 6.3 - 8.2 g/dL    Albumin 3.8 3.5 - 5.0 g/dL    Globulin 4.6 (H) 2.8 - 4.5 g/dL    Albumin/Globulin Ratio 0.8 0.4 - 1.6     Lipase    Collection Time: 11/02/22 10:31 AM   Result Value Ref Range    Lipase 72 (L) 73 - 393 U/L   Magnesium    Collection Time: 11/02/22 10:31 AM   Result Value Ref Range    Magnesium 2.1 1.8 - 2.4 mg/dL   Troponin    Collection Time: 11/02/22 10:31 AM   Result Value Ref Range    Troponin, High Sensitivity 6.9 0 - 14 pg/mL   POCT Urinalysis no Micro    Collection Time: 11/02/22  1:30 PM   Result Value Ref Range    Specific Gravity, Urine, POC >1.030 (H) 1.001 - 1.023    pH, Urine, POC 6.0 5.0 - 9.0      Protein, Urine, POC 30 (A) NEG mg/dL    Glucose, UA  (A) NEG mg/dL    Ketones, Urine, POC 80 (A) NEG mg/dL    Bilirubin, Urine, POC Negative NEG      Blood, UA POC Negative NEG      URINE UROBILINOGEN POC 0.2 0.2 - 1.0 EU/dL    Nitrate, Urine, POC Negative NEG      Leukocyte Est, UA POC Negative NEG      Performed by: Balbir Minor    POCT Glucose    Collection Time: 11/02/22  6:28 PM   Result Value Ref Range    POC Glucose 86 65 - 100 mg/dL    Performed by: Geneva    POCT Glucose    Collection Time: 11/02/22  8:57 PM   Result Value Ref Range    POC Glucose 86 65 - 100 mg/dL    Performed by:  Javed    Troponin    Collection Time: 11/02/22  9:00 PM   Result Value Ref Range    Troponin, High Sensitivity 12.0 0 - 14 pg/mL   CBC with Auto Differential    Collection Time: 11/03/22  6:04 AM   Result Value Ref Range    WBC 13.4 (H) 4.3 - 11.1 K/uL    RBC 4.73 4.05 - 5.2 M/uL    Hemoglobin 12.8 11.7 - 15.4 g/dL    Hematocrit 39.2 35.8 - 46.3 %    MCV 82.9 82 - 102 FL    MCH 27.1 26.1 - 32.9 PG    MCHC 32.7 31.4 - 35.0 g/dL    RDW 13.2 11.9 - 14.6 %    Platelets 053 530 - 648 K/uL    MPV 9.8 9.4 - 12.3 FL    nRBC 0.00 0.0 - 0.2 K/uL    Differential Type AUTOMATED      Seg Neutrophils 75 43 - 78 %    Lymphocytes 18 13 - 44 %    Monocytes 6 4.0 - 12.0 %    Eosinophils % 0 (L) 0.5 - 7.8 %    Basophils 1 0.0 - 2.0 %    Immature Granulocytes 0 0.0 - 5.0 %    Segs Absolute 10.0 (H) 1.7 - 8.2 K/UL    Absolute Lymph # 2.5 0.5 - 4.6 K/UL    Absolute Mono # 0.9 0.1 - 1.3 K/UL    Absolute Eos # 0.0 0.0 - 0.8 K/UL    Basophils Absolute 0.1 0.0 - 0.2 K/UL    Absolute Immature Granulocyte 0.1 0.0 - 0.5 K/UL   Magnesium    Collection Time: 11/03/22  6:04 AM   Result Value Ref Range    Magnesium 2.2 1.8 - 2.4 mg/dL   Comprehensive Metabolic Panel    Collection Time: 11/03/22  6:04 AM   Result Value Ref Range    Sodium 136 133 - 143 mmol/L    Potassium 3.7 3.5 - 5.1 mmol/L    Chloride 104 101 - 110 mmol/L    CO2 23 21 - 32 mmol/L    Anion Gap 9 2 - 11 mmol/L    Glucose 75 65 - 100 mg/dL    BUN 13 6 - 23 MG/DL    Creatinine 0.60 0.6 - 1.0 MG/DL    Est, Glom Filt Rate >60 >60 ml/min/1.73m2    Calcium 8.7 8.3 - 10.4 MG/DL    Total Bilirubin 0.7 0.2 - 1.1 MG/DL    ALT 21 12 - 65 U/L    AST 18 15 - 37 U/L    Alk Phosphatase 83 50 - 136 U/L    Total Protein 7.3 6.3 - 8.2 g/dL    Albumin 3.2 (L) 3.5 - 5.0 g/dL    Globulin 4.1 2.8 - 4.5 g/dL    Albumin/Globulin Ratio 0.8 0.4 - 1.6     POCT Glucose    Collection Time: 11/03/22  8:14 AM   Result Value Ref Range    POC Glucose 79 65 - 100 mg/dL    Performed by: [email or web address]    POCT Glucose    Collection Time: 11/03/22 11:07 AM   Result Value Ref Range    POC Glucose 90 65 - 100 mg/dL    Performed by: [email or web address]    POCT Glucose    Collection Time: 11/03/22  4:36 PM   Result Value Ref Range    POC Glucose 101 (H) 65 - 100 mg/dL    Performed by: Judi    POCT Glucose    Collection Time: 11/03/22  9:25 PM   Result Value Ref Range    POC Glucose 119 (H) 65 - 100 mg/dL    Performed by: Joey    POCT Glucose    Collection Time: 11/04/22  7:22 AM   Result Value Ref Range    POC Glucose 167 (H) 65 - 100 mg/dL    Performed by: Neal    CBC with Auto Differential    Collection Time: 11/04/22  7:27 AM   Result Value Ref Range    WBC 9.9 4.3 - 11.1 K/uL RBC 4.82 4.05 - 5.2 M/uL    Hemoglobin 13.1 11.7 - 15.4 g/dL    Hematocrit 38.5 35.8 - 46.3 %    MCV 79.9 (L) 82 - 102 FL    MCH 27.2 26.1 - 32.9 PG    MCHC 34.0 31.4 - 35.0 g/dL    RDW 12.9 11.9 - 14.6 %    Platelets 809 873 - 840 K/uL    MPV 9.9 9.4 - 12.3 FL    nRBC 0.00 0.0 - 0.2 K/uL    Differential Type AUTOMATED      Seg Neutrophils 55 43 - 78 %    Lymphocytes 34 13 - 44 %    Monocytes 8 4.0 - 12.0 %    Eosinophils % 1 0.5 - 7.8 %    Basophils 1 0.0 - 2.0 %    Immature Granulocytes 1 0.0 - 5.0 %    Segs Absolute 5.6 1.7 - 8.2 K/UL    Absolute Lymph # 3.3 0.5 - 4.6 K/UL    Absolute Mono # 0.8 0.1 - 1.3 K/UL    Absolute Eos # 0.1 0.0 - 0.8 K/UL    Basophils Absolute 0.1 0.0 - 0.2 K/UL    Absolute Immature Granulocyte 0.1 0.0 - 0.5 K/UL   Magnesium    Collection Time: 11/04/22  7:27 AM   Result Value Ref Range    Magnesium 2.2 1.8 - 2.4 mg/dL   Comprehensive Metabolic Panel    Collection Time: 11/04/22  7:27 AM   Result Value Ref Range    Sodium 133 133 - 143 mmol/L    Potassium 3.4 (L) 3.5 - 5.1 mmol/L    Chloride 99 (L) 101 - 110 mmol/L    CO2 29 21 - 32 mmol/L    Anion Gap 5 2 - 11 mmol/L    Glucose 161 (H) 65 - 100 mg/dL    BUN 19 6 - 23 MG/DL    Creatinine 0.80 0.6 - 1.0 MG/DL    Est, Glom Filt Rate >60 >60 ml/min/1.73m2    Calcium 6.6 (L) 8.3 - 10.4 MG/DL    Total Bilirubin 0.6 0.2 - 1.1 MG/DL    ALT 23 12 - 65 U/L    AST 9 (L) 15 - 37 U/L    Alk Phosphatase 78 50 - 136 U/L    Total Protein 7.2 6.3 - 8.2 g/dL    Albumin 3.1 (L) 3.5 - 5.0 g/dL    Globulin 4.1 2.8 - 4.5 g/dL    Albumin/Globulin Ratio 0.8 0.4 - 1.6     Basic Metabolic Panel    Collection Time: 11/04/22 10:22 AM   Result Value Ref Range    Sodium 133 133 - 143 mmol/L    Potassium 3.3 (L) 3.5 - 5.1 mmol/L    Chloride 97 (L) 101 - 110 mmol/L    CO2 27 21 - 32 mmol/L    Anion Gap 9 2 - 11 mmol/L    Glucose 177 (H) 65 - 100 mg/dL    BUN 13 6 - 23 MG/DL    Creatinine 0.70 0.6 - 1.0 MG/DL    Est, Glom Filt Rate >60 >60 ml/min/1.73m2    Calcium 8.6 8.3 - 10.4 MG/DL   POCT Glucose    Collection Time: 11/04/22 11:07 AM   Result Value Ref Range    POC Glucose 169 (H) 65 - 100 mg/dL    Performed by: Neal        No results found for any visits on 11/09/22. ASSESSMENT and PLAN    1. Need for influenza vaccination  -     Influenza, FLUCELVAX, (age 10 mo+), IM, PF, 0.5 mL  2. Poorly controlled type 2 diabetes with autonomic neuropathy  3. Coronary artery disease involving native coronary artery of native heart without angina pectoris  4. Colon cancer screening  5. Hypertensive heart disease without heart failure  6. Diabetic gastroparesis ([facility] [address]         No follow-ups on file.        Katelyn Meyer MD

## 2022-11-16 ENCOUNTER — TELEPHONE (OUTPATIENT)
Dept: CARDIAC REHAB | Age: 57
End: 2022-11-16

## 2022-11-16 NOTE — TELEPHONE ENCOUNTER
Called to check on pt who was recently hospitalized and has missed cardiac rehab. Also let her know that her insurance authorization will end on 11/27/22 so her last eligible day for cardiac rehab will be 11/23/22 due to the holiday week. Asked her to call me to discuss.

## 2022-11-21 ENCOUNTER — TELEPHONE (OUTPATIENT)
Dept: FAMILY MEDICINE CLINIC | Facility: CLINIC | Age: 57
End: 2022-11-21

## 2022-11-21 NOTE — TELEPHONE ENCOUNTER
Cierra Pena contacted. Let her know patient would need to be seen prior to antibiotics called in. Let mother know no available appointments today or tomorrow and Dr. Bindu Barbosa was off the rest of the week. [address] or Urgent Care to be seen. Voiced understanding.

## 2022-11-21 NOTE — TELEPHONE ENCOUNTER
Mother Jose Diehl [facility] stating that one of patients toe looks swollen; looks as though it is traveling up her foot. Wanting to know if provider will call in antibiotic. Please call mother.

## 2022-12-02 ENCOUNTER — OFFICE VISIT (OUTPATIENT)
Dept: FAMILY MEDICINE CLINIC | Facility: CLINIC | Age: 57
End: 2022-12-02
Payer: COMMERCIAL

## 2022-12-02 VITALS
HEART RATE: 89 BPM | BODY MASS INDEX: 28.95 KG/M2 | OXYGEN SATURATION: 99 % | HEIGHT: 63 IN | SYSTOLIC BLOOD PRESSURE: 118 MMHG | DIASTOLIC BLOOD PRESSURE: 72 MMHG | WEIGHT: 163.4 LBS

## 2022-12-02 DIAGNOSIS — E11.65 POORLY CONTROLLED TYPE 2 DIABETES MELLITUS WITH AUTONOMIC NEUROPATHY (HCC): ICD-10-CM

## 2022-12-02 DIAGNOSIS — E11.43 POORLY CONTROLLED TYPE 2 DIABETES MELLITUS WITH AUTONOMIC NEUROPATHY (HCC): ICD-10-CM

## 2022-12-02 DIAGNOSIS — Z12.11 COLON CANCER SCREENING: ICD-10-CM

## 2022-12-02 DIAGNOSIS — B35.3 TINEA PEDIS OF RIGHT FOOT: ICD-10-CM

## 2022-12-02 DIAGNOSIS — K21.00 GASTROESOPHAGEAL REFLUX DISEASE WITH ESOPHAGITIS, UNSPECIFIED WHETHER HEMORRHAGE: Primary | ICD-10-CM

## 2022-12-02 PROCEDURE — 3074F SYST BP LT 130 MM HG: CPT | Performed by: FAMILY MEDICINE

## 2022-12-02 PROCEDURE — 3078F DIAST BP <80 MM HG: CPT | Performed by: FAMILY MEDICINE

## 2022-12-02 PROCEDURE — 3052F HG A1C>EQUAL 8.0%<EQUAL 9.0%: CPT | Performed by: FAMILY MEDICINE

## 2022-12-02 PROCEDURE — 99214 OFFICE O/P EST MOD 30 MIN: CPT | Performed by: FAMILY MEDICINE

## 2022-12-02 RX ORDER — PRENATAL VIT 91/IRON/FOLIC/DHA 28-975-200
COMBINATION PACKAGE (EA) ORAL
Qty: 42 G | Refills: 3 | Status: SHIPPED | OUTPATIENT
Start: 2022-12-02

## 2022-12-02 RX ORDER — METOCLOPRAMIDE 5 MG/1
5 TABLET ORAL 3 TIMES DAILY
Qty: 270 TABLET | Refills: 1 | Status: SHIPPED | OUTPATIENT
Start: 2022-12-02 | End: 2023-05-31

## 2022-12-02 ASSESSMENT — PATIENT HEALTH QUESTIONNAIRE - PHQ9
5. POOR APPETITE OR OVEREATING: 0
6. FEELING BAD ABOUT YOURSELF - OR THAT YOU ARE A FAILURE OR HAVE LET YOURSELF OR YOUR FAMILY DOWN: 0
1. LITTLE INTEREST OR PLEASURE IN DOING THINGS: 0
SUM OF ALL RESPONSES TO PHQ QUESTIONS 1-9: 0
2. FEELING DOWN, DEPRESSED OR HOPELESS: 0
7. TROUBLE CONCENTRATING ON THINGS, SUCH AS READING THE NEWSPAPER OR WATCHING TELEVISION: 0
SUM OF ALL RESPONSES TO PHQ QUESTIONS 1-9: 0
10. IF YOU CHECKED OFF ANY PROBLEMS, HOW DIFFICULT HAVE THESE PROBLEMS MADE IT FOR YOU TO DO YOUR WORK, TAKE CARE OF THINGS AT HOME, OR GET ALONG WITH OTHER PEOPLE: 0
SUM OF ALL RESPONSES TO PHQ QUESTIONS 1-9: 0
SUM OF ALL RESPONSES TO PHQ9 QUESTIONS 1 & 2: 0
8. MOVING OR SPEAKING SO SLOWLY THAT OTHER PEOPLE COULD HAVE NOTICED. OR THE OPPOSITE, BEING SO FIGETY OR RESTLESS THAT YOU HAVE BEEN MOVING AROUND A LOT MORE THAN USUAL: 0
9. THOUGHTS THAT YOU WOULD BE BETTER OFF DEAD, OR OF HURTING YOURSELF: 0
4. FEELING TIRED OR HAVING LITTLE ENERGY: 0
3. TROUBLE FALLING OR STAYING ASLEEP: 0
SUM OF ALL RESPONSES TO PHQ QUESTIONS 1-9: 0

## 2022-12-02 ASSESSMENT — ENCOUNTER SYMPTOMS: RESPIRATORY NEGATIVE: 1

## 2022-12-02 NOTE — PROGRESS NOTES
PROGRESS NOTE    SUBJECTIVE:   Van Otto is a 62 y.o. female seen for a follow up visit regarding   Chief Complaint   Patient presents with    Toe Pain     4th toe on right foot pain, redness, blister on bottom, and swollen x 2 weeks. Unsure of any injury. Started out as a callus. HPI:  Patient comes in today reporting redness between her third and fourth toes on the right foot. She has been putting some neomycin on this and has been some improvement. She denies fever. There has been no drainage. Toe Pain        Reviewed and updated this visit by provider:  Tobacco  Allergies  Meds  Problems  Med Hx  Surg Hx  Fam Hx           Review of Systems   Respiratory: Negative. Cardiovascular: Negative. OBJECTIVE:  Vitals:    12/02/22 0851   BP: 118/72   Site: Left Upper Arm   Cuff Size: Medium Adult   Pulse: 89   SpO2: 99%   Weight: 163 lb 6.4 oz (74.1 kg)   Height: 5' 3\" (1.6 m)        Physical Exam   General: Alert, level affect, appropriate thought content, no distress  Extremities: The right foot demonstrates erythema and maceration between the third and fourth toes. No significant advancing erythema. The plantar aspect of the fourth toe demonstrates some thick callus with some superficial, central ulceration. Medical problems and test results were reviewed with the patient today. Recent Results (from the past 672 hour(s))   POCT Glucose    Collection Time: 11/04/22 11:07 AM   Result Value Ref Range    POC Glucose 169 (H) 65 - 100 mg/dL    Performed by: Neal        No results found for any visits on 12/02/22. ASSESSMENT and PLAN    1. Gastroesophageal reflux disease with esophagitis, unspecified whether hemorrhage  -     metoclopramide (REGLAN) 5 MG tablet; Take 1 tablet by mouth 3 times daily, Disp-270 tablet, R-1Normal  2. Tinea pedis of right foot  -     terbinafine (LAMISIL AT ATHLETES FOOT) 1 % cream; Apply topically 2 times daily. , Disp-42 g, R-3, Normal  -     Discussed foot care  3. Poorly controlled type 2 diabetes mellitus with autonomic neuropathy ([facility] [address] last A1c was 8.1.  -     metoclopramide (REGLAN) 5 MG tablet; Take 1 tablet by mouth 3 times daily, Disp-270 tablet, R-1Normal  -     Continue with current meds, TLC for diabetes, repeat A1c in early February  4. Colon cancer screening  -     [address] [facility]         Return for PAP.        Dorothy Tian MD

## 2022-12-28 DIAGNOSIS — I10 PRIMARY HYPERTENSION: Primary | ICD-10-CM

## 2022-12-28 RX ORDER — METOPROLOL TARTRATE 50 MG/1
25 TABLET, FILM COATED ORAL 2 TIMES DAILY
Qty: 90 TABLET | Refills: 1 | Status: SHIPPED | OUTPATIENT
Start: 2022-12-28 | End: 2023-06-26

## 2022-12-28 RX ORDER — LISINOPRIL 20 MG/1
20 TABLET ORAL NIGHTLY
Qty: 90 TABLET | Refills: 1 | Status: SHIPPED | OUTPATIENT
Start: 2022-12-28 | End: 2023-06-26

## 2023-02-09 ENCOUNTER — OFFICE VISIT (OUTPATIENT)
Dept: FAMILY MEDICINE CLINIC | Facility: CLINIC | Age: 58
End: 2023-02-09
Payer: COMMERCIAL

## 2023-02-09 VITALS
DIASTOLIC BLOOD PRESSURE: 64 MMHG | OXYGEN SATURATION: 100 % | HEART RATE: 98 BPM | BODY MASS INDEX: 29.59 KG/M2 | SYSTOLIC BLOOD PRESSURE: 98 MMHG | WEIGHT: 167 LBS | HEIGHT: 63 IN

## 2023-02-09 DIAGNOSIS — E11.43 POORLY CONTROLLED TYPE 2 DIABETES MELLITUS WITH AUTONOMIC NEUROPATHY (HCC): Primary | ICD-10-CM

## 2023-02-09 DIAGNOSIS — E11.65 POORLY CONTROLLED TYPE 2 DIABETES MELLITUS WITH AUTONOMIC NEUROPATHY (HCC): Primary | ICD-10-CM

## 2023-02-09 DIAGNOSIS — I10 PRIMARY HYPERTENSION: ICD-10-CM

## 2023-02-09 DIAGNOSIS — I65.23 CAROTID ATHEROSCLEROSIS, BILATERAL: ICD-10-CM

## 2023-02-09 DIAGNOSIS — Z95.1 HX OF CABG: ICD-10-CM

## 2023-02-09 DIAGNOSIS — F32.0 MAJOR DEPRESSIVE DISORDER, SINGLE EPISODE, MILD (HCC): ICD-10-CM

## 2023-02-09 DIAGNOSIS — I25.118 CORONARY ARTERY DISEASE OF NATIVE ARTERY OF NATIVE HEART WITH STABLE ANGINA PECTORIS (HCC): ICD-10-CM

## 2023-02-09 PROCEDURE — 99214 OFFICE O/P EST MOD 30 MIN: CPT | Performed by: FAMILY MEDICINE

## 2023-02-09 PROCEDURE — 3078F DIAST BP <80 MM HG: CPT | Performed by: FAMILY MEDICINE

## 2023-02-09 PROCEDURE — 3074F SYST BP LT 130 MM HG: CPT | Performed by: FAMILY MEDICINE

## 2023-02-09 RX ORDER — SEMAGLUTIDE 1.34 MG/ML
0.5 INJECTION, SOLUTION SUBCUTANEOUS WEEKLY
Qty: 6 ML | Refills: 1 | Status: SHIPPED | OUTPATIENT
Start: 2023-02-09 | End: 2023-08-08

## 2023-02-09 RX ORDER — GABAPENTIN 100 MG/1
CAPSULE ORAL
Qty: 30 CAPSULE | Refills: 5 | Status: CANCELLED | OUTPATIENT
Start: 2023-02-09 | End: 2023-08-11

## 2023-02-09 SDOH — ECONOMIC STABILITY: INCOME INSECURITY: HOW HARD IS IT FOR YOU TO PAY FOR THE VERY BASICS LIKE FOOD, HOUSING, MEDICAL CARE, AND HEATING?: NOT HARD AT ALL

## 2023-02-09 SDOH — ECONOMIC STABILITY: FOOD INSECURITY: WITHIN THE PAST 12 MONTHS, YOU WORRIED THAT YOUR FOOD WOULD RUN OUT BEFORE YOU GOT MONEY TO BUY MORE.: NEVER TRUE

## 2023-02-09 SDOH — ECONOMIC STABILITY: FOOD INSECURITY: WITHIN THE PAST 12 MONTHS, THE FOOD YOU BOUGHT JUST DIDN'T LAST AND YOU DIDN'T HAVE MONEY TO GET MORE.: NEVER TRUE

## 2023-02-09 SDOH — ECONOMIC STABILITY: HOUSING INSECURITY
IN THE LAST 12 MONTHS, WAS THERE A TIME WHEN YOU DID NOT HAVE A STEADY PLACE TO SLEEP OR SLEPT IN A SHELTER (INCLUDING NOW)?: NO

## 2023-02-09 ASSESSMENT — ENCOUNTER SYMPTOMS: RESPIRATORY NEGATIVE: 1

## 2023-02-09 ASSESSMENT — PATIENT HEALTH QUESTIONNAIRE - PHQ9
1. LITTLE INTEREST OR PLEASURE IN DOING THINGS: 0
5. POOR APPETITE OR OVEREATING: 0
SUM OF ALL RESPONSES TO PHQ QUESTIONS 1-9: 0
8. MOVING OR SPEAKING SO SLOWLY THAT OTHER PEOPLE COULD HAVE NOTICED. OR THE OPPOSITE, BEING SO FIGETY OR RESTLESS THAT YOU HAVE BEEN MOVING AROUND A LOT MORE THAN USUAL: 0
SUM OF ALL RESPONSES TO PHQ QUESTIONS 1-9: 0
2. FEELING DOWN, DEPRESSED OR HOPELESS: 0
9. THOUGHTS THAT YOU WOULD BE BETTER OFF DEAD, OR OF HURTING YOURSELF: 0
SUM OF ALL RESPONSES TO PHQ9 QUESTIONS 1 & 2: 0
SUM OF ALL RESPONSES TO PHQ QUESTIONS 1-9: 0
6. FEELING BAD ABOUT YOURSELF - OR THAT YOU ARE A FAILURE OR HAVE LET YOURSELF OR YOUR FAMILY DOWN: 0
SUM OF ALL RESPONSES TO PHQ QUESTIONS 1-9: 0
10. IF YOU CHECKED OFF ANY PROBLEMS, HOW DIFFICULT HAVE THESE PROBLEMS MADE IT FOR YOU TO DO YOUR WORK, TAKE CARE OF THINGS AT HOME, OR GET ALONG WITH OTHER PEOPLE: 0
4. FEELING TIRED OR HAVING LITTLE ENERGY: 0
3. TROUBLE FALLING OR STAYING ASLEEP: 0
7. TROUBLE CONCENTRATING ON THINGS, SUCH AS READING THE NEWSPAPER OR WATCHING TELEVISION: 0

## 2023-02-09 NOTE — PROGRESS NOTES
PROGRESS NOTE    SUBJECTIVE:   Regino Dye is a 62 y.o. female seen for a follow up visit regarding   Chief Complaint   Patient presents with    Diabetes     Follow up and medication refills        HPI:  Here for follow-up of her chronic problems. She is doing relatively well. She had upper and lower endoscopy in late January, she is not aware of the results. Reviewed and updated this visit by provider:  Tobacco  Allergies  Meds  Problems  Med Hx  Surg Hx  Fam Hx           Review of Systems   Respiratory: Negative. Cardiovascular: Negative. OBJECTIVE:  Vitals:    02/09/23 0831   BP: 98/64   Site: Left Upper Arm   Cuff Size: Large Adult   Pulse: 98   SpO2: 100%   Weight: 167 lb (75.8 kg)   Height: 5' 3\" (1.6 m)        Physical Exam   General: Alert and oriented x3, well-appearing  Neck: No adenopathy, thyromegaly or thyroid nodules  Pulmonary: Normal effort, good airflow, no rales or rhonchi  CVS: Regular rate and rhythm, normal S1, S2, no S3 or S4, no murmurs; bilateral carotid bruits, 2+ pedal pulses      Medical problems and test results were reviewed with the patient today. No results found for this or any previous visit (from the past 672 hour(s)). No results found for any visits on 02/09/23. ASSESSMENT and PLAN    1. Poorly controlled type 2 diabetes mellitus with autonomic neuropathy (HCC)  -     empagliflozin (JARDIANCE) 10 MG tablet; Take 1 tablet by mouth daily, Disp-90 tablet, R-1Normal  -     Hemoglobin A1C; Future  -     Semaglutide,0.25 or 0.5MG/DOS, (OZEMPIC, 0.25 OR 0.5 MG/DOSE,) 2 MG/1.5ML SOPN; Inject 0.5 mg into the skin once a week, Disp-6 mL, R-1Normal  -     Basic Metabolic Panel; Future  2. Primary hypertension  -     Basic Metabolic Panel; Future  3. Major depressive disorder, single episode, mild ([address]  4. Coronary artery disease of native artery of native heart with stable angina pectoris ([address]  5. Hx of CABG  6.  Carotid atherosclerosis, bilateral         No follow-ups on file.        Naz Boudreaux MD

## 2023-02-10 LAB
ANION GAP SERPL CALC-SCNC: 10 MMOL/L (ref 2–11)
BUN SERPL-MCNC: 18 MG/DL (ref 6–23)
CALCIUM SERPL-MCNC: 9.2 MG/DL (ref 8.3–10.4)
CHLORIDE SERPL-SCNC: 106 MMOL/L (ref 101–110)
CO2 SERPL-SCNC: 25 MMOL/L (ref 21–32)
CREAT SERPL-MCNC: 0.8 MG/DL (ref 0.6–1)
EST. AVERAGE GLUCOSE BLD GHB EST-MCNC: 160 MG/DL
GLUCOSE SERPL-MCNC: 161 MG/DL (ref 65–100)
HBA1C MFR BLD: 7.2 % (ref 4.8–5.6)
POTASSIUM SERPL-SCNC: 4.6 MMOL/L (ref 3.5–5.1)
SODIUM SERPL-SCNC: 141 MMOL/L (ref 133–143)

## 2023-02-14 ENCOUNTER — OFFICE VISIT (OUTPATIENT)
Dept: FAMILY MEDICINE CLINIC | Facility: CLINIC | Age: 58
End: 2023-02-14
Payer: COMMERCIAL

## 2023-02-14 VITALS
DIASTOLIC BLOOD PRESSURE: 68 MMHG | SYSTOLIC BLOOD PRESSURE: 116 MMHG | OXYGEN SATURATION: 99 % | HEIGHT: 63 IN | HEART RATE: 93 BPM | BODY MASS INDEX: 29.66 KG/M2 | WEIGHT: 167.4 LBS

## 2023-02-14 DIAGNOSIS — Z01.419 WELL FEMALE EXAM WITH ROUTINE GYNECOLOGICAL EXAM: Primary | ICD-10-CM

## 2023-02-14 PROCEDURE — 99213 OFFICE O/P EST LOW 20 MIN: CPT | Performed by: FAMILY MEDICINE

## 2023-02-14 PROCEDURE — 3078F DIAST BP <80 MM HG: CPT | Performed by: FAMILY MEDICINE

## 2023-02-14 PROCEDURE — 3074F SYST BP LT 130 MM HG: CPT | Performed by: FAMILY MEDICINE

## 2023-02-14 ASSESSMENT — PATIENT HEALTH QUESTIONNAIRE - PHQ9
9. THOUGHTS THAT YOU WOULD BE BETTER OFF DEAD, OR OF HURTING YOURSELF: 0
10. IF YOU CHECKED OFF ANY PROBLEMS, HOW DIFFICULT HAVE THESE PROBLEMS MADE IT FOR YOU TO DO YOUR WORK, TAKE CARE OF THINGS AT HOME, OR GET ALONG WITH OTHER PEOPLE: 0
6. FEELING BAD ABOUT YOURSELF - OR THAT YOU ARE A FAILURE OR HAVE LET YOURSELF OR YOUR FAMILY DOWN: 0
SUM OF ALL RESPONSES TO PHQ QUESTIONS 1-9: 2
4. FEELING TIRED OR HAVING LITTLE ENERGY: 0
SUM OF ALL RESPONSES TO PHQ9 QUESTIONS 1 & 2: 2
SUM OF ALL RESPONSES TO PHQ QUESTIONS 1-9: 2
3. TROUBLE FALLING OR STAYING ASLEEP: 0
5. POOR APPETITE OR OVEREATING: 0
1. LITTLE INTEREST OR PLEASURE IN DOING THINGS: 0
7. TROUBLE CONCENTRATING ON THINGS, SUCH AS READING THE NEWSPAPER OR WATCHING TELEVISION: 0
8. MOVING OR SPEAKING SO SLOWLY THAT OTHER PEOPLE COULD HAVE NOTICED. OR THE OPPOSITE, BEING SO FIGETY OR RESTLESS THAT YOU HAVE BEEN MOVING AROUND A LOT MORE THAN USUAL: 0
SUM OF ALL RESPONSES TO PHQ QUESTIONS 1-9: 2
2. FEELING DOWN, DEPRESSED OR HOPELESS: 2
SUM OF ALL RESPONSES TO PHQ QUESTIONS 1-9: 2

## 2023-02-14 ASSESSMENT — ENCOUNTER SYMPTOMS: RESPIRATORY NEGATIVE: 1

## 2023-02-14 NOTE — PROGRESS NOTES
PROGRESS NOTE    SUBJECTIVE:   Emily Herndon is a 62 y.o. female seen for a follow up visit regarding   Chief Complaint   Patient presents with    Gynecologic Exam        HPI:  Here for Pap smear. She is doing well presently. Had blood work done last week, all good. Reviewed and updated this visit by provider:  Tobacco  Allergies  Meds  Problems  Med Hx  Surg Hx  Fam Hx           Review of Systems   Respiratory: Negative. Cardiovascular: Negative. OBJECTIVE:  Vitals:    02/14/23 1411   BP: 116/68   Site: Left Upper Arm   Cuff Size: Large Adult   Pulse: 93   SpO2: 99%   Weight: 167 lb 6.4 oz (75.9 kg)   Height: 5' 3\" (1.6 m)        Physical Exam   : Normal introitus, no external skin lesions. Normal vaginal mucosa, normal-appearing cervix. Bimanual exam normal.  Pap smear taken without complication. Medical problems and test results were reviewed with the patient today. Recent Results (from the past 672 hour(s))   Basic Metabolic Panel    Collection Time: 02/09/23  8:51 AM   Result Value Ref Range    Sodium 141 133 - 143 mmol/L    Potassium 4.6 3.5 - 5.1 mmol/L    Chloride 106 101 - 110 mmol/L    CO2 25 21 - 32 mmol/L    Anion Gap 10 2 - 11 mmol/L    Glucose 161 (H) 65 - 100 mg/dL    BUN 18 6 - 23 MG/DL    Creatinine 0.80 0.6 - 1.0 MG/DL    Est, Glom Filt Rate >60 >60 ml/min/1.73m2    Calcium 9.2 8.3 - 10.4 MG/DL   Hemoglobin A1C    Collection Time: 02/09/23  8:51 AM   Result Value Ref Range    Hemoglobin A1C 7.2 (H) 4.8 - 5.6 %    eAG 160 mg/dL       No results found for any visits on 02/14/23. ASSESSMENT and PLAN    1. Well female exam with routine gynecological exam         No follow-ups on file.        Dallin Byrd MD

## 2023-02-28 ENCOUNTER — TELEPHONE (OUTPATIENT)
Dept: FAMILY MEDICINE CLINIC | Facility: CLINIC | Age: 58
End: 2023-02-28

## 2023-02-28 DIAGNOSIS — B97.7 HPV (HUMAN PAPILLOMA VIRUS) INFECTION: Primary | ICD-10-CM

## 2023-03-07 ENCOUNTER — PROCEDURE VISIT (OUTPATIENT)
Dept: OBGYN CLINIC | Age: 58
End: 2023-03-07

## 2023-03-07 VITALS — DIASTOLIC BLOOD PRESSURE: 78 MMHG | SYSTOLIC BLOOD PRESSURE: 110 MMHG | BODY MASS INDEX: 29.58 KG/M2 | WEIGHT: 167 LBS

## 2023-03-07 DIAGNOSIS — N95.1 MENOPAUSAL SYMPTOM: Primary | ICD-10-CM

## 2023-03-19 NOTE — PROGRESS NOTES
HDL 44 06/19/2019     Lab Results   Component Value Date    LDLCALC 28 10/27/2022    LDLCALC 131 (H) 04/27/2022    LDLCALC 164 (H) 06/19/2019     Lab Results   Component Value Date    LABVLDL 61 (H) 10/27/2022    LABVLDL 34 06/19/2019    VLDL 46 (H) 04/27/2022     Lab Results   Component Value Date    CHOLHDLRATIO 3.0 10/27/2022        Lab Results   Component Value Date/Time     02/09/2023 08:51 AM     11/04/2022 10:22 AM     11/04/2022 07:27 AM    K 4.6 02/09/2023 08:51 AM    K 3.3 11/04/2022 10:22 AM    K 3.4 11/04/2022 07:27 AM     02/09/2023 08:51 AM    CL 97 11/04/2022 10:22 AM    CL 99 11/04/2022 07:27 AM    CO2 25 02/09/2023 08:51 AM    CO2 27 11/04/2022 10:22 AM    CO2 29 11/04/2022 07:27 AM    BUN 18 02/09/2023 08:51 AM    BUN 13 11/04/2022 10:22 AM    BUN 19 11/04/2022 07:27 AM    CREATININE 0.80 02/09/2023 08:51 AM    CREATININE 0.70 11/04/2022 10:22 AM    CREATININE 0.80 11/04/2022 07:27 AM    GLUCOSE 161 02/09/2023 08:51 AM    GLUCOSE 177 11/04/2022 10:22 AM    GLUCOSE 161 11/04/2022 07:27 AM    CALCIUM 9.2 02/09/2023 08:51 AM    CALCIUM 8.6 11/04/2022 10:22 AM    CALCIUM 6.6 11/04/2022 07:27 AM         Lab Results   Component Value Date    ALT 23 11/04/2022    ALT 21 11/03/2022    ALT 26 11/02/2022    AST 9 (L) 11/04/2022    AST 18 11/03/2022    AST 20 11/02/2022        Assessment/Plan:   1. Hx of CABG  - Continue with Lipitor and baby aspirin daily    2. Carotid atherosclerosis, bilateral  - Continue with Lipitor    3. Hypertension, unspecified type  - Well-controlled  - Continue with Lopressor  - Currently on lisinopril    4. Hyperlipidemia, unspecified hyperlipidemia type  - Continue with Lipitor    5. Atypical chest pain  - Obtain an MPI  - PE unlikely per PE Wells score     6.  Overweight (BMI 25.0-29.9)  - Educated on Mediterranean diet and exercise    F/U: 6 months    Chayito Crystal MD

## 2023-03-20 ENCOUNTER — OFFICE VISIT (OUTPATIENT)
Dept: CARDIOLOGY CLINIC | Age: 58
End: 2023-03-20
Payer: COMMERCIAL

## 2023-03-20 VITALS
SYSTOLIC BLOOD PRESSURE: 106 MMHG | DIASTOLIC BLOOD PRESSURE: 68 MMHG | WEIGHT: 166.2 LBS | HEART RATE: 96 BPM | HEIGHT: 63 IN | BODY MASS INDEX: 29.45 KG/M2

## 2023-03-20 DIAGNOSIS — E78.5 HYPERLIPIDEMIA, UNSPECIFIED HYPERLIPIDEMIA TYPE: ICD-10-CM

## 2023-03-20 DIAGNOSIS — Z95.1 HX OF CABG: Primary | ICD-10-CM

## 2023-03-20 DIAGNOSIS — R07.89 ATYPICAL CHEST PAIN: ICD-10-CM

## 2023-03-20 DIAGNOSIS — E66.3 OVERWEIGHT (BMI 25.0-29.9): ICD-10-CM

## 2023-03-20 DIAGNOSIS — I10 HYPERTENSION, UNSPECIFIED TYPE: ICD-10-CM

## 2023-03-20 DIAGNOSIS — I65.23 CAROTID ATHEROSCLEROSIS, BILATERAL: ICD-10-CM

## 2023-03-20 PROCEDURE — 99214 OFFICE O/P EST MOD 30 MIN: CPT | Performed by: INTERNAL MEDICINE

## 2023-03-20 PROCEDURE — 3078F DIAST BP <80 MM HG: CPT | Performed by: INTERNAL MEDICINE

## 2023-03-20 PROCEDURE — 3074F SYST BP LT 130 MM HG: CPT | Performed by: INTERNAL MEDICINE

## 2023-03-20 RX ORDER — PANTOPRAZOLE SODIUM 40 MG/1
40 TABLET, DELAYED RELEASE ORAL 2 TIMES DAILY
Qty: 180 TABLET | Refills: 3 | Status: CANCELLED | OUTPATIENT
Start: 2023-03-20 | End: 2023-04-19

## 2023-03-29 NOTE — PROGRESS NOTES
Hospitalist Progress Note   Admit Date:  2022 12:42 PM   Name:  Yenni Spencer   Age:  62 y.o. Sex:  female  :  1965   MRN:  [id number]   Room:  [facility]    Presenting Complaint: Emesis     Reason(s) for Admission: Intractable vomiting [R11.10]  Intractable nausea and vomiting [R11.2]     Hospital Course:   Yenni Spencer is a 62 y.o. female with medical history of T2DM, Gastroparesis, HLD, CAD s.p CABG May 2022, BL carotid atherosclerosis who presented with intractable nausea and vomiting since yesterday. Hospitalized 22 for intractable n/v. Treated for severe sepsis with no source found. Thought to be d/t gastroparesis. She reports h/o esophageal rupture in [id number] 2/2 intractable vomiting. Having uncontrolled indigestion, bleching. Intractable n/v about 1x per month. This episode started on Monday while at work with multiple episodes of vomiting requiring frequent stops while driving home d/t symptoms. Took anti emetic at home and started feeling better yesterday. Had some soup and then it restarted associated with a lot of burning chest pain. Unsure if there was blood in her emesis, says it was dark. No tarry or black stools. Has been unable to establish care with GI outpatient due to scheduling conflicts. Had a bout of diarrhea last week. Wants to see GI.  /94      In ED, she rec'd 1 L NS, Zofran 4 mg IV x 2, and compazine 5 mg IV x 1 w/o relief. Per chart review, 2019 admitted @ [facility] s/p EGD  19, for coffee ground emesis and intractable n/v,  found with ulcerative esophagitis, ayden kerr tear,and gastric ulcerations/gastitis. Per GI note at that time -   Moderate to severe distal ulcerative esophagitis with healing ayden kerr tear. Throughout the proximal, mid and distal esophaguls, subtle suggestion of concentric rings, biopsies not obtained however cannot exclude EoE.  Bilious liquid obscuring complete visualtion but Milagros Moreno is a 64 year old postmenopausal female who is here today for her Pap and pelvic exam. She denies any vaginal bleeding or abnormal vaginal dc or irritation  She does not have any troublesome postmenopausal symptoms. She  is not sexually active. She does have history of abnormal Pap smears.  Mammogram 12/2022 normal  Colposcopy 11/2021  lgsil in ecc.   6/27/19 colposcopy and path lgsil on ecc and rare ascus on cervical biopsy.    She has hx of lgsil since 2013, had colposcopy 2014, 2015.  LEEP 12/2016 which yielded lgsil, 2019 colposcopy rare ascus,  2020 colposcopy ecc with lgsil.  Breast us 5/2021 benign appearing 5.6 x 3.1 cm hypoechoic nodule.  Due for repeat in November 2021.   Past medical and surgical history is reviewed per Epic.  fam hx mother with breast ca in her 40s. Maternal aunt with \"some type of female cancer\"   has completed cancer genetics counseling and testing and all testing negative.    No fam hx of ovarian or colon ca.   Soc hx she continues to smoke 1 ppd , retired book keeper, gardens,  watches grand kid 3 yr old.     Ros chronic back pain  Occasional bladder urgency due to water pill, no dysuria or hematuria, this is stable doesn't need to wear pad for incontinence.      Physical Exam:  Visit Vitals  /68 (BP Location: RUE - Right upper extremity, Patient Position: Sitting, Cuff Size: Large Adult)   Ht 4' 11\" (1.499 m) Comment: patient states   Wt 92.5 kg (203 lb 14.4 oz)   BMI 41.18 kg/m²     Pleasant female.  BREASTS: No masses, skin retractions, or nipple discharge bilaterally. No supraclavicular or axillary lymphadenopathy.  EXTERNAL GENITALIA: Atrophic, no lesions.   Urethra and urethral meatus no lesions, atrophic.  VAGINA: Atrophic, no lesions or discharge, pelvic relaxation  CERVIX: Atrophic, no lesions. Appears parous  Bladder nt.  UTERUS: Small, nontender, mobile.  ADNEXA: No masses or tenderness.     ASSESSMENT/PLAN:  Normal postmenopausal pelvic exam, h/o  cardia and fundus grossly normal. Moderate gastric body inflammation, moderate to severe antral inflammation/ heaped up folds, at least 2 antral ulcerations, however likely other ulcerations beneath the folds. Mild diffuse duodenitis. The ulcers within the antrum were clean based, no stigmata of recent bleed. Suspected NSAID induced gastritis/gastric ulcerations. Cannot exclude H pylori recommending stool antigen. Recommending repeat egd in 6-8 weeksk to document healing of these gastric antral ulcerations as well as to reevaluate the esophagus/ulcerative esophagitis/possible esinophilic esophagitis. Subjective & 24hr Events (11/03/22): Patient is seen and examined at the bedside. She is having hiccups. She is not eating much. She stated her nausea and vomiting improved. Patient denied diarrhea, chest pain, shortness of    Assessment & Plan:     Intractable n/v -most likely gastroparesis  esophageal thickening on CT concerning for esophagitis. Leukocytosis of 13.4K most likely reactive  Plan for EGD today 11/3  Continue IV PPI BID. Continue reglan. Continue anti-emetics. Started on CLD,   GI is following, appreciate recommendations       History of ulcerative esophagitis/gastritis, gastric ulcerations, ayden-cezar tear in 2019 - hold DVT ppx. Cont. Dragan Lager due to recent CABG unless overt bleeding. CAD s/p 2vCABG - f.b Dr Benson Farias. Cont ASA statin and metoprolol. Hold ACEI d/t risk of TERA from hypovolemia. HTN -increased metoprolol to 50 Mg twice daily   Hold lisinopril d/t risk of TERA with hypovolemia      HLD - statin      T2DM -   When tolerating PO, restart basal but reduce home dose to NPH 12 am / 6 pm plus SSI pending glycemic needs. Hold SLGT2i. Mood d/o - celexa        Anticipated discharge needs: Anticipate hospital stay for 1 to 2 days     Diet: ADULT DIET; Clear Liquid; 4 carb choices (60 gm/meal); Low Fat/Low Chol/High Fiber/JUDITH; [facility] [place] (GERD/Peptic Ulcer);  No red chronic lgsil. S/p leep in 2016,      Mammogram up to date  Pap smear  See PCP for primary care needs  Strongly encouraged to dc tobacco use and this puts her at incr risk of dysplasia  rtc one year or sooner      dye  Diet clear liquid diet  VTE ppx: Lovenox  Code status: Full Code             Diet:  ADULT DIET; Clear Liquid; No red dye  DVT PPx: Lovenox  Code status: Full Code    Hospital Problems:  Principal Problem:    Intractable nausea and vomiting  Active Problems:    S/P CABG x 2    CAD (coronary artery disease)    GERD with esophagitis    Hyperlipidemia associated with type 2 diabetes mellitus ([facility] [address]  Resolved Problems:    * No resolved hospital problems. *      Objective:   Patient Vitals for the past 24 hrs:   Temp Pulse Resp BP SpO2   11/03/22 1340 -- 96 12 (!) 168/88 99 %   11/03/22 1330 -- 96 13 (!) 175/84 98 %   11/03/22 1320 -- 93 14 (!) 142/71 98 %   11/03/22 1310 -- 90 12 (!) 116/56 97 %   11/03/22 1300 98 °F (36.7 °C) 93 14 (!) 107/55 99 %   11/03/22 1239 -- (!) 107 14 (!) 202/91 100 %   11/03/22 1143 98.1 °F (36.7 °C) (!) 106 23 (!) 184/94 97 %   11/03/22 1105 98.2 °F (36.8 °C) (!) 110 18 (!) 159/97 96 %   11/03/22 0740 98 °F (36.7 °C) (!) 110 18 (!) 158/109 93 %   11/03/22 0310 98.4 °F (36.9 °C) 98 18 (!) 151/88 95 %   11/02/22 2247 98.5 °F (36.9 °C) 86 18 118/70 96 %   11/02/22 2022 -- (!) 118 -- (!) 160/100 --   11/02/22 1828 98.3 °F (36.8 °C) (!) 127 18 (!) 165/90 96 %       Oxygen Therapy  SpO2: 99 %  Pulse via Oximetry: 99 beats per minute  Pulse Oximeter Device Mode: Continuous  Pulse Oximeter Device Location: Right  O2 Device: None (Room air)  Skin Assessment: Clean, dry, & intact  O2 Flow Rate (L/min): 2 L/min    Estimated body mass index is 27.53 kg/m² as calculated from the following:    Height as of this encounter: 5' 4\" (1.626 m). Weight as of this encounter: 160 lb 6.4 oz (72.8 kg). Intake/Output Summary (Last 24 hours) at 11/3/2022 1522  Last data filed at 11/3/2022 1301  Gross per 24 hour   Intake 1721 ml   Output 1050 ml   Net 671 ml         Physical Exam:     Blood pressure (!) 168/88, pulse 96, temperature 98 °F (36.7 °C), resp.  rate 12, height 5' 4\" (1.626 m), weight 160 lb 6.4 oz (72.8 kg), SpO2 99 %. General:    Well nourished. Head:  Normocephalic, atraumatic  Eyes:  Sclerae appear normal.  Pupils equally round. ENT:  Nares appear normal, no drainage. Moist oral mucosa  Neck:  No restricted ROM. Trachea midline   CV:   RRR. No m/r/g. No jugular venous distension. Lungs:   CTAB. No wheezing, rhonchi, or rales. Symmetric expansion. Abdomen: Bowel sounds present. Soft, nontender, nondistended. Extremities: No cyanosis or clubbing. No edema  Skin:     No rashes and normal coloration. Warm and dry. Neuro:  CN II-XII grossly intact. Sensation intact. A&Ox3  Psych:  Normal mood and affect.       I have personally reviewed labs and tests showing:  Recent Labs:  Recent Results (from the past 48 hour(s))   EKG 12 Lead (Select if upper abdominal pain or symptoms of SOB,Diaphoresis, or Tachycardia)    Collection Time: 11/02/22 10:29 AM   Result Value Ref Range    Ventricular Rate 113 BPM    Atrial Rate 113 BPM    P-R Interval 132 ms    QRS Duration 70 ms    Q-T Interval 346 ms    QTc Calculation (Bazett) 474 ms    P Axis 62 degrees    R Axis 65 degrees    T Axis 71 degrees    Diagnosis Sinus tachycardia    CBC with Auto Differential    Collection Time: 11/02/22 10:31 AM   Result Value Ref Range    WBC 12.8 (H) 4.3 - 11.1 K/uL    RBC 5.00 4.05 - 5.2 M/uL    Hemoglobin 13.6 11.7 - 15.4 g/dL    Hematocrit 41.0 35.8 - 46.3 %    MCV 82.0 82 - 102 FL    MCH 27.2 26.1 - 32.9 PG    MCHC 33.2 31.4 - 35.0 g/dL    RDW 13.2 11.9 - 14.6 %    Platelets 671 391 - 352 K/uL    MPV 9.4 9.4 - 12.3 FL    nRBC 0.00 0.0 - 0.2 K/uL    Differential Type AUTOMATED      Seg Neutrophils 85 (H) 43 - 78 %    Lymphocytes 13 13 - 44 %    Monocytes 2 (L) 4.0 - 12.0 %    Eosinophils % 0 (L) 0.5 - 7.8 %    Basophils 0 0.0 - 2.0 %    Immature Granulocytes 0 0.0 - 5.0 %    Segs Absolute 10.8 (H) 1.7 - 8.2 K/UL    Absolute Lymph # 1.7 0.5 - 4.6 K/UL    Absolute Mono # 0.2 0.1 - 1.3 K/UL    Absolute Eos # 0.0 0.0 - 0.8 K/UL    Basophils Absolute 0.0 0.0 - 0.2 K/UL    Absolute Immature Granulocyte 0.0 0.0 - 0.5 K/UL   Comprehensive Metabolic Panel    Collection Time: 11/02/22 10:31 AM   Result Value Ref Range    Sodium 136 133 - 143 mmol/L    Potassium 3.7 3.5 - 5.1 mmol/L    Chloride 99 (L) 101 - 110 mmol/L    CO2 28 21 - 32 mmol/L    Anion Gap 9 2 - 11 mmol/L    Glucose 120 (H) 65 - 100 mg/dL    BUN 17 6 - 23 MG/DL    Creatinine 0.90 0.6 - 1.0 MG/DL    Est, Glom Filt Rate >60 >60 ml/min/1.73m2    Calcium 9.6 8.3 - 10.4 MG/DL    Total Bilirubin 0.5 0.2 - 1.1 MG/DL    ALT 26 12 - 65 U/L    AST 20 15 - 37 U/L    Alk Phosphatase 91 50 - 136 U/L    Total Protein 8.4 (H) 6.3 - 8.2 g/dL    Albumin 3.8 3.5 - 5.0 g/dL    Globulin 4.6 (H) 2.8 - 4.5 g/dL    Albumin/Globulin Ratio 0.8 0.4 - 1.6     Lipase    Collection Time: 11/02/22 10:31 AM   Result Value Ref Range    Lipase 72 (L) 73 - 393 U/L   Magnesium    Collection Time: 11/02/22 10:31 AM   Result Value Ref Range    Magnesium 2.1 1.8 - 2.4 mg/dL   Troponin    Collection Time: 11/02/22 10:31 AM   Result Value Ref Range    Troponin, High Sensitivity 6.9 0 - 14 pg/mL   POCT Urinalysis no Micro    Collection Time: 11/02/22  1:30 PM   Result Value Ref Range    Specific Gravity, Urine, POC >1.030 (H) 1.001 - 1.023    pH, Urine, POC 6.0 5.0 - 9.0      Protein, Urine, POC 30 (A) NEG mg/dL    Glucose, UA  (A) NEG mg/dL    Ketones, Urine, POC 80 (A) NEG mg/dL    Bilirubin, Urine, POC Negative NEG      Blood, UA POC Negative NEG      URINE UROBILINOGEN POC 0.2 0.2 - 1.0 EU/dL    Nitrate, Urine, POC Negative NEG      Leukocyte Est, UA POC Negative NEG      Performed by: Danilo Ruelas    POCT Glucose    Collection Time: 11/02/22  6:28 PM   Result Value Ref Range    POC Glucose 86 65 - 100 mg/dL    Performed by: [place]    POCT Glucose    Collection Time: 11/02/22  8:57 PM   Result Value Ref Range    POC Glucose 86 65 - 100 mg/dL    Performed by:  Javed Beyer Collection Time: 11/02/22  9:00 PM   Result Value Ref Range    Troponin, High Sensitivity 12.0 0 - 14 pg/mL   CBC with Auto Differential    Collection Time: 11/03/22  6:04 AM   Result Value Ref Range    WBC 13.4 (H) 4.3 - 11.1 K/uL    RBC 4.73 4.05 - 5.2 M/uL    Hemoglobin 12.8 11.7 - 15.4 g/dL    Hematocrit 39.2 35.8 - 46.3 %    MCV 82.9 82 - 102 FL    MCH 27.1 26.1 - 32.9 PG    MCHC 32.7 31.4 - 35.0 g/dL    RDW 13.2 11.9 - 14.6 %    Platelets 128 339 - 687 K/uL    MPV 9.8 9.4 - 12.3 FL    nRBC 0.00 0.0 - 0.2 K/uL    Differential Type AUTOMATED      Seg Neutrophils 75 43 - 78 %    Lymphocytes 18 13 - 44 %    Monocytes 6 4.0 - 12.0 %    Eosinophils % 0 (L) 0.5 - 7.8 %    Basophils 1 0.0 - 2.0 %    Immature Granulocytes 0 0.0 - 5.0 %    Segs Absolute 10.0 (H) 1.7 - 8.2 K/UL    Absolute Lymph # 2.5 0.5 - 4.6 K/UL    Absolute Mono # 0.9 0.1 - 1.3 K/UL    Absolute Eos # 0.0 0.0 - 0.8 K/UL    Basophils Absolute 0.1 0.0 - 0.2 K/UL    Absolute Immature Granulocyte 0.1 0.0 - 0.5 K/UL   Magnesium    Collection Time: 11/03/22  6:04 AM   Result Value Ref Range    Magnesium 2.2 1.8 - 2.4 mg/dL   Comprehensive Metabolic Panel    Collection Time: 11/03/22  6:04 AM   Result Value Ref Range    Sodium 136 133 - 143 mmol/L    Potassium 3.7 3.5 - 5.1 mmol/L    Chloride 104 101 - 110 mmol/L    CO2 23 21 - 32 mmol/L    Anion Gap 9 2 - 11 mmol/L    Glucose 75 65 - 100 mg/dL    BUN 13 6 - 23 MG/DL    Creatinine 0.60 0.6 - 1.0 MG/DL    Est, Glom Filt Rate >60 >60 ml/min/1.73m2    Calcium 8.7 8.3 - 10.4 MG/DL    Total Bilirubin 0.7 0.2 - 1.1 MG/DL    ALT 21 12 - 65 U/L    AST 18 15 - 37 U/L    Alk Phosphatase 83 50 - 136 U/L    Total Protein 7.3 6.3 - 8.2 g/dL    Albumin 3.2 (L) 3.5 - 5.0 g/dL    Globulin 4.1 2.8 - 4.5 g/dL    Albumin/Globulin Ratio 0.8 0.4 - 1.6     POCT Glucose    Collection Time: 11/03/22  8:14 AM   Result Value Ref Range    POC Glucose 79 65 - 100 mg/dL    Performed by: EsbeckiwJohnPCTJessica    POCT Glucose Collection Time: 11/03/22 11:07 AM   Result Value Ref Range    POC Glucose 90 65 - 100 mg/dL    Performed by: KayyTSapna        I have personally reviewed imaging studies showing: Other Studies:  CT ABDOMEN PELVIS W IV CONTRAST Additional Contrast? None   Final Result   1. Diffuse distal esophageal wall thickening, may represent sequela of   esophagitis. 2.  No other acute findings within the abdomen and pelvis.           Current Meds:  Current Facility-Administered Medications   Medication Dose Route Frequency    metoclopramide (REGLAN) injection 5 mg  5 mg IntraVENous 4x Daily AC & HS    metoprolol tartrate (LOPRESSOR) tablet 50 mg  50 mg Oral BID    dextrose 5 % and 0.45 % sodium chloride infusion   IntraVENous Continuous    lactated ringers infusion   IntraVENous Continuous    sucralfate (CARAFATE) tablet 1 g  1 g Oral 4 times per day    pantoprazole (PROTONIX) 40 mg in sodium chloride (PF) 0.9 % 10 mL injection  40 mg IntraVENous Q12H    sodium chloride flush 0.9 % injection 5-40 mL  5-40 mL IntraVENous 2 times per day    sodium chloride flush 0.9 % injection 5-40 mL  5-40 mL IntraVENous PRN    0.9 % sodium chloride infusion   IntraVENous PRN    [Held by provider] enoxaparin (LOVENOX) injection 40 mg  40 mg SubCUTAneous Q24H    acetaminophen (TYLENOL) tablet 650 mg  650 mg Oral Q6H PRN    Or    acetaminophen (TYLENOL) suppository 650 mg  650 mg Rectal Q6H PRN    polyethylene glycol (GLYCOLAX) packet 17 g  17 g Oral Daily PRN    promethazine (PHENERGAN) tablet 12.5 mg  12.5 mg Oral Q6H PRN    Or    ondansetron (ZOFRAN) injection 4 mg  4 mg IntraVENous Q6H PRN    hyoscyamine (LEVSIN/SL) sublingual tablet 125 mcg  125 mcg SubLINGual Q4H PRN    glucose chewable tablet 16 g  4 tablet Oral PRN    dextrose bolus 10% 125 mL  125 mL IntraVENous PRN    Or    dextrose bolus 10% 250 mL  250 mL IntraVENous PRN    glucagon (rDNA) injection 1 mg  1 mg SubCUTAneous PRN    dextrose 10 % infusion   IntraVENous Continuous PRN    aluminum & magnesium hydroxide-simethicone (MAALOX) 200-200-20 MG/5ML suspension 30 mL  30 mL Oral Q6H PRN    [Held by provider] insulin NPH (HUMULIN N;NOVOLIN N) injection vial 12 Units  12 Units SubCUTAneous QAM AC    And    [Held by provider] insulin NPH (HUMULIN N;NOVOLIN N) injection vial 6 Units  6 Units SubCUTAneous Nightly    insulin lispro (HUMALOG) injection vial 0-8 Units  0-8 Units SubCUTAneous TID WC    insulin lispro (HUMALOG) injection vial 0-4 Units  0-4 Units SubCUTAneous Nightly    atorvastatin (LIPITOR) tablet 80 mg  80 mg Oral Nightly    gabapentin (NEURONTIN) capsule 100 mg  100 mg Oral TID PRN    citalopram (CELEXA) tablet 20 mg  20 mg Oral Nightly    aspirin EC tablet 81 mg  81 mg Oral Daily       Signed:  Soto Mckinney MD    Part of this note may have been written by using a voice dictation software. The note has been proof read but may still contain some grammatical/other typographical errors.

## 2023-04-26 PROBLEM — K86.81 EXOCRINE PANCREATIC INSUFFICIENCY: Status: ACTIVE | Noted: 2023-04-26

## 2023-05-08 ENCOUNTER — OFFICE VISIT (OUTPATIENT)
Dept: OBGYN CLINIC | Age: 58
End: 2023-05-08

## 2023-05-08 VITALS
WEIGHT: 169 LBS | HEIGHT: 63 IN | DIASTOLIC BLOOD PRESSURE: 78 MMHG | SYSTOLIC BLOOD PRESSURE: 116 MMHG | BODY MASS INDEX: 29.95 KG/M2

## 2023-05-08 DIAGNOSIS — R87.619 ABNORMAL CERVICAL PAPANICOLAOU SMEAR, UNSPECIFIED ABNORMAL PAP FINDING: Primary | ICD-10-CM

## 2023-06-07 ENCOUNTER — HOSPITAL ENCOUNTER (INPATIENT)
Age: 58
LOS: 2 days | Discharge: HOME | DRG: 638 | End: 2023-06-09
Attending: EMERGENCY MEDICINE | Admitting: HOSPITALIST
Payer: COMMERCIAL

## 2023-06-07 ENCOUNTER — APPOINTMENT (OUTPATIENT)
Dept: GENERAL RADIOLOGY | Age: 58
DRG: 638 | End: 2023-06-07
Payer: COMMERCIAL

## 2023-06-07 DIAGNOSIS — E11.10 DIABETIC KETOACIDOSIS WITHOUT COMA ASSOCIATED WITH TYPE 2 DIABETES MELLITUS (HCC): Primary | ICD-10-CM

## 2023-06-07 DIAGNOSIS — E11.65 POORLY CONTROLLED TYPE 2 DIABETES MELLITUS WITH AUTONOMIC NEUROPATHY (HCC): ICD-10-CM

## 2023-06-07 DIAGNOSIS — E11.43 POORLY CONTROLLED TYPE 2 DIABETES MELLITUS WITH AUTONOMIC NEUROPATHY (HCC): ICD-10-CM

## 2023-06-07 DIAGNOSIS — K21.00 GASTROESOPHAGEAL REFLUX DISEASE WITH ESOPHAGITIS, UNSPECIFIED WHETHER HEMORRHAGE: ICD-10-CM

## 2023-06-07 DIAGNOSIS — R11.2 INTRACTABLE NAUSEA AND VOMITING: ICD-10-CM

## 2023-06-07 PROBLEM — E66.3 OVERWEIGHT (BMI 25.0-29.9): Status: ACTIVE | Noted: 2022-04-29

## 2023-06-07 LAB
ALBUMIN SERPL-MCNC: 3.9 G/DL (ref 3.5–5)
ALBUMIN/GLOB SERPL: 0.8 (ref 0.4–1.6)
ALP SERPL-CCNC: 110 U/L (ref 50–136)
ALT SERPL-CCNC: 33 U/L (ref 12–65)
ANION GAP SERPL CALC-SCNC: 15 MMOL/L (ref 2–11)
AST SERPL-CCNC: 35 U/L (ref 15–37)
BASE DEFICIT BLD-SCNC: 4 MMOL/L
BILIRUB SERPL-MCNC: 0.6 MG/DL (ref 0.2–1.1)
BILIRUB UR QL: NEGATIVE
BUN SERPL-MCNC: 20 MG/DL (ref 6–23)
CALCIUM SERPL-MCNC: 10.1 MG/DL (ref 8.3–10.4)
CHLORIDE SERPL-SCNC: 101 MMOL/L (ref 101–110)
CO2 SERPL-SCNC: 19 MMOL/L (ref 21–32)
CREAT SERPL-MCNC: 1.3 MG/DL (ref 0.6–1)
ERYTHROCYTE [DISTWIDTH] IN BLOOD BY AUTOMATED COUNT: 12.9 % (ref 11.9–14.6)
FLUAV RNA SPEC QL NAA+PROBE: NOT DETECTED
FLUBV RNA SPEC QL NAA+PROBE: NOT DETECTED
GLOBULIN SER CALC-MCNC: 5.2 G/DL (ref 2.8–4.5)
GLUCOSE BLD STRIP.AUTO-MCNC: 331 MG/DL (ref 65–100)
GLUCOSE SERPL-MCNC: 309 MG/DL (ref 65–100)
GLUCOSE UR QL STRIP.AUTO: 500 MG/DL
HCO3 BLD-SCNC: 19.3 MMOL/L (ref 22–26)
HCT VFR BLD AUTO: 46.5 % (ref 35.8–46.3)
HGB BLD-MCNC: 15.7 G/DL (ref 11.7–15.4)
KETONES UR-MCNC: 80 MG/DL
LEUKOCYTE ESTERASE UR QL STRIP: NEGATIVE
LIPASE SERPL-CCNC: 116 U/L (ref 73–393)
MCH RBC QN AUTO: 28.1 PG (ref 26.1–32.9)
MCHC RBC AUTO-ENTMCNC: 33.8 G/DL (ref 31.4–35)
MCV RBC AUTO: 83.2 FL (ref 82–102)
NITRITE UR QL: NEGATIVE
NRBC # BLD: 0 K/UL (ref 0–0.2)
PCO2 BLD: 29.9 MMHG (ref 35–45)
PH BLD: 7.42 (ref 7.35–7.45)
PH UR: 5.5 (ref 5–9)
PLATELET # BLD AUTO: 279 K/UL (ref 150–450)
PMV BLD AUTO: 10.3 FL (ref 9.4–12.3)
PO2 BLD: 92 MMHG (ref 75–100)
POTASSIUM SERPL-SCNC: 5.1 MMOL/L (ref 3.5–5.1)
PROT SERPL-MCNC: 9.1 G/DL (ref 6.3–8.2)
PROT UR QL: ABNORMAL MG/DL
RBC # BLD AUTO: 5.59 M/UL (ref 4.05–5.2)
RBC # UR STRIP: ABNORMAL
SAO2 % BLD: 97.4 % (ref 95–98)
SARS-COV-2 RDRP RESP QL NAA+PROBE: NOT DETECTED
SERVICE CMNT-IMP: ABNORMAL
SODIUM SERPL-SCNC: 135 MMOL/L (ref 133–143)
SOURCE: NORMAL
SP GR UR: 1.02 (ref 1–1.02)
SPECIMEN TYPE: ABNORMAL
UROBILINOGEN UR QL: 0.2 EU/DL (ref 0.2–1)
WBC # BLD AUTO: 13.4 K/UL (ref 4.3–11.1)

## 2023-06-07 PROCEDURE — 99285 EMERGENCY DEPT VISIT HI MDM: CPT

## 2023-06-07 PROCEDURE — 2580000003 HC RX 258: Performed by: PHYSICIAN ASSISTANT

## 2023-06-07 PROCEDURE — 82962 GLUCOSE BLOOD TEST: CPT

## 2023-06-07 PROCEDURE — 80053 COMPREHEN METABOLIC PANEL: CPT

## 2023-06-07 PROCEDURE — 1100000000 HC RM PRIVATE

## 2023-06-07 PROCEDURE — 96375 TX/PRO/DX INJ NEW DRUG ADDON: CPT

## 2023-06-07 PROCEDURE — 71045 X-RAY EXAM CHEST 1 VIEW: CPT

## 2023-06-07 PROCEDURE — 81003 URINALYSIS AUTO W/O SCOPE: CPT

## 2023-06-07 PROCEDURE — 87502 INFLUENZA DNA AMP PROBE: CPT

## 2023-06-07 PROCEDURE — 83690 ASSAY OF LIPASE: CPT

## 2023-06-07 PROCEDURE — 6370000000 HC RX 637 (ALT 250 FOR IP): Performed by: PHYSICIAN ASSISTANT

## 2023-06-07 PROCEDURE — 83036 HEMOGLOBIN GLYCOSYLATED A1C: CPT

## 2023-06-07 PROCEDURE — 84132 ASSAY OF SERUM POTASSIUM: CPT

## 2023-06-07 PROCEDURE — 96374 THER/PROPH/DIAG INJ IV PUSH: CPT

## 2023-06-07 PROCEDURE — 93005 ELECTROCARDIOGRAM TRACING: CPT | Performed by: PHYSICIAN ASSISTANT

## 2023-06-07 PROCEDURE — 85027 COMPLETE CBC AUTOMATED: CPT

## 2023-06-07 PROCEDURE — 6360000002 HC RX W HCPCS: Performed by: PHYSICIAN ASSISTANT

## 2023-06-07 PROCEDURE — 82803 BLOOD GASES ANY COMBINATION: CPT

## 2023-06-07 PROCEDURE — 87635 SARS-COV-2 COVID-19 AMP PRB: CPT

## 2023-06-07 RX ORDER — HEPARIN SODIUM 5000 [USP'U]/ML
5000 INJECTION, SOLUTION INTRAVENOUS; SUBCUTANEOUS EVERY 8 HOURS SCHEDULED
Status: DISCONTINUED | OUTPATIENT
Start: 2023-06-08 | End: 2023-06-08

## 2023-06-07 RX ORDER — SODIUM CHLORIDE 9 MG/ML
INJECTION, SOLUTION INTRAVENOUS CONTINUOUS
Status: DISCONTINUED | OUTPATIENT
Start: 2023-06-07 | End: 2023-06-08

## 2023-06-07 RX ORDER — ONDANSETRON 2 MG/ML
4 INJECTION INTRAMUSCULAR; INTRAVENOUS
Status: COMPLETED | OUTPATIENT
Start: 2023-06-07 | End: 2023-06-07

## 2023-06-07 RX ORDER — 0.9 % SODIUM CHLORIDE 0.9 %
1000 INTRAVENOUS SOLUTION INTRAVENOUS
Status: COMPLETED | OUTPATIENT
Start: 2023-06-07 | End: 2023-06-07

## 2023-06-07 RX ORDER — DIPHENHYDRAMINE HYDROCHLORIDE 50 MG/ML
12.5 INJECTION INTRAMUSCULAR; INTRAVENOUS
Status: COMPLETED | OUTPATIENT
Start: 2023-06-07 | End: 2023-06-07

## 2023-06-07 RX ORDER — ENOXAPARIN SODIUM 100 MG/ML
40 INJECTION SUBCUTANEOUS DAILY
Status: DISCONTINUED | OUTPATIENT
Start: 2023-06-08 | End: 2023-06-07 | Stop reason: SDUPTHER

## 2023-06-07 RX ORDER — MAGNESIUM SULFATE 1 G/100ML
1000 INJECTION INTRAVENOUS PRN
Status: DISCONTINUED | OUTPATIENT
Start: 2023-06-07 | End: 2023-06-09 | Stop reason: HOSPADM

## 2023-06-07 RX ORDER — POLYETHYLENE GLYCOL 3350 17 G/17G
17 POWDER, FOR SOLUTION ORAL DAILY PRN
Status: DISCONTINUED | OUTPATIENT
Start: 2023-06-07 | End: 2023-06-09 | Stop reason: HOSPADM

## 2023-06-07 RX ORDER — METOCLOPRAMIDE HYDROCHLORIDE 5 MG/ML
10 INJECTION INTRAMUSCULAR; INTRAVENOUS
Status: COMPLETED | OUTPATIENT
Start: 2023-06-07 | End: 2023-06-07

## 2023-06-07 RX ORDER — DEXTROSE AND SODIUM CHLORIDE 5; .45 G/100ML; G/100ML
INJECTION, SOLUTION INTRAVENOUS CONTINUOUS PRN
Status: DISCONTINUED | OUTPATIENT
Start: 2023-06-07 | End: 2023-06-08

## 2023-06-07 RX ORDER — POTASSIUM CHLORIDE 7.45 MG/ML
10 INJECTION INTRAVENOUS PRN
Status: DISCONTINUED | OUTPATIENT
Start: 2023-06-07 | End: 2023-06-09 | Stop reason: HOSPADM

## 2023-06-07 RX ADMIN — METOCLOPRAMIDE 10 MG: 5 INJECTION, SOLUTION INTRAMUSCULAR; INTRAVENOUS at 23:30

## 2023-06-07 RX ADMIN — SODIUM CHLORIDE 5.4 UNITS/HR: 9 INJECTION, SOLUTION INTRAVENOUS at 23:11

## 2023-06-07 RX ADMIN — DIPHENHYDRAMINE HYDROCHLORIDE 12.5 MG: 50 INJECTION, SOLUTION INTRAMUSCULAR; INTRAVENOUS at 23:30

## 2023-06-07 RX ADMIN — ONDANSETRON 4 MG: 2 INJECTION INTRAMUSCULAR; INTRAVENOUS at 21:18

## 2023-06-07 RX ADMIN — SODIUM CHLORIDE 1000 ML: 9 INJECTION, SOLUTION INTRAVENOUS at 21:17

## 2023-06-07 ASSESSMENT — PAIN - FUNCTIONAL ASSESSMENT: PAIN_FUNCTIONAL_ASSESSMENT: 0-10

## 2023-06-07 ASSESSMENT — PAIN SCALES - GENERAL: PAINLEVEL_OUTOF10: 8

## 2023-06-07 ASSESSMENT — ENCOUNTER SYMPTOMS
NAUSEA: 1
COUGH: 0
BLOOD IN STOOL: 0
VOMITING: 1
DIARRHEA: 1
ABDOMINAL PAIN: 0
SORE THROAT: 0
RHINORRHEA: 0

## 2023-06-07 ASSESSMENT — PAIN DESCRIPTION - LOCATION: LOCATION: GENERALIZED

## 2023-06-08 LAB
ANION GAP SERPL CALC-SCNC: 2 MMOL/L (ref 2–11)
ANION GAP SERPL CALC-SCNC: 9 MMOL/L (ref 2–11)
BASOPHILS # BLD: 0 K/UL (ref 0–0.2)
BASOPHILS NFR BLD: 0 % (ref 0–2)
BUN SERPL-MCNC: 24 MG/DL (ref 6–23)
BUN SERPL-MCNC: 27 MG/DL (ref 6–23)
CALCIUM SERPL-MCNC: 8.9 MG/DL (ref 8.3–10.4)
CALCIUM SERPL-MCNC: 9.7 MG/DL (ref 8.3–10.4)
CHLORIDE SERPL-SCNC: 108 MMOL/L (ref 101–110)
CHLORIDE SERPL-SCNC: 108 MMOL/L (ref 101–110)
CO2 SERPL-SCNC: 21 MMOL/L (ref 21–32)
CO2 SERPL-SCNC: 27 MMOL/L (ref 21–32)
CREAT SERPL-MCNC: 1 MG/DL (ref 0.6–1)
CREAT SERPL-MCNC: 1.1 MG/DL (ref 0.6–1)
DIFFERENTIAL METHOD BLD: ABNORMAL
EKG ATRIAL RATE: 114 BPM
EKG DIAGNOSIS: NORMAL
EKG P AXIS: 61 DEGREES
EKG P-R INTERVAL: 122 MS
EKG Q-T INTERVAL: 360 MS
EKG QRS DURATION: 72 MS
EKG QTC CALCULATION (BAZETT): 496 MS
EKG R AXIS: 59 DEGREES
EKG T AXIS: 70 DEGREES
EKG VENTRICULAR RATE: 114 BPM
EOSINOPHIL # BLD: 0 K/UL (ref 0–0.8)
EOSINOPHIL NFR BLD: 0 % (ref 0.5–7.8)
ERYTHROCYTE [DISTWIDTH] IN BLOOD BY AUTOMATED COUNT: 13 % (ref 11.9–14.6)
EST. AVERAGE GLUCOSE BLD GHB EST-MCNC: 272 MG/DL
EST. AVERAGE GLUCOSE BLD GHB EST-MCNC: 283 MG/DL
GLUCOSE BLD STRIP.AUTO-MCNC: 131 MG/DL (ref 65–100)
GLUCOSE BLD STRIP.AUTO-MCNC: 133 MG/DL (ref 65–100)
GLUCOSE BLD STRIP.AUTO-MCNC: 144 MG/DL (ref 65–100)
GLUCOSE BLD STRIP.AUTO-MCNC: 152 MG/DL (ref 65–100)
GLUCOSE BLD STRIP.AUTO-MCNC: 156 MG/DL (ref 65–100)
GLUCOSE BLD STRIP.AUTO-MCNC: 163 MG/DL (ref 65–100)
GLUCOSE BLD STRIP.AUTO-MCNC: 175 MG/DL (ref 65–100)
GLUCOSE BLD STRIP.AUTO-MCNC: 175 MG/DL (ref 65–100)
GLUCOSE BLD STRIP.AUTO-MCNC: 191 MG/DL (ref 65–100)
GLUCOSE BLD STRIP.AUTO-MCNC: 208 MG/DL (ref 65–100)
GLUCOSE BLD STRIP.AUTO-MCNC: 209 MG/DL (ref 65–100)
GLUCOSE BLD STRIP.AUTO-MCNC: 209 MG/DL (ref 65–100)
GLUCOSE BLD STRIP.AUTO-MCNC: 222 MG/DL (ref 65–100)
GLUCOSE BLD STRIP.AUTO-MCNC: 227 MG/DL (ref 65–100)
GLUCOSE BLD STRIP.AUTO-MCNC: 264 MG/DL (ref 65–100)
GLUCOSE BLD STRIP.AUTO-MCNC: 312 MG/DL (ref 65–100)
GLUCOSE SERPL-MCNC: 189 MG/DL (ref 65–100)
GLUCOSE SERPL-MCNC: 211 MG/DL (ref 65–100)
HBA1C MFR BLD: 11.1 % (ref 4.8–5.6)
HBA1C MFR BLD: 11.5 % (ref 4.8–5.6)
HCT VFR BLD AUTO: 42.7 % (ref 35.8–46.3)
HGB BLD-MCNC: 14.3 G/DL (ref 11.7–15.4)
IMM GRANULOCYTES # BLD AUTO: 0.1 K/UL (ref 0–0.5)
IMM GRANULOCYTES NFR BLD AUTO: 1 % (ref 0–5)
LYMPHOCYTES # BLD: 1.9 K/UL (ref 0.5–4.6)
LYMPHOCYTES NFR BLD: 14 % (ref 13–44)
MAGNESIUM SERPL-MCNC: 2 MG/DL (ref 1.8–2.4)
MAGNESIUM SERPL-MCNC: 2.1 MG/DL (ref 1.8–2.4)
MCH RBC QN AUTO: 28.1 PG (ref 26.1–32.9)
MCHC RBC AUTO-ENTMCNC: 33.5 G/DL (ref 31.4–35)
MCV RBC AUTO: 83.9 FL (ref 82–102)
MONOCYTES # BLD: 0.7 K/UL (ref 0.1–1.3)
MONOCYTES NFR BLD: 5 % (ref 4–12)
NEUTS SEG # BLD: 10.6 K/UL (ref 1.7–8.2)
NEUTS SEG NFR BLD: 80 % (ref 43–78)
NRBC # BLD: 0 K/UL (ref 0–0.2)
PHOSPHATE SERPL-MCNC: 2.2 MG/DL (ref 2.5–4.5)
PHOSPHATE SERPL-MCNC: 3.3 MG/DL (ref 2.5–4.5)
PLATELET # BLD AUTO: 249 K/UL (ref 150–450)
PMV BLD AUTO: 10.3 FL (ref 9.4–12.3)
POTASSIUM SERPL-SCNC: 4 MMOL/L (ref 3.5–5.1)
POTASSIUM SERPL-SCNC: 4.3 MMOL/L (ref 3.5–5.1)
POTASSIUM SERPL-SCNC: 4.5 MMOL/L (ref 3.5–5.1)
RBC # BLD AUTO: 5.09 M/UL (ref 4.05–5.2)
SERVICE CMNT-IMP: ABNORMAL
SODIUM SERPL-SCNC: 137 MMOL/L (ref 133–143)
SODIUM SERPL-SCNC: 138 MMOL/L (ref 133–143)
WBC # BLD AUTO: 13.2 K/UL (ref 4.3–11.1)

## 2023-06-08 PROCEDURE — 84100 ASSAY OF PHOSPHORUS: CPT

## 2023-06-08 PROCEDURE — 6360000002 HC RX W HCPCS: Performed by: HOSPITALIST

## 2023-06-08 PROCEDURE — 2580000003 HC RX 258: Performed by: HOSPITALIST

## 2023-06-08 PROCEDURE — 2500000003 HC RX 250 WO HCPCS: Performed by: HOSPITALIST

## 2023-06-08 PROCEDURE — 93010 ELECTROCARDIOGRAM REPORT: CPT | Performed by: INTERNAL MEDICINE

## 2023-06-08 PROCEDURE — A4216 STERILE WATER/SALINE, 10 ML: HCPCS | Performed by: HOSPITALIST

## 2023-06-08 PROCEDURE — 83036 HEMOGLOBIN GLYCOSYLATED A1C: CPT

## 2023-06-08 PROCEDURE — C9113 INJ PANTOPRAZOLE SODIUM, VIA: HCPCS | Performed by: HOSPITALIST

## 2023-06-08 PROCEDURE — 6370000000 HC RX 637 (ALT 250 FOR IP): Performed by: HOSPITALIST

## 2023-06-08 PROCEDURE — 80048 BASIC METABOLIC PNL TOTAL CA: CPT

## 2023-06-08 PROCEDURE — 85025 COMPLETE CBC W/AUTO DIFF WBC: CPT

## 2023-06-08 PROCEDURE — 83735 ASSAY OF MAGNESIUM: CPT

## 2023-06-08 PROCEDURE — 82962 GLUCOSE BLOOD TEST: CPT

## 2023-06-08 PROCEDURE — 2000000000 HC ICU R&B

## 2023-06-08 PROCEDURE — 36415 COLL VENOUS BLD VENIPUNCTURE: CPT

## 2023-06-08 RX ORDER — INSULIN GLARGINE 100 [IU]/ML
15 INJECTION, SOLUTION SUBCUTANEOUS DAILY
Status: DISCONTINUED | OUTPATIENT
Start: 2023-06-08 | End: 2023-06-08

## 2023-06-08 RX ORDER — DEXTROSE MONOHYDRATE 100 MG/ML
INJECTION, SOLUTION INTRAVENOUS CONTINUOUS PRN
Status: DISCONTINUED | OUTPATIENT
Start: 2023-06-08 | End: 2023-06-09 | Stop reason: HOSPADM

## 2023-06-08 RX ORDER — INSULIN LISPRO 100 [IU]/ML
0-4 INJECTION, SOLUTION INTRAVENOUS; SUBCUTANEOUS NIGHTLY
Status: DISCONTINUED | OUTPATIENT
Start: 2023-06-08 | End: 2023-06-09 | Stop reason: HOSPADM

## 2023-06-08 RX ORDER — ENOXAPARIN SODIUM 100 MG/ML
40 INJECTION SUBCUTANEOUS EVERY 24 HOURS
Status: DISCONTINUED | OUTPATIENT
Start: 2023-06-08 | End: 2023-06-09 | Stop reason: HOSPADM

## 2023-06-08 RX ORDER — ASPIRIN 81 MG/1
81 TABLET ORAL NIGHTLY
Status: DISCONTINUED | OUTPATIENT
Start: 2023-06-08 | End: 2023-06-09 | Stop reason: HOSPADM

## 2023-06-08 RX ORDER — INSULIN GLARGINE 100 [IU]/ML
15 INJECTION, SOLUTION SUBCUTANEOUS NIGHTLY
Status: DISCONTINUED | OUTPATIENT
Start: 2023-06-08 | End: 2023-06-09

## 2023-06-08 RX ORDER — LANOLIN ALCOHOL/MO/W.PET/CERES
6 CREAM (GRAM) TOPICAL NIGHTLY PRN
Status: DISCONTINUED | OUTPATIENT
Start: 2023-06-08 | End: 2023-06-09 | Stop reason: HOSPADM

## 2023-06-08 RX ORDER — ONDANSETRON 2 MG/ML
4 INJECTION INTRAMUSCULAR; INTRAVENOUS EVERY 6 HOURS PRN
Status: DISCONTINUED | OUTPATIENT
Start: 2023-06-08 | End: 2023-06-09 | Stop reason: HOSPADM

## 2023-06-08 RX ORDER — LISINOPRIL 20 MG/1
20 TABLET ORAL NIGHTLY
Status: DISCONTINUED | OUTPATIENT
Start: 2023-06-08 | End: 2023-06-09 | Stop reason: HOSPADM

## 2023-06-08 RX ORDER — ATORVASTATIN CALCIUM 80 MG/1
80 TABLET, FILM COATED ORAL NIGHTLY
Status: DISCONTINUED | OUTPATIENT
Start: 2023-06-08 | End: 2023-06-09 | Stop reason: HOSPADM

## 2023-06-08 RX ORDER — METOCLOPRAMIDE 10 MG/1
5 TABLET ORAL 2 TIMES DAILY
Status: DISCONTINUED | OUTPATIENT
Start: 2023-06-08 | End: 2023-06-09 | Stop reason: HOSPADM

## 2023-06-08 RX ORDER — CITALOPRAM 20 MG/1
20 TABLET ORAL NIGHTLY
Status: DISCONTINUED | OUTPATIENT
Start: 2023-06-08 | End: 2023-06-09 | Stop reason: HOSPADM

## 2023-06-08 RX ORDER — INSULIN LISPRO 100 [IU]/ML
0-8 INJECTION, SOLUTION INTRAVENOUS; SUBCUTANEOUS
Status: DISCONTINUED | OUTPATIENT
Start: 2023-06-08 | End: 2023-06-09 | Stop reason: HOSPADM

## 2023-06-08 RX ORDER — PANTOPRAZOLE SODIUM 40 MG/1
40 TABLET, DELAYED RELEASE ORAL DAILY
Status: DISCONTINUED | OUTPATIENT
Start: 2023-06-09 | End: 2023-06-09 | Stop reason: HOSPADM

## 2023-06-08 RX ORDER — PREGABALIN 50 MG/1
50 CAPSULE ORAL 2 TIMES DAILY
Status: DISCONTINUED | OUTPATIENT
Start: 2023-06-08 | End: 2023-06-08

## 2023-06-08 RX ORDER — ACETAMINOPHEN 325 MG/1
650 TABLET ORAL EVERY 4 HOURS PRN
Status: DISCONTINUED | OUTPATIENT
Start: 2023-06-08 | End: 2023-06-09 | Stop reason: HOSPADM

## 2023-06-08 RX ADMIN — POTASSIUM CHLORIDE 10 MEQ: 7.46 INJECTION, SOLUTION INTRAVENOUS at 06:35

## 2023-06-08 RX ADMIN — ASPIRIN 81 MG: 81 TABLET ORAL at 20:49

## 2023-06-08 RX ADMIN — METOPROLOL TARTRATE 25 MG: 25 TABLET, FILM COATED ORAL at 20:50

## 2023-06-08 RX ADMIN — SODIUM PHOSPHATE, MONOBASIC, MONOHYDRATE AND SODIUM PHOSPHATE, DIBASIC, ANHYDROUS 10 MMOL: 142; 276 INJECTION, SOLUTION INTRAVENOUS at 13:44

## 2023-06-08 RX ADMIN — ONDANSETRON 4 MG: 2 INJECTION INTRAMUSCULAR; INTRAVENOUS at 04:26

## 2023-06-08 RX ADMIN — POTASSIUM CHLORIDE 10 MEQ: 7.46 INJECTION, SOLUTION INTRAVENOUS at 03:19

## 2023-06-08 RX ADMIN — LISINOPRIL 20 MG: 20 TABLET ORAL at 20:50

## 2023-06-08 RX ADMIN — ATORVASTATIN CALCIUM 80 MG: 80 TABLET, FILM COATED ORAL at 20:49

## 2023-06-08 RX ADMIN — SODIUM CHLORIDE 40 MG: 9 INJECTION INTRAMUSCULAR; INTRAVENOUS; SUBCUTANEOUS at 00:41

## 2023-06-08 RX ADMIN — INSULIN GLARGINE 15 UNITS: 100 INJECTION, SOLUTION SUBCUTANEOUS at 11:56

## 2023-06-08 RX ADMIN — SODIUM CHLORIDE 40 MG: 9 INJECTION INTRAMUSCULAR; INTRAVENOUS; SUBCUTANEOUS at 10:45

## 2023-06-08 RX ADMIN — HEPARIN SODIUM 5000 UNITS: 5000 INJECTION INTRAVENOUS; SUBCUTANEOUS at 06:12

## 2023-06-08 RX ADMIN — DEXTROSE AND SODIUM CHLORIDE: 5; 450 INJECTION, SOLUTION INTRAVENOUS at 09:15

## 2023-06-08 RX ADMIN — INSULIN GLARGINE 15 UNITS: 100 INJECTION, SOLUTION SUBCUTANEOUS at 21:13

## 2023-06-08 RX ADMIN — POTASSIUM CHLORIDE 10 MEQ: 7.46 INJECTION, SOLUTION INTRAVENOUS at 04:34

## 2023-06-08 RX ADMIN — METOCLOPRAMIDE 5 MG: 10 TABLET ORAL at 14:23

## 2023-06-08 RX ADMIN — METOCLOPRAMIDE 5 MG: 10 TABLET ORAL at 20:49

## 2023-06-08 RX ADMIN — CITALOPRAM HYDROBROMIDE 20 MG: 20 TABLET ORAL at 20:50

## 2023-06-08 RX ADMIN — METOPROLOL TARTRATE 25 MG: 25 TABLET, FILM COATED ORAL at 14:23

## 2023-06-08 RX ADMIN — ENOXAPARIN SODIUM 40 MG: 40 INJECTION SUBCUTANEOUS at 14:23

## 2023-06-08 RX ADMIN — ONDANSETRON 4 MG: 2 INJECTION INTRAMUSCULAR; INTRAVENOUS at 18:02

## 2023-06-08 RX ADMIN — DEXTROSE AND SODIUM CHLORIDE: 5; 450 INJECTION, SOLUTION INTRAVENOUS at 02:15

## 2023-06-08 ASSESSMENT — PAIN SCALES - GENERAL
PAINLEVEL_OUTOF10: 0

## 2023-06-08 NOTE — ED TRIAGE NOTES
Pt reports feeling sick and weak yesterday with multiple episodes of diarrhea, then at 3am this morning began vomiting and has been unable to tolerate PO since. Denies f/c/coughing or SOB. 4-5 episodes of similar problems in the past with trips to the hospital, most recently in Dec.  Chronic pain in back/joints but no new abdominal pain or dysuria.

## 2023-06-08 NOTE — ED PROVIDER NOTES
COVID-19, RAPID   INFLUENZA A/B, MOLECULAR   LIPASE   POTASSIUM   BLOOD GAS, VENOUS   BASIC METABOLIC PANEL   BASIC METABOLIC PANEL   MAGNESIUM   MAGNESIUM   PHOSPHORUS   PHOSPHORUS   CBC WITH AUTO DIFFERENTIAL   BASIC METABOLIC PANEL   MAGNESIUM   PHOSPHORUS   POCT GLUCOSE   POCT GLUCOSE   POCT GLUCOSE   POCT GLUCOSE   POCT GLUCOSE   POCT GLUCOSE   POCT GLUCOSE   POCT GLUCOSE   POCT GLUCOSE   POCT GLUCOSE   POCT GLUCOSE   POCT GLUCOSE   POCT GLUCOSE   POCT GLUCOSE   POCT GLUCOSE   POCT GLUCOSE   POCT GLUCOSE   POCT GLUCOSE   POCT GLUCOSE   POCT GLUCOSE   POCT GLUCOSE   POCT GLUCOSE   POCT GLUCOSE   POCT GLUCOSE   POCT GLUCOSE   POCT GLUCOSE   POCT GLUCOSE   POCT GLUCOSE   POCT GLUCOSE   POCT GLUCOSE   POCT GLUCOSE   POCT GLUCOSE     Medications   insulin regular (HUMULIN R;NOVOLIN R) 100 Units in sodium chloride 0.9 % 100 mL infusion (6.7 Units/hr IntraVENous Handoff 6/8/23 0237)   insulin regular (HUMULIN R;NOVOLIN R) injection 0-10 Units (has no administration in time range)   dextrose bolus 10% 125 mL (has no administration in time range)     Or   dextrose bolus 10% 250 mL (has no administration in time range)   dextrose bolus 10% 125 mL (has no administration in time range)     Or   dextrose bolus 10% 250 mL (has no administration in time range)   potassium chloride 10 mEq/100 mL IVPB (Peripheral Line) (has no administration in time range)   magnesium sulfate 1000 mg in dextrose 5% 100 mL IVPB (has no administration in time range)   sodium phosphate 10 mmol in sodium chloride 0.9 % 250 mL IVPB (has no administration in time range)     Or   sodium phosphate 15 mmol in sodium chloride 0.9 % 250 mL IVPB (has no administration in time range)     Or   sodium phosphate 20 mmol in sodium chloride 0.9 % 250 mL IVPB (has no administration in time range)   polyethylene glycol (GLYCOLAX) packet 17 g (has no administration in time range)   0.9 % sodium chloride infusion (has no administration in time range)   dextrose 5

## 2023-06-08 NOTE — INTERDISCIPLINARY ROUNDS
Interdisciplinary team rounds were held 6/8/2023 with the following team members:Care Management, Nursing, Nurse Practitioner, Occupational Therapy, Palliative Care, Pastoral Care, Pharmacy, Physician, Respiratory Therapy, and Clinical Coordinator and the patient. Plan of care discussed. See clinical pathway and/or care plan for interventions and desired outcomes.

## 2023-06-08 NOTE — ED NOTES
TRANSFER - OUT REPORT:    Verbal report given to Alison Storey RN on [employer]  being transferred to [facility] for routine progression of patient care       Report consisted of patient's Situation, Background, Assessment and   Recommendations(SBAR). Information from the following report(s) ED SBAR was reviewed with the receiving nurse. Rena Lara Assessment: Presents to emergency department  because of falls (Syncope, seizure, or loss of consciousness): No, Age > 79: No, Altered Mental Status, Intoxication with alcohol or substance confusion (Disorientation, impaired judgment, poor safety awaremess, or inability to follow instructions): No, Impaired Mobility: Ambulates or transfers with assistive devices or assistance; Unable to ambulate or transer.: No, Nursing Judgement: No  Lines:   Peripheral IV 06/07/23 Right Antecubital (Active)       Peripheral IV 06/08/23 Distal;Left Cephalic (Active)        Opportunity for questions and clarification was provided.       Patient transported with:  Registered Nurse           Siva Delgado RN  06/08/23 2459

## 2023-06-08 NOTE — CARE COORDINATION
Chart reviewed s/p admission to ICU for DKA. Pt has PCP and insurance. Diabetes management has seen and given pt lots of education and material. No needs identified at present for d/c home. CM to follow for any needs per MD.          06/08/23 1368   Service Assessment   Patient Orientation Alert and Oriented   Cognition Alert   History Provided By Medical Record   Primary Caregiver Self   Accompanied By/Relationship none   Support Systems Children;Parent   Patient's Healthcare Decision Maker is: Legal Next of Kin   PCP Verified by CM Yes   Current Functional Level Independent in ADLs/IADLs   Can patient return to prior living arrangement Yes   Ability to make needs known: Good   Family able to assist with home care needs: Other (comment)   Would you like for me to discuss the discharge plan with any other family members/significant others, and if so, who? Yes   Financial Resources Other (Comment)  (BCBS)   Community Resources None   CM/SW Referral Other (see comment)  (pending)   Social/Functional History   ADL Assistance Independent   Homemaking Assistance Independent   Homemaking Responsibilities Yes   Ambulation Assistance Independent   Transfer Assistance Independent   Discharge Planning   [facility]   Confirm Follow Up Transport Family   Condition of Participation: Discharge Planning   Freedom of Choice list was provided with basic dialogue that supports the patient's individualized plan of care/goals, treatment preferences, and shares the quality data associated with the providers?   Yes

## 2023-06-08 NOTE — INTERDISCIPLINARY ROUNDS
Labs printed for upcomming appt and ordered per PCP's note below.   Multi-D Rounds/Checklist (leapfrog):  Lines: can any be removed?: None     External Urinary Catheter (Active)      DVT Prophylaxis: Ordered  Vent: N/A  Nutrition Ordered/appropriate: Contraindicated- DKA  Can antibiotics or other drugs be stopped? Is Nozin performed: Yes/End Date set  Inpat Anti-Infectives (From admission, onward)      None          Consults needed: None  A: Is pain control adequate? (has PRNs? Stop drip?) Yes  B: Sedation break and SBT? N/A  C: Is sedation choice appropriate? N/A  D: Delirium/CAM-ICU? No  E: Mobility goals/appropriateness? Yes  F: Family update and plan? parent is primary contact and is being updated daily by primary attending and nursing staff.     Rosana Palacios, APRN - CNP

## 2023-06-09 VITALS
TEMPERATURE: 98.2 F | RESPIRATION RATE: 19 BRPM | WEIGHT: 167 LBS | BODY MASS INDEX: 29.59 KG/M2 | SYSTOLIC BLOOD PRESSURE: 100 MMHG | DIASTOLIC BLOOD PRESSURE: 63 MMHG | OXYGEN SATURATION: 94 % | HEIGHT: 63 IN | HEART RATE: 84 BPM

## 2023-06-09 LAB
ANION GAP SERPL CALC-SCNC: 5 MMOL/L (ref 2–11)
BASOPHILS # BLD: 0.1 K/UL (ref 0–0.2)
BASOPHILS NFR BLD: 1 % (ref 0–2)
BUN SERPL-MCNC: 19 MG/DL (ref 6–23)
CALCIUM SERPL-MCNC: 8.7 MG/DL (ref 8.3–10.4)
CHLORIDE SERPL-SCNC: 107 MMOL/L (ref 101–110)
CO2 SERPL-SCNC: 26 MMOL/L (ref 21–32)
CREAT SERPL-MCNC: 0.7 MG/DL (ref 0.6–1)
DIFFERENTIAL METHOD BLD: ABNORMAL
EOSINOPHIL # BLD: 0.1 K/UL (ref 0–0.8)
EOSINOPHIL NFR BLD: 1 % (ref 0.5–7.8)
ERYTHROCYTE [DISTWIDTH] IN BLOOD BY AUTOMATED COUNT: 12.8 % (ref 11.9–14.6)
GLUCOSE BLD STRIP.AUTO-MCNC: 137 MG/DL (ref 65–100)
GLUCOSE BLD STRIP.AUTO-MCNC: 163 MG/DL (ref 65–100)
GLUCOSE SERPL-MCNC: 128 MG/DL (ref 65–100)
HCT VFR BLD AUTO: 37.8 % (ref 35.8–46.3)
HGB BLD-MCNC: 12.6 G/DL (ref 11.7–15.4)
IMM GRANULOCYTES # BLD AUTO: 0 K/UL (ref 0–0.5)
IMM GRANULOCYTES NFR BLD AUTO: 0 % (ref 0–5)
LYMPHOCYTES # BLD: 5.1 K/UL (ref 0.5–4.6)
LYMPHOCYTES NFR BLD: 33 % (ref 13–44)
MCH RBC QN AUTO: 28.1 PG (ref 26.1–32.9)
MCHC RBC AUTO-ENTMCNC: 33.3 G/DL (ref 31.4–35)
MCV RBC AUTO: 84.2 FL (ref 82–102)
MONOCYTES # BLD: 0.9 K/UL (ref 0.1–1.3)
MONOCYTES NFR BLD: 6 % (ref 4–12)
NEUTS SEG # BLD: 9.1 K/UL (ref 1.7–8.2)
NEUTS SEG NFR BLD: 59 % (ref 43–78)
NRBC # BLD: 0 K/UL (ref 0–0.2)
PLATELET # BLD AUTO: 201 K/UL (ref 150–450)
PMV BLD AUTO: 10 FL (ref 9.4–12.3)
POTASSIUM SERPL-SCNC: 3.8 MMOL/L (ref 3.5–5.1)
RBC # BLD AUTO: 4.49 M/UL (ref 4.05–5.2)
SERVICE CMNT-IMP: ABNORMAL
SERVICE CMNT-IMP: ABNORMAL
SODIUM SERPL-SCNC: 138 MMOL/L (ref 133–143)
WBC # BLD AUTO: 15.2 K/UL (ref 4.3–11.1)

## 2023-06-09 PROCEDURE — 82962 GLUCOSE BLOOD TEST: CPT

## 2023-06-09 PROCEDURE — 85025 COMPLETE CBC W/AUTO DIFF WBC: CPT

## 2023-06-09 PROCEDURE — 6370000000 HC RX 637 (ALT 250 FOR IP): Performed by: HOSPITALIST

## 2023-06-09 PROCEDURE — 80048 BASIC METABOLIC PNL TOTAL CA: CPT

## 2023-06-09 PROCEDURE — 36415 COLL VENOUS BLD VENIPUNCTURE: CPT

## 2023-06-09 RX ORDER — INSULIN GLARGINE 100 [IU]/ML
22 INJECTION, SOLUTION SUBCUTANEOUS NIGHTLY
Status: DISCONTINUED | OUTPATIENT
Start: 2023-06-09 | End: 2023-06-09 | Stop reason: HOSPADM

## 2023-06-09 RX ORDER — PEN NEEDLE, DIABETIC 30 GX3/16"
NEEDLE, DISPOSABLE MISCELLANEOUS
Qty: 100 EACH | Refills: 1 | Status: SHIPPED | OUTPATIENT
Start: 2023-06-09

## 2023-06-09 RX ORDER — PANTOPRAZOLE SODIUM 40 MG/1
40 TABLET, DELAYED RELEASE ORAL DAILY
Qty: 1 TABLET | Refills: 0
Start: 2023-06-09 | End: 2023-07-09

## 2023-06-09 RX ORDER — INSULIN LISPRO 100 [IU]/ML
INJECTION, SOLUTION INTRAVENOUS; SUBCUTANEOUS
Qty: 1 ADJUSTABLE DOSE PRE-FILLED PEN SYRINGE | Refills: 1 | Status: SHIPPED | OUTPATIENT
Start: 2023-06-09

## 2023-06-09 RX ORDER — SYRING-NEEDL,DISP,INSUL,0.3 ML 30 GX5/16"
1 SYRINGE, EMPTY DISPOSABLE MISCELLANEOUS SEE ADMIN INSTRUCTIONS
Qty: 100 EACH | Refills: 1 | Status: SHIPPED | OUTPATIENT
Start: 2023-06-09

## 2023-06-09 RX ORDER — METOCLOPRAMIDE 5 MG/1
5 TABLET ORAL 2 TIMES DAILY
Qty: 180 TABLET | Refills: 1 | Status: SHIPPED | OUTPATIENT
Start: 2023-06-09 | End: 2023-12-06

## 2023-06-09 RX ORDER — INSULIN GLARGINE 100 [IU]/ML
22 INJECTION, SOLUTION SUBCUTANEOUS NIGHTLY
Qty: 15 ADJUSTABLE DOSE PRE-FILLED PEN SYRINGE | Refills: 3 | Status: SHIPPED | OUTPATIENT
Start: 2023-06-09

## 2023-06-09 RX ORDER — GLUCOSAMINE HCL/CHONDROITIN SU 500-400 MG
CAPSULE ORAL SEE ADMIN INSTRUCTIONS
Qty: 100 STRIP | Refills: 1 | Status: SHIPPED | OUTPATIENT
Start: 2023-06-09

## 2023-06-09 RX ORDER — BLOOD-GLUCOSE METER
1 KIT MISCELLANEOUS DAILY
Qty: 1 KIT | Refills: 0 | Status: SHIPPED | OUTPATIENT
Start: 2023-06-09

## 2023-06-09 RX ORDER — SEMAGLUTIDE 1.34 MG/ML
0.5 INJECTION, SOLUTION SUBCUTANEOUS WEEKLY
Qty: 6 ML | Refills: 1
Start: 2023-06-09 | End: 2023-12-06

## 2023-06-09 RX ADMIN — METOPROLOL TARTRATE 25 MG: 25 TABLET, FILM COATED ORAL at 07:58

## 2023-06-09 RX ADMIN — METOCLOPRAMIDE 5 MG: 10 TABLET ORAL at 11:08

## 2023-06-09 RX ADMIN — PANTOPRAZOLE SODIUM 40 MG: 40 TABLET, DELAYED RELEASE ORAL at 07:58

## 2023-06-09 ASSESSMENT — PAIN SCALES - GENERAL
PAINLEVEL_OUTOF10: 0
PAINLEVEL_OUTOF10: 0

## 2023-06-09 NOTE — PLAN OF CARE
Problem: Discharge Planning  Goal: Discharge to home or other facility with appropriate resources  6/9/2023 1200 by Jermain Elizondo RN  Outcome: Adequate for Discharge  6/9/2023 0829 by Jermain Elizondo RN  Outcome: Progressing  6/9/2023 0545 by Deana Johnson RN  Outcome: Progressing  Flowsheets (Taken 6/8/2023 2001 by Alva Chery, RN)  Discharge to home or other facility with appropriate resources:   Identify barriers to discharge with patient and caregiver   Arrange for needed discharge resources and transportation as appropriate   Identify discharge learning needs (meds, wound care, etc)   Refer to discharge planning if patient needs post-hospital services based on physician order or complex needs related to functional status, cognitive ability or social support system     Problem: Pain  Goal: Verbalizes/displays adequate comfort level or baseline comfort level  6/9/2023 1200 by Jermain Elizondo RN  Outcome: Adequate for Discharge  6/9/2023 0829 by Jermain Elizondo RN  Outcome: Progressing  6/9/2023 0545 by Deana Johnson RN  Outcome: Progressing  Flowsheets (Taken 6/8/2023 2001 by Alva Chery RN)  Verbalizes/displays adequate comfort level or baseline comfort level:   Encourage patient to monitor pain and request assistance   Assess pain using appropriate pain scale   Administer analgesics based on type and severity of pain and evaluate response   Implement non-pharmacological measures as appropriate and evaluate response   Notify Licensed Independent Practitioner if interventions unsuccessful or patient reports new pain

## 2023-06-09 NOTE — PROGRESS NOTES
Initial visit was made, emotional support and a spiritual presence were provided. She was appreciative of the visit. Laureen Greenfield we pray for the medical team caring for this patient. We ask that you bless their work and guide their decisions. We pray that you give them the wisdom to effectively diagnose and treat the patient's illnesses. We ask in  [address] name. Amen.      Axel Harmon, [address] [facility], [facility]  
Pt discharged to home. Reviewed over education and follow up visits. IVs removed. Opportunity for questions provided.
TRANSFER - IN REPORT:    Verbal report received from Lauryn on [address]  being received from ICU for routine progression of patient care      Report consisted of patient's Situation, Background, Assessment and   Recommendations(SBAR). Information from the following report(s) Nurse Handoff Report, Adult Overview, Recent Results, and Neuro Assessment was reviewed with the receiving nurse. Opportunity for questions and clarification was provided. Assessment to be completed upon patient's arrival to unit and care assumed.
TRANSFER - IN REPORT:    Verbal report received from [facility] on [employer]  being received from ER for routine progression of patient care      Report consisted of patient's Situation, Background, Assessment and   Recommendations(SBAR). Information from the following report(s) ED SBAR was reviewed with the receiving nurse. Opportunity for questions and clarification was provided. Assessment completed upon patient's arrival to unit and care assumed. DKA patient coming up shortly.
TRANSFER - OUT REPORT:    Verbal report given to [facility], RN on [employer]  being transferred to 7th floor for routine progression of patient care       Report consisted of patient's Situation, Background, Assessment and   Recommendations(SBAR). Information from the following report(s) Nurse Handoff Report, Index, Adult Overview, Intake/Output, MAR, Recent Results, and Cardiac Rhythm Sinus Rhythm  was reviewed with the receiving nurse. Boon Assessment: Presents to emergency department  because of falls (Syncope, seizure, or loss of consciousness): No, Age > 79: No, Altered Mental Status, Intoxication with alcohol or substance confusion (Disorientation, impaired judgment, poor safety awaremess, or inability to follow instructions): No, Impaired Mobility: Ambulates or transfers with assistive devices or assistance; Unable to ambulate or transer.: No, Nursing Judgement: No  Lines:   Peripheral IV 06/08/23 Distal;Left Cephalic (Active)   Site Assessment Clean, dry & intact 06/09/23 0302   Line Status Flushed;Capped 06/08/23 2001   Line Care Connections checked and tightened;Cap changed 06/08/23 2001   Phlebitis Assessment No symptoms 06/08/23 2001   Infiltration Assessment 0 06/08/23 2001   Alcohol Cap Used Yes 06/08/23 2001   Dressing Status Clean, dry & intact 06/09/23 0302   Dressing Type Transparent 06/08/23 2001        Opportunity for questions and clarification was provided.       Patient transported with:  Registered Nurse
or clubbing. No edema  Skin:     No rashes and normal coloration. Warm and dry. Neuro:  CN II-XII grossly intact. Psych:  Normal mood and affect.       I have personally reviewed labs and tests:  Recent Labs:  Recent Results (from the past 48 hour(s))   CBC    Collection Time: 06/07/23  9:09 PM   Result Value Ref Range    WBC 13.4 (H) 4.3 - 11.1 K/uL    RBC 5.59 (H) 4.05 - 5.2 M/uL    Hemoglobin 15.7 (H) 11.7 - 15.4 g/dL    Hematocrit 46.5 (H) 35.8 - 46.3 %    MCV 83.2 82 - 102 FL    MCH 28.1 26.1 - 32.9 PG    MCHC 33.8 31.4 - 35.0 g/dL    RDW 12.9 11.9 - 14.6 %    Platelets 185 091 - 113 K/uL    MPV 10.3 9.4 - 12.3 FL    nRBC 0.00 0.0 - 0.2 K/uL   Comprehensive Metabolic Panel    Collection Time: 06/07/23  9:09 PM   Result Value Ref Range    Sodium 135 133 - 143 mmol/L    Potassium 5.1 3.5 - 5.1 mmol/L    Chloride 101 101 - 110 mmol/L    CO2 19 (L) 21 - 32 mmol/L    Anion Gap 15 (H) 2 - 11 mmol/L    Glucose 309 (H) 65 - 100 mg/dL    BUN 20 6 - 23 MG/DL    Creatinine 1.30 (H) 0.6 - 1.0 MG/DL    Est, Glom Filt Rate 48 (L) >60 ml/min/1.73m2    Calcium 10.1 8.3 - 10.4 MG/DL    Total Bilirubin 0.6 0.2 - 1.1 MG/DL    ALT 33 12 - 65 U/L    AST 35 15 - 37 U/L    Alk Phosphatase 110 50 - 136 U/L    Total Protein 9.1 (H) 6.3 - 8.2 g/dL    Albumin 3.9 3.5 - 5.0 g/dL    Globulin 5.2 (H) 2.8 - 4.5 g/dL    Albumin/Globulin Ratio 0.8 0.4 - 1.6     Lipase    Collection Time: 06/07/23  9:09 PM   Result Value Ref Range    Lipase 116 73 - 393 U/L   COVID-19, Rapid    Collection Time: 06/07/23  9:09 PM    Specimen: Nasopharyngeal   Result Value Ref Range    Source NASAL      SARS-CoV-2, Rapid Not detected NOTD     Influenza A/B, Molecular    Collection Time: 06/07/23  9:09 PM    Specimen: Not Specified   Result Value Ref Range    Influenza A, AMY Not detected NOTD      Influenza B, AMY Not detected NOTD     Hemoglobin A1c    Collection Time: 06/07/23  9:09 PM   Result Value Ref Range    Hemoglobin A1C 11.1 (H) 4.8 - 5.6 %

## 2023-06-09 NOTE — PLAN OF CARE
Problem: Discharge Planning  Goal: Discharge to home or other facility with appropriate resources  6/9/2023 0829 by Jose Richter RN  Outcome: Progressing  6/9/2023 0545 by Katie Duncan RN  Outcome: Progressing  Flowsheets (Taken 6/8/2023 2001 by Milagro Jimenez, RN)  Discharge to home or other facility with appropriate resources:   Identify barriers to discharge with patient and caregiver   Arrange for needed discharge resources and transportation as appropriate   Identify discharge learning needs (meds, wound care, etc)   Refer to discharge planning if patient needs post-hospital services based on physician order or complex needs related to functional status, cognitive ability or social support system     Problem: Pain  Goal: Verbalizes/displays adequate comfort level or baseline comfort level  6/9/2023 0829 by Jose Richter RN  Outcome: Progressing  6/9/2023 0545 by Katie Duncan RN  Outcome: Progressing  Flowsheets (Taken 6/8/2023 2001 by Milagro Jimenez, RN)  Verbalizes/displays adequate comfort level or baseline comfort level:   Encourage patient to monitor pain and request assistance   Assess pain using appropriate pain scale   Administer analgesics based on type and severity of pain and evaluate response   Implement non-pharmacological measures as appropriate and evaluate response   Notify Licensed Independent Practitioner if interventions unsuccessful or patient reports new pain

## 2023-06-09 NOTE — DISCHARGE SUMMARY
Hospitalist Discharge Summary   Admit Date:  2023  9:05 PM   DC Note date: 2023  Name:  Ayesha Lindsay   Age:  62 y.o. Sex:  female  :  1965   MRN:  [id number]   Room:  [facility]  PCP:  Jacinto Salgado MD    Presenting Complaint: Emesis     Initial Admission Diagnosis: DKA, type 2, not at goal [facility]) [E11.10]  Intractable nausea and vomiting [R11.2]  Diabetic ketoacidosis without coma associated with type 2 diabetes mellitus ([facility] [E11.10]     Problem List for this Hospitalization (present on admission):    Principal Problem:    DKA, type 2, not at goal [facility])  Active Problems:    Hyperlipidemia    S/P CABG x 2    CAD (coronary artery disease)    GERD with esophagitis    DM (diabetes mellitus) ([facility]    Overweight (BMI 25.0-29. 9)  Resolved Problems:    * No resolved hospital problems. *      Hospital Course:  Ayesha Lindsay is a 62 y.o. female with medical history of Coronary artery disease status post CABG, hypertension, diabetes mellitus type 2, currently on Ozempic, Jardiance and insulin presented to the ER on  with nausea vomiting started 3 days prior. She was admitted for DKA. Pt admitted to non-compliance with all medications as an outpatient. She was admitted to the ICU and was initially on insulin drip and IV fluids. Anion gap closed, DKA resolved. Transitioned to subcutaneous insulin. Diabetes mgmt was consulted, who provided education to the patient and insulin dosing changes. On day of discharge, the patient was feeling well, tolerating diet and verbalized understanding of compliance of medications. She is to discontinue Jardiance and Ozempic until she sees her PCP. New Rx's for Basaglar, Humalog, glucometer, needles, lancets and glucometer strips were placed. She is to follow up with her PCP and endocrine within 1 week of discharge. Disposition: Home  Diet: ADULT DIET;  Regular; 3 carb choices (45 gm/meal)  Code Status: Full Code    Follow Ups:  PCP  Endocrine -

## 2023-06-09 NOTE — DIABETES MGMT
Patient admitted with DKA. Blood glucose ranged 131-156 yesterday once off insulin gtt with patient receiving Lantus 30 units total. Blood glucose this morning was 137. Creatinine 0.70. GFR >60. Reviewed patient current regimen: Lantus 15 units nightly and Humalog correctional insulin. Patient would likely benefit from an increase in basal insulin dosing as patient received Lantus 30 units yesterday but only has 15 units ordered today. Provider updated via sifonr regarding recommendations and patient glycemic control. Pt will need prescription for glucometer and glucometer supplies at discharge so that the patient may obtain the meter covered by their insurance. Patient prefers pens. Patient will need prescription for insulin pens and pen needles if needed at discharge.
Patient seen for follow up diabetes education. Reviewed ADA blood glucose and A1C target goals. Educated patient regarding current basal/bolus regimen of Lantus 22 units HS and including type of insulin, timing with meals, onset, duration of effect, and peak of insulin dose. Instructed patient to always seek guidance from their primary care provider about adjusting their insulin doses and not to adjust them on their own as this could negatively impact their glycemic control or result in hypoglycemia. Patient verbalizes understanding. Encouraged compliance with discharge regimen. Encouraged patient to continue to work on lifestyle modifications and to follow up with primary care provider Jorge Barboza) for further titration of regimen. Patient verbalized understanding and voices no further questions regarding diabetes management at this time. Patient would likely benefit from discontinuation of Jardiance at discharge to reduce the risk of of recurrent DKA. Provider updated via [employer] regarding recommendations and patient glycemic control.
titration of regimen. Patient verbalized understanding and voices no further questions regarding diabetes management at this time.

## 2023-07-11 ENCOUNTER — OFFICE VISIT (OUTPATIENT)
Dept: FAMILY MEDICINE CLINIC | Facility: CLINIC | Age: 58
End: 2023-07-11
Payer: COMMERCIAL

## 2023-07-11 VITALS
SYSTOLIC BLOOD PRESSURE: 106 MMHG | OXYGEN SATURATION: 98 % | HEART RATE: 84 BPM | WEIGHT: 170.8 LBS | BODY MASS INDEX: 30.26 KG/M2 | HEIGHT: 63 IN | DIASTOLIC BLOOD PRESSURE: 70 MMHG

## 2023-07-11 DIAGNOSIS — E11.43 POORLY CONTROLLED TYPE 2 DIABETES MELLITUS WITH AUTONOMIC NEUROPATHY (HCC): ICD-10-CM

## 2023-07-11 DIAGNOSIS — Z09 HOSPITAL DISCHARGE FOLLOW-UP: Primary | ICD-10-CM

## 2023-07-11 DIAGNOSIS — I10 PRIMARY HYPERTENSION: ICD-10-CM

## 2023-07-11 DIAGNOSIS — E11.65 POORLY CONTROLLED TYPE 2 DIABETES MELLITUS WITH AUTONOMIC NEUROPATHY (HCC): ICD-10-CM

## 2023-07-11 PROBLEM — K52.9 CHRONIC DIARRHEA: Status: ACTIVE | Noted: 2023-07-11

## 2023-07-11 PROCEDURE — 3046F HEMOGLOBIN A1C LEVEL >9.0%: CPT | Performed by: FAMILY MEDICINE

## 2023-07-11 PROCEDURE — 3074F SYST BP LT 130 MM HG: CPT | Performed by: FAMILY MEDICINE

## 2023-07-11 PROCEDURE — 99214 OFFICE O/P EST MOD 30 MIN: CPT | Performed by: FAMILY MEDICINE

## 2023-07-11 PROCEDURE — 3078F DIAST BP <80 MM HG: CPT | Performed by: FAMILY MEDICINE

## 2023-07-11 PROCEDURE — 1111F DSCHRG MED/CURRENT MED MERGE: CPT | Performed by: FAMILY MEDICINE

## 2023-07-11 RX ORDER — EMPAGLIFLOZIN 10 MG/1
TABLET, FILM COATED ORAL
COMMUNITY
Start: 2023-07-02

## 2023-07-11 RX ORDER — PREGABALIN 50 MG/1
50 CAPSULE ORAL 2 TIMES DAILY
Qty: 60 CAPSULE | Refills: 5 | Status: SHIPPED | OUTPATIENT
Start: 2023-07-11 | End: 2024-01-07

## 2023-07-11 RX ORDER — GLUCOSAMINE HCL/CHONDROITIN SU 500-400 MG
1 CAPSULE ORAL 3 TIMES DAILY
Qty: 300 STRIP | Refills: 5 | Status: SHIPPED | OUTPATIENT
Start: 2023-07-11 | End: 2025-03-02

## 2023-07-11 RX ORDER — LISINOPRIL 20 MG/1
20 TABLET ORAL NIGHTLY
Qty: 90 TABLET | Refills: 1 | Status: SHIPPED | OUTPATIENT
Start: 2023-07-11 | End: 2024-01-07

## 2023-07-11 RX ORDER — METOPROLOL TARTRATE 50 MG/1
25 TABLET, FILM COATED ORAL 2 TIMES DAILY
Qty: 90 TABLET | Refills: 1 | Status: SHIPPED | OUTPATIENT
Start: 2023-07-11 | End: 2024-01-07

## 2023-07-11 RX ORDER — ATORVASTATIN CALCIUM 80 MG/1
80 TABLET, FILM COATED ORAL NIGHTLY
Qty: 90 TABLET | Refills: 3 | Status: SHIPPED | OUTPATIENT
Start: 2023-07-11

## 2023-07-11 RX ORDER — PROCHLORPERAZINE 25 MG/1
1 SUPPOSITORY RECTAL
Qty: 1 EACH | Refills: 1 | Status: SHIPPED | OUTPATIENT
Start: 2023-07-11

## 2023-07-11 RX ORDER — PROCHLORPERAZINE 25 MG/1
1 SUPPOSITORY RECTAL
Qty: 9 EACH | Refills: 1 | Status: SHIPPED | OUTPATIENT
Start: 2023-07-11 | End: 2024-01-07

## 2023-07-11 ASSESSMENT — PATIENT HEALTH QUESTIONNAIRE - PHQ9
7. TROUBLE CONCENTRATING ON THINGS, SUCH AS READING THE NEWSPAPER OR WATCHING TELEVISION: 0
1. LITTLE INTEREST OR PLEASURE IN DOING THINGS: 0
5. POOR APPETITE OR OVEREATING: 0
8. MOVING OR SPEAKING SO SLOWLY THAT OTHER PEOPLE COULD HAVE NOTICED. OR THE OPPOSITE, BEING SO FIGETY OR RESTLESS THAT YOU HAVE BEEN MOVING AROUND A LOT MORE THAN USUAL: 0
SUM OF ALL RESPONSES TO PHQ QUESTIONS 1-9: 0
3. TROUBLE FALLING OR STAYING ASLEEP: 0
SUM OF ALL RESPONSES TO PHQ QUESTIONS 1-9: 0
2. FEELING DOWN, DEPRESSED OR HOPELESS: 0
9. THOUGHTS THAT YOU WOULD BE BETTER OFF DEAD, OR OF HURTING YOURSELF: 0
SUM OF ALL RESPONSES TO PHQ QUESTIONS 1-9: 0
4. FEELING TIRED OR HAVING LITTLE ENERGY: 0
6. FEELING BAD ABOUT YOURSELF - OR THAT YOU ARE A FAILURE OR HAVE LET YOURSELF OR YOUR FAMILY DOWN: 0
SUM OF ALL RESPONSES TO PHQ QUESTIONS 1-9: 0
10. IF YOU CHECKED OFF ANY PROBLEMS, HOW DIFFICULT HAVE THESE PROBLEMS MADE IT FOR YOU TO DO YOUR WORK, TAKE CARE OF THINGS AT HOME, OR GET ALONG WITH OTHER PEOPLE: 0
SUM OF ALL RESPONSES TO PHQ9 QUESTIONS 1 & 2: 0

## 2023-07-11 ASSESSMENT — ENCOUNTER SYMPTOMS: RESPIRATORY NEGATIVE: 1

## 2023-07-11 NOTE — PROGRESS NOTES
autonomic neuropathy (HCC)  -     pregabalin (LYRICA) 50 MG capsule; Take 1 capsule by mouth 2 times daily for 180 days. , Disp-60 capsule, R-5Normal  -     Continuous Blood Gluc Transmit (DEXCOM G6 TRANSMITTER) MISC; 1 Device by Does not apply route every 3 months, Disp-1 each, R-1Normal  -     Continuous Blood Gluc Sensor (DEXCOM G6 SENSOR) MISC; 1 Device by Does not apply route every 10 days, Disp-9 each, R-1Normal  -     blood glucose monitor strips; 1 strip by Other route in the morning, at noon, and at bedtime, Other, 3 times daily Starting Tue 7/11/2023, Until Sun 3/2/2025, For 600 days, Disp-300 strip, R-5, Normal  -     atorvastatin (LIPITOR) 80 MG tablet; Take 1 tablet by mouth at bedtime, Disp-90 tablet, R-3Normal     Discussed basal bolus therapy. I think this should be the mainstay of her treatment at this point. She is going to be compliant with her Dexcom, we will see how sugars run with basal bolus therapy. She really would like to get back on Ozempic but I think at this point we should continue to hold, likely has very little pancreatic insulin production remaining. Consider SGLT2      No follow-ups on file.        Alice Delatorre MD

## 2023-08-10 DIAGNOSIS — E11.43 POORLY CONTROLLED TYPE 2 DIABETES MELLITUS WITH AUTONOMIC NEUROPATHY (HCC): Primary | ICD-10-CM

## 2023-08-10 DIAGNOSIS — E11.65 POORLY CONTROLLED TYPE 2 DIABETES MELLITUS WITH AUTONOMIC NEUROPATHY (HCC): Primary | ICD-10-CM

## 2023-08-10 RX ORDER — INSULIN ASPART 100 [IU]/ML
INJECTION, SOLUTION INTRAVENOUS; SUBCUTANEOUS
Qty: 5 ADJUSTABLE DOSE PRE-FILLED PEN SYRINGE | Refills: 5 | Status: SHIPPED | OUTPATIENT
Start: 2023-08-10

## 2023-08-10 NOTE — TELEPHONE ENCOUNTER
Patient reports insurance will not cover Humalog but will cover Novolog. Order pended. Please advise.

## 2023-08-22 ENCOUNTER — TELEPHONE (OUTPATIENT)
Dept: FAMILY MEDICINE CLINIC | Facility: CLINIC | Age: 58
End: 2023-08-22

## 2023-08-22 NOTE — TELEPHONE ENCOUNTER
Patient is requesting to be seen or put on a cancellation list. Patient states she has not been feeling well and blood sugar has been running above 290.  Patient is requesting a call for advice

## 2023-08-23 ENCOUNTER — OFFICE VISIT (OUTPATIENT)
Dept: FAMILY MEDICINE CLINIC | Facility: CLINIC | Age: 58
End: 2023-08-23
Payer: COMMERCIAL

## 2023-08-23 VITALS
SYSTOLIC BLOOD PRESSURE: 106 MMHG | OXYGEN SATURATION: 97 % | DIASTOLIC BLOOD PRESSURE: 62 MMHG | HEIGHT: 63 IN | BODY MASS INDEX: 31.47 KG/M2 | HEART RATE: 77 BPM | WEIGHT: 177.6 LBS

## 2023-08-23 DIAGNOSIS — R19.7 DIARRHEA, UNSPECIFIED TYPE: ICD-10-CM

## 2023-08-23 DIAGNOSIS — E11.43 POORLY CONTROLLED TYPE 2 DIABETES MELLITUS WITH AUTONOMIC NEUROPATHY (HCC): Primary | ICD-10-CM

## 2023-08-23 DIAGNOSIS — E11.65 POORLY CONTROLLED TYPE 2 DIABETES MELLITUS WITH AUTONOMIC NEUROPATHY (HCC): Primary | ICD-10-CM

## 2023-08-23 LAB
ANION GAP SERPL CALC-SCNC: 4 MMOL/L (ref 2–11)
BUN SERPL-MCNC: 20 MG/DL (ref 6–23)
CALCIUM SERPL-MCNC: 8.8 MG/DL (ref 8.3–10.4)
CHLORIDE SERPL-SCNC: 107 MMOL/L (ref 101–110)
CO2 SERPL-SCNC: 29 MMOL/L (ref 21–32)
CREAT SERPL-MCNC: 1 MG/DL (ref 0.6–1)
GLUCOSE SERPL-MCNC: 223 MG/DL (ref 65–100)
POTASSIUM SERPL-SCNC: 5 MMOL/L (ref 3.5–5.1)
SODIUM SERPL-SCNC: 140 MMOL/L (ref 133–143)

## 2023-08-23 PROCEDURE — 3046F HEMOGLOBIN A1C LEVEL >9.0%: CPT | Performed by: FAMILY MEDICINE

## 2023-08-23 PROCEDURE — 3078F DIAST BP <80 MM HG: CPT | Performed by: FAMILY MEDICINE

## 2023-08-23 PROCEDURE — 3074F SYST BP LT 130 MM HG: CPT | Performed by: FAMILY MEDICINE

## 2023-08-23 PROCEDURE — 99214 OFFICE O/P EST MOD 30 MIN: CPT | Performed by: FAMILY MEDICINE

## 2023-08-23 RX ORDER — DIPHENOXYLATE HYDROCHLORIDE AND ATROPINE SULFATE 2.5; .025 MG/1; MG/1
1 TABLET ORAL 4 TIMES DAILY PRN
Qty: 50 TABLET | Refills: 0 | Status: SHIPPED | OUTPATIENT
Start: 2023-08-23 | End: 2023-09-12

## 2023-08-23 RX ORDER — BLOOD-GLUCOSE SENSOR
1 EACH MISCELLANEOUS
Qty: 6 EACH | Refills: 1 | Status: SHIPPED | OUTPATIENT
Start: 2023-08-23 | End: 2024-02-19

## 2023-08-23 ASSESSMENT — ENCOUNTER SYMPTOMS
NAUSEA: 1
RESPIRATORY NEGATIVE: 1
ALLERGIC/IMMUNOLOGIC NEGATIVE: 1
EYES NEGATIVE: 1
DIARRHEA: 1

## 2023-08-23 ASSESSMENT — PATIENT HEALTH QUESTIONNAIRE - PHQ9
SUM OF ALL RESPONSES TO PHQ9 QUESTIONS 1 & 2: 0
9. THOUGHTS THAT YOU WOULD BE BETTER OFF DEAD, OR OF HURTING YOURSELF: 0
5. POOR APPETITE OR OVEREATING: 0
1. LITTLE INTEREST OR PLEASURE IN DOING THINGS: 0
2. FEELING DOWN, DEPRESSED OR HOPELESS: 0
8. MOVING OR SPEAKING SO SLOWLY THAT OTHER PEOPLE COULD HAVE NOTICED. OR THE OPPOSITE, BEING SO FIGETY OR RESTLESS THAT YOU HAVE BEEN MOVING AROUND A LOT MORE THAN USUAL: 0
3. TROUBLE FALLING OR STAYING ASLEEP: 0
SUM OF ALL RESPONSES TO PHQ QUESTIONS 1-9: 0
SUM OF ALL RESPONSES TO PHQ QUESTIONS 1-9: 0
6. FEELING BAD ABOUT YOURSELF - OR THAT YOU ARE A FAILURE OR HAVE LET YOURSELF OR YOUR FAMILY DOWN: 0
4. FEELING TIRED OR HAVING LITTLE ENERGY: 0
7. TROUBLE CONCENTRATING ON THINGS, SUCH AS READING THE NEWSPAPER OR WATCHING TELEVISION: 0
10. IF YOU CHECKED OFF ANY PROBLEMS, HOW DIFFICULT HAVE THESE PROBLEMS MADE IT FOR YOU TO DO YOUR WORK, TAKE CARE OF THINGS AT HOME, OR GET ALONG WITH OTHER PEOPLE: 0
SUM OF ALL RESPONSES TO PHQ QUESTIONS 1-9: 0
SUM OF ALL RESPONSES TO PHQ QUESTIONS 1-9: 0

## 2023-08-23 NOTE — PROGRESS NOTES
ASSESSMENT/PLAN:  1. Poorly controlled type 2 diabetes mellitus with autonomic neuropathy (HCC)  -     Continuous Blood Gluc Sensor (FREESTYLE JIM 3 SENSOR) MISC; 1 Device by Does not apply route every 14 days, Disp-6 each, R-1Normal  - Discussed with the patient to use sliding scale with short-acting insulin for the times when her blood sugars are high; reviewed how to use sliding scale before meals (add 8 units of Novolog to sliding scale amount). - She would like to be on Ozempic again, but discussed with her that this will likely not help with controlling her blood sugars, rather will help with weight loss. 2. Diarrhea, unspecified type  -     diphenoxylate-atropine (LOMOTIL) 2.5-0.025 MG per tablet; Take 1 tablet by mouth 4 times daily as needed for Diarrhea for up to 20 days. Max Daily Amount: 4 tablets, Disp-50 tablet, R-0Normal  -     Basic Metabolic Panel; Future  - Advised patient to stay hydrated, she keeps herself hydrated and repletes electrolytes with Gatorade and zero sugar lemonade. No follow-ups on file. Subjective   SUBJECTIVE/OBJECTIVE:  HPI: Tash Odom is a 63 YO female with a PMHx of HTN, HLD, CAD s/p CABG x 2, bilateral carotid atherosclerosis, T2DM with gastroparesis, GERD with esophagitis, chronic diarrhea, anxiety, depression, and obesity. She presents today for follow-up of her diabetes. She reports her blood sugar readings have been all over the place using the Dexcom; ranging from 180-200. She reports feeling tired, nauseous, and having diarrhea for the past 2 weeks. She reports she had to leave work early last Tuesday due to feeling nauseous. She has taken Imodium without relief. She takes 8 units of SA insulin and 22 units of LA insulin, she was originally taking 30 units of LA insulin before she was hospitalized. Review of Systems   Constitutional:  Positive for fatigue. HENT: Negative. Eyes: Negative. Respiratory: Negative. Cardiovascular: Negative.

## 2023-08-24 ENCOUNTER — TELEPHONE (OUTPATIENT)
Dept: FAMILY MEDICINE CLINIC | Facility: CLINIC | Age: 58
End: 2023-08-24

## 2023-08-24 NOTE — TELEPHONE ENCOUNTER
----- Message from Cherie Thompson MD sent at 8/23/2023  9:01 PM EDT -----  Please call patient and advise that I would like her to return in a month to go over how much insulin she is having to use with her sliding scale, and see if she needs to increase her long-acting insulin. She also is due for an A1c in 1 month.

## 2023-09-14 RX ORDER — EMPAGLIFLOZIN 10 MG/1
10 TABLET, FILM COATED ORAL DAILY
Qty: 30 TABLET | Refills: 5 | Status: SHIPPED | OUTPATIENT
Start: 2023-09-14

## 2023-09-16 NOTE — PROGRESS NOTES
2. Uncontrolled type 2 diabetes mellitus with hyperglycemia ([address])  - Refer to endocrinology    3. Hx of CABG  - Continue with baby aspirin daily, Lopressor and Lipitor    4. Carotid atherosclerosis, bilateral  - Continue with Lipitor    5. Hypertension, unspecified type  - Well-controlled  - Currently on lisinopril  - Continue with Lopressor    6. Hyperlipidemia, unspecified hyperlipidemia type  - Continue with Lipitor    7.  Atypical chest pain  - MPI in April 2023 noted to be negative for myocardial ischemia with no angina noted during the stress test  - Chest pain is nonanginal by definition; therefore, an ischemic evaluation is not warranted at this time  - Low clinical suspicion for pericarditis or aortic dissection at this time  - PE unlikely per PE Wells score    F/U: 6 months    Gerber Pritchard MD

## 2023-09-19 ENCOUNTER — OFFICE VISIT (OUTPATIENT)
Age: 58
End: 2023-09-19
Payer: COMMERCIAL

## 2023-09-19 VITALS
WEIGHT: 181 LBS | SYSTOLIC BLOOD PRESSURE: 112 MMHG | DIASTOLIC BLOOD PRESSURE: 64 MMHG | BODY MASS INDEX: 32.07 KG/M2 | HEIGHT: 63 IN | HEART RATE: 88 BPM

## 2023-09-19 DIAGNOSIS — Z95.1 HX OF CABG: ICD-10-CM

## 2023-09-19 DIAGNOSIS — E11.65 UNCONTROLLED TYPE 2 DIABETES MELLITUS WITH HYPERGLYCEMIA (HCC): ICD-10-CM

## 2023-09-19 DIAGNOSIS — I65.23 CAROTID ATHEROSCLEROSIS, BILATERAL: ICD-10-CM

## 2023-09-19 DIAGNOSIS — I10 HYPERTENSION, UNSPECIFIED TYPE: ICD-10-CM

## 2023-09-19 DIAGNOSIS — R06.09 DOE (DYSPNEA ON EXERTION): Primary | ICD-10-CM

## 2023-09-19 DIAGNOSIS — R07.89 ATYPICAL CHEST PAIN: ICD-10-CM

## 2023-09-19 DIAGNOSIS — E78.5 HYPERLIPIDEMIA, UNSPECIFIED HYPERLIPIDEMIA TYPE: ICD-10-CM

## 2023-09-19 PROCEDURE — 3074F SYST BP LT 130 MM HG: CPT | Performed by: INTERNAL MEDICINE

## 2023-09-19 PROCEDURE — 3046F HEMOGLOBIN A1C LEVEL >9.0%: CPT | Performed by: INTERNAL MEDICINE

## 2023-09-19 PROCEDURE — 3078F DIAST BP <80 MM HG: CPT | Performed by: INTERNAL MEDICINE

## 2023-09-19 PROCEDURE — 99214 OFFICE O/P EST MOD 30 MIN: CPT | Performed by: INTERNAL MEDICINE

## 2023-10-11 ENCOUNTER — TELEPHONE (OUTPATIENT)
Age: 58
End: 2023-10-11

## 2023-10-11 DIAGNOSIS — E11.10 DKA, TYPE 2, NOT AT GOAL (HCC): Primary | ICD-10-CM

## 2023-10-11 NOTE — TELEPHONE ENCOUNTER
:Read below response from endocrinology about the referral you made to them. .Should we defer pt.back to her PCP?

## 2023-10-11 NOTE — TELEPHONE ENCOUNTER
Vincent Flores MD  You 5 minutes ago (4:32 PM)       Please refer to [facility] endocrinology for uncontrolled diabetes with the comorbidity of heart disease. It is critical that she has adequate blood sugar control, and I would like endocrinology to see her.

## 2023-10-11 NOTE — TELEPHONE ENCOUNTER
Gina Bolds [facility] Cardiology Triage 1 hour ago (2:09 PM)     AF  The response in the referral from Chloe Mendes at [facility] Endocrinology is that [address] [place] office policy is that all diabetic patients establish with a primary care provider and then be referred over if unmanageable.

## 2023-10-12 NOTE — TELEPHONE ENCOUNTER
I called and informed pt. of MD response and she v/u. I placed order for referral to [facility] Endocrinology. Note sent to scheduling to schedule referral.

## 2023-10-17 ENCOUNTER — TELEPHONE (OUTPATIENT)
Age: 58
End: 2023-10-17

## 2023-10-17 NOTE — TELEPHONE ENCOUNTER
----- Message from Markell Aden MD sent at 10/17/2023  8:54 AM EDT -----  Please let the patient know that the heart function is normal on ECHO.

## 2023-10-25 DIAGNOSIS — E11.65 POORLY CONTROLLED TYPE 2 DIABETES MELLITUS WITH AUTONOMIC NEUROPATHY (HCC): Primary | ICD-10-CM

## 2023-10-25 DIAGNOSIS — E11.43 POORLY CONTROLLED TYPE 2 DIABETES MELLITUS WITH AUTONOMIC NEUROPATHY (HCC): Primary | ICD-10-CM

## 2023-10-25 RX ORDER — INSULIN GLARGINE 100 [IU]/ML
30 INJECTION, SOLUTION SUBCUTANEOUS NIGHTLY
Qty: 27 ML | Refills: 1 | Status: SHIPPED | OUTPATIENT
Start: 2023-10-25 | End: 2024-04-22

## 2023-10-25 NOTE — TELEPHONE ENCOUNTER
Patient called and stated she needed a prescription for lantus called in per pharmacy. Patient stated she was completely out.  [facility]

## 2023-10-25 NOTE — TELEPHONE ENCOUNTER
Patient contacted. Reports Marisol Vegas is not covered by insurance any longer and pharmacy told her Rx needed to be changed to Lantus. Confirmed she is taking 30 units at night. Order pended. Please advise.

## 2024-02-26 DIAGNOSIS — E11.43 POORLY CONTROLLED TYPE 2 DIABETES MELLITUS WITH AUTONOMIC NEUROPATHY (HCC): ICD-10-CM

## 2024-02-26 DIAGNOSIS — E11.65 POORLY CONTROLLED TYPE 2 DIABETES MELLITUS WITH AUTONOMIC NEUROPATHY (HCC): ICD-10-CM

## 2024-02-26 DIAGNOSIS — K21.00 GASTROESOPHAGEAL REFLUX DISEASE WITH ESOPHAGITIS, UNSPECIFIED WHETHER HEMORRHAGE: ICD-10-CM

## 2024-02-26 DIAGNOSIS — I10 PRIMARY HYPERTENSION: ICD-10-CM

## 2024-02-26 NOTE — TELEPHONE ENCOUNTER
Patient requesting refill on lisinopril (PRINIVIL;ZESTRIL) 20 MG tablet , metoclopramide (REGLAN) 5 MG tablet ,pantoprazole (PROTONIX) 40 MG tablet and citalopram (CELEXA) 20 MG tablet to [facility] [address]

## 2024-02-27 RX ORDER — LISINOPRIL 20 MG/1
20 TABLET ORAL NIGHTLY
Qty: 60 TABLET | Refills: 0 | Status: SHIPPED | OUTPATIENT
Start: 2024-02-27 | End: 2024-04-27

## 2024-02-27 RX ORDER — PANTOPRAZOLE SODIUM 40 MG/1
40 TABLET, DELAYED RELEASE ORAL DAILY
Qty: 60 TABLET | Refills: 0 | Status: SHIPPED | OUTPATIENT
Start: 2024-02-27 | End: 2024-04-27

## 2024-02-27 RX ORDER — METOCLOPRAMIDE 5 MG/1
5 TABLET ORAL 2 TIMES DAILY
Qty: 120 TABLET | Refills: 0 | Status: SHIPPED | OUTPATIENT
Start: 2024-02-27 | End: 2024-04-27

## 2024-02-27 RX ORDER — CITALOPRAM 20 MG/1
20 TABLET ORAL NIGHTLY
Qty: 60 TABLET | Refills: 0 | Status: SHIPPED | OUTPATIENT
Start: 2024-02-27 | End: 2024-04-27

## 2024-02-27 NOTE — TELEPHONE ENCOUNTER
LOV 8/23/23. LVM letting patient know she is due an appointment for medication refills. Advised a 2 month supply would be sent in until she is able to see provider. Advised to return call regarding scheduling an appointment.

## 2024-03-20 NOTE — PROGRESS NOTES
[facility] CARDIOLOGY Follow Up                 Reason for Visit: CCD    Subjective:     Patient is a 58 y.o. female with a PMH of CAD status post CABG, bilateral carotid atherosclerosis, hypertension, hyperlipidemia, diabetes and gastroparesis who presents for follow-up.  The patient was last seen in September 2023.  A TTE was ordered for CASTRO.  She was referred to endocrinology for uncontrolled diabetes.  The patient had a TTE in October 2023 that was noted to demonstrate a normal EF.  The patient denies angina and reports chronic CASTRO.    Past Medical History:   Diagnosis Date    Anxiety 11/28/2012    CAD (coronary artery disease)     No MI; no stents    Depression     managed with med    DM (diabetes mellitus) ([facility]) 11/28/2012    insulin reliant; AVG ; pt denies s.s. 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/11/2019    Viral gastroenteritis 09/13/2022      Past Surgical History:   Procedure Laterality Date    CARDIAC CATHETERIZATION Left     CORONARY ARTERY BYPASS GRAFT N/A 05/25/2022    CORONARY ARTERY BYPASS GRAFT (CABG X 2), [place]; ENDOSCOPIC VEIN HARVEST LEFT GREATER SAPHENOUS performed by Margarito Olivia MD at [facility] MAIN OR    TRANSESOPHAGEAL ECHOCARDIOGRAM N/A 05/25/2022    TRANSESOPHAGEAL ECHOCARDIOGRAM performed by Margarito Olivia MD at [facility] MAIN OR    UPPER GASTROINTESTINAL ENDOSCOPY  08/12/2019    performed at [facility] for coffee ground emesis, ulcerative espophagitis, gastric ulcers    UPPER GASTROINTESTINAL ENDOSCOPY N/A 11/3/2022    EGD BIOPSY performed by

## 2024-03-22 ENCOUNTER — OFFICE VISIT (OUTPATIENT)
Age: 59
End: 2024-03-22
Payer: COMMERCIAL

## 2024-03-22 VITALS
WEIGHT: 186 LBS | HEIGHT: 63 IN | BODY MASS INDEX: 32.96 KG/M2 | SYSTOLIC BLOOD PRESSURE: 138 MMHG | HEART RATE: 80 BPM | DIASTOLIC BLOOD PRESSURE: 88 MMHG

## 2024-03-22 DIAGNOSIS — R06.09 CHRONIC DYSPNEA: Primary | ICD-10-CM

## 2024-03-22 DIAGNOSIS — E78.5 HYPERLIPIDEMIA, UNSPECIFIED HYPERLIPIDEMIA TYPE: ICD-10-CM

## 2024-03-22 DIAGNOSIS — E66.9 OBESITY (BMI 30-39.9): ICD-10-CM

## 2024-03-22 DIAGNOSIS — Z95.1 HX OF CABG: ICD-10-CM

## 2024-03-22 DIAGNOSIS — I65.23 CAROTID ATHEROSCLEROSIS, BILATERAL: ICD-10-CM

## 2024-03-22 DIAGNOSIS — I10 HYPERTENSION, UNSPECIFIED TYPE: ICD-10-CM

## 2024-03-22 PROCEDURE — 3075F SYST BP GE 130 - 139MM HG: CPT | Performed by: INTERNAL MEDICINE

## 2024-03-22 PROCEDURE — 3079F DIAST BP 80-89 MM HG: CPT | Performed by: INTERNAL MEDICINE

## 2024-03-22 PROCEDURE — 99214 OFFICE O/P EST MOD 30 MIN: CPT | Performed by: INTERNAL MEDICINE

## 2024-04-12 ENCOUNTER — OFFICE VISIT (OUTPATIENT)
Dept: FAMILY MEDICINE CLINIC | Facility: CLINIC | Age: 59
End: 2024-04-12
Payer: COMMERCIAL

## 2024-04-12 VITALS
OXYGEN SATURATION: 96 % | HEIGHT: 63 IN | WEIGHT: 186 LBS | HEART RATE: 95 BPM | BODY MASS INDEX: 32.96 KG/M2 | SYSTOLIC BLOOD PRESSURE: 130 MMHG | DIASTOLIC BLOOD PRESSURE: 68 MMHG

## 2024-04-12 DIAGNOSIS — F32.0 MAJOR DEPRESSIVE DISORDER, SINGLE EPISODE, MILD (HCC): ICD-10-CM

## 2024-04-12 DIAGNOSIS — E11.65 POORLY CONTROLLED TYPE 2 DIABETES MELLITUS WITH AUTONOMIC NEUROPATHY (HCC): Primary | ICD-10-CM

## 2024-04-12 DIAGNOSIS — E78.5 HYPERLIPIDEMIA, UNSPECIFIED HYPERLIPIDEMIA TYPE: ICD-10-CM

## 2024-04-12 DIAGNOSIS — E11.43 POORLY CONTROLLED TYPE 2 DIABETES MELLITUS WITH AUTONOMIC NEUROPATHY (HCC): Primary | ICD-10-CM

## 2024-04-12 DIAGNOSIS — I10 PRIMARY HYPERTENSION: ICD-10-CM

## 2024-04-12 DIAGNOSIS — I25.118 CORONARY ARTERY DISEASE OF NATIVE ARTERY OF NATIVE HEART WITH STABLE ANGINA PECTORIS (HCC): ICD-10-CM

## 2024-04-12 DIAGNOSIS — K21.00 GASTROESOPHAGEAL REFLUX DISEASE WITH ESOPHAGITIS, UNSPECIFIED WHETHER HEMORRHAGE: ICD-10-CM

## 2024-04-12 LAB
ALBUMIN SERPL-MCNC: 3.5 G/DL (ref 3.5–5)
ALBUMIN/GLOB SERPL: 1 (ref 0.4–1.6)
ALP SERPL-CCNC: 178 U/L (ref 50–136)
ALT SERPL-CCNC: 23 U/L (ref 12–65)
ANION GAP SERPL CALC-SCNC: 5 MMOL/L (ref 2–11)
AST SERPL-CCNC: 15 U/L (ref 15–37)
BASOPHILS # BLD: 0.1 K/UL (ref 0–0.2)
BASOPHILS NFR BLD: 1 % (ref 0–2)
BILIRUB SERPL-MCNC: 0.3 MG/DL (ref 0.2–1.1)
BUN SERPL-MCNC: 16 MG/DL (ref 6–23)
CALCIUM SERPL-MCNC: 9.4 MG/DL (ref 8.3–10.4)
CHLORIDE SERPL-SCNC: 103 MMOL/L (ref 103–113)
CHOLEST SERPL-MCNC: 151 MG/DL
CO2 SERPL-SCNC: 29 MMOL/L (ref 21–32)
CREAT SERPL-MCNC: 0.8 MG/DL (ref 0.6–1)
CREAT UR-MCNC: 58 MG/DL
DIFFERENTIAL METHOD BLD: NORMAL
EOSINOPHIL # BLD: 0.1 K/UL (ref 0–0.8)
EOSINOPHIL NFR BLD: 2 % (ref 0.5–7.8)
ERYTHROCYTE [DISTWIDTH] IN BLOOD BY AUTOMATED COUNT: 13.2 % (ref 11.9–14.6)
EST. AVERAGE GLUCOSE BLD GHB EST-MCNC: 324 MG/DL
GLOBULIN SER CALC-MCNC: 3.6 G/DL (ref 2.8–4.5)
GLUCOSE SERPL-MCNC: 253 MG/DL (ref 65–100)
HBA1C MFR BLD: 12.9 % (ref 4.8–5.6)
HCT VFR BLD AUTO: 40.4 % (ref 35.8–46.3)
HDLC SERPL-MCNC: 37 MG/DL (ref 40–60)
HDLC SERPL: 4.1
HGB BLD-MCNC: 13.1 G/DL (ref 11.7–15.4)
IMM GRANULOCYTES # BLD AUTO: 0 K/UL (ref 0–0.5)
IMM GRANULOCYTES NFR BLD AUTO: 1 % (ref 0–5)
LDLC SERPL CALC-MCNC: 81.2 MG/DL
LYMPHOCYTES # BLD: 2.3 K/UL (ref 0.5–4.6)
LYMPHOCYTES NFR BLD: 35 % (ref 13–44)
MCH RBC QN AUTO: 27.8 PG (ref 26.1–32.9)
MCHC RBC AUTO-ENTMCNC: 32.4 G/DL (ref 31.4–35)
MCV RBC AUTO: 85.6 FL (ref 82–102)
MICROALBUMIN UR-MCNC: 20.8 MG/DL
MICROALBUMIN/CREAT UR-RTO: 359 MG/G (ref 0–30)
MONOCYTES # BLD: 0.6 K/UL (ref 0.1–1.3)
MONOCYTES NFR BLD: 10 % (ref 4–12)
NEUTS SEG # BLD: 3.5 K/UL (ref 1.7–8.2)
NEUTS SEG NFR BLD: 51 % (ref 43–78)
NRBC # BLD: 0 K/UL (ref 0–0.2)
PLATELET # BLD AUTO: 215 K/UL (ref 150–450)
PMV BLD AUTO: 10.9 FL (ref 9.4–12.3)
POTASSIUM SERPL-SCNC: 4.2 MMOL/L (ref 3.5–5.1)
PROT SERPL-MCNC: 7.1 G/DL (ref 6.3–8.2)
RBC # BLD AUTO: 4.72 M/UL (ref 4.05–5.2)
SODIUM SERPL-SCNC: 137 MMOL/L (ref 136–146)
TRIGL SERPL-MCNC: 164 MG/DL (ref 35–150)
VLDLC SERPL CALC-MCNC: 32.8 MG/DL (ref 6–23)
WBC # BLD AUTO: 6.6 K/UL (ref 4.3–11.1)

## 2024-04-12 PROCEDURE — 3078F DIAST BP <80 MM HG: CPT | Performed by: FAMILY MEDICINE

## 2024-04-12 PROCEDURE — 99214 OFFICE O/P EST MOD 30 MIN: CPT | Performed by: FAMILY MEDICINE

## 2024-04-12 PROCEDURE — 3075F SYST BP GE 130 - 139MM HG: CPT | Performed by: FAMILY MEDICINE

## 2024-04-12 RX ORDER — LISINOPRIL 20 MG/1
20 TABLET ORAL NIGHTLY
Qty: 90 TABLET | Refills: 3 | Status: SHIPPED | OUTPATIENT
Start: 2024-04-12 | End: 2025-04-12

## 2024-04-12 RX ORDER — EMPAGLIFLOZIN 10 MG/1
10 TABLET, FILM COATED ORAL DAILY
Qty: 90 TABLET | Refills: 3 | Status: SHIPPED | OUTPATIENT
Start: 2024-04-12 | End: 2025-04-12

## 2024-04-12 RX ORDER — PANTOPRAZOLE SODIUM 40 MG/1
40 TABLET, DELAYED RELEASE ORAL DAILY
Qty: 90 TABLET | Refills: 3 | Status: SHIPPED | OUTPATIENT
Start: 2024-04-12 | End: 2025-04-12

## 2024-04-12 RX ORDER — ATORVASTATIN CALCIUM 80 MG/1
80 TABLET, FILM COATED ORAL NIGHTLY
Qty: 90 TABLET | Refills: 3 | Status: SHIPPED | OUTPATIENT
Start: 2024-04-12

## 2024-04-12 RX ORDER — INSULIN ASPART 100 [IU]/ML
INJECTION, SOLUTION INTRAVENOUS; SUBCUTANEOUS
Qty: 5 ADJUSTABLE DOSE PRE-FILLED PEN SYRINGE | Refills: 11 | Status: SHIPPED | OUTPATIENT
Start: 2024-04-12

## 2024-04-12 RX ORDER — INSULIN GLARGINE 100 [IU]/ML
30 INJECTION, SOLUTION SUBCUTANEOUS NIGHTLY
Qty: 27 ML | Refills: 3 | Status: SHIPPED | OUTPATIENT
Start: 2024-04-12 | End: 2025-04-12

## 2024-04-12 RX ORDER — PEN NEEDLE, DIABETIC 30 GX3/16"
NEEDLE, DISPOSABLE MISCELLANEOUS
Qty: 100 EACH | Refills: 5 | Status: SHIPPED | OUTPATIENT
Start: 2024-04-12

## 2024-04-12 RX ORDER — METOPROLOL TARTRATE 50 MG/1
25 TABLET, FILM COATED ORAL 2 TIMES DAILY
Qty: 90 TABLET | Refills: 3 | Status: SHIPPED | OUTPATIENT
Start: 2024-04-12 | End: 2025-04-12

## 2024-04-12 RX ORDER — METOCLOPRAMIDE 5 MG/1
5 TABLET ORAL 2 TIMES DAILY
Qty: 180 TABLET | Refills: 3 | Status: SHIPPED | OUTPATIENT
Start: 2024-04-12 | End: 2025-04-12

## 2024-04-12 RX ORDER — CITALOPRAM 20 MG/1
20 TABLET ORAL NIGHTLY
Qty: 90 TABLET | Refills: 3 | Status: SHIPPED | OUTPATIENT
Start: 2024-04-12 | End: 2025-04-12

## 2024-04-12 ASSESSMENT — ENCOUNTER SYMPTOMS: RESPIRATORY NEGATIVE: 1

## 2024-04-12 NOTE — PROGRESS NOTES
PROGRESS NOTE    SUBJECTIVE:   Tash Matos is a 58 y.o. female seen for a follow up visit regarding   Chief Complaint   Patient presents with    Diabetes    Fatigue        HPI:  Here for follow-up of diabetes, hypertension, CAD.  She saw her cardiologist last week, good report.  She continues to experience fatigue.  She has been having difficulty getting her insulin, reports that her insurance is not covering it like it used to, she not sure why, has the same insurance.         Reviewed and updated this visit by provider:           Review of Systems   Constitutional:  Positive for fatigue.   Respiratory: Negative.     Cardiovascular: Negative.           OBJECTIVE:  Vitals:    04/12/24 0855   BP: 130/68   Site: Left Upper Arm   Position: Sitting   Cuff Size: Medium Adult   Pulse: 95   SpO2: 96%   Weight: 84.4 kg (186 lb)   Height: 1.6 m (5' 3\")        Physical Exam   General: Alert and oriented x3, well-appearing  Neck: No adenopathy, thyromegaly or thyroid nodules  Pulmonary: Normal effort, good airflow, no rales or rhonchi  CVS: Regular rate and rhythm, normal S1, S2, no S3 or S4, no murmurs; no carotid bruits, 2+ pedal pulses      Medical problems and test results were reviewed with the patient today.     No results found for this or any previous visit (from the past 672 hour(s)).    No results found for any visits on 04/12/24.     ASSESSMENT and PLAN    1. Poorly controlled type 2 diabetes mellitus with autonomic neuropathy (HCC), having difficulty paying for insulin.  May need to change to a Humulin product  -     atorvastatin (LIPITOR) 80 MG tablet; Take 1 tablet by mouth at bedtime, Disp-90 tablet, R-3Normal  -     insulin aspart (NOVOLOG FLEXPEN) 100 UNIT/ML injection pen;  No Insulin 200-249 2 Units 250-299 4 Units 300-349 6 Units Over 349 8 Units and notify physician Max daily dose: 20 units., Disp-5 Adjustable Dose Pre-filled Pen Syringe, R-11Normal  -     insulin glargine (LANTUS

## 2024-05-01 ENCOUNTER — TELEMEDICINE (OUTPATIENT)
Dept: FAMILY MEDICINE CLINIC | Facility: CLINIC | Age: 59
End: 2024-05-01
Payer: COMMERCIAL

## 2024-05-01 DIAGNOSIS — E11.65 POORLY CONTROLLED TYPE 2 DIABETES MELLITUS (HCC): Primary | ICD-10-CM

## 2024-05-01 PROCEDURE — 3046F HEMOGLOBIN A1C LEVEL >9.0%: CPT | Performed by: FAMILY MEDICINE

## 2024-05-01 PROCEDURE — 99213 OFFICE O/P EST LOW 20 MIN: CPT | Performed by: FAMILY MEDICINE

## 2024-05-01 RX ORDER — HUMAN INSULIN 100 [IU]/ML
INJECTION, SUSPENSION SUBCUTANEOUS
Qty: 5 ADJUSTABLE DOSE PRE-FILLED PEN SYRINGE | Refills: 3 | Status: SHIPPED | OUTPATIENT
Start: 2024-05-01

## 2024-05-01 SDOH — ECONOMIC STABILITY: FOOD INSECURITY: WITHIN THE PAST 12 MONTHS, YOU WORRIED THAT YOUR FOOD WOULD RUN OUT BEFORE YOU GOT MONEY TO BUY MORE.: NEVER TRUE

## 2024-05-01 SDOH — ECONOMIC STABILITY: FOOD INSECURITY: WITHIN THE PAST 12 MONTHS, THE FOOD YOU BOUGHT JUST DIDN'T LAST AND YOU DIDN'T HAVE MONEY TO GET MORE.: NEVER TRUE

## 2024-05-01 SDOH — ECONOMIC STABILITY: INCOME INSECURITY: HOW HARD IS IT FOR YOU TO PAY FOR THE VERY BASICS LIKE FOOD, HOUSING, MEDICAL CARE, AND HEATING?: NOT HARD AT ALL

## 2024-05-01 ASSESSMENT — PATIENT HEALTH QUESTIONNAIRE - PHQ9
SUM OF ALL RESPONSES TO PHQ QUESTIONS 1-9: 0
SUM OF ALL RESPONSES TO PHQ QUESTIONS 1-9: 0
5. POOR APPETITE OR OVEREATING: NOT AT ALL
SUM OF ALL RESPONSES TO PHQ9 QUESTIONS 1 & 2: 0
2. FEELING DOWN, DEPRESSED OR HOPELESS: NOT AT ALL
1. LITTLE INTEREST OR PLEASURE IN DOING THINGS: NOT AT ALL
7. TROUBLE CONCENTRATING ON THINGS, SUCH AS READING THE NEWSPAPER OR WATCHING TELEVISION: NOT AT ALL
8. MOVING OR SPEAKING SO SLOWLY THAT OTHER PEOPLE COULD HAVE NOTICED. OR THE OPPOSITE, BEING SO FIGETY OR RESTLESS THAT YOU HAVE BEEN MOVING AROUND A LOT MORE THAN USUAL: NOT AT ALL
SUM OF ALL RESPONSES TO PHQ QUESTIONS 1-9: 0
9. THOUGHTS THAT YOU WOULD BE BETTER OFF DEAD, OR OF HURTING YOURSELF: NOT AT ALL
10. IF YOU CHECKED OFF ANY PROBLEMS, HOW DIFFICULT HAVE THESE PROBLEMS MADE IT FOR YOU TO DO YOUR WORK, TAKE CARE OF THINGS AT HOME, OR GET ALONG WITH OTHER PEOPLE: NOT DIFFICULT AT ALL
SUM OF ALL RESPONSES TO PHQ QUESTIONS 1-9: 0
4. FEELING TIRED OR HAVING LITTLE ENERGY: NOT AT ALL
3. TROUBLE FALLING OR STAYING ASLEEP: NOT AT ALL

## 2024-05-01 NOTE — PROGRESS NOTES
PROGRESS NOTE    SUBJECTIVE:   Tash Matos is a 58 y.o. female seen for a follow up visit regarding   Chief Complaint   Patient presents with    Results     Discuss recent lab results        HPI:  Virtual audio and video visit today to discuss diabetes care.  Patient has a very high hemoglobin A1c on recent evaluation, 12.9.  Historically she has been having a difficult time affording her insulin.         Reviewed and updated this visit by provider:           Review of Systems       OBJECTIVE:  There were no vitals filed for this visit.     Physical Exam   General: Appears well, no distress, alert, level affect and appropriate mentation      Medical problems and test results were reviewed with the patient today.     Recent Results (from the past 672 hour(s))   Microalbumin / Creatinine Urine Ratio    Collection Time: 04/12/24  9:22 AM   Result Value Ref Range    Microalbumin, Random Urine 20.80 MG/DL    Creatinine, Ur 58.00 mg/dL    Microalbumin Creatinine Ratio 359 (H) 0 - 30 mg/g   Hemoglobin A1C    Collection Time: 04/12/24  9:22 AM   Result Value Ref Range    Hemoglobin A1C 12.9 (H) 4.8 - 5.6 %    Estimated Avg Glucose 324 mg/dL   Lipid Panel    Collection Time: 04/12/24  9:22 AM   Result Value Ref Range    Cholesterol, Total 151 <200 MG/DL    Triglycerides 164 (H) 35 - 150 MG/DL    HDL 37 (L) 40 - 60 MG/DL    LDL Calculated 81.2 <100 MG/DL    VLDL Cholesterol Calculated 32.8 (H) 6.0 - 23.0 MG/DL    Chol/HDL Ratio 4.1     Comprehensive Metabolic Panel    Collection Time: 04/12/24  9:22 AM   Result Value Ref Range    Sodium 137 136 - 146 mmol/L    Potassium 4.2 3.5 - 5.1 mmol/L    Chloride 103 103 - 113 mmol/L    CO2 29 21 - 32 mmol/L    Anion Gap 5 2 - 11 mmol/L    Glucose 253 (H) 65 - 100 mg/dL    BUN 16 6 - 23 MG/DL    Creatinine 0.80 0.6 - 1.0 MG/DL    Est, Glom Filt Rate 85 >60 ml/min/1.73m2    Calcium 9.4 8.3 - 10.4 MG/DL    Total Bilirubin 0.3 0.2 - 1.1 MG/DL    ALT 23 12 - 65 U/L    AST 15 15

## 2024-05-08 DIAGNOSIS — E11.65 POORLY CONTROLLED TYPE 2 DIABETES MELLITUS (HCC): ICD-10-CM

## 2024-05-09 RX ORDER — HUMAN INSULIN 100 [IU]/ML
INJECTION, SUSPENSION SUBCUTANEOUS
Qty: 15 ML | Refills: 5 | Status: SHIPPED | OUTPATIENT
Start: 2024-05-09

## 2024-06-24 DIAGNOSIS — I10 PRIMARY HYPERTENSION: ICD-10-CM

## 2024-06-25 RX ORDER — LISINOPRIL 20 MG/1
20 TABLET ORAL NIGHTLY
Qty: 90 TABLET | Refills: 1 | OUTPATIENT
Start: 2024-06-25

## 2024-09-03 ENCOUNTER — HOSPITAL ENCOUNTER (EMERGENCY)
Age: 59
Discharge: HOME | End: 2024-09-04
Payer: COMMERCIAL

## 2024-09-03 DIAGNOSIS — E11.65 TYPE 2 DIABETES MELLITUS WITH HYPERGLYCEMIA, WITH LONG-TERM CURRENT USE OF INSULIN (HCC): Primary | ICD-10-CM

## 2024-09-03 DIAGNOSIS — Z79.4 TYPE 2 DIABETES MELLITUS WITH HYPERGLYCEMIA, WITH LONG-TERM CURRENT USE OF INSULIN (HCC): Primary | ICD-10-CM

## 2024-09-03 DIAGNOSIS — L03.90 CELLULITIS, UNSPECIFIED CELLULITIS SITE: ICD-10-CM

## 2024-09-03 PROCEDURE — 99284 EMERGENCY DEPT VISIT MOD MDM: CPT

## 2024-09-03 ASSESSMENT — LIFESTYLE VARIABLES
HOW MANY STANDARD DRINKS CONTAINING ALCOHOL DO YOU HAVE ON A TYPICAL DAY: 1 OR 2
HOW OFTEN DO YOU HAVE A DRINK CONTAINING ALCOHOL: MONTHLY OR LESS

## 2024-09-03 ASSESSMENT — PAIN DESCRIPTION - LOCATION: LOCATION: FOOT

## 2024-09-03 ASSESSMENT — PAIN DESCRIPTION - ORIENTATION: ORIENTATION: RIGHT;LEFT

## 2024-09-03 ASSESSMENT — PAIN DESCRIPTION - DESCRIPTORS: DESCRIPTORS: ACHING

## 2024-09-03 ASSESSMENT — PAIN - FUNCTIONAL ASSESSMENT: PAIN_FUNCTIONAL_ASSESSMENT: 0-10

## 2024-09-03 ASSESSMENT — PAIN SCALES - GENERAL: PAINLEVEL_OUTOF10: 8

## 2024-09-04 ENCOUNTER — APPOINTMENT (OUTPATIENT)
Dept: GENERAL RADIOLOGY | Age: 59
End: 2024-09-04
Payer: COMMERCIAL

## 2024-09-04 VITALS
OXYGEN SATURATION: 94 % | WEIGHT: 180 LBS | RESPIRATION RATE: 18 BRPM | BODY MASS INDEX: 31.89 KG/M2 | HEIGHT: 63 IN | HEART RATE: 90 BPM | DIASTOLIC BLOOD PRESSURE: 83 MMHG | SYSTOLIC BLOOD PRESSURE: 128 MMHG | TEMPERATURE: 98.1 F

## 2024-09-04 LAB
ALBUMIN SERPL-MCNC: 3.6 G/DL (ref 3.5–5)
ALBUMIN/GLOB SERPL: 1 (ref 1–1.9)
ALP SERPL-CCNC: 115 U/L (ref 35–104)
ALT SERPL-CCNC: 23 U/L (ref 12–65)
ANION GAP SERPL CALC-SCNC: 13 MMOL/L (ref 9–18)
AST SERPL-CCNC: 30 U/L (ref 15–37)
BASOPHILS # BLD: 0.1 K/UL (ref 0–0.2)
BASOPHILS NFR BLD: 1 % (ref 0–2)
BILIRUB SERPL-MCNC: 0.3 MG/DL (ref 0–1.2)
BUN SERPL-MCNC: 18 MG/DL (ref 6–23)
CALCIUM SERPL-MCNC: 9.5 MG/DL (ref 8.8–10.2)
CHLORIDE SERPL-SCNC: 100 MMOL/L (ref 98–107)
CO2 SERPL-SCNC: 24 MMOL/L (ref 20–28)
CREAT SERPL-MCNC: 0.71 MG/DL (ref 0.6–1.1)
DIFFERENTIAL METHOD BLD: ABNORMAL
EOSINOPHIL # BLD: 0.2 K/UL (ref 0–0.8)
EOSINOPHIL NFR BLD: 2 % (ref 0.5–7.8)
ERYTHROCYTE [DISTWIDTH] IN BLOOD BY AUTOMATED COUNT: 12.9 % (ref 11.9–14.6)
GLOBULIN SER CALC-MCNC: 3.7 G/DL (ref 2.3–3.5)
GLUCOSE SERPL-MCNC: 134 MG/DL (ref 70–99)
HCT VFR BLD AUTO: 40.1 % (ref 35.8–46.3)
HGB BLD-MCNC: 13.4 G/DL (ref 11.7–15.4)
IMM GRANULOCYTES # BLD AUTO: 0 K/UL (ref 0–0.5)
IMM GRANULOCYTES NFR BLD AUTO: 0 % (ref 0–5)
LACTATE SERPL-SCNC: 1.5 MMOL/L (ref 0.5–2)
LYMPHOCYTES # BLD: 4.3 K/UL (ref 0.5–4.6)
LYMPHOCYTES NFR BLD: 45 % (ref 13–44)
MCH RBC QN AUTO: 27.7 PG (ref 26.1–32.9)
MCHC RBC AUTO-ENTMCNC: 33.4 G/DL (ref 31.4–35)
MCV RBC AUTO: 82.9 FL (ref 82–102)
MONOCYTES # BLD: 0.8 K/UL (ref 0.1–1.3)
MONOCYTES NFR BLD: 9 % (ref 4–12)
NEUTS SEG # BLD: 4 K/UL (ref 1.7–8.2)
NEUTS SEG NFR BLD: 43 % (ref 43–78)
NRBC # BLD: 0 K/UL (ref 0–0.2)
PLATELET # BLD AUTO: 242 K/UL (ref 150–450)
PMV BLD AUTO: 10.6 FL (ref 9.4–12.3)
POTASSIUM SERPL-SCNC: 4.3 MMOL/L (ref 3.5–5.1)
PROCALCITONIN SERPL-MCNC: 0.09 NG/ML (ref 0–0.1)
PROT SERPL-MCNC: 7.3 G/DL (ref 6.3–8.2)
RBC # BLD AUTO: 4.84 M/UL (ref 4.05–5.2)
SODIUM SERPL-SCNC: 137 MMOL/L (ref 136–145)
WBC # BLD AUTO: 9.4 K/UL (ref 4.3–11.1)

## 2024-09-04 PROCEDURE — 6360000002 HC RX W HCPCS: Performed by: PHYSICIAN ASSISTANT

## 2024-09-04 PROCEDURE — 2580000003 HC RX 258: Performed by: PHYSICIAN ASSISTANT

## 2024-09-04 PROCEDURE — 83605 ASSAY OF LACTIC ACID: CPT

## 2024-09-04 PROCEDURE — 84145 PROCALCITONIN (PCT): CPT

## 2024-09-04 PROCEDURE — 85025 COMPLETE CBC W/AUTO DIFF WBC: CPT

## 2024-09-04 PROCEDURE — 80053 COMPREHEN METABOLIC PANEL: CPT

## 2024-09-04 PROCEDURE — 73630 X-RAY EXAM OF FOOT: CPT

## 2024-09-04 PROCEDURE — 96374 THER/PROPH/DIAG INJ IV PUSH: CPT

## 2024-09-04 RX ORDER — CEFPODOXIME PROXETIL 200 MG/1
200 TABLET, FILM COATED ORAL 2 TIMES DAILY
Qty: 14 TABLET | Refills: 0 | Status: SHIPPED | OUTPATIENT
Start: 2024-09-04 | End: 2024-09-11

## 2024-09-04 RX ADMIN — WATER 1000 MG: 1 INJECTION INTRAMUSCULAR; INTRAVENOUS; SUBCUTANEOUS at 00:20

## 2024-09-04 ASSESSMENT — PAIN - FUNCTIONAL ASSESSMENT: PAIN_FUNCTIONAL_ASSESSMENT: 0-10

## 2024-09-04 ASSESSMENT — PAIN SCALES - GENERAL: PAINLEVEL_OUTOF10: 6
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
## 2024-09-04 NOTE — DISCHARGE INSTRUCTIONS
It is imperative that you check your wound daily and take pictures at the same time daily to create a log book in case you have to return.    You should see marked improvements of the appearance of your foot within 72 hours of taking regular antibiotic dosing.  If you do not see improvements or you believe it is worsening in any way, please return here promptly for reevaluation.

## 2024-09-04 NOTE — ED PROVIDER NOTES
daily dose: 20 units., Disp-5 Adjustable Dose Pre-filled Pen Syringe, R-11Normal      insulin glargine (LANTUS SOLOSTAR) 100 UNIT/ML injection pen Inject 30 Units into the skin nightly, Disp-27 mL, R-3Replacing BasaglarNormal      lisinopril (PRINIVIL;ZESTRIL) 20 MG tablet Take 1 tablet by mouth at bedtime, Disp-90 tablet, R-3Normal      JARDIANCE 10 MG tablet Take 1 tablet by mouth daily, Disp-90 tablet, R-3, DAWNormal      metoprolol tartrate (LOPRESSOR) 50 MG tablet Take 0.5 tablets by mouth 2 times daily, Disp-90 tablet, R-3Normal      metoclopramide (REGLAN) 5 MG tablet Take 1 tablet by mouth in the morning and at bedtime, Disp-180 tablet, R-3Normal      pantoprazole (PROTONIX) 40 MG tablet Take 1 tablet by mouth daily, Disp-90 tablet, R-3NEEDS APPOINTMENT FOR ADDITIONAL REFILLSNormal      Insulin Pen Needle (PEN NEEDLES) 31G X 5 MM MISC Please dispense pen needles for use with patient's insulin pen., Disp-100 each, R-5Normal      Continuous Blood Gluc Transmit (DEXCOM G6 TRANSMITTER) MISC 1 Device by Does not apply route every 3 months, Disp-1 each, R-1Normal      blood glucose monitor strips 1 strip by Other route in the morning, at noon, and at bedtime, Other, 3 times daily Starting Tue 7/11/2023, Until Sun 3/2/2025, For 600 days, Disp-300 strip, R-5, Normal      glucose monitoring (FREESTYLE FREEDOM) kit DAILY Starting Fri 6/9/2023, Disp-1 kit, R-0, Normal      Lancet Device MISC SEE ADMIN INSTRUCTIONS Starting Fri 6/9/2023, Disp-100 each, R-1, NormalFor use to test before meals and bedtime & as needed for symptoms of irregular blood glucose. Dispense sufficient amount for indicated testing frequency plus additional to accommoda te PRN testing needs.      terbinafine (LAMISIL AT ATHLETES FOOT) 1 % cream Apply topically 2 times daily., Disp-42 g, R-3, Normal      aspirin 81 MG EC tablet Take 1 tablet by mouth at bedtimeHistorical Med              Results for orders placed or performed during the hospital

## 2024-09-05 ENCOUNTER — CARE COORDINATION (OUTPATIENT)
Dept: CARE COORDINATION | Facility: CLINIC | Age: 59
End: 2024-09-05
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
## 2024-09-06 ENCOUNTER — CARE COORDINATION (OUTPATIENT)
Dept: CARE COORDINATION | Facility: CLINIC | Age: 59
End: 2024-09-06

## 2024-09-06 NOTE — CARE COORDINATION
Ambulatory Care Coordination Note     9/6/2024 9:35 AM    Care Summary Note:    ACM outreach attempt by this ACM today to perform care management follow up . ACM was unable to reach patient.  Did not leave a second message (after leaving one yesterday requesting a return call.  Patient has appointment with PCP 9/20/2024.     ACM: Dara Resendiz RN     PCP/Specialist follow up:   Future Appointments         Provider Specialty Dept Phone    9/20/2024 10:20 AM Rex Garcia MD Family Medicine [phone]    9/24/2024 8:15 AM Darnell Moffett MD Cardiology [phone]

## 2024-09-16 ENCOUNTER — OFFICE VISIT (OUTPATIENT)
Dept: FAMILY MEDICINE CLINIC | Facility: CLINIC | Age: 59
End: 2024-09-16
Payer: COMMERCIAL

## 2024-09-16 VITALS
HEART RATE: 62 BPM | WEIGHT: 188.8 LBS | OXYGEN SATURATION: 98 % | DIASTOLIC BLOOD PRESSURE: 76 MMHG | SYSTOLIC BLOOD PRESSURE: 116 MMHG | HEIGHT: 63 IN | BODY MASS INDEX: 33.45 KG/M2

## 2024-09-16 DIAGNOSIS — T30.0 FIRST DEGREE BURNS: Primary | ICD-10-CM

## 2024-09-16 DIAGNOSIS — E11.65 POORLY CONTROLLED TYPE 2 DIABETES MELLITUS WITH AUTONOMIC NEUROPATHY (HCC): ICD-10-CM

## 2024-09-16 DIAGNOSIS — E78.5 HYPERLIPIDEMIA ASSOCIATED WITH TYPE 2 DIABETES MELLITUS (HCC): ICD-10-CM

## 2024-09-16 DIAGNOSIS — E11.43 POORLY CONTROLLED TYPE 2 DIABETES MELLITUS WITH AUTONOMIC NEUROPATHY (HCC): ICD-10-CM

## 2024-09-16 DIAGNOSIS — Z95.1 S/P CABG X 2: ICD-10-CM

## 2024-09-16 DIAGNOSIS — I10 PRIMARY HYPERTENSION: ICD-10-CM

## 2024-09-16 DIAGNOSIS — E11.69 HYPERLIPIDEMIA ASSOCIATED WITH TYPE 2 DIABETES MELLITUS (HCC): ICD-10-CM

## 2024-09-16 PROBLEM — E11.10 DKA, TYPE 2, NOT AT GOAL (HCC): Status: RESOLVED | Noted: 2023-06-07 | Resolved: 2024-09-16

## 2024-09-16 LAB
ALBUMIN SERPL-MCNC: 4 G/DL (ref 3.5–5)
ALBUMIN/GLOB SERPL: 1.1 (ref 1–1.9)
ALP SERPL-CCNC: 113 U/L (ref 35–104)
ALT SERPL-CCNC: 23 U/L (ref 12–65)
ANION GAP SERPL CALC-SCNC: 11 MMOL/L (ref 9–18)
AST SERPL-CCNC: 25 U/L (ref 15–37)
BILIRUB SERPL-MCNC: 0.5 MG/DL (ref 0–1.2)
BUN SERPL-MCNC: 16 MG/DL (ref 6–23)
CALCIUM SERPL-MCNC: 9.9 MG/DL (ref 8.8–10.2)
CHLORIDE SERPL-SCNC: 100 MMOL/L (ref 98–107)
CO2 SERPL-SCNC: 28 MMOL/L (ref 20–28)
CREAT SERPL-MCNC: 0.72 MG/DL (ref 0.6–1.1)
EST. AVERAGE GLUCOSE BLD GHB EST-MCNC: 315 MG/DL
GLOBULIN SER CALC-MCNC: 3.7 G/DL (ref 2.3–3.5)
GLUCOSE SERPL-MCNC: 171 MG/DL (ref 70–99)
HBA1C MFR BLD: 12.6 % (ref 0–5.6)
POTASSIUM SERPL-SCNC: 4.5 MMOL/L (ref 3.5–5.1)
PROT SERPL-MCNC: 7.7 G/DL (ref 6.3–8.2)
SODIUM SERPL-SCNC: 139 MMOL/L (ref 136–145)

## 2024-09-16 PROCEDURE — 3078F DIAST BP <80 MM HG: CPT | Performed by: FAMILY MEDICINE

## 2024-09-16 PROCEDURE — 3074F SYST BP LT 130 MM HG: CPT | Performed by: FAMILY MEDICINE

## 2024-09-16 PROCEDURE — 3046F HEMOGLOBIN A1C LEVEL >9.0%: CPT | Performed by: FAMILY MEDICINE

## 2024-09-16 PROCEDURE — 99214 OFFICE O/P EST MOD 30 MIN: CPT | Performed by: FAMILY MEDICINE

## 2024-09-16 RX ORDER — HUMAN INSULIN 100 [IU]/ML
INJECTION, SUSPENSION SUBCUTANEOUS
Qty: 15 ML | Refills: 5 | Status: SHIPPED | OUTPATIENT
Start: 2024-09-16

## 2024-09-16 RX ORDER — PEN NEEDLE, DIABETIC 30 GX3/16"
NEEDLE, DISPOSABLE MISCELLANEOUS
Qty: 100 EACH | Refills: 5 | Status: SHIPPED | OUTPATIENT
Start: 2024-09-16

## 2024-09-16 ASSESSMENT — ENCOUNTER SYMPTOMS: RESPIRATORY NEGATIVE: 1

## 2024-09-17 LAB — C PEPTIDE SERPL-MCNC: 3.1 NG/ML (ref 1.1–4.4)

## 2024-09-19 ENCOUNTER — CARE COORDINATION (OUTPATIENT)
Dept: CARE COORDINATION | Facility: CLINIC | Age: 59
End: 2024-09-19

## 2024-09-24 ENCOUNTER — OFFICE VISIT (OUTPATIENT)
Age: 59
End: 2024-09-24
Payer: COMMERCIAL

## 2024-09-24 VITALS
WEIGHT: 191 LBS | HEART RATE: 88 BPM | BODY MASS INDEX: 33.84 KG/M2 | SYSTOLIC BLOOD PRESSURE: 114 MMHG | HEIGHT: 63 IN | DIASTOLIC BLOOD PRESSURE: 62 MMHG

## 2024-09-24 DIAGNOSIS — I10 HYPERTENSION, UNSPECIFIED TYPE: ICD-10-CM

## 2024-09-24 DIAGNOSIS — E66.9 OBESITY (BMI 30-39.9): ICD-10-CM

## 2024-09-24 DIAGNOSIS — R06.09 CHRONIC DYSPNEA: ICD-10-CM

## 2024-09-24 DIAGNOSIS — G89.29 CHRONIC CHEST PAIN: ICD-10-CM

## 2024-09-24 DIAGNOSIS — E78.5 HYPERLIPIDEMIA, UNSPECIFIED HYPERLIPIDEMIA TYPE: ICD-10-CM

## 2024-09-24 DIAGNOSIS — R07.9 CHRONIC CHEST PAIN: ICD-10-CM

## 2024-09-24 DIAGNOSIS — I65.23 CAROTID ATHEROSCLEROSIS, BILATERAL: ICD-10-CM

## 2024-09-24 DIAGNOSIS — Z95.1 HX OF CABG: Primary | ICD-10-CM

## 2024-09-24 PROCEDURE — 3074F SYST BP LT 130 MM HG: CPT | Performed by: INTERNAL MEDICINE

## 2024-09-24 PROCEDURE — 99214 OFFICE O/P EST MOD 30 MIN: CPT | Performed by: INTERNAL MEDICINE

## 2024-09-24 PROCEDURE — 3078F DIAST BP <80 MM HG: CPT | Performed by: INTERNAL MEDICINE

## 2024-11-26 ENCOUNTER — HOSPITAL ENCOUNTER (EMERGENCY)
Age: 59
Discharge: HOME | End: 2024-11-26
Attending: EMERGENCY MEDICINE
Payer: COMMERCIAL

## 2024-11-26 VITALS
HEART RATE: 100 BPM | WEIGHT: 187 LBS | TEMPERATURE: 98 F | RESPIRATION RATE: 17 BRPM | HEIGHT: 63 IN | BODY MASS INDEX: 33.13 KG/M2 | OXYGEN SATURATION: 95 % | SYSTOLIC BLOOD PRESSURE: 129 MMHG | DIASTOLIC BLOOD PRESSURE: 68 MMHG

## 2024-11-26 DIAGNOSIS — R11.2 NAUSEA AND VOMITING, UNSPECIFIED VOMITING TYPE: Primary | ICD-10-CM

## 2024-11-26 LAB
ALBUMIN SERPL-MCNC: 3.7 G/DL (ref 3.5–5)
ALBUMIN/GLOB SERPL: 0.8 (ref 1–1.9)
ALP SERPL-CCNC: 101 U/L (ref 35–104)
ALT SERPL-CCNC: 32 U/L (ref 8–45)
ANION GAP SERPL CALC-SCNC: 16 MMOL/L (ref 7–16)
AST SERPL-CCNC: 41 U/L (ref 15–37)
BASOPHILS # BLD: 0 K/UL (ref 0–0.2)
BASOPHILS NFR BLD: 0 % (ref 0–2)
BILIRUB SERPL-MCNC: 0.5 MG/DL (ref 0–1.2)
BUN SERPL-MCNC: 12 MG/DL (ref 6–23)
CALCIUM SERPL-MCNC: 9 MG/DL (ref 8.8–10.2)
CHLORIDE SERPL-SCNC: 99 MMOL/L (ref 98–107)
CO2 SERPL-SCNC: 23 MMOL/L (ref 20–29)
CREAT SERPL-MCNC: 0.76 MG/DL (ref 0.6–1.1)
DIFFERENTIAL METHOD BLD: ABNORMAL
EOSINOPHIL # BLD: 0 K/UL (ref 0–0.8)
EOSINOPHIL NFR BLD: 0 % (ref 0.5–7.8)
ERYTHROCYTE [DISTWIDTH] IN BLOOD BY AUTOMATED COUNT: 12.8 % (ref 11.9–14.6)
GLOBULIN SER CALC-MCNC: 4.4 G/DL (ref 2.3–3.5)
GLUCOSE BLD STRIP.AUTO-MCNC: 235 MG/DL (ref 65–100)
GLUCOSE BLD STRIP.AUTO-MCNC: 309 MG/DL (ref 65–100)
GLUCOSE SERPL-MCNC: 307 MG/DL (ref 70–99)
HCT VFR BLD AUTO: 43.6 % (ref 35.8–46.3)
HGB BLD-MCNC: 14.8 G/DL (ref 11.7–15.4)
IMM GRANULOCYTES # BLD AUTO: 0 K/UL (ref 0–0.5)
IMM GRANULOCYTES NFR BLD AUTO: 0 % (ref 0–5)
LIPASE SERPL-CCNC: 151 U/L (ref 13–60)
LYMPHOCYTES # BLD: 1.4 K/UL (ref 0.5–4.6)
LYMPHOCYTES NFR BLD: 14 % (ref 13–44)
MCH RBC QN AUTO: 27.8 PG (ref 26.1–32.9)
MCHC RBC AUTO-ENTMCNC: 33.9 G/DL (ref 31.4–35)
MCV RBC AUTO: 82 FL (ref 82–102)
MONOCYTES # BLD: 0.3 K/UL (ref 0.1–1.3)
MONOCYTES NFR BLD: 3 % (ref 4–12)
NEUTS SEG # BLD: 8.3 K/UL (ref 1.7–8.2)
NEUTS SEG NFR BLD: 83 % (ref 43–78)
NRBC # BLD: 0 K/UL (ref 0–0.2)
PLATELET # BLD AUTO: 225 K/UL (ref 150–450)
PMV BLD AUTO: 9.9 FL (ref 9.4–12.3)
POTASSIUM SERPL-SCNC: ABNORMAL MMOL/L (ref 3.5–5.1)
PROT SERPL-MCNC: 8.1 G/DL (ref 6.3–8.2)
RBC # BLD AUTO: 5.32 M/UL (ref 4.05–5.2)
SERVICE CMNT-IMP: ABNORMAL
SERVICE CMNT-IMP: ABNORMAL
SODIUM SERPL-SCNC: 137 MMOL/L (ref 136–145)
TROPONIN T SERPL HS-MCNC: 13 NG/L (ref 0–14)
WBC # BLD AUTO: 10.1 K/UL (ref 4.3–11.1)

## 2024-11-26 PROCEDURE — 96361 HYDRATE IV INFUSION ADD-ON: CPT

## 2024-11-26 PROCEDURE — 2500000003 HC RX 250 WO HCPCS: Performed by: EMERGENCY MEDICINE

## 2024-11-26 PROCEDURE — 82962 GLUCOSE BLOOD TEST: CPT

## 2024-11-26 PROCEDURE — 99284 EMERGENCY DEPT VISIT MOD MDM: CPT

## 2024-11-26 PROCEDURE — 6360000002 HC RX W HCPCS: Performed by: EMERGENCY MEDICINE

## 2024-11-26 PROCEDURE — 85025 COMPLETE CBC W/AUTO DIFF WBC: CPT

## 2024-11-26 PROCEDURE — 83690 ASSAY OF LIPASE: CPT

## 2024-11-26 PROCEDURE — 96375 TX/PRO/DX INJ NEW DRUG ADDON: CPT

## 2024-11-26 PROCEDURE — 2580000003 HC RX 258: Performed by: EMERGENCY MEDICINE

## 2024-11-26 PROCEDURE — 84484 ASSAY OF TROPONIN QUANT: CPT

## 2024-11-26 PROCEDURE — 96376 TX/PRO/DX INJ SAME DRUG ADON: CPT

## 2024-11-26 PROCEDURE — 6370000000 HC RX 637 (ALT 250 FOR IP): Performed by: EMERGENCY MEDICINE

## 2024-11-26 PROCEDURE — 80053 COMPREHEN METABOLIC PANEL: CPT

## 2024-11-26 PROCEDURE — 96374 THER/PROPH/DIAG INJ IV PUSH: CPT

## 2024-11-26 RX ORDER — 0.9 % SODIUM CHLORIDE 0.9 %
1000 INTRAVENOUS SOLUTION INTRAVENOUS ONCE
Status: COMPLETED | OUTPATIENT
Start: 2024-11-26 | End: 2024-11-26

## 2024-11-26 RX ORDER — PROCHLORPERAZINE MALEATE 10 MG
10 TABLET ORAL EVERY 6 HOURS PRN
Qty: 12 TABLET | Refills: 0 | Status: ON HOLD | OUTPATIENT
Start: 2024-11-26 | End: 2024-11-29 | Stop reason: HOSPADM

## 2024-11-26 RX ORDER — MAGNESIUM HYDROXIDE/ALUMINUM HYDROXICE/SIMETHICONE 120; 1200; 1200 MG/30ML; MG/30ML; MG/30ML
30 SUSPENSION ORAL
Status: COMPLETED | OUTPATIENT
Start: 2024-11-26 | End: 2024-11-26

## 2024-11-26 RX ORDER — ONDANSETRON 8 MG/1
8 TABLET, ORALLY DISINTEGRATING ORAL EVERY 8 HOURS PRN
Qty: 12 TABLET | Refills: 2 | Status: SHIPPED | OUTPATIENT
Start: 2024-11-26

## 2024-11-26 RX ORDER — LIDOCAINE HYDROCHLORIDE 20 MG/ML
15 SOLUTION OROPHARYNGEAL
Status: COMPLETED | OUTPATIENT
Start: 2024-11-26 | End: 2024-11-26

## 2024-11-26 RX ORDER — METOCLOPRAMIDE HYDROCHLORIDE 5 MG/ML
10 INJECTION INTRAMUSCULAR; INTRAVENOUS
Status: COMPLETED | OUTPATIENT
Start: 2024-11-26 | End: 2024-11-26

## 2024-11-26 RX ORDER — PROCHLORPERAZINE 25 MG
25 SUPPOSITORY, RECTAL RECTAL EVERY 12 HOURS PRN
Qty: 12 SUPPOSITORY | Refills: 1 | Status: ON HOLD | OUTPATIENT
Start: 2024-11-26 | End: 2024-11-29 | Stop reason: HOSPADM

## 2024-11-26 RX ADMIN — FAMOTIDINE 20 MG: 10 INJECTION, SOLUTION INTRAVENOUS at 18:38

## 2024-11-26 RX ADMIN — SODIUM CHLORIDE 1000 ML: 9 INJECTION, SOLUTION INTRAVENOUS at 18:41

## 2024-11-26 RX ADMIN — INSULIN HUMAN 8 UNITS: 100 INJECTION, SOLUTION PARENTERAL at 19:58

## 2024-11-26 RX ADMIN — METOCLOPRAMIDE 10 MG: 5 INJECTION, SOLUTION INTRAMUSCULAR; INTRAVENOUS at 18:38

## 2024-11-26 RX ADMIN — LIDOCAINE HYDROCHLORIDE 15 ML: 20 SOLUTION ORAL at 18:38

## 2024-11-26 RX ADMIN — HYOSCYAMINE SULFATE 0.25 MG: 0.12 TABLET ORAL; SUBLINGUAL at 18:38

## 2024-11-26 RX ADMIN — ALUMINUM HYDROXIDE, MAGNESIUM HYDROXIDE, AND SIMETHICONE 30 ML: 200; 200; 20 SUSPENSION ORAL at 18:38

## 2024-11-26 RX ADMIN — INSULIN HUMAN 10 UNITS: 100 INJECTION, SOLUTION PARENTERAL at 18:37

## 2024-11-26 ASSESSMENT — PAIN - FUNCTIONAL ASSESSMENT: PAIN_FUNCTIONAL_ASSESSMENT: 0-10

## 2024-11-26 ASSESSMENT — LIFESTYLE VARIABLES
HOW MANY STANDARD DRINKS CONTAINING ALCOHOL DO YOU HAVE ON A TYPICAL DAY: PATIENT DOES NOT DRINK
HOW OFTEN DO YOU HAVE A DRINK CONTAINING ALCOHOL: NEVER

## 2024-11-26 ASSESSMENT — PAIN DESCRIPTION - LOCATION: LOCATION: ABDOMEN

## 2024-11-26 ASSESSMENT — PAIN SCALES - GENERAL: PAINLEVEL_OUTOF10: 3

## 2024-11-26 NOTE — ED TRIAGE NOTES
Patient a 60 y/o Female reports to ED with c/c of Nausea ad vomiting since 0300. States she has a Hx of Diabetes and HTN and has been in DKA before and was worried about it. Has not been able to hold anything down and has not been able to take her meds. BGL with EMS was 341. Hr in the 120's.

## 2024-11-27 ENCOUNTER — APPOINTMENT (OUTPATIENT)
Dept: GENERAL RADIOLOGY | Age: 59
End: 2024-11-27
Payer: COMMERCIAL

## 2024-11-27 ENCOUNTER — HOSPITAL ENCOUNTER (OUTPATIENT)
Age: 59
Setting detail: OBSERVATION
Discharge: HOME | End: 2024-11-29
Attending: EMERGENCY MEDICINE | Admitting: FAMILY MEDICINE
Payer: COMMERCIAL

## 2024-11-27 ENCOUNTER — APPOINTMENT (OUTPATIENT)
Dept: CT IMAGING | Age: 59
End: 2024-11-27
Payer: COMMERCIAL

## 2024-11-27 ENCOUNTER — CARE COORDINATION (OUTPATIENT)
Dept: CARE COORDINATION | Facility: CLINIC | Age: 59
End: 2024-11-27

## 2024-11-27 DIAGNOSIS — E11.43 POORLY CONTROLLED TYPE 2 DIABETES MELLITUS WITH AUTONOMIC NEUROPATHY (HCC): ICD-10-CM

## 2024-11-27 DIAGNOSIS — E10.65 TYPE 1 DIABETES MELLITUS WITH HYPERGLYCEMIA (HCC): ICD-10-CM

## 2024-11-27 DIAGNOSIS — K20.90 ESOPHAGITIS: ICD-10-CM

## 2024-11-27 DIAGNOSIS — R11.2 INTRACTABLE NAUSEA AND VOMITING: Primary | ICD-10-CM

## 2024-11-27 DIAGNOSIS — E11.65 POORLY CONTROLLED TYPE 2 DIABETES MELLITUS WITH AUTONOMIC NEUROPATHY (HCC): ICD-10-CM

## 2024-11-27 DIAGNOSIS — K21.00 GASTROESOPHAGEAL REFLUX DISEASE WITH ESOPHAGITIS, UNSPECIFIED WHETHER HEMORRHAGE: ICD-10-CM

## 2024-11-27 LAB
ALBUMIN SERPL-MCNC: 3.8 G/DL (ref 3.5–5)
ALBUMIN/GLOB SERPL: 0.8 (ref 1–1.9)
ALP SERPL-CCNC: 104 U/L (ref 35–104)
ALT SERPL-CCNC: 27 U/L (ref 8–45)
ANION GAP SERPL CALC-SCNC: 17 MMOL/L (ref 7–16)
APPEARANCE UR: CLEAR
AST SERPL-CCNC: 40 U/L (ref 15–37)
BACTERIA URNS QL MICRO: ABNORMAL /HPF
BASOPHILS # BLD: 0 K/UL (ref 0–0.2)
BASOPHILS NFR BLD: 0 % (ref 0–2)
BILIRUB SERPL-MCNC: 0.5 MG/DL (ref 0–1.2)
BILIRUB UR QL: NEGATIVE
BUN SERPL-MCNC: 16 MG/DL (ref 6–23)
CALCIUM SERPL-MCNC: 9.5 MG/DL (ref 8.8–10.2)
CASTS URNS QL MICRO: ABNORMAL /LPF
CHLORIDE SERPL-SCNC: 93 MMOL/L (ref 98–107)
CO2 SERPL-SCNC: 24 MMOL/L (ref 20–29)
COLOR UR: ABNORMAL
CREAT SERPL-MCNC: 0.74 MG/DL (ref 0.6–1.1)
CRP SERPL-MCNC: 0.6 MG/DL (ref 0–0.4)
DIFFERENTIAL METHOD BLD: ABNORMAL
EOSINOPHIL # BLD: 0.1 K/UL (ref 0–0.8)
EOSINOPHIL NFR BLD: 0 % (ref 0.5–7.8)
EPI CELLS #/AREA URNS HPF: ABNORMAL /HPF
ERYTHROCYTE [DISTWIDTH] IN BLOOD BY AUTOMATED COUNT: 12.5 % (ref 11.9–14.6)
GLOBULIN SER CALC-MCNC: 4.6 G/DL (ref 2.3–3.5)
GLUCOSE SERPL-MCNC: 263 MG/DL (ref 70–99)
GLUCOSE UR STRIP.AUTO-MCNC: >1000 MG/DL
HCT VFR BLD AUTO: 44.2 % (ref 35.8–46.3)
HGB BLD-MCNC: 15.2 G/DL (ref 11.7–15.4)
HGB UR QL STRIP: ABNORMAL
IMM GRANULOCYTES # BLD AUTO: 0.1 K/UL (ref 0–0.5)
IMM GRANULOCYTES NFR BLD AUTO: 0 % (ref 0–5)
KETONES UR QL STRIP.AUTO: 80 MG/DL
LEUKOCYTE ESTERASE UR QL STRIP.AUTO: NEGATIVE
LYMPHOCYTES # BLD: 2.4 K/UL (ref 0.5–4.6)
LYMPHOCYTES NFR BLD: 14 % (ref 13–44)
MAGNESIUM SERPL-MCNC: 2 MG/DL (ref 1.8–2.4)
MCH RBC QN AUTO: 27.9 PG (ref 26.1–32.9)
MCHC RBC AUTO-ENTMCNC: 34.4 G/DL (ref 31.4–35)
MCV RBC AUTO: 81.1 FL (ref 82–102)
MONOCYTES # BLD: 0.8 K/UL (ref 0.1–1.3)
MONOCYTES NFR BLD: 5 % (ref 4–12)
MUCOUS THREADS URNS QL MICRO: ABNORMAL /LPF
NEUTS SEG # BLD: 14.1 K/UL (ref 1.7–8.2)
NEUTS SEG NFR BLD: 81 % (ref 43–78)
NITRITE UR QL STRIP.AUTO: NEGATIVE
NRBC # BLD: 0 K/UL (ref 0–0.2)
OTHER OBSERVATIONS: ABNORMAL
PH UR STRIP: 7 (ref 5–9)
PHOSPHATE SERPL-MCNC: 2.2 MG/DL (ref 2.5–4.5)
PLATELET # BLD AUTO: 268 K/UL (ref 150–450)
PMV BLD AUTO: 9.6 FL (ref 9.4–12.3)
POTASSIUM SERPL-SCNC: 3.7 MMOL/L (ref 3.5–5.1)
PROT SERPL-MCNC: 8.4 G/DL (ref 6.3–8.2)
PROT UR STRIP-MCNC: 300 MG/DL
RBC # BLD AUTO: 5.45 M/UL (ref 4.05–5.2)
RBC #/AREA URNS HPF: ABNORMAL /HPF
SODIUM SERPL-SCNC: 134 MMOL/L (ref 136–145)
SP GR UR REFRACTOMETRY: 1.03 (ref 1–1.02)
TROPONIN T SERPL HS-MCNC: 15 NG/L (ref 0–14)
UROBILINOGEN UR QL STRIP.AUTO: 0.2 EU/DL (ref 0.2–1)
WBC # BLD AUTO: 17.4 K/UL (ref 4.3–11.1)
WBC URNS QL MICRO: ABNORMAL /HPF
YEAST URNS QL MICRO: ABNORMAL

## 2024-11-27 PROCEDURE — 6360000002 HC RX W HCPCS: Performed by: EMERGENCY MEDICINE

## 2024-11-27 PROCEDURE — 2580000003 HC RX 258: Performed by: EMERGENCY MEDICINE

## 2024-11-27 PROCEDURE — 86140 C-REACTIVE PROTEIN: CPT

## 2024-11-27 PROCEDURE — 96361 HYDRATE IV INFUSION ADD-ON: CPT

## 2024-11-27 PROCEDURE — 83735 ASSAY OF MAGNESIUM: CPT

## 2024-11-27 PROCEDURE — 99285 EMERGENCY DEPT VISIT HI MDM: CPT

## 2024-11-27 PROCEDURE — 84100 ASSAY OF PHOSPHORUS: CPT

## 2024-11-27 PROCEDURE — 81001 URINALYSIS AUTO W/SCOPE: CPT

## 2024-11-27 PROCEDURE — 84484 ASSAY OF TROPONIN QUANT: CPT

## 2024-11-27 PROCEDURE — 85025 COMPLETE CBC W/AUTO DIFF WBC: CPT

## 2024-11-27 PROCEDURE — 96375 TX/PRO/DX INJ NEW DRUG ADDON: CPT

## 2024-11-27 PROCEDURE — 96374 THER/PROPH/DIAG INJ IV PUSH: CPT

## 2024-11-27 PROCEDURE — 6360000004 HC RX CONTRAST MEDICATION: Performed by: EMERGENCY MEDICINE

## 2024-11-27 PROCEDURE — 80053 COMPREHEN METABOLIC PANEL: CPT

## 2024-11-27 PROCEDURE — 74177 CT ABD & PELVIS W/CONTRAST: CPT

## 2024-11-27 PROCEDURE — 74022 RADEX COMPL AQT ABD SERIES: CPT

## 2024-11-27 RX ORDER — IOPAMIDOL 755 MG/ML
100 INJECTION, SOLUTION INTRAVASCULAR
Status: COMPLETED | OUTPATIENT
Start: 2024-11-27 | End: 2024-11-27

## 2024-11-27 RX ORDER — DIPHENHYDRAMINE HYDROCHLORIDE 50 MG/ML
12.5 INJECTION INTRAMUSCULAR; INTRAVENOUS
Status: COMPLETED | OUTPATIENT
Start: 2024-11-27 | End: 2024-11-27

## 2024-11-27 RX ORDER — SODIUM CHLORIDE, SODIUM LACTATE, POTASSIUM CHLORIDE, AND CALCIUM CHLORIDE .6; .31; .03; .02 G/100ML; G/100ML; G/100ML; G/100ML
1000 INJECTION, SOLUTION INTRAVENOUS ONCE
Status: COMPLETED | OUTPATIENT
Start: 2024-11-27 | End: 2024-11-28

## 2024-11-27 RX ORDER — PROCHLORPERAZINE EDISYLATE 5 MG/ML
10 INJECTION INTRAMUSCULAR; INTRAVENOUS
Status: COMPLETED | OUTPATIENT
Start: 2024-11-27 | End: 2024-11-27

## 2024-11-27 RX ADMIN — DIPHENHYDRAMINE HYDROCHLORIDE 12.5 MG: 50 INJECTION INTRAMUSCULAR; INTRAVENOUS at 22:28

## 2024-11-27 RX ADMIN — IOPAMIDOL 100 ML: 755 INJECTION, SOLUTION INTRAVENOUS at 23:24

## 2024-11-27 RX ADMIN — SODIUM CHLORIDE 40 MG: 9 INJECTION INTRAMUSCULAR; INTRAVENOUS; SUBCUTANEOUS at 22:28

## 2024-11-27 RX ADMIN — SODIUM CHLORIDE, POTASSIUM CHLORIDE, SODIUM LACTATE AND CALCIUM CHLORIDE 1000 ML: 600; 310; 30; 20 INJECTION, SOLUTION INTRAVENOUS at 22:28

## 2024-11-27 RX ADMIN — PROCHLORPERAZINE EDISYLATE 10 MG: 5 INJECTION INTRAMUSCULAR; INTRAVENOUS at 22:27

## 2024-11-27 ASSESSMENT — ENCOUNTER SYMPTOMS
ABDOMINAL PAIN: 1
RHINORRHEA: 0
BACK PAIN: 0
VOMITING: 1
NAUSEA: 1
EYE REDNESS: 0
SHORTNESS OF BREATH: 0
DIARRHEA: 0
COLOR CHANGE: 0
FACIAL SWELLING: 0
EYE DISCHARGE: 0
COUGH: 0

## 2024-11-27 ASSESSMENT — PAIN - FUNCTIONAL ASSESSMENT: PAIN_FUNCTIONAL_ASSESSMENT: NONE - DENIES PAIN

## 2024-11-27 NOTE — ED PROVIDER NOTES
before meals and bedtime & as needed for symptoms of irregular blood glucose. Dispense sufficient amount for indicated testing frequency plus additional to accommoda te PRN testing needs.      terbinafine (LAMISIL AT ATHLETES FOOT) 1 % cream Apply topically 2 times daily., Disp-42 g, R-3, Normal      aspirin 81 MG EC tablet Take 1 tablet by mouth at bedtimeHistorical Med              Results for orders placed or performed during the hospital encounter of 11/26/24   CBC with Auto Differential   Result Value Ref Range    WBC 10.1 4.3 - 11.1 K/uL    RBC 5.32 (H) 4.05 - 5.2 M/uL    Hemoglobin 14.8 11.7 - 15.4 g/dL    Hematocrit 43.6 35.8 - 46.3 %    MCV 82.0 82 - 102 FL    MCH 27.8 26.1 - 32.9 PG    MCHC 33.9 31.4 - 35.0 g/dL    RDW 12.8 11.9 - 14.6 %    Platelets 225 150 - 450 K/uL    MPV 9.9 9.4 - 12.3 FL    nRBC 0.00 0.0 - 0.2 K/uL    Differential Type AUTOMATED      Neutrophils % 83 (H) 43 - 78 %    Lymphocytes % 14 13 - 44 %    Monocytes % 3 (L) 4.0 - 12.0 %    Eosinophils % 0 (L) 0.5 - 7.8 %    Basophils % 0 0.0 - 2.0 %    Immature Granulocytes % 0 0.0 - 5.0 %    Neutrophils Absolute 8.3 (H) 1.7 - 8.2 K/UL    Lymphocytes Absolute 1.4 0.5 - 4.6 K/UL    Monocytes Absolute 0.3 0.1 - 1.3 K/UL    Eosinophils Absolute 0.0 0.0 - 0.8 K/UL    Basophils Absolute 0.0 0.0 - 0.2 K/UL    Immature Granulocytes Absolute 0.0 0.0 - 0.5 K/UL   Comprehensive Metabolic Panel   Result Value Ref Range    Sodium 137 136 - 145 mmol/L    Potassium HEMOLYZED SPECIMEN 3.5 - 5.1 mmol/L    Chloride 99 98 - 107 mmol/L    CO2 23 20 - 29 mmol/L    Anion Gap 16 7 - 16 mmol/L    Glucose 307 (H) 70 - 99 mg/dL    BUN 12 6 - 23 MG/DL    Creatinine 0.76 0.60 - 1.10 MG/DL    Est, Glom Filt Rate >90 >60 ml/min/1.73m2    Calcium 9.0 8.8 - 10.2 MG/DL    Total Bilirubin 0.5 0.0 - 1.2 MG/DL    ALT 32 8 - 45 U/L    AST 41 (H) 15 - 37 U/L    Alk Phosphatase 101 35 - 104 U/L    Total Protein 8.1 6.3 - 8.2 g/dL    Albumin 3.7 3.5 - 5.0 g/dL    Globulin 4.4 (H)

## 2024-11-27 NOTE — CARE COORDINATION
Ambulatory Care Coordination Note     11/27/2024 2:38 PM     Care Summary Note:   Outreach for ED follow up call / High Risk Case Management  Reports ongoing nausea - dry heaves  Discussed medications prescribed   - To start take suppository so that she can rest and get past dry heaves  Discussed staying hydrated - start slow and progress as able to keep fluid down   - sugar free popsicles   - chicken broth  Provided contact information, encouraged her to call   - ACM will call patient tomorrow.    ACM: Dara Resendiz RN     Method of communication with provider: chart routing.    Utilization: Has the patient been seen in the ED since your last call? Yes,   Discharge Date: 11/26/24   Discharge Facility: [facility]  Reason for ED Visit: Nausea, Vomiting  Visit Diagnosis: Possible Virus, ED physician notes not yet posted    Medications Reviewed:   Completed during this call    Advance Care Planning:   Not reviewed during this call     PCP/Specialist follow up:   Future Appointments         Provider Specialty Dept Phone    12/16/2024 9:20 AM Rex Garcia MD Family Medicine [phone]    3/24/2025 8:00 AM Darnell Moffett MD Cardiology [phone]

## 2024-11-28 PROBLEM — D72.829 LEUKOCYTOSIS: Status: ACTIVE | Noted: 2024-11-28

## 2024-11-28 LAB
ALBUMIN SERPL-MCNC: 3.3 G/DL (ref 3.5–5)
ALBUMIN/GLOB SERPL: 0.8 (ref 1–1.9)
ALP SERPL-CCNC: 88 U/L (ref 35–104)
ALT SERPL-CCNC: 24 U/L (ref 8–45)
ANION GAP SERPL CALC-SCNC: 14 MMOL/L (ref 7–16)
ARTERIAL PATENCY WRIST A: POSITIVE
AST SERPL-CCNC: 30 U/L (ref 15–37)
BASE EXCESS BLD CALC-SCNC: 3.3 MMOL/L
BASOPHILS # BLD: 0 K/UL (ref 0–0.2)
BASOPHILS NFR BLD: 0 % (ref 0–2)
BDY SITE: ABNORMAL
BILIRUB SERPL-MCNC: 0.5 MG/DL (ref 0–1.2)
BUN SERPL-MCNC: 13 MG/DL (ref 6–23)
CALCIUM SERPL-MCNC: 8.8 MG/DL (ref 8.8–10.2)
CHLORIDE SERPL-SCNC: 95 MMOL/L (ref 98–107)
CO2 SERPL-SCNC: 24 MMOL/L (ref 20–29)
CREAT SERPL-MCNC: 0.61 MG/DL (ref 0.6–1.1)
DIFFERENTIAL METHOD BLD: ABNORMAL
EKG ATRIAL RATE: 103 BPM
EKG DIAGNOSIS: NORMAL
EKG P AXIS: 70 DEGREES
EKG P-R INTERVAL: 129 MS
EKG Q-T INTERVAL: 366 MS
EKG QRS DURATION: 81 MS
EKG QTC CALCULATION (BAZETT): 480 MS
EKG R AXIS: 75 DEGREES
EKG T AXIS: 82 DEGREES
EKG VENTRICULAR RATE: 103 BPM
EOSINOPHIL # BLD: 0 K/UL (ref 0–0.8)
EOSINOPHIL NFR BLD: 0 % (ref 0.5–7.8)
ERYTHROCYTE [DISTWIDTH] IN BLOOD BY AUTOMATED COUNT: 12.2 % (ref 11.9–14.6)
GAS FLOW.O2 O2 DELIVERY SYS: ABNORMAL
GLOBULIN SER CALC-MCNC: 4.2 G/DL (ref 2.3–3.5)
GLUCOSE BLD STRIP.AUTO-MCNC: 189 MG/DL (ref 65–100)
GLUCOSE BLD STRIP.AUTO-MCNC: 215 MG/DL (ref 65–100)
GLUCOSE BLD STRIP.AUTO-MCNC: 257 MG/DL (ref 65–100)
GLUCOSE SERPL-MCNC: 226 MG/DL (ref 70–99)
HCO3 BLD-SCNC: 25.4 MMOL/L (ref 22–26)
HCT VFR BLD AUTO: 42 % (ref 35.8–46.3)
HGB BLD-MCNC: 14.3 G/DL (ref 11.7–15.4)
IMM GRANULOCYTES # BLD AUTO: 0.1 K/UL (ref 0–0.5)
IMM GRANULOCYTES NFR BLD AUTO: 1 % (ref 0–5)
LACTATE SERPL-SCNC: 1.1 MMOL/L (ref 0.5–2)
LIPASE SERPL-CCNC: 36 U/L (ref 13–60)
LYMPHOCYTES # BLD: 3.1 K/UL (ref 0.5–4.6)
LYMPHOCYTES NFR BLD: 19 % (ref 13–44)
MCH RBC QN AUTO: 27.9 PG (ref 26.1–32.9)
MCHC RBC AUTO-ENTMCNC: 34 G/DL (ref 31.4–35)
MCV RBC AUTO: 82 FL (ref 82–102)
MONOCYTES # BLD: 1.1 K/UL (ref 0.1–1.3)
MONOCYTES NFR BLD: 7 % (ref 4–12)
NEUTS SEG # BLD: 11.8 K/UL (ref 1.7–8.2)
NEUTS SEG NFR BLD: 73 % (ref 43–78)
NRBC # BLD: 0 K/UL (ref 0–0.2)
O2/TOTAL GAS SETTING VFR VENT: 21 %
PCO2 BLD: 30.8 MMHG (ref 35–45)
PH BLD: 7.52 (ref 7.35–7.45)
PLATELET # BLD AUTO: 244 K/UL (ref 150–450)
PMV BLD AUTO: 9.7 FL (ref 9.4–12.3)
PO2 BLD: 68 MMHG (ref 75–100)
POTASSIUM SERPL-SCNC: 3.8 MMOL/L (ref 3.5–5.1)
PROT SERPL-MCNC: 7.4 G/DL (ref 6.3–8.2)
RBC # BLD AUTO: 5.12 M/UL (ref 4.05–5.2)
SAO2 % BLD: 95.3 % (ref 95–98)
SERVICE CMNT-IMP: ABNORMAL
SODIUM SERPL-SCNC: 132 MMOL/L (ref 136–145)
SPECIMEN TYPE: ABNORMAL
TROPONIN T SERPL HS-MCNC: 12 NG/L (ref 0–14)
WBC # BLD AUTO: 16.1 K/UL (ref 4.3–11.1)

## 2024-11-28 PROCEDURE — 83605 ASSAY OF LACTIC ACID: CPT

## 2024-11-28 PROCEDURE — 6370000000 HC RX 637 (ALT 250 FOR IP): Performed by: FAMILY MEDICINE

## 2024-11-28 PROCEDURE — 80053 COMPREHEN METABOLIC PANEL: CPT

## 2024-11-28 PROCEDURE — 6360000002 HC RX W HCPCS: Performed by: FAMILY MEDICINE

## 2024-11-28 PROCEDURE — 96361 HYDRATE IV INFUSION ADD-ON: CPT

## 2024-11-28 PROCEDURE — 84484 ASSAY OF TROPONIN QUANT: CPT

## 2024-11-28 PROCEDURE — 82803 BLOOD GASES ANY COMBINATION: CPT

## 2024-11-28 PROCEDURE — 2580000003 HC RX 258: Performed by: FAMILY MEDICINE

## 2024-11-28 PROCEDURE — 6370000000 HC RX 637 (ALT 250 FOR IP): Performed by: STUDENT IN AN ORGANIZED HEALTH CARE EDUCATION/TRAINING PROGRAM

## 2024-11-28 PROCEDURE — 36415 COLL VENOUS BLD VENIPUNCTURE: CPT

## 2024-11-28 PROCEDURE — G0378 HOSPITAL OBSERVATION PER HR: HCPCS

## 2024-11-28 PROCEDURE — 36600 WITHDRAWAL OF ARTERIAL BLOOD: CPT

## 2024-11-28 PROCEDURE — 85025 COMPLETE CBC W/AUTO DIFF WBC: CPT

## 2024-11-28 PROCEDURE — 83690 ASSAY OF LIPASE: CPT

## 2024-11-28 PROCEDURE — 82962 GLUCOSE BLOOD TEST: CPT

## 2024-11-28 RX ORDER — POTASSIUM CHLORIDE 7.45 MG/ML
10 INJECTION INTRAVENOUS PRN
Status: DISCONTINUED | OUTPATIENT
Start: 2024-11-28 | End: 2024-11-29 | Stop reason: HOSPADM

## 2024-11-28 RX ORDER — ASPIRIN 81 MG/1
81 TABLET ORAL NIGHTLY
Status: DISCONTINUED | OUTPATIENT
Start: 2024-11-28 | End: 2024-11-29 | Stop reason: HOSPADM

## 2024-11-28 RX ORDER — HYDRALAZINE HYDROCHLORIDE 20 MG/ML
10 INJECTION INTRAMUSCULAR; INTRAVENOUS EVERY 6 HOURS PRN
Status: DISCONTINUED | OUTPATIENT
Start: 2024-11-28 | End: 2024-11-29 | Stop reason: HOSPADM

## 2024-11-28 RX ORDER — ENOXAPARIN SODIUM 100 MG/ML
40 INJECTION SUBCUTANEOUS DAILY
Status: DISCONTINUED | OUTPATIENT
Start: 2024-11-28 | End: 2024-11-29 | Stop reason: HOSPADM

## 2024-11-28 RX ORDER — PROCHLORPERAZINE EDISYLATE 5 MG/ML
10 INJECTION INTRAMUSCULAR; INTRAVENOUS EVERY 6 HOURS PRN
Status: DISCONTINUED | OUTPATIENT
Start: 2024-11-28 | End: 2024-11-29 | Stop reason: HOSPADM

## 2024-11-28 RX ORDER — INSULIN LISPRO 100 [IU]/ML
0-8 INJECTION, SOLUTION INTRAVENOUS; SUBCUTANEOUS
Status: DISCONTINUED | OUTPATIENT
Start: 2024-11-28 | End: 2024-11-29 | Stop reason: HOSPADM

## 2024-11-28 RX ORDER — ATORVASTATIN CALCIUM 80 MG/1
80 TABLET, FILM COATED ORAL NIGHTLY
Status: DISCONTINUED | OUTPATIENT
Start: 2024-11-28 | End: 2024-11-29 | Stop reason: HOSPADM

## 2024-11-28 RX ORDER — OXYCODONE HYDROCHLORIDE 5 MG/1
5 TABLET ORAL EVERY 4 HOURS PRN
Status: DISCONTINUED | OUTPATIENT
Start: 2024-11-28 | End: 2024-11-29 | Stop reason: HOSPADM

## 2024-11-28 RX ORDER — SODIUM CHLORIDE 0.9 % (FLUSH) 0.9 %
5-40 SYRINGE (ML) INJECTION EVERY 12 HOURS SCHEDULED
Status: DISCONTINUED | OUTPATIENT
Start: 2024-11-28 | End: 2024-11-29 | Stop reason: HOSPADM

## 2024-11-28 RX ORDER — POLYETHYLENE GLYCOL 3350 17 G/17G
17 POWDER, FOR SOLUTION ORAL DAILY PRN
Status: DISCONTINUED | OUTPATIENT
Start: 2024-11-28 | End: 2024-11-29 | Stop reason: HOSPADM

## 2024-11-28 RX ORDER — SODIUM CHLORIDE 9 MG/ML
INJECTION, SOLUTION INTRAVENOUS PRN
Status: DISCONTINUED | OUTPATIENT
Start: 2024-11-28 | End: 2024-11-29 | Stop reason: HOSPADM

## 2024-11-28 RX ORDER — ONDANSETRON 2 MG/ML
4 INJECTION INTRAMUSCULAR; INTRAVENOUS EVERY 6 HOURS PRN
Status: DISCONTINUED | OUTPATIENT
Start: 2024-11-28 | End: 2024-11-29 | Stop reason: HOSPADM

## 2024-11-28 RX ORDER — SODIUM CHLORIDE 0.9 % (FLUSH) 0.9 %
5-40 SYRINGE (ML) INJECTION PRN
Status: DISCONTINUED | OUTPATIENT
Start: 2024-11-28 | End: 2024-11-29 | Stop reason: HOSPADM

## 2024-11-28 RX ORDER — ACETAMINOPHEN 650 MG/1
650 SUPPOSITORY RECTAL EVERY 6 HOURS PRN
Status: DISCONTINUED | OUTPATIENT
Start: 2024-11-28 | End: 2024-11-29 | Stop reason: HOSPADM

## 2024-11-28 RX ORDER — MAGNESIUM SULFATE IN WATER 40 MG/ML
2000 INJECTION, SOLUTION INTRAVENOUS PRN
Status: DISCONTINUED | OUTPATIENT
Start: 2024-11-28 | End: 2024-11-29 | Stop reason: HOSPADM

## 2024-11-28 RX ORDER — POTASSIUM CHLORIDE 1500 MG/1
40 TABLET, EXTENDED RELEASE ORAL PRN
Status: DISCONTINUED | OUTPATIENT
Start: 2024-11-28 | End: 2024-11-29 | Stop reason: HOSPADM

## 2024-11-28 RX ORDER — SODIUM CHLORIDE, SODIUM LACTATE, POTASSIUM CHLORIDE, CALCIUM CHLORIDE 600; 310; 30; 20 MG/100ML; MG/100ML; MG/100ML; MG/100ML
INJECTION, SOLUTION INTRAVENOUS CONTINUOUS
Status: ACTIVE | OUTPATIENT
Start: 2024-11-28 | End: 2024-11-28

## 2024-11-28 RX ORDER — CITALOPRAM HYDROBROMIDE 20 MG/1
20 TABLET ORAL NIGHTLY
Status: DISCONTINUED | OUTPATIENT
Start: 2024-11-28 | End: 2024-11-29 | Stop reason: HOSPADM

## 2024-11-28 RX ORDER — METOPROLOL TARTRATE 25 MG/1
25 TABLET, FILM COATED ORAL 2 TIMES DAILY
Status: DISCONTINUED | OUTPATIENT
Start: 2024-11-28 | End: 2024-11-29 | Stop reason: HOSPADM

## 2024-11-28 RX ORDER — DEXTROSE MONOHYDRATE 100 MG/ML
INJECTION, SOLUTION INTRAVENOUS CONTINUOUS PRN
Status: DISCONTINUED | OUTPATIENT
Start: 2024-11-28 | End: 2024-11-29 | Stop reason: HOSPADM

## 2024-11-28 RX ORDER — ACETAMINOPHEN 325 MG/1
650 TABLET ORAL EVERY 6 HOURS PRN
Status: DISCONTINUED | OUTPATIENT
Start: 2024-11-28 | End: 2024-11-29 | Stop reason: HOSPADM

## 2024-11-28 RX ORDER — ONDANSETRON 4 MG/1
4 TABLET, ORALLY DISINTEGRATING ORAL EVERY 8 HOURS PRN
Status: DISCONTINUED | OUTPATIENT
Start: 2024-11-28 | End: 2024-11-29 | Stop reason: HOSPADM

## 2024-11-28 RX ORDER — IBUPROFEN 600 MG/1
1 TABLET ORAL PRN
Status: DISCONTINUED | OUTPATIENT
Start: 2024-11-28 | End: 2024-11-29 | Stop reason: HOSPADM

## 2024-11-28 RX ORDER — LISINOPRIL 20 MG/1
20 TABLET ORAL NIGHTLY
Status: DISCONTINUED | OUTPATIENT
Start: 2024-11-28 | End: 2024-11-29 | Stop reason: HOSPADM

## 2024-11-28 RX ORDER — MORPHINE SULFATE 2 MG/ML
2 INJECTION, SOLUTION INTRAMUSCULAR; INTRAVENOUS EVERY 4 HOURS PRN
Status: DISCONTINUED | OUTPATIENT
Start: 2024-11-28 | End: 2024-11-29 | Stop reason: HOSPADM

## 2024-11-28 RX ADMIN — ASPIRIN 81 MG: 81 TABLET, COATED ORAL at 20:43

## 2024-11-28 RX ADMIN — INSULIN LISPRO 4 UNITS: 100 INJECTION, SOLUTION INTRAVENOUS; SUBCUTANEOUS at 11:45

## 2024-11-28 RX ADMIN — PROCHLORPERAZINE EDISYLATE 10 MG: 5 INJECTION INTRAMUSCULAR; INTRAVENOUS at 05:27

## 2024-11-28 RX ADMIN — LISINOPRIL 20 MG: 20 TABLET ORAL at 05:20

## 2024-11-28 RX ADMIN — ATORVASTATIN CALCIUM 80 MG: 80 TABLET, FILM COATED ORAL at 05:20

## 2024-11-28 RX ADMIN — CITALOPRAM HYDROBROMIDE 20 MG: 20 TABLET ORAL at 20:43

## 2024-11-28 RX ADMIN — METOCLOPRAMIDE 20 MG: 5 INJECTION, SOLUTION INTRAMUSCULAR; INTRAVENOUS at 22:25

## 2024-11-28 RX ADMIN — ASPIRIN 81 MG: 81 TABLET, COATED ORAL at 05:20

## 2024-11-28 RX ADMIN — LISINOPRIL 20 MG: 20 TABLET ORAL at 20:43

## 2024-11-28 RX ADMIN — SODIUM CHLORIDE, PRESERVATIVE FREE 10 ML: 5 INJECTION INTRAVENOUS at 09:33

## 2024-11-28 RX ADMIN — ATORVASTATIN CALCIUM 80 MG: 80 TABLET, FILM COATED ORAL at 20:43

## 2024-11-28 RX ADMIN — SODIUM CHLORIDE 40 MG: 9 INJECTION, SOLUTION INTRAMUSCULAR; INTRAVENOUS; SUBCUTANEOUS at 09:28

## 2024-11-28 RX ADMIN — METOCLOPRAMIDE 20 MG: 5 INJECTION, SOLUTION INTRAMUSCULAR; INTRAVENOUS at 09:33

## 2024-11-28 RX ADMIN — ONDANSETRON 4 MG: 2 INJECTION INTRAMUSCULAR; INTRAVENOUS at 02:53

## 2024-11-28 RX ADMIN — SODIUM CHLORIDE, POTASSIUM CHLORIDE, SODIUM LACTATE AND CALCIUM CHLORIDE: 600; 310; 30; 20 INJECTION, SOLUTION INTRAVENOUS at 03:02

## 2024-11-28 RX ADMIN — SODIUM CHLORIDE, PRESERVATIVE FREE 10 ML: 5 INJECTION INTRAVENOUS at 22:22

## 2024-11-28 RX ADMIN — METOPROLOL TARTRATE 25 MG: 25 TABLET, FILM COATED ORAL at 20:43

## 2024-11-28 RX ADMIN — CITALOPRAM HYDROBROMIDE 20 MG: 20 TABLET ORAL at 05:20

## 2024-11-28 RX ADMIN — INSULIN LISPRO 2 UNITS: 100 INJECTION, SOLUTION INTRAVENOUS; SUBCUTANEOUS at 17:05

## 2024-11-28 RX ADMIN — INSULIN LISPRO 2 UNITS: 100 INJECTION, SOLUTION INTRAVENOUS; SUBCUTANEOUS at 20:43

## 2024-11-28 RX ADMIN — METOPROLOL TARTRATE 25 MG: 25 TABLET, FILM COATED ORAL at 05:20

## 2024-11-28 RX ADMIN — METOPROLOL TARTRATE 25 MG: 25 TABLET, FILM COATED ORAL at 09:28

## 2024-11-28 RX ADMIN — ENOXAPARIN SODIUM 40 MG: 100 INJECTION SUBCUTANEOUS at 09:28

## 2024-11-28 NOTE — H&P
PM   Result Value Ref Range    CRP 0.6 (H) 0.0 - 0.4 mg/dL   Urinalysis w rflx microscopic    Collection Time: 11/27/24 10:06 PM   Result Value Ref Range    Color, UA YELLOW/STRAW      Appearance CLEAR      Specific Gravity, UA 1.034 (H) 1.001 - 1.023      pH, Urine 7.0 5.0 - 9.0      Protein,  (A) NEG mg/dL    Glucose, Ur >1000 (A) NEG mg/dL    Ketones, Urine 80 (A) NEG mg/dL    Bilirubin, Urine Negative NEG      Blood, Urine SMALL (A) NEG      Urobilinogen, Urine 0.2 0.2 - 1.0 EU/dL    Nitrite, Urine Negative NEG      Leukocyte Esterase, Urine Negative NEG      WBC, UA 5-10 0 /hpf    RBC, UA 10-20 0 /hpf    Epithelial Cells, UA 5-10 0 /hpf    BACTERIA, URINE 2+ (H) 0 /hpf    Casts HYALINE 0 /lpf    Mucus, UA 3+ (H) 0 /lpf    Yeast, UA OCCASIONAL      Other observations RESULTS VERIFIED MANUALLY     Troponin    Collection Time: 11/27/24 10:06 PM   Result Value Ref Range    Troponin T 15.0 (H) 0 - 14 ng/L   EKG 12 Lead    Collection Time: 11/27/24 10:21 PM   Result Value Ref Range    Ventricular Rate 103 BPM    Atrial Rate 103 BPM    P-R Interval 129 ms    QRS Duration 81 ms    Q-T Interval 366 ms    QTc Calculation (Bazett) 480 ms    P Axis 70 degrees    R Axis 75 degrees    T Axis 82 degrees    Diagnosis Sinus tachycardia  Low voltage, precordial leads      Troponin    Collection Time: 11/28/24 12:14 AM   Result Value Ref Range    Troponin T 12.0 0 - 14 ng/L   Lipase    Collection Time: 11/28/24 12:14 AM   Result Value Ref Range    Lipase 36 13 - 60 U/L   Arterial Blood Gas, POC    Collection Time: 11/28/24  1:43 AM   Result Value Ref Range    DEVICE ROOM AIR      FIO2 21 %    POC pH 7.52 (H) 7.35 - 7.45      POC pCO2 30.8 (L) 35 - 45 MMHG    POC PO2 68 (L) 75 - 100 MMHG    POC HCO3 25.4 22 - 26 MMOL/L    POC O2 SAT 95.3 95 - 98 %    Base Excess 3.3 mmol/L    POC Kendell's Test Positive      Site LEFT RADIAL      Specimen type: ARTERIAL      Performed by: [email or web address]        Signed:  Hailey Lezama MD

## 2024-11-28 NOTE — ED TRIAGE NOTES
Pt presents to ER for nausea and vomiting. Patient was seen yesterday morning for same symptoms. Patient vomiting during triage.

## 2024-11-28 NOTE — ED PROVIDER NOTES
Emergency Department Provider Note       PCP: Rex Garcia MD   Age: 59 y.o.   Sex: female     DISPOSITION              ICD-10-CM    1. Intractable nausea and vomiting  R11.2       2. Esophagitis  K20.90       3. Type 1 diabetes mellitus with hyperglycemia (HCC)  E10.65           Medical Decision Making     While patient has not been vomiting in the emergency room and she continues to be very nauseous especially when sitting up.  CT shows evidence of esophagitis but otherwise no other acute intra-abdominal process.  Specifically no evidence of gastroparesis or bowel obstruction. Laboratory data shows a slightly elevated anion gap at 17 with a blood sugar of 263.  CO2 however is normal.  White blood cell count is elevated at 17,000.  Will refer to hospitalist for observation or admission due to intractable nausea and vomiting and esophagitis.     1 or more chronic illnesses with a severe exacerbation or progression.  Shared medical decision making was utilized in creating the patients health plan today.    I independently ordered and reviewed each unique test.       I interpreted the EKG with rhythm strip performed at 2221 shows a sinus tachycardia with a rate of 103 bpm.  Otherwise normal EKG..    The patient was admitted and I have discussed patient management with the admitting provider.          History     Patient with past medical history seen for diabetes and probable diabetic gastroparesis presents emerged part with complaints of continued and intractable nausea and vomiting.  She was seen in the emergency department here yesterday for the same complaint and evaluation and treatment seem to have symptomatic improvement.  She states after she got home however she began vomiting again and has been vomiting all day.  She denies any diarrhea.  She denies any fever or chills.  She denies any significant abdominal pain.  She does complain of some chest pain but thinks that is from the vomiting although she

## 2024-11-28 NOTE — ED NOTES
TRANSFER - OUT REPORT:    Verbal report given to Corry RN on Tash Matos  being transferred to [address] [place] [address] for routine progression of patient care       Report consisted of patient's Situation, Background, Assessment and   Recommendations(SBAR).     Information from the following report(s) Nurse Handoff Report was reviewed with the receiving nurse.    Ravenna Fall Assessment:                           Lines:   Peripheral IV 11/27/24 Left Hand (Active)        Opportunity for questions and clarification was provided.      Patient transported with:  Registered Nurse          Jay Forde RN  11/28/24 021

## 2024-11-28 NOTE — ASSESSMENT & PLAN NOTE
- Possibly related to known gastroparesis  - CT without evidence of infection.  Some possible esophagitis, which is likely due to intractable vomiting of recent  - NPO for now, advance diet as tolerated  - PRN antiemetics.  IV reglan for now (transitioned from home PO dosing)  - No abdominal pain.  Bowel sounds present

## 2024-11-28 NOTE — ASSESSMENT & PLAN NOTE
- WBC is 17.4  - Could be due to intractable vomiting  - No fevers, will check procal and lactic acid  - Recheck CBC in AM.  Defer antibiotics unless additional evidence of infection

## 2024-11-28 NOTE — PROGRESS NOTES
TRANSFER - IN REPORT:    Verbal report received from  on Tash Matos  being received from ED for routine progression of patient care      Report consisted of patient's Situation, Background, Assessment and   Recommendations(SBAR).     Information from the following report(s) Nurse Handoff Report, ED Encounter Summary, ED SBAR, Adult Overview, MAR, Quality Measures, and Neuro Assessment was reviewed with the receiving nurse.    Opportunity for questions and clarification was provided.      Assessment to be completed upon patient's arrival to unit and care assumed.

## 2024-11-28 NOTE — PROGRESS NOTES
4 Eyes Skin Assessment     NAME:  Tash Matos  YOB: 1965  MEDICAL RECORD NUMBER:  [id number]    The patient is being assessed for  Admission    I agree that at least one RN has performed a thorough Head to Toe Skin Assessment on the patient. ALL assessment sites listed below have been assessed.      Areas assessed by both nurses:    Sacrum. Buttock, Coccyx, Ischium        Does the Patient have a Wound? No noted wound(s)       Octaviano Prevention initiated by RN: Yes  Wound Care Orders initiated by RN: No    Pressure Injury (Stage 3,4, Unstageable, DTI, NWPT, and Complex wounds) if present, place Wound referral order by RN under : No    New Ostomies, if present place, Ostomy referral order under : No     Nurse 1 eSignature: Electronically signed by KAYDEN DIAZ RN on 11/28/24 at 3:29 AM EST    **SHARE this note so that the co-signing nurse can place an eSignature**    Nurse 2 eSignature: Electronically signed by Asiya Marroquin RN on 11/28/24 at 6:58 AM EST

## 2024-11-29 VITALS
TEMPERATURE: 98.3 F | DIASTOLIC BLOOD PRESSURE: 86 MMHG | OXYGEN SATURATION: 98 % | SYSTOLIC BLOOD PRESSURE: 131 MMHG | HEIGHT: 63 IN | RESPIRATION RATE: 16 BRPM | BODY MASS INDEX: 33.13 KG/M2 | WEIGHT: 187 LBS | HEART RATE: 104 BPM

## 2024-11-29 LAB
ALBUMIN SERPL-MCNC: 3.1 G/DL (ref 3.5–5)
ALBUMIN/GLOB SERPL: 0.8 (ref 1–1.9)
ALP SERPL-CCNC: 92 U/L (ref 35–104)
ALT SERPL-CCNC: 23 U/L (ref 8–45)
ANION GAP SERPL CALC-SCNC: 14 MMOL/L (ref 7–16)
AST SERPL-CCNC: 26 U/L (ref 15–37)
BASOPHILS # BLD: 0 K/UL (ref 0–0.2)
BASOPHILS NFR BLD: 0 % (ref 0–2)
BILIRUB SERPL-MCNC: 0.6 MG/DL (ref 0–1.2)
BUN SERPL-MCNC: 13 MG/DL (ref 6–23)
CALCIUM SERPL-MCNC: 8.8 MG/DL (ref 8.8–10.2)
CHLORIDE SERPL-SCNC: 92 MMOL/L (ref 98–107)
CO2 SERPL-SCNC: 24 MMOL/L (ref 20–29)
CREAT SERPL-MCNC: 0.65 MG/DL (ref 0.6–1.1)
DIFFERENTIAL METHOD BLD: ABNORMAL
EOSINOPHIL # BLD: 0 K/UL (ref 0–0.8)
EOSINOPHIL NFR BLD: 0 % (ref 0.5–7.8)
ERYTHROCYTE [DISTWIDTH] IN BLOOD BY AUTOMATED COUNT: 11.9 % (ref 11.9–14.6)
GLOBULIN SER CALC-MCNC: 3.7 G/DL (ref 2.3–3.5)
GLUCOSE BLD STRIP.AUTO-MCNC: 199 MG/DL (ref 65–100)
GLUCOSE BLD STRIP.AUTO-MCNC: 209 MG/DL (ref 65–100)
GLUCOSE SERPL-MCNC: 219 MG/DL (ref 70–99)
HCT VFR BLD AUTO: 41.2 % (ref 35.8–46.3)
HGB BLD-MCNC: 14.6 G/DL (ref 11.7–15.4)
IMM GRANULOCYTES # BLD AUTO: 0.1 K/UL (ref 0–0.5)
IMM GRANULOCYTES NFR BLD AUTO: 1 % (ref 0–5)
LYMPHOCYTES # BLD: 2.9 K/UL (ref 0.5–4.6)
LYMPHOCYTES NFR BLD: 26 % (ref 13–44)
MCH RBC QN AUTO: 28.3 PG (ref 26.1–32.9)
MCHC RBC AUTO-ENTMCNC: 35.4 G/DL (ref 31.4–35)
MCV RBC AUTO: 79.8 FL (ref 82–102)
MONOCYTES # BLD: 0.7 K/UL (ref 0.1–1.3)
MONOCYTES NFR BLD: 7 % (ref 4–12)
NEUTS SEG # BLD: 7.6 K/UL (ref 1.7–8.2)
NEUTS SEG NFR BLD: 66 % (ref 43–78)
NRBC # BLD: 0 K/UL (ref 0–0.2)
PLATELET # BLD AUTO: 244 K/UL (ref 150–450)
PMV BLD AUTO: 9.9 FL (ref 9.4–12.3)
POTASSIUM SERPL-SCNC: 4 MMOL/L (ref 3.5–5.1)
PROT SERPL-MCNC: 6.8 G/DL (ref 6.3–8.2)
RBC # BLD AUTO: 5.16 M/UL (ref 4.05–5.2)
SERVICE CMNT-IMP: ABNORMAL
SERVICE CMNT-IMP: ABNORMAL
SODIUM SERPL-SCNC: 131 MMOL/L (ref 136–145)
WBC # BLD AUTO: 11.3 K/UL (ref 4.3–11.1)

## 2024-11-29 PROCEDURE — 6370000000 HC RX 637 (ALT 250 FOR IP): Performed by: FAMILY MEDICINE

## 2024-11-29 PROCEDURE — G0378 HOSPITAL OBSERVATION PER HR: HCPCS

## 2024-11-29 PROCEDURE — 85025 COMPLETE CBC W/AUTO DIFF WBC: CPT

## 2024-11-29 PROCEDURE — 6370000000 HC RX 637 (ALT 250 FOR IP): Performed by: HOSPITALIST

## 2024-11-29 PROCEDURE — 6370000000 HC RX 637 (ALT 250 FOR IP): Performed by: STUDENT IN AN ORGANIZED HEALTH CARE EDUCATION/TRAINING PROGRAM

## 2024-11-29 PROCEDURE — 6360000002 HC RX W HCPCS: Performed by: FAMILY MEDICINE

## 2024-11-29 PROCEDURE — 36415 COLL VENOUS BLD VENIPUNCTURE: CPT

## 2024-11-29 PROCEDURE — 80053 COMPREHEN METABOLIC PANEL: CPT

## 2024-11-29 PROCEDURE — 82962 GLUCOSE BLOOD TEST: CPT

## 2024-11-29 PROCEDURE — 2580000003 HC RX 258: Performed by: FAMILY MEDICINE

## 2024-11-29 RX ORDER — METOCLOPRAMIDE 10 MG/1
10 TABLET ORAL
Qty: 270 TABLET | Refills: 0 | Status: SHIPPED | OUTPATIENT
Start: 2024-11-29 | End: 2025-02-27

## 2024-11-29 RX ORDER — METOCLOPRAMIDE 10 MG/1
10 TABLET ORAL
Status: DISCONTINUED | OUTPATIENT
Start: 2024-11-29 | End: 2024-11-29 | Stop reason: HOSPADM

## 2024-11-29 RX ORDER — SENNOSIDES A AND B 8.6 MG/1
2 TABLET, FILM COATED ORAL 2 TIMES DAILY PRN
Qty: 30 TABLET | Refills: 0 | Status: SHIPPED | OUTPATIENT
Start: 2024-11-29 | End: 2025-01-28

## 2024-11-29 RX ORDER — PANTOPRAZOLE SODIUM 40 MG/1
40 TABLET, DELAYED RELEASE ORAL
Status: DISCONTINUED | OUTPATIENT
Start: 2024-11-29 | End: 2024-11-29 | Stop reason: HOSPADM

## 2024-11-29 RX ADMIN — ENOXAPARIN SODIUM 40 MG: 100 INJECTION SUBCUTANEOUS at 08:28

## 2024-11-29 RX ADMIN — SODIUM CHLORIDE 40 MG: 9 INJECTION, SOLUTION INTRAMUSCULAR; INTRAVENOUS; SUBCUTANEOUS at 08:27

## 2024-11-29 RX ADMIN — INSULIN LISPRO 2 UNITS: 100 INJECTION, SOLUTION INTRAVENOUS; SUBCUTANEOUS at 11:42

## 2024-11-29 RX ADMIN — METOCLOPRAMIDE 10 MG: 10 TABLET ORAL at 11:43

## 2024-11-29 RX ADMIN — METOPROLOL TARTRATE 25 MG: 25 TABLET, FILM COATED ORAL at 08:28

## 2024-11-29 RX ADMIN — INSULIN LISPRO 2 UNITS: 100 INJECTION, SOLUTION INTRAVENOUS; SUBCUTANEOUS at 08:28

## 2024-11-29 NOTE — DISCHARGE SUMMARY
[unfilled]    Physician Discharge Summary     Patient ID:  Tash Matos  [id number]  59 y.o.  1965    Admit date: 11/27/2024    Discharge date: 11-    Diagnosis:    1- Intractable nausea and vomiting, duet o diabetic gastroparesis, resolved with supportive treatment.  - CT without evidence of infection.  Some possible esophagitis, which is likely due to intractable vomiting of recent  - reglan dose increased on discharge     2- Hyponatremia, mild, hypovolemic, adequate hydration advised.    3- History of Hyperlipidemia, Hypertension, Diabetes mellitus. Stable.      Hospital course:   Per HPI:  59 y.o. female with medical history of HTN, DM2, gastroparesis who presented to ED with intractable nausea/vomiting.  Was seen in the ER yesterday for similar.  Upon ER evaluation tonight, WBC has increased to 17.4 from 10.1.      CT shows:  IMPRESSION:  1. Hepatic steatosis with hepatomegaly. Please correlate with LFTs.  2. Mild prominence of the esophageal wall. Please correlate for esophagitis..  3. Scattered diverticulosis. No CT evidence for acute diverticulitis.  4. Sclerosis at L4-L5 most likely due to Modic changes/degenerative change. If  there is any further clinical concern, bone scan would help better delineate.  5. Normal appendix    Patient given compazine, benadryl, and protonix in ER without improvement of symptoms.  Hospitalist asked to admit.  Patient admitted and treated as above.  On day of discharge, patient exam is normal, asymptomatic. No vomiting     PCP: Rex Garcia MD    Patient Instructions:   Current Discharge Medication List        CONTINUE these medications which have CHANGED    Details   metoclopramide (REGLAN) 10 MG tablet Take 1 tablet by mouth 3 times daily (before meals)  Qty: 270 tablet, Refills: 0    Associated Diagnoses: Poorly controlled type 2 diabetes mellitus with autonomic neuropathy (HCC); Gastroesophageal reflux disease with esophagitis, unspecified

## 2024-11-29 NOTE — CARE COORDINATION
supports the patient's individualized plan of care/goals, treatment preferences, and shares the quality data associated with the providers?  Yes         Catalina Patrick RN 11/29/24 12:34 PM

## 2024-12-02 ENCOUNTER — CARE COORDINATION (OUTPATIENT)
Dept: CARE COORDINATION | Facility: CLINIC | Age: 59
End: 2024-12-02

## 2024-12-02 NOTE — CARE COORDINATION
Ambulatory Care Coordination Note     12/2/2024 11:39 AM     Care Summary Note:   Outreach for High Risk Case Management  Patient hospitalized - 11/27 through 11/29   - intractable nausea r/t diabetic gastroparesis   - hyponatremia - mild, hypovolemic   - diabetes mellitus: last glucose on CMP = 219  Discharged to home with increased dose of reglan    Unable to reach today, both by this ACM and Transitions of Care RN  Will follow up again later today.  History of poor glycemic control  PCP appointment 12/16 @ 09:20    ACM: Dara Resendiz RN     Method of communication with provider: chart routing.    Utilization: Has the patient been seen in the ED since your last call? Yes,   Discharge Date: 11/29/24   [facility]  Reason for ED Visit: intractable nausea r/t diabetic gastroparesis  Visit Diagnosis: admitted for - intractable nausea r/t diabetic gastroparesis    Number of ED visits in the last 6 months: 4       PCP/Specialist follow up:   Future Appointments         Provider Specialty Dept Phone    12/16/2024 9:20 AM Rex Garcia MD Family Medicine [phone]    3/24/2025 8:00 AM Darnell Moffett MD Cardiology [phone]

## 2024-12-02 NOTE — CARE COORDINATION
Ambulatory Care Coordination Note     12/2/2024 1:10 PM     Care Summary Note:   Patient has returned call from earlier.  Reviewed discharge instructions   BS have been ranging from 119-75.  States they have been labile and she has had to work at keeping them controlled.  Diarrhea began yesterday evening - took off work today.  Resting, reminded her to stay hydrated.  Will check in with patient in a couple days     ACM: Dara Resendiz RN    PCP/Specialist follow up:   Future Appointments         Provider Specialty Dept Phone    12/16/2024 9:20 AM Rex Garcia MD Family Medicine [phone]    3/24/2025 8:00 AM Darnell Moffett MD Cardiology [phone]

## 2024-12-10 ENCOUNTER — CARE COORDINATION (OUTPATIENT)
Dept: CARE COORDINATION | Facility: CLINIC | Age: 59
End: 2024-12-10

## 2024-12-10 NOTE — CARE COORDINATION
Ambulatory Care Coordination Note     12/10/2024 10:20 AM     Care Summary Note:   Unable to reach multiple calls to support patient.   - post ED visit   - post hospitalization   - text messages also sent in effort to elicit response  Episode/Program closed     ACM: Dara Resendiz, BILL     PCP/Specialist follow up:   Future Appointments         Provider Specialty Dept Phone    12/16/2024 9:20 AM Rex Garcia MD Family Medicine [phone]    3/24/2025 8:00 AM Darnell Moffett MD Cardiology [phone]

## 2024-12-13 ENCOUNTER — CARE COORDINATION (OUTPATIENT)
Dept: CARE COORDINATION | Facility: CLINIC | Age: 59
End: 2024-12-13

## 2024-12-16 ENCOUNTER — OFFICE VISIT (OUTPATIENT)
Dept: FAMILY MEDICINE CLINIC | Facility: CLINIC | Age: 59
End: 2024-12-16
Payer: COMMERCIAL

## 2024-12-16 VITALS
HEART RATE: 78 BPM | SYSTOLIC BLOOD PRESSURE: 116 MMHG | WEIGHT: 184 LBS | DIASTOLIC BLOOD PRESSURE: 72 MMHG | HEIGHT: 63 IN | BODY MASS INDEX: 32.6 KG/M2 | OXYGEN SATURATION: 98 %

## 2024-12-16 DIAGNOSIS — I10 PRIMARY HYPERTENSION: ICD-10-CM

## 2024-12-16 DIAGNOSIS — K21.00 GASTROESOPHAGEAL REFLUX DISEASE WITH ESOPHAGITIS, UNSPECIFIED WHETHER HEMORRHAGE: ICD-10-CM

## 2024-12-16 DIAGNOSIS — E11.65 POORLY CONTROLLED TYPE 2 DIABETES MELLITUS WITH AUTONOMIC NEUROPATHY (HCC): ICD-10-CM

## 2024-12-16 DIAGNOSIS — E11.43 POORLY CONTROLLED TYPE 2 DIABETES MELLITUS WITH AUTONOMIC NEUROPATHY (HCC): ICD-10-CM

## 2024-12-16 LAB
ALBUMIN SERPL-MCNC: 3.4 G/DL (ref 3.5–5)
ALBUMIN/GLOB SERPL: 1 (ref 1–1.9)
ALP SERPL-CCNC: 97 U/L (ref 35–104)
ALT SERPL-CCNC: 21 U/L (ref 8–45)
ANION GAP SERPL CALC-SCNC: 10 MMOL/L (ref 7–16)
AST SERPL-CCNC: 19 U/L (ref 15–37)
BASOPHILS # BLD: 0 K/UL (ref 0–0.2)
BASOPHILS NFR BLD: 1 % (ref 0–2)
BILIRUB SERPL-MCNC: 0.4 MG/DL (ref 0–1.2)
BUN SERPL-MCNC: 18 MG/DL (ref 6–23)
CALCIUM SERPL-MCNC: 9.6 MG/DL (ref 8.8–10.2)
CHLORIDE SERPL-SCNC: 102 MMOL/L (ref 98–107)
CO2 SERPL-SCNC: 27 MMOL/L (ref 20–29)
CREAT SERPL-MCNC: 0.89 MG/DL (ref 0.6–1.1)
DIFFERENTIAL METHOD BLD: NORMAL
EOSINOPHIL # BLD: 0.1 K/UL (ref 0–0.8)
EOSINOPHIL NFR BLD: 2 % (ref 0.5–7.8)
ERYTHROCYTE [DISTWIDTH] IN BLOOD BY AUTOMATED COUNT: 13.2 % (ref 11.9–14.6)
EST. AVERAGE GLUCOSE BLD GHB EST-MCNC: 258 MG/DL
GLOBULIN SER CALC-MCNC: 3.3 G/DL (ref 2.3–3.5)
GLUCOSE SERPL-MCNC: 134 MG/DL (ref 70–99)
HBA1C MFR BLD: 10.6 % (ref 0–5.6)
HCT VFR BLD AUTO: 39.1 % (ref 35.8–46.3)
HGB BLD-MCNC: 12.8 G/DL (ref 11.7–15.4)
IMM GRANULOCYTES # BLD AUTO: 0 K/UL (ref 0–0.5)
IMM GRANULOCYTES NFR BLD AUTO: 0 % (ref 0–5)
LYMPHOCYTES # BLD: 2.1 K/UL (ref 0.5–4.6)
LYMPHOCYTES NFR BLD: 33 % (ref 13–44)
MCH RBC QN AUTO: 28.2 PG (ref 26.1–32.9)
MCHC RBC AUTO-ENTMCNC: 32.7 G/DL (ref 31.4–35)
MCV RBC AUTO: 86.1 FL (ref 82–102)
MONOCYTES # BLD: 0.4 K/UL (ref 0.1–1.3)
MONOCYTES NFR BLD: 7 % (ref 4–12)
NEUTS SEG # BLD: 3.7 K/UL (ref 1.7–8.2)
NEUTS SEG NFR BLD: 57 % (ref 43–78)
NRBC # BLD: 0 K/UL (ref 0–0.2)
PLATELET # BLD AUTO: 243 K/UL (ref 150–450)
PMV BLD AUTO: 10.1 FL (ref 9.4–12.3)
POTASSIUM SERPL-SCNC: 4.3 MMOL/L (ref 3.5–5.1)
PROT SERPL-MCNC: 6.7 G/DL (ref 6.3–8.2)
RBC # BLD AUTO: 4.54 M/UL (ref 4.05–5.2)
SODIUM SERPL-SCNC: 139 MMOL/L (ref 136–145)
WBC # BLD AUTO: 6.4 K/UL (ref 4.3–11.1)

## 2024-12-16 PROCEDURE — 3078F DIAST BP <80 MM HG: CPT | Performed by: FAMILY MEDICINE

## 2024-12-16 PROCEDURE — 99214 OFFICE O/P EST MOD 30 MIN: CPT | Performed by: FAMILY MEDICINE

## 2024-12-16 PROCEDURE — 3046F HEMOGLOBIN A1C LEVEL >9.0%: CPT | Performed by: FAMILY MEDICINE

## 2024-12-16 PROCEDURE — 3074F SYST BP LT 130 MM HG: CPT | Performed by: FAMILY MEDICINE

## 2024-12-16 RX ORDER — METOPROLOL TARTRATE 50 MG
25 TABLET ORAL 2 TIMES DAILY
Qty: 90 TABLET | Refills: 3 | Status: SHIPPED | OUTPATIENT
Start: 2024-12-16 | End: 2025-12-16

## 2024-12-16 RX ORDER — LISINOPRIL 20 MG/1
20 TABLET ORAL NIGHTLY
Qty: 90 TABLET | Refills: 3 | Status: SHIPPED | OUTPATIENT
Start: 2024-12-16 | End: 2025-12-16

## 2024-12-16 RX ORDER — INSULIN ASPART 100 [IU]/ML
INJECTION, SOLUTION INTRAVENOUS; SUBCUTANEOUS
Qty: 5 ADJUSTABLE DOSE PRE-FILLED PEN SYRINGE | Refills: 11 | Status: SHIPPED | OUTPATIENT
Start: 2024-12-16

## 2024-12-16 RX ORDER — EMPAGLIFLOZIN 10 MG/1
10 TABLET, FILM COATED ORAL DAILY
Qty: 90 TABLET | Refills: 3 | Status: SHIPPED | OUTPATIENT
Start: 2024-12-16 | End: 2025-12-16

## 2024-12-16 RX ORDER — PANTOPRAZOLE SODIUM 40 MG/1
40 TABLET, DELAYED RELEASE ORAL DAILY
Qty: 90 TABLET | Refills: 3 | Status: SHIPPED | OUTPATIENT
Start: 2024-12-16 | End: 2025-12-16

## 2024-12-16 RX ORDER — CITALOPRAM HYDROBROMIDE 20 MG/1
20 TABLET ORAL NIGHTLY
Qty: 90 TABLET | Refills: 3 | Status: SHIPPED | OUTPATIENT
Start: 2024-12-16 | End: 2025-12-16

## 2024-12-16 RX ORDER — ONDANSETRON 8 MG/1
8 TABLET, ORALLY DISINTEGRATING ORAL EVERY 8 HOURS PRN
Qty: 12 TABLET | Refills: 2 | Status: SHIPPED | OUTPATIENT
Start: 2024-12-16

## 2024-12-16 RX ORDER — ATORVASTATIN CALCIUM 80 MG/1
80 TABLET, FILM COATED ORAL NIGHTLY
Qty: 90 TABLET | Refills: 3 | Status: SHIPPED | OUTPATIENT
Start: 2024-12-16

## 2024-12-16 ASSESSMENT — ENCOUNTER SYMPTOMS: RESPIRATORY NEGATIVE: 1

## 2024-12-16 NOTE — PROGRESS NOTES
and NovoLog sliding scale as needed  The following orders have not been finalized:  -     JARDIANCE 10 MG tablet  -     insulin aspart (NOVOLOG FLEXPEN) 100 UNIT/ML injection pen, to be used for sliding scale only.  -     atorvastatin (LIPITOR) 80 MG tablet  2. Primary hypertension at goal  The following orders have not been finalized:  -     metoprolol tartrate (LOPRESSOR) 50 MG tablet  -     lisinopril (PRINIVIL;ZESTRIL) 20 MG tablet  3. Gastroesophageal reflux disease with esophagitis, unspecified whether hemorrhage  The following orders have not been finalized:  -     pantoprazole (PROTONIX) 40 MG tablet         No follow-ups on file.       Rex Garcia MD

## 2025-03-17 RX ORDER — ONDANSETRON 8 MG/1
8 TABLET, ORALLY DISINTEGRATING ORAL EVERY 8 HOURS PRN
Qty: 12 TABLET | Refills: 2 | Status: CANCELLED | OUTPATIENT
Start: 2025-03-17

## 2025-03-20 ENCOUNTER — OFFICE VISIT (OUTPATIENT)
Dept: FAMILY MEDICINE CLINIC | Facility: CLINIC | Age: 60
End: 2025-03-20
Payer: COMMERCIAL

## 2025-03-20 VITALS
HEIGHT: 63 IN | SYSTOLIC BLOOD PRESSURE: 124 MMHG | RESPIRATION RATE: 16 BRPM | HEART RATE: 77 BPM | DIASTOLIC BLOOD PRESSURE: 80 MMHG | WEIGHT: 190.2 LBS | BODY MASS INDEX: 33.7 KG/M2 | OXYGEN SATURATION: 94 %

## 2025-03-20 DIAGNOSIS — R53.82 CHRONIC FATIGUE: ICD-10-CM

## 2025-03-20 DIAGNOSIS — F34.1 PERSISTENT DEPRESSIVE DISORDER: ICD-10-CM

## 2025-03-20 DIAGNOSIS — E11.43 POORLY CONTROLLED TYPE 2 DIABETES MELLITUS WITH AUTONOMIC NEUROPATHY (HCC): Primary | ICD-10-CM

## 2025-03-20 DIAGNOSIS — I10 PRIMARY HYPERTENSION: ICD-10-CM

## 2025-03-20 DIAGNOSIS — E11.65 POORLY CONTROLLED TYPE 2 DIABETES MELLITUS WITH AUTONOMIC NEUROPATHY (HCC): Primary | ICD-10-CM

## 2025-03-20 LAB
ALBUMIN SERPL-MCNC: 3.7 G/DL (ref 3.5–5)
ALBUMIN/GLOB SERPL: 1 (ref 1–1.9)
ALP SERPL-CCNC: 140 U/L (ref 35–104)
ALT SERPL-CCNC: 27 U/L (ref 8–45)
ANION GAP SERPL CALC-SCNC: 11 MMOL/L (ref 7–16)
AST SERPL-CCNC: 24 U/L (ref 15–37)
BILIRUB SERPL-MCNC: 0.3 MG/DL (ref 0–1.2)
BUN SERPL-MCNC: 16 MG/DL (ref 6–23)
CALCIUM SERPL-MCNC: 9.5 MG/DL (ref 8.8–10.2)
CHLORIDE SERPL-SCNC: 103 MMOL/L (ref 98–107)
CO2 SERPL-SCNC: 26 MMOL/L (ref 20–29)
CREAT SERPL-MCNC: 0.89 MG/DL (ref 0.6–1.1)
CREAT UR-MCNC: 59.7 MG/DL (ref 28–217)
EST. AVERAGE GLUCOSE BLD GHB EST-MCNC: 266 MG/DL
GLOBULIN SER CALC-MCNC: 3.6 G/DL (ref 2.3–3.5)
GLUCOSE SERPL-MCNC: 182 MG/DL (ref 70–99)
HBA1C MFR BLD: 10.9 % (ref 0–5.6)
MICROALBUMIN UR-MCNC: 2.18 MG/DL (ref 0–20)
MICROALBUMIN/CREAT UR-RTO: 37 MG/G (ref 0–30)
POTASSIUM SERPL-SCNC: 4.6 MMOL/L (ref 3.5–5.1)
PROT SERPL-MCNC: 7.2 G/DL (ref 6.3–8.2)
SODIUM SERPL-SCNC: 140 MMOL/L (ref 136–145)
TSH, 3RD GENERATION: 2.91 UIU/ML (ref 0.27–4.2)

## 2025-03-20 PROCEDURE — 3074F SYST BP LT 130 MM HG: CPT | Performed by: FAMILY MEDICINE

## 2025-03-20 PROCEDURE — 3079F DIAST BP 80-89 MM HG: CPT | Performed by: FAMILY MEDICINE

## 2025-03-20 PROCEDURE — 99214 OFFICE O/P EST MOD 30 MIN: CPT | Performed by: FAMILY MEDICINE

## 2025-03-20 RX ORDER — BUPROPION HYDROCHLORIDE 150 MG/1
150 TABLET ORAL EVERY MORNING
Qty: 90 TABLET | Refills: 3 | Status: SHIPPED | OUTPATIENT
Start: 2025-03-20 | End: 2026-03-15

## 2025-03-20 SDOH — ECONOMIC STABILITY: FOOD INSECURITY: WITHIN THE PAST 12 MONTHS, YOU WORRIED THAT YOUR FOOD WOULD RUN OUT BEFORE YOU GOT MONEY TO BUY MORE.: NEVER TRUE

## 2025-03-20 SDOH — ECONOMIC STABILITY: FOOD INSECURITY: WITHIN THE PAST 12 MONTHS, THE FOOD YOU BOUGHT JUST DIDN'T LAST AND YOU DIDN'T HAVE MONEY TO GET MORE.: NEVER TRUE

## 2025-03-20 ASSESSMENT — PATIENT HEALTH QUESTIONNAIRE - PHQ9
SUM OF ALL RESPONSES TO PHQ QUESTIONS 1-9: 9
5. POOR APPETITE OR OVEREATING: NOT AT ALL
SUM OF ALL RESPONSES TO PHQ QUESTIONS 1-9: 9
2. FEELING DOWN, DEPRESSED OR HOPELESS: NEARLY EVERY DAY
SUM OF ALL RESPONSES TO PHQ QUESTIONS 1-9: 9
9. THOUGHTS THAT YOU WOULD BE BETTER OFF DEAD, OR OF HURTING YOURSELF: NOT AT ALL
1. LITTLE INTEREST OR PLEASURE IN DOING THINGS: NOT AT ALL
10. IF YOU CHECKED OFF ANY PROBLEMS, HOW DIFFICULT HAVE THESE PROBLEMS MADE IT FOR YOU TO DO YOUR WORK, TAKE CARE OF THINGS AT HOME, OR GET ALONG WITH OTHER PEOPLE: NOT DIFFICULT AT ALL
6. FEELING BAD ABOUT YOURSELF - OR THAT YOU ARE A FAILURE OR HAVE LET YOURSELF OR YOUR FAMILY DOWN: SEVERAL DAYS
7. TROUBLE CONCENTRATING ON THINGS, SUCH AS READING THE NEWSPAPER OR WATCHING TELEVISION: SEVERAL DAYS
3. TROUBLE FALLING OR STAYING ASLEEP: SEVERAL DAYS
4. FEELING TIRED OR HAVING LITTLE ENERGY: NEARLY EVERY DAY
8. MOVING OR SPEAKING SO SLOWLY THAT OTHER PEOPLE COULD HAVE NOTICED. OR THE OPPOSITE, BEING SO FIGETY OR RESTLESS THAT YOU HAVE BEEN MOVING AROUND A LOT MORE THAN USUAL: NOT AT ALL
SUM OF ALL RESPONSES TO PHQ QUESTIONS 1-9: 9

## 2025-03-20 ASSESSMENT — ENCOUNTER SYMPTOMS: RESPIRATORY NEGATIVE: 1

## 2025-03-20 NOTE — PROGRESS NOTES
regimen.  She is compliant with her citalopram 20 mg, she really does not want to go up to 40 mg due to the potential for further weight gain, she is agreeable to adding Wellbutrin.    Will see her every 3 months until we get her A1c at goal.    Return in about 3 months (around 6/20/2025).       Rex Garcia MD

## 2025-03-20 NOTE — PATIENT INSTRUCTIONS
[employer] [employer] - Financial Resources*  (Call [facility] if you need more resources.)      Utility Assistance     [facility] Low Income Home Energy Assistance Program  They offer: energy assistance.  Contact: [phone]; [email or web address]  Helpful Info: Must be a [place] resident and need financial assistance with home energy costs.    [employer]   They offer: wide range of services to low and moderate-income residents in [facility]  Contact: [phone]; [address] [place] [address]    [facility]   They offer: basic needs for stability and support services.   Contact: [phone]; [address] [place] [address]     [facility]   They offer: help for families and individuals in need to pay rent and utility bills, purchase clothing, and food.    Contact: [phone]; [address], [place] [id number] [employer]   They offer:  assistance with utility bills   Contact:  [phone]; [address] [place] [address]       [employer]   They offer: Bill, clothes, food, and rent assistance.  Case Management and Counseling services available.   Contact: [phone] [facility] [place] [address]    Medical/Financial  [facility] Financial Assistance  They offer: help uninsured patients who do not qualify for government-sponsored health insurance and cannot afford to pay for their medical care. Insured patients may also qualify depending on family income, family size, and medical needs.   Contact: [phone]   Helpful Info: How to apply-  Option 1: Fill out the Financial Assistance Application(FAP). Copies of the Financial Assistance Application and the FAP may be obtained for free by calling the [employer] service department at [phone].   Option 2: download a copy from the [employer] website:

## 2025-03-23 NOTE — PROGRESS NOTES
[facility] CARDIOLOGY Follow Up                 Reason for Visit: CCD    Subjective:     Patient is a 59 y.o. female with a PMH of CAD status post CABG, bilateral carotid atherosclerosis, hypertension, hyperlipidemia, diabetes and gastroparesis who presents for follow-up.  The patient was last seen in September 2024.  She had a TTE in October 2023 that was noted to demonstrate a normal EF.  She denies angina.  The patient reports chronic CASTRO.      Past Medical History:   Diagnosis Date    Anxiety 11/28/2012    CAD (coronary artery disease)     No MI; no stents    Depression     managed with med    DM (diabetes mellitus) ([facility]) 11/28/2012    insulin reliant; AVG ; pt denies s.s. of hypoglycemia; last A1C    Encounter for weaning from ventilator ([facility]) 05/25/2022    Gastric ulcer due to nonsteroidal anti-inflammatory drug (NSAID) 08/11/2019    Gastroesophageal reflux disease with esophagitis     HLD (hyperlipidemia)     managed with med    Hyperkalemia 09/13/2022    Hypertension     managed with med    Idiopathic acute pancreatitis 06/28/2022    Lactic acidosis 09/13/2022    Ivelisse-Pena tear 08/11/2019    Severe sepsis ([facility]) 09/13/2022    Ulcerative esophagitis 08/11/2019    Viral gastroenteritis 09/13/2022      Past Surgical History:   Procedure Laterality Date    CARDIAC CATHETERIZATION Left     CORONARY ARTERY BYPASS GRAFT N/A 05/25/2022    CORONARY ARTERY BYPASS GRAFT (CABG X 2), [place]; ENDOSCOPIC VEIN HARVEST LEFT GREATER SAPHENOUS performed by Margarito Olivia MD at [facility] MAIN OR    TRANSESOPHAGEAL ECHOCARDIOGRAM N/A 05/25/2022    TRANSESOPHAGEAL ECHOCARDIOGRAM performed by Margarito Olivia MD at [facility] MAIN OR    UPPER GASTROINTESTINAL ENDOSCOPY  08/12/2019    performed at [facility] for coffee ground emesis, ulcerative espophagitis, gastric ulcers    UPPER GASTROINTESTINAL ENDOSCOPY N/A 11/3/2022    EGD BIOPSY performed by Peewee Britton MD at [facility] ENDOSCOPY      Family History   Problem Relation Age of Onset    Anemia

## 2025-03-24 ENCOUNTER — OFFICE VISIT (OUTPATIENT)
Age: 60
End: 2025-03-24
Payer: COMMERCIAL

## 2025-03-24 ENCOUNTER — RESULTS FOLLOW-UP (OUTPATIENT)
Age: 60
End: 2025-03-24

## 2025-03-24 VITALS
WEIGHT: 195.4 LBS | DIASTOLIC BLOOD PRESSURE: 60 MMHG | BODY MASS INDEX: 33.36 KG/M2 | SYSTOLIC BLOOD PRESSURE: 120 MMHG | HEART RATE: 74 BPM | HEIGHT: 64 IN

## 2025-03-24 DIAGNOSIS — E78.5 HYPERLIPIDEMIA, UNSPECIFIED HYPERLIPIDEMIA TYPE: ICD-10-CM

## 2025-03-24 DIAGNOSIS — Z95.1 HX OF CABG: Primary | ICD-10-CM

## 2025-03-24 DIAGNOSIS — R06.09 CHRONIC DYSPNEA: ICD-10-CM

## 2025-03-24 DIAGNOSIS — E66.9 OBESITY (BMI 30-39.9): ICD-10-CM

## 2025-03-24 DIAGNOSIS — I65.23 CAROTID ATHEROSCLEROSIS, BILATERAL: ICD-10-CM

## 2025-03-24 DIAGNOSIS — I10 HYPERTENSION, UNSPECIFIED TYPE: ICD-10-CM

## 2025-03-24 LAB
CHOLEST SERPL-MCNC: 147 MG/DL (ref 0–200)
HDLC SERPL-MCNC: 41 MG/DL (ref 40–60)
HDLC SERPL: 3.6 (ref 0–5)
LDLC SERPL CALC-MCNC: 69 MG/DL (ref 0–100)
TRIGL SERPL-MCNC: 187 MG/DL (ref 0–150)
VLDLC SERPL CALC-MCNC: 37 MG/DL (ref 6–23)

## 2025-03-24 PROCEDURE — 99214 OFFICE O/P EST MOD 30 MIN: CPT | Performed by: INTERNAL MEDICINE

## 2025-03-24 PROCEDURE — 3074F SYST BP LT 130 MM HG: CPT | Performed by: INTERNAL MEDICINE

## 2025-03-24 PROCEDURE — 3078F DIAST BP <80 MM HG: CPT | Performed by: INTERNAL MEDICINE

## 2025-03-25 NOTE — TELEPHONE ENCOUNTER
----- Message from Dr. Darnell Moffett MD sent at 3/24/2025  7:41 PM EDT -----  Please let the patient know that her LDL is well controlled.

## 2025-03-30 ENCOUNTER — RESULTS FOLLOW-UP (OUTPATIENT)
Dept: FAMILY MEDICINE CLINIC | Facility: CLINIC | Age: 60
End: 2025-03-30

## 2025-03-30 DIAGNOSIS — R74.8 ELEVATED ALKALINE PHOSPHATASE LEVEL: Primary | ICD-10-CM

## 2025-04-09 ENCOUNTER — TELEPHONE (OUTPATIENT)
Dept: FAMILY MEDICINE CLINIC | Facility: CLINIC | Age: 60
End: 2025-04-09

## 2025-04-09 ENCOUNTER — RESULTS FOLLOW-UP (OUTPATIENT)
Dept: FAMILY MEDICINE CLINIC | Facility: CLINIC | Age: 60
End: 2025-04-09

## 2025-04-09 ENCOUNTER — LAB (OUTPATIENT)
Dept: FAMILY MEDICINE CLINIC | Facility: CLINIC | Age: 60
End: 2025-04-09

## 2025-04-09 DIAGNOSIS — R74.8 ELEVATED ALKALINE PHOSPHATASE LEVEL: ICD-10-CM

## 2025-04-09 LAB
ALBUMIN SERPL-MCNC: 3.8 G/DL (ref 3.5–5)
ALBUMIN/GLOB SERPL: 1.1 (ref 1–1.9)
ALP SERPL-CCNC: 118 U/L (ref 35–104)
ALT SERPL-CCNC: 31 U/L (ref 8–45)
AST SERPL-CCNC: 25 U/L (ref 15–37)
BILIRUB DIRECT SERPL-MCNC: 0.1 MG/DL (ref 0–0.3)
BILIRUB SERPL-MCNC: 0.3 MG/DL (ref 0–1.2)
GLOBULIN SER CALC-MCNC: 3.6 G/DL (ref 2.3–3.5)
PROT SERPL-MCNC: 7.3 G/DL (ref 6.3–8.2)

## 2025-04-09 NOTE — TELEPHONE ENCOUNTER
Patient in office for labs this morning. There was a note from Dr. Garcia to talk with him about her insulin regimen while here. However, Dr. Garcia is out of the office.     Patient states that she is currently taking Novolog and Novolin.     Novolog is taken when needed in sliding scale if sugars are running high. States typically about 8 units when she has to take it. Was taking almost daily when she was out of test strips.     Novolin is 30 units in AM and 15 units at night.     States that she has been out of test strips and was not able to check her sugars, but has them now and they are getting more regular now.   Wondering if she can try getting Dexcom again now radha she has new insurance.     Patient also states that since her last visit she has been having dizziness, nausea, vomiting, hot sweats as well.   States that at first she wasn't sure what was causing this, but since last night she is thinking that it may be because she stopped taking her Celexa.

## 2025-04-11 LAB — 5NT SERPL-CCNC: 4 IU/L (ref 0–11)

## 2025-04-12 LAB
ALP BONE CFR SERPL: 32 % (ref 14–68)
ALP INTEST CFR SERPL: 41 % (ref 0–18)
ALP LIVER CFR SERPL: 27 % (ref 18–85)
ALP SERPL-CCNC: 121 IU/L (ref 44–121)

## 2025-06-04 DIAGNOSIS — E11.43 POORLY CONTROLLED TYPE 2 DIABETES MELLITUS WITH AUTONOMIC NEUROPATHY (HCC): ICD-10-CM

## 2025-06-04 DIAGNOSIS — E11.65 POORLY CONTROLLED TYPE 2 DIABETES MELLITUS WITH AUTONOMIC NEUROPATHY (HCC): ICD-10-CM

## 2025-06-04 RX ORDER — HUMAN INSULIN 100 [IU]/ML
INJECTION, SUSPENSION SUBCUTANEOUS
Qty: 15 ML | Refills: 5 | Status: SHIPPED | OUTPATIENT
Start: 2025-06-04

## 2025-07-17 ENCOUNTER — CARE COORDINATION (OUTPATIENT)
Dept: CARE COORDINATION | Facility: CLINIC | Age: 60
End: 2025-07-17

## (undated) DEVICE — TRAY CATHETER 16FR F INCLUDE LUB URIN M STATLOK STBL DEV SURSTP

## (undated) DEVICE — NEEDLE SYR 18GA L1.5IN RED PLAS HUB S STL BLNT FILL W/O

## (undated) DEVICE — CATHETER DIAG AD 5FR L100CM COR NYL JUDKINS R 5 DILATED

## (undated) DEVICE — SOLUTION IRRIG 3000ML 0.9% SOD CHL USP UROMATIC PLAS CONT

## (undated) DEVICE — SYSTEM ENDOSCP VES HARV W/ TOOL CANN SEAL SHT PRT BLNT TIP

## (undated) DEVICE — MPS® DELIVERY SET WITH 6 FT. DELIVERY TUBING: Brand: MPS

## (undated) DEVICE — CONTAINER FORMALIN PREFILLED 10% NBF 60ML

## (undated) DEVICE — BAND RADIAL COMPR ARTERY 24CM -- REG BX/10

## (undated) DEVICE — GAUZE,SPONGE,4"X4",12PLY,WOVEN,NS,LF: Brand: MEDLINE

## (undated) DEVICE — KENDALL RADIOLUCENT FOAM MONITORING ELECTRODE RECTANGULAR SHAPE: Brand: KENDALL

## (undated) DEVICE — GLIDESHEATH SLENDER STAINLESS STEEL KIT: Brand: GLIDESHEATH SLENDER

## (undated) DEVICE — SYRINGE MED 3ML CLR PLAS STD N CTRL LUERLOCK TIP DISP

## (undated) DEVICE — YANKAUER,BULB TIP,W/O VENT,RIGID,STERILE: Brand: MEDLINE

## (undated) DEVICE — CLIP LIG SM TI 20 BLU HNDL FOR OPN AND ENDOSCP SGL APPL

## (undated) DEVICE — CABG DR DENNIS: Brand: MEDLINE INDUSTRIES, INC.

## (undated) DEVICE — LUBE JELLY FOIL PACK 1.4 OZ: Brand: MEDLINE INDUSTRIES, INC.

## (undated) DEVICE — Device

## (undated) DEVICE — FORCEPS BX L240CM JAW DIA2.8MM L CAP W/ NDL MIC MESH TOOTH

## (undated) DEVICE — SINGLE PORT MANIFOLD: Brand: NEPTUNE 2

## (undated) DEVICE — AIRLIFE™ OXYGEN TUBING 7 FEET (2.1 M) CRUSH RESISTANT OXYGEN TUBING, VINYL TIPPED: Brand: AIRLIFE™

## (undated) DEVICE — BLOCK BITE AD 60FR W/ VELC STRP ADDRESSES MOST PT AND

## (undated) DEVICE — CONNECTOR TBNG OD5-7MM O2 END DISP

## (undated) DEVICE — CATH 5F 110CM PG145 -- DXTERITY

## (undated) DEVICE — PERCUTANEOUS INSERTION KIT-ARTERIAL: Brand: PERCUTANEOUS INSERTION KIT-ARTERIAL

## (undated) DEVICE — SOLUTION IRRIG 1000ML 09% SOD CHL USP PIC PLAS CONTAINER

## (undated) DEVICE — CATH 5F 100CM JL35 -- DXTERITY

## (undated) DEVICE — WIRE PACING BIPOLAR ATRIAL 60CM ULTRA THIN

## (undated) DEVICE — CANNULA NSL ORAL AD FOR CAPNOFLEX CO2 O2 AIRLFE

## (undated) DEVICE — SYRINGE, LUER SLIP, STERILE, 60ML: Brand: MEDLINE

## (undated) DEVICE — DRAIN SURG SGL COLL PT TB FOR ATS BG OASIS

## (undated) DEVICE — AUTOTRANSFUSION KIT 120 MH FAST STRT AT1 FOR CATS +

## (undated) DEVICE — CATH GUID COR EB30 5FR 100CM -- LAUNCHER

## (undated) DEVICE — SOLUTION IRRIG 1000ML LAC RINGER PLAS POUR BTL

## (undated) DEVICE — PERFUSION PACK XCOATING 76320] TERUMO CARDIOVASCULAR]

## (undated) DEVICE — SYRINGE MED 10ML LUERLOCK TIP W/O SFTY DISP